# Patient Record
Sex: FEMALE | Race: WHITE | Employment: OTHER | URBAN - METROPOLITAN AREA
[De-identification: names, ages, dates, MRNs, and addresses within clinical notes are randomized per-mention and may not be internally consistent; named-entity substitution may affect disease eponyms.]

---

## 2016-07-11 LAB
AMB DEXA, EXTERNAL: NORMAL
COLONOSCOPY, EXTERNAL: NORMAL
MAMMOGRAPHY, EXTERNAL: NORMAL

## 2017-04-17 DIAGNOSIS — I12.9 HYPERTENSION WITH RENAL DISEASE: ICD-10-CM

## 2017-07-17 ENCOUNTER — OFFICE VISIT (OUTPATIENT)
Dept: INTERNAL MEDICINE CLINIC | Age: 72
End: 2017-07-17

## 2017-07-17 VITALS
HEIGHT: 62 IN | HEART RATE: 80 BPM | SYSTOLIC BLOOD PRESSURE: 140 MMHG | DIASTOLIC BLOOD PRESSURE: 90 MMHG | RESPIRATION RATE: 14 BRPM | WEIGHT: 203.6 LBS | BODY MASS INDEX: 37.47 KG/M2

## 2017-07-17 DIAGNOSIS — M19.90 OSTEOARTHRITIS, UNSPECIFIED OSTEOARTHRITIS TYPE, UNSPECIFIED SITE: ICD-10-CM

## 2017-07-17 DIAGNOSIS — E55.9 VITAMIN D DEFICIENCY: ICD-10-CM

## 2017-07-17 DIAGNOSIS — N18.3 CKD (CHRONIC KIDNEY DISEASE), STAGE 3 (MODERATE): ICD-10-CM

## 2017-07-17 DIAGNOSIS — E66.9 MILD OBESITY: ICD-10-CM

## 2017-07-17 DIAGNOSIS — E78.2 MIXED HYPERLIPIDEMIA: ICD-10-CM

## 2017-07-17 DIAGNOSIS — I12.9 HYPERTENSION WITH RENAL DISEASE: Primary | ICD-10-CM

## 2017-07-17 DIAGNOSIS — M85.80 OSTEOPENIA, UNSPECIFIED LOCATION: ICD-10-CM

## 2017-07-17 DIAGNOSIS — R73.02 GLUCOSE INTOLERANCE (IMPAIRED GLUCOSE TOLERANCE): ICD-10-CM

## 2017-07-17 PROBLEM — R79.89 ELEVATED LFTS: Status: ACTIVE | Noted: 2017-07-17

## 2017-07-17 PROBLEM — N18.9 CKD (CHRONIC KIDNEY DISEASE): Status: ACTIVE | Noted: 2017-07-17

## 2017-07-17 PROBLEM — H40.20X0 PRIMARY ANGLE-CLOSURE GLAUCOMA: Status: ACTIVE | Noted: 2017-07-17

## 2017-07-17 PROBLEM — E78.5 HYPERLIPIDEMIA: Status: ACTIVE | Noted: 2017-07-17

## 2017-07-17 PROBLEM — Z79.899 ON STATIN THERAPY: Status: ACTIVE | Noted: 2017-07-17

## 2017-07-17 PROBLEM — D48.5 NEOPLASM OF UNCERTAIN BEHAVIOR OF SKIN: Status: ACTIVE | Noted: 2017-07-17

## 2017-07-17 PROBLEM — K63.5 COLON POLYPS: Status: ACTIVE | Noted: 2017-07-17

## 2017-07-17 LAB
ALBUMIN SERPL-MCNC: 4.2 G/DL (ref 3.9–5.4)
ALKALINE PHOS POC: 95 U/L (ref 38–126)
ALT SERPL-CCNC: 31 U/L (ref 9–52)
AST SERPL-CCNC: 26 U/L (ref 14–36)
BACTERIA UA POCT, BACTPOCT: NORMAL
BILIRUB UR QL STRIP: NEGATIVE
BUN BLD-MCNC: 19 MG/DL (ref 7–17)
CALCIUM BLD-MCNC: 9.9 MG/DL (ref 8.4–10.2)
CASTS UA POCT: 0
CHLORIDE BLD-SCNC: 99 MMOL/L (ref 98–107)
CHOLEST SERPL-MCNC: 256 MG/DL (ref 0–200)
CK (CPK) POC: 73 U/L (ref 30–135)
CLUE CELLS, CLUEPOCT: NEGATIVE
CO2 POC: 28 MMOL/L (ref 22–32)
CREAT BLD-MCNC: 0.8 MG/DL (ref 0.7–1.2)
CRYSTALS UA POCT, CRYSPOCT: NEGATIVE
EGFR (POC): 75.2
EPITHELIAL CELLS POCT, EPITHPOCT: NORMAL
GLUCOSE POC: 94 MG/DL (ref 65–105)
GLUCOSE UR-MCNC: NEGATIVE MG/DL
GRAN# POC: 3.8 K/UL (ref 2–7.8)
GRAN% POC: 46.5 % (ref 37–92)
HBA1C MFR BLD HPLC: 5.9 % (ref 4–5.7)
HCT VFR BLD CALC: 43.8 %
HDLC SERPL-MCNC: 67 MG/DL (ref 35–130)
HGB BLD-MCNC: 14.1 G/DL (ref 12–18)
KETONES P FAST UR STRIP-MCNC: NEGATIVE MG/DL
LDL CHOLESTEROL POC: 159.8 MG/DL (ref 0–130)
LY# POC: 4 K/UL (ref 0.6–4.1)
LY% POC: 49.6 % (ref 10–58.5)
MCH RBC QN: 26.9 PG (ref 26–32)
MCHC RBC-ENTMCNC: 32.1 G/DL (ref 30–36)
MCV RBC: 84 FL (ref 80–97)
MID #, POC: 0.3 K/UL (ref 0–1.8)
MID% POC: 3.9 % (ref 0.1–24)
MUCUS UA POCT, MUCPOCT: NORMAL
PH UR STRIP: 7 [PH] (ref 4.6–8)
PLATELET # BLD: 264 K/UL (ref 140–440)
POTASSIUM SERPL-SCNC: 4.6 MMOL/L (ref 3.6–5)
PROT SERPL-MCNC: 7.5 G/DL (ref 6.3–8.2)
PROTEIN,URINE POC: NEGATIVE MG/DL
RBC # BLD: 5.22 M/UL (ref 4.2–6.3)
RBC UA POCT, RBCPOCT: NORMAL
SODIUM SERPL-SCNC: 142 MMOL/L (ref 137–145)
SP GR UR STRIP: 1.01 (ref 1–1.03)
T4 FREE SERPL-MCNC: 0.91 NG/DL (ref 0.58–2.3)
TCHOL/HDL RATIO (POC): 3.8 (ref 0–4)
TOTAL BILIRUBIN POC: 1.3 MG/DL (ref 0.2–1.3)
TRICH UA POCT, TRICHPOC: NEGATIVE
TRIGL SERPL-MCNC: 146 MG/DL (ref 0–200)
TSH BLD-ACNC: 2.79 UIU/ML (ref 0.34–5.6)
UA UROBILINOGEN AMB POC: NORMAL (ref 0.2–1)
URINALYSIS CLARITY POC: CLEAR
URINALYSIS COLOR POC: NORMAL
URINE BLOOD POC: NORMAL
URINE LEUKOCYTES POC: NORMAL
URINE NITRITES POC: NEGATIVE
VITAMIN D POC: 44.4 NG/ML (ref 30–96)
VLDLC SERPL CALC-MCNC: 29.2 MG/DL
WBC # BLD: 8.1 K/UL (ref 4.1–10.9)
WBC UA POCT, WBCPOCT: 0
YEAST UA POCT, YEASTPOC: NEGATIVE

## 2017-07-17 RX ORDER — ALPRAZOLAM 0.25 MG/1
0.5 TABLET ORAL
COMMUNITY
End: 2017-08-09 | Stop reason: ALTCHOICE

## 2017-07-17 RX ORDER — POLYETHYLENE GLYCOL 3350 17 G/17G
17 POWDER, FOR SOLUTION ORAL DAILY
COMMUNITY
End: 2017-07-17 | Stop reason: SDUPTHER

## 2017-07-17 RX ORDER — CYCLOBENZAPRINE HCL 10 MG
10 TABLET ORAL 2 TIMES DAILY
COMMUNITY
End: 2017-10-23 | Stop reason: ALTCHOICE

## 2017-07-17 NOTE — PROGRESS NOTES
Chief Complaint   Patient presents with    Annual Exam       SUBJECTIVE:    Gi Shane is a 73 y.o. female who presents for follow-up of her multiple medical problems to include hypertension glucose intolerance hyperlipidemia obesity DJD GERD and osteopenia porosis along with her other medical problems. She claims to be following her diet and trying to get her weight down to like to something that is over-the-counter that may be beneficial to help her with jumpstart her weight loss. She denies any chest pain shortness of breath or cardiovascular complaints at all. There are no GI/ complaints. She does have a lot of stress going on since her 's been diagnosed with advanced prostate cancer and is receiving treatment at the [facility].  There are no other complaints on complete review of systems. Current Outpatient Prescriptions   Medication Sig Dispense Refill    cyclobenzaprine (FLEXERIL) 10 mg tablet Take 10 mg by mouth two (2) times a day.  ALPRAZolam (XANAX) 0.25 mg tablet Take 0.5 mg by mouth two (2) times daily as needed for Anxiety.  Omeprazole delayed release (PRILOSEC D/R) 20 mg tablet Take 20 mg by mouth every morning.  biotin 10,000 mcg cap Take  by mouth daily (with dinner).  Cholecalciferol, Vitamin D3, 3,000 unit tab Take 1,000 Units by mouth every morning.  cyanocobalamin (VITAMIN B12) 500 mcg tablet Take 500 mcg by mouth every morning.  ranitidine (ZANTAC) 300 mg tablet Take 300 mg by mouth daily (with dinner).  irbesartan (AVAPRO) 300 mg tablet Take 300 mg by mouth every morning. Indications: HYPERTENSION      polyethylene glycol (MIRALAX) 17 gram/dose powder Take 17 g by mouth every morning. Indications: CONSTIPATION      hydrochlorothiazide (HYDRODIURIL) 25 mg tablet Take 25 mg by mouth every morning.        Past Medical History:   Diagnosis Date    Anxiety     Arthritis     Chronic constipation     CKD (chronic kidney disease) 7/17/2017    Colon polyps 7/17/2017    DJD (degenerative joint disease) 7/17/2017    Elevated LFTs 7/17/2017    GERD (gastroesophageal reflux disease)     Glucose intolerance (impaired glucose tolerance) 7/17/2017    Hemorrhoids     internal & external- bleeds frequently    High cholesterol     Hyperlipidemia 7/17/2017    Hypertension     Hypertension with renal disease 7/17/2017    PT Education - High Blood Pressure (Essential Hypertension) *: blood pressure PT Education - How to access health information online: discussed with patient and provided information    Hypertension, renal 7/17/2017    Ill-defined condition     ringing in ears    Mild obesity 7/17/2017    Nausea & vomiting     Neoplasm of uncertain behavior of skin 7/17/2017    On statin therapy 7/17/2017    Osteopenia 7/17/2017    Primary angle-closure glaucoma 7/17/2017    Comments: OU    Skin cancer of face     as of 6/15/16 pt states \"removed years ago\"    Spinal stenosis      Past Surgical History:   Procedure Laterality Date    HX CHOLECYSTECTOMY  11/6/2014    Dr Jose E Mina for glaucoma    HX HYSTERECTOMY      partial    HX PELVIC LAPAROSCOPY      Jono. oophorectomy    HX TUBAL LIGATION      MS COLSC FLX W/REMOVAL LESION BY HOT BX FORCEPS  11/12/2012         MS ERCP REMOVE CALCULI/DEBRIS BILIARY/PANCREAS DUCT  11/7/2014         MS ERCP W/SPHINCTEROTOMY/PAPILLOTOMY  11/7/2014          Allergies   Allergen Reactions    Iodinated Contrast- Oral And Iv Dye Hives    Oxycodone-Aspirin Unknown (comments)    Simvastatin Myalgia    Benicar [Olmesartan] Cough       REVIEW OF SYSTEMS:  General: negative for - chills or fever positive anxiety  ENT: negative for - headaches, nasal congestion or tinnitus  Eyes: no blurred or visual changes  Neck: No stiffness or swollen nodes  Respiratory: negative for - cough, hemoptysis, shortness of breath or wheezing  Cardiovascular : negative for - chest pain, edema, palpitations or shortness of breath  Gastrointestinal: negative for - abdominal pain, blood in stools, heartburn or nausea/vomiting  Genito-Urinary: no dysuria, trouble voiding, or hematuria  Musculoskeletal: negative for - gait disturbance, joint pain, joint stiffness or joint swelling  Neurological: no TIA or stroke symptoms  Hematologic: no bruises, no bleeding  Lymphatic: no swollen glands  Integument: no lumps, nail changes or rash positive changed lesion back of R leg  Endocrine:no malaise/lethargy poly uria or polydipsia or unexpected weight changes        Social History     Social History    Marital status:      Spouse name: N/A    Number of children: N/A    Years of education: N/A     Social History Main Topics    Smoking status: Former Smoker     Packs/day: 1.50     Years: 35.00     Quit date: 11/4/2004    Smokeless tobacco: Never Used      Comment: quit smoking 6 yrs ago    Alcohol use No    Drug use: No    Sexual activity: Not on file     Other Topics Concern    Not on file     Social History Narrative     Family History   Problem Relation Age of Onset    Heart Disease Mother     Hypertension Mother     Cancer Mother      skin    Stroke Father     Cancer Sister      breast     Breast Cancer Sister        OBJECTIVE:     Visit Vitals    /90 (BP 1 Location: Left arm, BP Patient Position: Sitting)    Pulse 80    Resp 14    Ht 5' 1.5\" (1.562 m)    Wt 203 lb 9.6 oz (92.4 kg)    BMI 37.85 kg/m2     CONSTITUTIONAL: well , well nourished, appears age appropriate  EYES: perrla, eom intact  ENMT:moist mucous membranes, pharynx clear  NECK: supple.  Thyroid normal, No JVD or bruits  BREAST: Normal female w/o masses or skin changes, no axillary adenopathy  RESPIRATORY: Chest: clear to ascultation and percussion   CARDIOVASCULAR: Heart: regular rate and rhythm no murmurs, rubs or gallops, PMI not displaced  GASTROINTESTINAL: Abdomen: soft, bowel sounds active  HEMATOLOGIC: no pathological lymph nodes palpated  MUSCULOSKELETAL: Extremities: no edema or active synovitis, pulse 1+   INTEGUMENT: No unusual rashes or suspicious skin lesions note except irregular raised lesion back of R calf. Nails appear normal.  NEUROLOGIC: non-focal exam   MENTAL STATUS: alert and oriented, appropriate affect Increased stress    ASSESSMENT:     ICD-10-CM ICD-9-CM    1. Hypertension with renal disease I12.9 403.90 AMB POC COMPLETE CBC,AUTOMATED ENTER      AMB POC COMPREHENSIVE METABOLIC PANEL      AMB POC T4, FREE      AMB POC TSH      AMB POC URINALYSIS DIP STICK AUTO W/ MICRO    2. Glucose intolerance (impaired glucose tolerance) R73.02 790.22 AMB POC HEMOGLOBIN A1C   3. CKD (chronic kidney disease), stage 3 (moderate) N18.3 585.3    4. Mixed hyperlipidemia E78.2 272.2 AMB POC CK (CPK)      AMB POC LIPID PROFILE   5. Osteoarthritis, unspecified osteoarthritis type, unspecified site M19.90 715.90    6. Mild obesity E66.9 278.00    7. Osteopenia, unspecified location M85.80 733.90    8. Vitamin D deficiency E55.9 268.9 AMB POC VITAMIN D     Impression  1. Hypertension that is borderline today 140/90 she thinks stress exacerbated by the fact that her  is ill. We will not make any changes because of that. 2.  Glucose intolerance we will see what that status is today and see if we need to make any changes I did review her last numbers with her A1c at that time was 6.2  3. CKD we will see what that status his last GFR was 75  4. Hyperlipidemia I reviewed her last numbers with her remarkable for a HDL which was 65 LDL was a bit high at 144  5. Mild obesity weight is 063.1 which certainly needs to come down stressed again diet exercise weight reduction I did caution regarding any over-the-counter supplements  6.   Vitamin D deficiency we will see what that status is today  We will call and make further adjustments and recommendations we will continue current medicines and will make any changes we need to based upon lab otherwise follow-up scheduled again for 3 months          PLAN:  .  Orders Placed This Encounter    AMB POC CK (CPK)    AMB POC COMPLETE CBC,AUTOMATED ENTER    AMB POC COMPREHENSIVE METABOLIC PANEL    AMB POC HEMOGLOBIN A1C    AMB POC T4, FREE    AMB POC LIPID PROFILE    AMB POC TSH    AMB POC URINALYSIS DIP STICK AUTO W/ MICRO     AMB POC VITAMIN D    DISCONTD: polyethylene glycol (MIRALAX) 17 gram/dose powder         ATTENTION:   This medical record was transcribed using an electronic medical records system. Although proofread, it may and can contain electronic and spelling errors. Other human spelling and other errors may be present. Corrections may be executed at a later time. Please feel free to contact us for any clarifications as needed.       Follow-up Disposition: Not on File      Clifton Krabbe, MD

## 2017-07-17 NOTE — PROGRESS NOTES
1. Have you been to the ER, urgent care clinic since your last visit? Hospitalized since your last visit? No    2. Have you seen or consulted any other health care providers outside of the [address] [place] [address] since your last visit? Include any pap smears or colon screening. No    Pt under stress due to  dx. Of advanced prostate cancer.

## 2017-08-09 ENCOUNTER — OFFICE VISIT (OUTPATIENT)
Dept: INTERNAL MEDICINE CLINIC | Age: 72
End: 2017-08-09

## 2017-08-09 VITALS
HEART RATE: 92 BPM | BODY MASS INDEX: 37.36 KG/M2 | DIASTOLIC BLOOD PRESSURE: 70 MMHG | TEMPERATURE: 98 F | HEIGHT: 62 IN | RESPIRATION RATE: 18 BRPM | OXYGEN SATURATION: 92 % | SYSTOLIC BLOOD PRESSURE: 122 MMHG | WEIGHT: 203 LBS

## 2017-08-09 DIAGNOSIS — D48.5 NEOPLASM OF UNCERTAIN BEHAVIOR OF SKIN: Primary | ICD-10-CM

## 2017-08-09 NOTE — PROGRESS NOTES
Chief Complaint   Patient presents with    Skin Problem     skin lesion on left lower leg x 6 months has changed color and now raised off the skin     1. Have you been to the ER, urgent care clinic since your last visit? Hospitalized since your last visit? No    2. Have you seen or consulted any other health care providers outside of the [address] [place] [address] since your last visit? Include any pap smears or colon screening.  No

## 2017-08-09 NOTE — PROGRESS NOTES
Subjective:   Kiara Grmaajo is a 81 y.o. female      Chief Complaint   Patient presents with    Skin Problem     skin lesion on left lower leg x 6 months has changed color and now raised off the skin        History of present illness: She presents for removal of skin lesion on the back of the left leg is become larger and very dark in appearance. She denies chest pain shortness breath cardio instruments. There are no neurological points. There are no other complaints on complete review of systems.     Patient Active Problem List   Diagnosis Code    Choledocholithiasis K80.50    Grade IV hemorrhoids K64.3    Colon polyps K63.5    Primary angle-closure glaucoma H40.20X0    Mild obesity E66.9    DJD (degenerative joint disease) M19.90    On statin therapy Z79.899    Hyperlipidemia E78.5    Glucose intolerance (impaired glucose tolerance) R73.02    Hypertension with renal disease I12.9    Osteopenia M85.80    Neoplasm of uncertain behavior of skin D48.5    Elevated LFTs R94.5    CKD (chronic kidney disease) N18.9    Vitamin D deficiency E55.9      Past Medical History:   Diagnosis Date    Anxiety     Arthritis     Chronic constipation     CKD (chronic kidney disease) 7/17/2017    Colon polyps 7/17/2017    DJD (degenerative joint disease) 7/17/2017    Elevated LFTs 7/17/2017    GERD (gastroesophageal reflux disease)     Glucose intolerance (impaired glucose tolerance) 7/17/2017    Hemorrhoids     internal & external- bleeds frequently    High cholesterol     Hyperlipidemia 7/17/2017    Hypertension     Hypertension with renal disease 7/17/2017    PT Education - High Blood Pressure (Essential Hypertension) *: blood pressure PT Education - How to access health information online: discussed with patient and provided information    Hypertension, renal 7/17/2017    Ill-defined condition     ringing in ears    Mild obesity 7/17/2017    Nausea & vomiting     Neoplasm of uncertain behavior of skin 7/17/2017    On statin therapy 7/17/2017    Osteopenia 7/17/2017    Primary angle-closure glaucoma 7/17/2017    Comments: OU    Skin cancer of face     as of 6/15/16 pt states \"removed years ago\"    Spinal stenosis       Allergies   Allergen Reactions    Iodinated Contrast- Oral And Iv Dye Hives    Oxycodone-Aspirin Unknown (comments)    Simvastatin Myalgia    Benicar [Olmesartan] Cough      Family History   Problem Relation Age of Onset    Heart Disease Mother     Hypertension Mother     Cancer Mother      skin    Stroke Father     Breast Cancer Sister       Social History     Social History    Marital status:      Spouse name: N/A    Number of children: N/A    Years of education: N/A     Occupational History    Not on file. Social History Main Topics    Smoking status: Former Smoker     Packs/day: 1.50     Years: 35.00     Quit date: 11/4/2004    Smokeless tobacco: Never Used      Comment: quit smoking 6 yrs ago    Alcohol use No    Drug use: No    Sexual activity: Not on file     Other Topics Concern    Not on file     Social History Narrative     Prior to Admission medications    Medication Sig Start Date End Date Taking? Authorizing Provider   cyclobenzaprine (FLEXERIL) 10 mg tablet Take 10 mg by mouth two (2) times a day. Yes Historical Provider   Omeprazole delayed release (PRILOSEC D/R) 20 mg tablet Take 20 mg by mouth every morning. Yes Historical Provider   biotin 10,000 mcg cap Take  by mouth daily (with dinner). Yes Historical Provider   Cholecalciferol, Vitamin D3, 3,000 unit tab Take 1,000 Units by mouth every morning. Yes Celestino Wetzel MD   cyanocobalamin (VITAMIN B12) 500 mcg tablet Take 500 mcg by mouth every morning. Yes Celestino Wetzel MD   ranitidine (ZANTAC) 300 mg tablet Take 300 mg by mouth daily (with dinner). Yes Historical Provider   irbesartan (AVAPRO) 300 mg tablet Take 300 mg by mouth every morning.  Indications: HYPERTENSION   Yes Historical Provider   polyethylene glycol (MIRALAX) 17 gram/dose powder Take 17 g by mouth every morning. Indications: CONSTIPATION   Yes Historical Provider   hydrochlorothiazide (HYDRODIURIL) 25 mg tablet Take 25 mg by mouth every morning. Yes Celestino Wetzel MD        Review of Systems              Constitutional:  She denies fever, weight loss, sweats or fatigue. Respiratory:  No cough, wheezing or shortness of breath. No sputum production. Cardiac:  Denies chest pain, palpitations, unexplained indigestion, syncope, edema, PND or orthopnea. Skin:  No recent rashes, skin lesion on the back of the left leg has gotten larger and become darker  Neurological:   No syncope, dizziness, or other recent neurologic changes     Objective:     Vitals:    08/09/17 1456   BP: 122/70   Pulse: 92   Resp: 18   Temp: 98 °F (36.7 °C)   TempSrc: Oral   SpO2: 92%   Weight: 203 lb (92.1 kg)   Height: 5' 1.5\" (1.562 m)   PainSc:   0 - No pain       Body mass index is 37.74 kg/(m^2). Physical Examination:              General Appearance:  Well-developed, well-nourished, no acute  distress. Eyes:  Anicteric    Neck:   No JVD noted. Lungs:  Clear to auscultation and percussion. Cardiac:  Regular rate and rhythm without murmur. PMI not displaced. No gallop, rub or click. Extremities:  No edema. Skin:  No rash, but irregular dark raised skin lesion problematic in appearance maximum diameter about 1.4 cm posterior left mid calf  Lymph Nodes:  None felt in the cervical, supraclavicular, axillary or inguinal region. Neurological: . No focal deficits. Assessment/Plan:         1. Neoplasm of uncertain behavior of skin        Impressions/Plan:  We discussed treatment options for the skin lesion and we have elected to excise it after Betadine scrub and local lidocaine anesthesia the lesion was excised in entirety edges were then opposed with 3 sutures of 5-0 Ethilon.   It is sterilely dressed and she is instructed in local care. Specimen sent for path evaluation. She will return in 10 days for suture removal we will notify regarding path results    Follow-up Disposition: Not on File    No results found for any visits on 08/09/17. Carlie Guerrero MD    The patient was given an after visit summary and the patient verbalized an understanding of the plans and problems as explained.

## 2017-08-14 LAB
DX ICD CODE: NORMAL
DX ICD CODE: NORMAL
PATH REPORT.FINAL DX SPEC: NORMAL
PATH REPORT.GROSS SPEC: NORMAL
PATH REPORT.RELEVANT HX SPEC: NORMAL
PATH REPORT.SITE OF ORIGIN SPEC: NORMAL
PATHOLOGIST NAME: NORMAL
PAYMENT PROCEDURE: NORMAL

## 2017-08-18 ENCOUNTER — OFFICE VISIT (OUTPATIENT)
Dept: INTERNAL MEDICINE CLINIC | Age: 72
End: 2017-08-18

## 2017-08-18 DIAGNOSIS — Z48.02 ENCOUNTER FOR REMOVAL OF SUTURES: Primary | ICD-10-CM

## 2017-08-18 NOTE — PROGRESS NOTES
Subjective:   Jovan Jade is a 81 y.o. is here for suture removal.    Objective:   Patient appears well. Wound is healing well, without evidence of infection. Assessment/Plan:   Wound healing well, ready for suture removal.  suture(s) remove x 3, recheck PRN. Discussed taking care of wound and signs of infection.

## 2017-09-21 ENCOUNTER — HOSPITAL ENCOUNTER (OUTPATIENT)
Dept: MAMMOGRAPHY | Age: 72
Discharge: HOME | End: 2017-09-21
Attending: INTERNAL MEDICINE
Payer: MEDICARE

## 2017-09-21 DIAGNOSIS — Z12.31 VISIT FOR SCREENING MAMMOGRAM: ICD-10-CM

## 2017-09-21 PROCEDURE — 77067 SCR MAMMO BI INCL CAD: CPT

## 2017-10-23 ENCOUNTER — OFFICE VISIT (OUTPATIENT)
Dept: INTERNAL MEDICINE CLINIC | Age: 72
End: 2017-10-23

## 2017-10-23 VITALS
TEMPERATURE: 97.7 F | HEART RATE: 78 BPM | HEIGHT: 62 IN | RESPIRATION RATE: 18 BRPM | BODY MASS INDEX: 37.73 KG/M2 | SYSTOLIC BLOOD PRESSURE: 130 MMHG | WEIGHT: 205 LBS | OXYGEN SATURATION: 96 % | DIASTOLIC BLOOD PRESSURE: 89 MMHG

## 2017-10-23 DIAGNOSIS — Z23 ENCOUNTER FOR IMMUNIZATION: ICD-10-CM

## 2017-10-23 DIAGNOSIS — R73.02 GLUCOSE INTOLERANCE (IMPAIRED GLUCOSE TOLERANCE): ICD-10-CM

## 2017-10-23 DIAGNOSIS — M54.50 ACUTE MIDLINE LOW BACK PAIN WITHOUT SCIATICA: ICD-10-CM

## 2017-10-23 DIAGNOSIS — I12.9 HYPERTENSION WITH RENAL DISEASE: Primary | ICD-10-CM

## 2017-10-23 DIAGNOSIS — T75.89XA EFFECTS OF EXPOSURE TO EXTERNAL CAUSE, INITIAL ENCOUNTER: ICD-10-CM

## 2017-10-23 DIAGNOSIS — M85.80 OSTEOPENIA, UNSPECIFIED LOCATION: ICD-10-CM

## 2017-10-23 DIAGNOSIS — E55.9 VITAMIN D DEFICIENCY: ICD-10-CM

## 2017-10-23 DIAGNOSIS — E66.9 MILD OBESITY: ICD-10-CM

## 2017-10-23 DIAGNOSIS — Z00.00 MEDICARE ANNUAL WELLNESS VISIT, INITIAL: ICD-10-CM

## 2017-10-23 DIAGNOSIS — M19.90 OSTEOARTHRITIS, UNSPECIFIED OSTEOARTHRITIS TYPE, UNSPECIFIED SITE: ICD-10-CM

## 2017-10-23 DIAGNOSIS — E78.2 MIXED HYPERLIPIDEMIA: ICD-10-CM

## 2017-10-23 DIAGNOSIS — N18.2 STAGE 2 CHRONIC KIDNEY DISEASE: ICD-10-CM

## 2017-10-23 LAB
ALBUMIN SERPL-MCNC: 4.3 G/DL (ref 3.9–5.4)
ALKALINE PHOS POC: 90 U/L (ref 38–126)
ALT SERPL-CCNC: 36 U/L (ref 9–52)
AST SERPL-CCNC: 27 U/L (ref 14–36)
BACTERIA UA POCT, BACTPOCT: NORMAL
BILIRUB UR QL STRIP: NEGATIVE
BUN BLD-MCNC: 20 MG/DL (ref 7–17)
CALCIUM BLD-MCNC: 9.8 MG/DL (ref 8.4–10.2)
CASTS UA POCT: NORMAL
CHLORIDE BLD-SCNC: 104 MMOL/L (ref 98–107)
CHOLEST SERPL-MCNC: 239 MG/DL (ref 0–200)
CK (CPK) POC: 59 U/L (ref 30–135)
CLUE CELLS, CLUEPOCT: NEGATIVE
CO2 POC: 28 MMOL/L (ref 22–32)
CREAT BLD-MCNC: 0.8 MG/DL (ref 0.7–1.2)
CRYSTALS UA POCT, CRYSPOCT: NEGATIVE
EGFR (POC): 74.7
EPITHELIAL CELLS POCT: NORMAL
GLUCOSE POC: 97 MG/DL (ref 65–105)
GLUCOSE UR-MCNC: NEGATIVE MG/DL
GRAN# POC: 3.6 K/UL (ref 2–7.8)
GRAN% POC: 48.6 % (ref 37–92)
HBA1C MFR BLD HPLC: 6.3 % (ref 4.5–5.7)
HCT VFR BLD CALC: 45.8 % (ref 37–51)
HDLC SERPL-MCNC: 66 MG/DL (ref 35–130)
HGB BLD-MCNC: 14.9 G/DL (ref 12–18)
KETONES P FAST UR STRIP-MCNC: NEGATIVE MG/DL
LDL CHOLESTEROL POC: 148.6 MG/DL (ref 0–130)
LY# POC: 3.5 K/UL (ref 0.6–4.1)
LY% POC: 48 % (ref 10–58.5)
MCH RBC QN: 27.6 PG (ref 26–32)
MCHC RBC-ENTMCNC: 32.5 G/DL (ref 30–36)
MCV RBC: 85 FL (ref 80–97)
MID #, POC: 0.2 K/UL (ref 0–1.8)
MID% POC: 3.4 % (ref 0.1–24)
MUCUS UA POCT, MUCPOCT: NORMAL
PH UR STRIP: 6.5 [PH] (ref 5–7)
PLATELET # BLD: 258 K/UL (ref 140–440)
POTASSIUM SERPL-SCNC: 4.3 MMOL/L (ref 3.6–5)
PROT SERPL-MCNC: 7.6 G/DL (ref 6.3–8.2)
PROT UR QL STRIP: NEGATIVE MG/DL
RBC # BLD: 5.39 M/UL (ref 4.2–6.3)
RBC UA POCT, RBCPOCT: NORMAL
SODIUM SERPL-SCNC: 143 MMOL/L (ref 137–145)
SP GR UR STRIP: 1.01 (ref 1.01–1.02)
T4 FREE SERPL-MCNC: 1.04 NG/DL (ref 0.71–1.85)
TCHOL/HDL RATIO (POC): 3.6 (ref 0–4)
TOTAL BILIRUBIN POC: 1 MG/DL (ref 0.2–1.3)
TRICH UA POCT, TRICHPOC: NEGATIVE
TRIGL SERPL-MCNC: 122 MG/DL (ref 0–200)
TSH BLD-ACNC: 2.38 UIU/ML (ref 0.4–4.2)
UA UROBILINOGEN AMB POC: NORMAL (ref 0.2–1)
URINALYSIS CLARITY POC: CLEAR
URINALYSIS COLOR POC: NORMAL
URINE BLOOD POC: NEGATIVE
URINE CULT COMMENT, POCT: NORMAL
URINE LEUKOCYTES POC: NORMAL
URINE NITRITES POC: NEGATIVE
VITAMIN D POC: 39.7 NG/ML (ref 30–96)
VLDLC SERPL CALC-MCNC: 24.4 MG/DL
WBC # BLD: 7.3 K/UL (ref 4.1–10.9)
WBC UA POCT, WBCPOCT: NORMAL
YEAST UA POCT, YEASTPOC: NEGATIVE

## 2017-10-23 RX ORDER — DIAZEPAM 5 MG/1
TABLET ORAL
COMMUNITY
Start: 2017-10-20 | End: 2017-10-23 | Stop reason: ALTCHOICE

## 2017-10-23 NOTE — PATIENT INSTRUCTIONS

## 2017-10-23 NOTE — PROGRESS NOTES
Chief Complaint   Patient presents with    Blood sugar problem     f/u visit    Cholesterol Problem    Hypertension     1. Have you been to the ER, urgent care clinic since your last visit? Hospitalized since your last visit? No    2. Have you seen or consulted any other health care providers outside of the [address] [place] [address] since your last visit? Include any pap smears or colon screening. No    Per provider order, Preservative high dose Fluzone Influenza Vaccine (0.5) mL was administered to the patient in the left deltoid via IM route. Patient tolerated vaccine/injection well. Patient waited for 20 minutes post injection for observation. No adverse reaction noted. All documentation completed.

## 2017-10-23 NOTE — PROGRESS NOTES
This is an Initial Medicare Annual Wellness Exam (AWV) (Performed 12 months after IPPE or effective date of Medicare Part B enrollment, Once in a lifetime)    I have reviewed the patient's medical history in detail and updated the computerized patient record. She presents today for initial annual Medicare wellness examination screening questionnaire. She is also in follow-up of medical problems include hypertension, glucose intolerance, hyperlipidemia, GERD, DJD, chronic kidney disease stage II, anxiety, and osteopenia, she is taking her medications and trying to follow her diet. She is trying to get some exercise but probably not doing as much as she should. She is noting some low back pain but no other real arthritic complaints. The low back pain seems to be worse some days than others. She notes no radiation to the legs and no numbness or paresthesias. She denies any headaches or other neurologic complaint. She denies chest pain shortness breath or cardiorespiratory complaint. There are no GI/ complaint. There are no other complaints on complete review of systems.     History     Past Medical History:   Diagnosis Date    Anxiety     Arthritis     Chronic constipation     CKD (chronic kidney disease) 7/17/2017    Colon polyps 7/17/2017    DJD (degenerative joint disease) 7/17/2017    Elevated LFTs 7/17/2017    GERD (gastroesophageal reflux disease)     Glucose intolerance (impaired glucose tolerance) 7/17/2017    Hemorrhoids     internal & external- bleeds frequently    High cholesterol     Hyperlipidemia 7/17/2017    Hypertension     Hypertension with renal disease 7/17/2017    PT Education - High Blood Pressure (Essential Hypertension) *: blood pressure PT Education - How to access health information online: discussed with patient and provided information    Hypertension, renal 7/17/2017    Ill-defined condition     ringing in ears    Mild obesity 7/17/2017    Nausea & vomiting  Neoplasm of uncertain behavior of skin 7/17/2017    On statin therapy 7/17/2017    Osteopenia 7/17/2017    Primary angle-closure glaucoma 7/17/2017    Comments: OU    Skin cancer of face     as of 6/15/16 pt states \"removed years ago\"    Spinal stenosis       Past Surgical History:   Procedure Laterality Date    HX CHOLECYSTECTOMY  11/6/2014    Dr Arreola Combe    HX HEENT      laser for glaucoma    HX HYSTERECTOMY      partial    HX PELVIC LAPAROSCOPY      Jono. oophorectomy    HX TUBAL LIGATION      ID COLSC FLX W/REMOVAL LESION BY HOT BX FORCEPS  11/12/2012         ID ERCP REMOVE CALCULI/DEBRIS BILIARY/PANCREAS DUCT  11/7/2014         ID ERCP W/SPHINCTEROTOMY/PAPILLOTOMY  11/7/2014          Current Outpatient Prescriptions   Medication Sig Dispense Refill    Omeprazole delayed release (PRILOSEC D/R) 20 mg tablet Take 20 mg by mouth every morning.  biotin 10,000 mcg cap Take  by mouth daily (with dinner).  Cholecalciferol, Vitamin D3, 3,000 unit tab Take 1,000 Units by mouth every morning.  cyanocobalamin (VITAMIN B12) 500 mcg tablet Take 500 mcg by mouth every morning.  irbesartan (AVAPRO) 300 mg tablet Take 300 mg by mouth every morning. Indications: HYPERTENSION      polyethylene glycol (MIRALAX) 17 gram/dose powder Take 17 g by mouth every morning. Indications: CONSTIPATION      hydrochlorothiazide (HYDRODIURIL) 25 mg tablet Take 25 mg by mouth every morning.        Allergies   Allergen Reactions    Iodinated Contrast- Oral And Iv Dye Hives    Oxycodone-Aspirin Unknown (comments)    Simvastatin Myalgia    Benicar [Olmesartan] Cough     Family History   Problem Relation Age of Onset    Heart Disease Mother     Hypertension Mother     Cancer Mother      skin    Stroke Father     Breast Cancer Sister      onset: 76s     Social History   Substance Use Topics    Smoking status: Former Smoker     Packs/day: 1.50     Years: 35.00     Quit date: 11/4/2004    Smokeless tobacco: Never Used      Comment: quit smoking 6 yrs ago    Alcohol use No     Patient Active Problem List   Diagnosis Code    Choledocholithiasis K80.50    Grade IV hemorrhoids K64.3    Colon polyps K63.5    Primary angle-closure glaucoma H40.20X0    Mild obesity E66.9    DJD (degenerative joint disease) M19.90    On statin therapy Z79.899    Hyperlipidemia E78.5    Glucose intolerance (impaired glucose tolerance) R73.02    Hypertension with renal disease I12.9    Osteopenia M85.80    Neoplasm of uncertain behavior of skin D48.5    Elevated LFTs R79.89    CKD (chronic kidney disease) N18.9    Vitamin D deficiency E55.9    Acute midline low back pain without sciatica M54.5    Exposure T75.89XA    Medicare annual wellness visit, initial Z00.00       Depression Risk Factor Screening:     PHQ over the last two weeks 10/23/2017   Little interest or pleasure in doing things Not at all   Feeling down, depressed or hopeless Not at all   Total Score PHQ 2 0     Alcohol Risk Factor Screening: You do not drink alcohol or very rarely. Functional Ability and Level of Safety:     Hearing Loss  Hearing is good. Activities of Daily Living  The home contains: no safety equipment. Patient does total self care    Fall Risk  Fall Risk Assessment, last 12 mths 10/23/2017   Able to walk? Yes   Fall in past 12 months? No       Abuse Screen  Patient is not abused    Cognitive Screening   Evaluation of Cognitive Function:  Has your family/caregiver stated any concerns about your memory: no  Normal     ROS:    Constitutional: She denies fevers, weight loss, sweats. Eyes: No blurred or double vision. ENT: No difficulty with swallowing, taste, speech or smell. NECK: no stiffness swelling or lymph node enlargement  Respiratory: No cough wheezing or shortness of breath. Cardiovascular: Denies chest pain, palpitations, unexplained indigestion or syncope.   Breast: She has noted no masses or lumps and no discharge or axillary swelling  Gastrointestinal:  No changes in bowel movements, no abdominal pain, no bloating. Genitourinary: No discharge or abnormal bleeding or pain  Extremities: No joint pain, stiffness or swelling. Low back pain as noted above. Neurological:  No numbness, tingling, burring paresthesias or loss of motor strength. No syncope, dizziness or frequent headache  Skin:  No recent rashes or mole changes. Psychiatric/Behavioral:  Negative for depression. The patient is not nervous/anxious. HEMATOLOGIC: no easy bruising or bleeding gums  Endocrine: no sweats of urinary frequency or excessive thirst    Vitals:    10/23/17 1019   BP: 130/89   Pulse: 78   Resp: 18   Temp: 97.7 °F (36.5 °C)   TempSrc: Oral   SpO2: 96%   Weight: 205 lb (93 kg)   Height: 5' 1.5\" (1.562 m)   PainSc:   2   PainLoc: Back        PHYSICAL EXAM:    General appearance - alert, well appearing, and in no distress  Mental status - alert, oriented to person, place, and time  HEENT:  Ears - bilateral TM's and external ear canals clear  Eyes - pupillary responses were normal.  Extraocular muscle function intact. Lids and conjunctiva not injected. Fundoscopic exam revealed sharp disc margins. eye movements intact  Pharynx- clear with teeth in good repair. No masses were noted  Neck - supple without thyromegaly or burit. No JVD noted  Lungs - clear to auscultation and percussion  Cardiac- normal rate, regular rhythm without murmurs. PMI not displaced. No gallop, rub or click  Breast: deferred to GYN  Abdomen - flat, soft, non-tender without palpable organomegaly or mass. No pulsatile mass was felt, and not bruit was heard.   Bowel sounds were active   Female - deferred to GYN  Rectal - deferred to GYN  Extremities -  no clubbing cyanosis or edema  Lymphatics - no palpable lymphadenopathy, no hepatosplenomegaly  Peripheral vascular - Dorsalis pedis and posterior tibial pulses felt without difficulty  Skin - no rash or unusual mole change noted  Neurological - Cranial nerves II-XII grossly intact. Motor strength 5/5. DTR's 2+ and symmetric. Station and gait normal  Back exam - full range of motion, no tenderness, palpable spasm or pain on motion. Straight leg raising is negative bilateral  Musculoskeletal - no joint tenderness, deformity or swelling  Hematologic: no purpura, petechiae or bruising    Patient Care Team   Patient Care Team:  Tyson Joe MD as PCP - General (Internal Medicine)    Assessment/Plan   Education and counseling provided:  Are appropriate based on today's review and evaluation    ASSESSMENT:   1. Hypertension with renal disease    2. Glucose intolerance (impaired glucose tolerance)    3. Mixed hyperlipidemia    4. Osteoarthritis, unspecified osteoarthritis type, unspecified site    5. Stage 2 chronic kidney disease    6. Mild obesity    7. Osteopenia, unspecified location    8. Vitamin D deficiency    9. Effects of exposure to external cause, initial encounter    10. Encounter for immunization    11. Acute midline low back pain without sciatica    12. Medicare annual wellness visit, initial      Impression  1. Hypertension that is controlled we will continue current therapy pending results of today's labs but blood pressure looks good  2. Glucose intolerance repeat status pending we reviewed prior lab with her  3. Hyperlipidemia prior labs reviewed repeat status pending will adjust if necessary  4. DJD all stable except for low back pain x-ray of lumbar spine today reveal some arthritic changes but no acute process we will continue the diclofenac supplementing with as needed Tylenol if he gets worse than we will proceed with a MRI  5. CKD reviewed prior numbers repeat status pending  6. Mild obesity we will see what we can do as far as getting his weight down with diet and exercise which we discussed with her. Getting her weight down I think would help her back pain and I discussed that with her  7. Osteopenia reviewed her prior bone density with her  8. Vitamin D deficiency repeat status pending  Medicare annual wellness examination screening questionnaire completed today. The results were reviewed with her and questions were answered. Lifestyle recommendations and modifications discussed and made. Flu shot given today. Labs are pending as noted will make further recommendations based on those. I recheck her again in 3 months or sooner should be a problem. PLAN:  .  Orders Placed This Encounter    XR SPINE LUMB 2 OR 3 V    Influenza virus vaccine (FLUZONE HIGH-DOSE) 65 years and older (26282)    HEPATITIS C AB    AMB POC VITAMIN D    AMB POC URINALYSIS DIP STICK AUTO W/ MICRO     AMB POC TSH    AMB POC COMPREHENSIVE METABOLIC PANEL    AMB POC LIPID PROFILE    AMB POC T4, FREE    AMB POC HEMOGLOBIN A1C    AMB POC CK (CPK)    AMB POC COMPLETE CBC,AUTOMATED ENTER    DISCONTD: diazePAM (VALIUM) 5 mg tablet         ATTENTION:   This medical record was transcribed using an electronic medical records system. Although proofread, it may and can contain electronic and spelling errors. Other human spelling and other errors may be present. Corrections may be executed at a later time. Please feel free to contact us for any clarifications as needed. Follow-up Disposition:  Return in about 3 months (around 1/23/2018). Vincent Hatfield MD     There are no preventive care reminders to display for this patient.

## 2017-10-23 NOTE — MR AVS SNAPSHOT
Visit Information Date & Time Provider Department Dept. Phone Encounter #  
 10/23/2017  9:20 AM Xiang Dominguez MD [address] [place] [address] [facility] [phone] [id number] Follow-up Instructions Return in about 3 months (around 1/23/2018). Upcoming Health Maintenance Date Due  
 GLAUCOMA SCREENING Q2Y 12/29/2017* ZOSTER VACCINE AGE 60> 12/29/2017* MEDICARE YEARLY EXAM 10/24/2018 COLONOSCOPY 7/11/2019 BREAST CANCER SCRN MAMMOGRAM 9/21/2019 DTaP/Tdap/Td series (2 - Td) 10/23/2027 *Topic was postponed. The date shown is not the original due date. Allergies as of 10/23/2017  Review Complete On: 10/23/2017 By: Xiang Dominguez MD  
  
 Severity Noted Reaction Type Reactions Iodinated Contrast- Oral And Iv Dye  07/17/2017    Hives Oxycodone-aspirin  07/17/2017    Unknown (comments) Simvastatin  07/17/2017    Myalgia Benicar [Olmesartan] Low 11/09/2012   Intolerance Cough Current Immunizations  Never Reviewed Name Date Influenza High Dose Vaccine PF  Incomplete Influenza Vaccine 10/10/2016, 1/11/2016 Pneumococcal Conjugate (PCV-13) 7/6/2015 Pneumococcal Vaccine (Unspecified Type) 10/1/2010 Not reviewed this visit You Were Diagnosed With   
  
 Codes Comments Hypertension with renal disease    -  Primary ICD-10-CM: I12.9 ICD-9-CM: 403.90 Glucose intolerance (impaired glucose tolerance)     ICD-10-CM: R73.02 
ICD-9-CM: 790.22 Mixed hyperlipidemia     ICD-10-CM: E78.2 ICD-9-CM: 272.2 Osteoarthritis, unspecified osteoarthritis type, unspecified site     ICD-10-CM: M19.90 ICD-9-CM: 715.90 Stage 2 chronic kidney disease     ICD-10-CM: N18.2 ICD-9-CM: 663. 2 Mild obesity     ICD-10-CM: E66.9 ICD-9-CM: 278.00 Osteopenia, unspecified location     ICD-10-CM: M85.80 ICD-9-CM: 733.90 Vitamin D deficiency     ICD-10-CM: E55.9 ICD-9-CM: 268.9 Effects of exposure to external cause, initial encounter     ICD-10-CM: T75.89XA ICD-9-CM: 994.9 Encounter for immunization     ICD-10-CM: S29 ICD-9-CM: V03.89 Acute midline low back pain without sciatica     ICD-10-CM: M54.5 ICD-9-CM: 724.2 Medicare annual wellness visit, initial     ICD-10-CM: Z00.00 ICD-9-CM: V70.0 Vitals BP Pulse Temp Resp Height(growth percentile) Weight(growth percentile) 130/89 (BP 1 Location: Right arm, BP Patient Position: Sitting) 78 97.7 °F (36.5 °C) (Oral) 18 5' 1.5\" (1.562 m) 205 lb (93 kg) SpO2 BMI OB Status Smoking Status 96% 38.11 kg/m2 Hysterectomy Former Smoker Vitals History BMI and BSA Data Body Mass Index Body Surface Area  
 38.11 kg/m 2 2.01 m 2 Preferred Pharmacy Pharmacy Name [facility] [address] [phone] Your Updated Medication List  
  
   
This list is accurate as of: 10/23/17 11:50 AM.  Always use your most recent med list.  
  
  
  
  
 biotin 10,000 mcg Cap Take  by mouth daily (with dinner). Cholecalciferol (Vitamin D3) 3,000 unit Tab Take 1,000 Units by mouth every morning. cyanocobalamin 500 mcg tablet Commonly known as:  VITAMIN B12 Take 500 mcg by mouth every morning. hydroCHLOROthiazide 25 mg tablet Commonly known as:  HYDRODIURIL Take 25 mg by mouth every morning. irbesartan 300 mg tablet Commonly known as:  AVAPRO Take 300 mg by mouth every morning. Indications: HYPERTENSION MIRALAX 17 gram/dose powder Generic drug:  polyethylene glycol Take 17 g by mouth every morning. Indications: CONSTIPATION Omeprazole delayed release 20 mg tablet Commonly known as:  PRILOSEC D/R Take 20 mg by mouth every morning. We Performed the Following ADMIN INFLUENZA VIRUS VAC [ HCPCS] CBC WITH AUTOMATED DIFF [22250 CPT(R)] CK [id number] CPT(R)] HEMOGLOBIN A1C WITH EAG [82419 CPT(R)] HEPATITIS C AB [65720 CPT(R)] INFLUENZA VIRUS VACCINE, HIGH DOSE SEASONAL, PRESERVATIVE FREE [98877 CPT(R)] LIPID PANEL [28499 CPT(R)] METABOLIC PANEL, COMPREHENSIVE [52835 CPT(R)] T4, FREE [id number] CPT(R)] TSH 3RD GENERATION [52422 CPT(R)] URINALYSIS W/ RFLX MICROSCOPIC [44742 CPT(R)] VITAMIN D, 25 HYDROXY [id number] CPT(R)] Follow-up Instructions Return in about 3 months (around 1/23/2018). To-Do List   
 10/23/2017 Imaging:  XR SPINE LUMB 2 OR 3 V Patient Instructions Influenza (Flu) Vaccine (Inactivated or Recombinant): What You Need to Know Why get vaccinated? Influenza (\"flu\") is a contagious disease that spreads around the [facility] every winter, usually between October and May. Flu is caused by influenza viruses and is spread mainly by coughing, sneezing, and close contact. Anyone can get flu. Flu strikes suddenly and can last several days. Symptoms vary by age, but can include: · Fever/chills. · Sore throat. · Muscle aches. · Fatigue. · Cough. · Headache. · Runny or stuffy nose. Flu can also lead to pneumonia and blood infections, and cause diarrhea and seizures in children. If you have a medical condition, such as heart or lung disease, flu can make it worse. Flu is more dangerous for some people. Infants and young children, people 72years of age and older, pregnant women, and people with certain health conditions or a weakened immune system are at greatest risk. Each year thousands of people in the [facility] die from flu, and many more are hospitalized. Flu vaccine can: · Keep you from getting flu. · Make flu less severe if you do get it. · Keep you from spreading flu to your family and other people. Inactivated and recombinant flu vaccines A dose of flu vaccine is recommended every flu season.  Children 6 months through 6years of age may need two doses during the same flu season. Everyone else needs only one dose each flu season. Some inactivated flu vaccines contain a very small amount of a mercury-based preservative called thimerosal. Studies have not shown thimerosal in vaccines to be harmful, but flu vaccines that do not contain thimerosal are available. There is no live flu virus in flu shots. They cannot cause the flu. There are many flu viruses, and they are always changing. Each year a new flu vaccine is made to protect against three or four viruses that are likely to cause disease in the upcoming flu season. But even when the vaccine doesn't exactly match these viruses, it may still provide some protection. Flu vaccine cannot prevent: · Flu that is caused by a virus not covered by the vaccine. · Illnesses that look like flu but are not. Some people should not get this vaccine Tell the person who is giving you the vaccine: · If you have any severe (life-threatening) allergies. If you ever had a life-threatening allergic reaction after a dose of flu vaccine, or have a severe allergy to any part of this vaccine, you may be advised not to get vaccinated. Most, but not all, types of flu vaccine contain a small amount of egg protein. · If you ever had Guillain-Barré syndrome (also called GBS) Some people with a history of GBS should not get this vaccine. This should be discussed with your doctor. · If you are not feeling well. It is usually okay to get flu vaccine when you have a mild illness, but you might be asked to come back when you feel better. Risks of a vaccine reaction With any medicine, including vaccines, there is a chance of reactions. These are usually mild and go away on their own, but serious reactions are also possible. Most people who get a flu shot do not have any problems with it. Minor problems following a flu shot include: · Soreness, redness, or swelling where the shot was given · Hoarseness · Sore, red or itchy eyes · Cough · Fever · Aches · Headache · Itching · Fatigue If these problems occur, they usually begin soon after the shot and last 1 or 2 days. More serious problems following a flu shot can include the following: · There may be a small increased risk of Guillain-Barré Syndrome (GBS) after inactivated flu vaccine. This risk has been estimated at 1 or 2 additional cases per million people vaccinated. This is much lower than the risk of severe complications from flu, which can be prevented by flu vaccine. · Ibeth Abbott children who get the flu shot along with pneumococcal vaccine (PCV13) and/or DTaP vaccine at the same time might be slightly more likely to have a seizure caused by fever. Ask your doctor for more information. Tell your doctor if a child who is getting flu vaccine has ever had a seizure Problems that could happen after any injected vaccine: · People sometimes faint after a medical procedure, including vaccination. Sitting or lying down for about 15 minutes can help prevent fainting, and injuries caused by a fall. Tell your doctor if you feel dizzy, or have vision changes or ringing in the ears. · Some people get severe pain in the shoulder and have difficulty moving the arm where a shot was given. This happens very rarely. · Any medication can cause a severe allergic reaction. Such reactions from a vaccine are very rare, estimated at about 1 in a million doses, and would happen within a few minutes to a few hours after the vaccination. As with any medicine, there is a very remote chance of a vaccine causing a serious injury or death. The safety of vaccines is always being monitored. For more information, visit: www.cdc.gov/vaccinesafety/. What if there is a serious reaction? What should I look for? · Look for anything that concerns you, such as signs of a severe allergic reaction, very high fever, or unusual behavior. Signs of a severe allergic reaction can include hives, swelling of the face and throat, difficulty breathing, a fast heartbeat, dizziness, and weakness  usually within a few minutes to a few hours after the vaccination. What should I do? · If you think it is a severe allergic reaction or other emergency that can't wait, call 9-1-1 and get the person to the nearest hospital. Otherwise, call your doctor. · Reactions should be reported to the \"Vaccine Adverse Event Reporting System\" (VAERS). Your doctor should file this report, or you can do it yourself through the VAERS website at www.vaers. [facility].gov, or by calling 5-241.866.5799. [employer] does not give medical advice. The National Vaccine Injury Compensation Program 
The National Vaccine Injury Compensation Program (VICP) is a federal program that was created to compensate people who may have been injured by certain vaccines. Persons who believe they may have been injured by a vaccine can learn about the program and about filing a claim by calling 9-743.105.8427 or visiting the [employer] website at www.[facility].gov/vaccinecompensation. There is a time limit to file a claim for compensation. How can I learn more? · Ask your healthcare provider. He or she can give you the vaccine package insert or suggest other sources of information. · Call your local or state health department. · Contact the Centers for Disease Control and Prevention (CDC): 
¨ Call 5-191.600.6882 (1-800-CDC-INFO) or ¨ Visit CDC's website at www.cdc.gov/flu Vaccine Information Statement Inactivated Influenza Vaccine 8/7/2015) 42 JACQUES Contrerasoli White 477GV-06 Department of Health and [employer] Centers for Disease Control and Prevention Many Vaccine Information Statements are available in Portuguese and other languages. See www.immunize.org/vis. Muchas hojas de información sobre vacunas están disponibles en español y en otros idiomas. Visite www.immunize.org/vis. Care instructions adapted under license by [employer] (which disclaims liability or warranty for this information). If you have questions about a medical condition or this instruction, always ask your healthcare professional. [address] any warranty or liability for your use of this information. Introducing [facility] & HEALTH SERVICES! [facility] introduces Engrade patient portal. Now you can access parts of your medical record, email your doctor's office, and request medication refills online. 1. In your internet browser, go to [email or web address] 2. Click on the First Time User? Click Here link in the Sign In box. You will see the New Member Sign Up page. 3. Enter your Engrade Access Code exactly as it appears below. You will not need to use this code after youve completed the sign-up process. If you do not sign up before the expiration date, you must request a new code. · Engrade Access Code: [email or web address] Expires: 11/7/2017  2:06 PM 
 
4. Enter the last four digits of your Social Security Number (xxxx) and Date of Birth (mm/dd/yyyy) as indicated and click Submit. You will be taken to the next sign-up page. 5. Create a Engrade ID. This will be your Engrade login ID and cannot be changed, so think of one that is secure and easy to remember. 6. Create a Engrade password. You can change your password at any time. 7. Enter your Password Reset Question and Answer. This can be used at a later time if you forget your password. 8. Enter your e-mail address. You will receive e-mail notification when new information is available in [address] 9. Click Sign Up. You can now view and download portions of your medical record. 10. Click the Download Summary menu link to download a portable copy of your medical information.  
 
If you have questions, please visit the Frequently Asked Questions section of the [employer]. Remember, [employer] is NOT to be used for urgent needs. For medical emergencies, dial 911. Now available from your iPhone and Android! Please provide this summary of care documentation to your next provider. Your primary care clinician is listed as Yakov. If you have any questions after today's visit, please call [phone].

## 2017-10-24 LAB — HCV AB S/CO SERPL IA: <0.1 S/CO RATIO (ref 0–0.9)

## 2018-01-05 ENCOUNTER — OFFICE VISIT (OUTPATIENT)
Dept: INTERNAL MEDICINE CLINIC | Age: 73
End: 2018-01-05

## 2018-01-05 VITALS
BODY MASS INDEX: 38.68 KG/M2 | SYSTOLIC BLOOD PRESSURE: 118 MMHG | DIASTOLIC BLOOD PRESSURE: 80 MMHG | OXYGEN SATURATION: 98 % | HEIGHT: 62 IN | WEIGHT: 210.2 LBS | HEART RATE: 91 BPM

## 2018-01-05 DIAGNOSIS — M54.50 ACUTE MIDLINE LOW BACK PAIN WITHOUT SCIATICA: Primary | ICD-10-CM

## 2018-01-05 RX ORDER — PREDNISONE 10 MG/1
10 TABLET ORAL SEE ADMIN INSTRUCTIONS
Qty: 48 TAB | Refills: 0 | Status: SHIPPED | OUTPATIENT
Start: 2018-01-05 | End: 2018-01-22 | Stop reason: ALTCHOICE

## 2018-01-05 NOTE — MR AVS SNAPSHOT
Visit Information Date & Time Provider Department Dept. Phone Encounter #  
 1/5/2018  1:30 PM Marilee Morgan, [address] [facility] [phone] [id number] Follow-up Instructions Return if symptoms worsen or fail to improve. Follow-up and Disposition History Your Appointments 2/5/2018  9:20 AM  
FOLLOW UP 10 with MD JUDIE Lawrence [facility] ([address]) Appt Note: [address] [facility] [address] [phone] [address] [address] Upcoming Health Maintenance Date Due ZOSTER VACCINE AGE 60> 6/2/2007 GLAUCOMA SCREENING Q2Y 8/2/2012 MEDICARE YEARLY EXAM 10/24/2018 COLONOSCOPY 7/11/2019 BREAST CANCER SCRN MAMMOGRAM 9/21/2019 DTaP/Tdap/Td series (2 - Td) 10/23/2027 Allergies as of 1/5/2018  Review Complete On: 1/5/2018 By: Marilee Morgan MD  
  
 Severity Noted Reaction Type Reactions Iodinated Contrast- Oral And Iv Dye  07/17/2017    Hives Oxycodone-aspirin  07/17/2017    Unknown (comments) Simvastatin  07/17/2017    Myalgia Benicar [Olmesartan] Low 11/09/2012   Intolerance Cough Current Immunizations  Never Reviewed Name Date Influenza High Dose Vaccine PF 10/23/2017 Influenza Vaccine 10/10/2016, 1/11/2016 Pneumococcal Conjugate (PCV-13) 7/6/2015 Pneumococcal Vaccine (Unspecified Type) 10/1/2010 Not reviewed this visit You Were Diagnosed With   
  
 Codes Comments Acute midline low back pain without sciatica    -  Primary ICD-10-CM: M54.5 ICD-9-CM: 724.2 Vitals BP Pulse Height(growth percentile) Weight(growth percentile) SpO2 BMI  
 118/80 (BP 1 Location: Left arm, BP Patient Position: Sitting) 91 5' 1.5\" (1.562 m) 210 lb 3.2 oz (95.3 kg) 98% 39.07 kg/m2 OB Status Smoking Status Hysterectomy Former Smoker BMI and BSA Data Body Mass Index Body Surface Area 39.07 kg/m 2 2.03 m 2 Preferred Pharmacy Pharmacy Name Phone [facility] Vicente Anguiano [phone] Your Updated Medication List  
  
   
This list is accurate as of: 1/5/18  2:12 PM.  Always use your most recent med list.  
  
  
  
  
 biotin 10,000 mcg Cap Take  by mouth daily (with dinner). Cholecalciferol (Vitamin D3) 3,000 unit Tab Take 1,000 Units by mouth every morning. cyanocobalamin 500 mcg tablet Commonly known as:  VITAMIN B12 Take 500 mcg by mouth every morning. hydroCHLOROthiazide 25 mg tablet Commonly known as:  HYDRODIURIL Take 25 mg by mouth every morning. irbesartan 300 mg tablet Commonly known as:  AVAPRO Take 300 mg by mouth every morning. Indications: HYPERTENSION MIRALAX 17 gram/dose powder Generic drug:  polyethylene glycol Take 17 g by mouth every morning. Indications: CONSTIPATION Omeprazole delayed release 20 mg tablet Commonly known as:  PRILOSEC D/R Take 20 mg by mouth every morning. predniSONE 10 mg dose pack Commonly known as:  STERAPRED DS Take 1 Tab by mouth See Admin Instructions. See administration instruction per 10mg dose pack Prescriptions Sent to Pharmacy Refills  
 predniSONE (STERAPRED DS) 10 mg dose pack 0 Sig: Take 1 Tab by mouth See Admin Instructions. See administration instruction per 10mg dose pack Class: Normal  
 [facility] [address] [facility] Raya Ferguson Ph #: [phone] Route: Oral  
  
Follow-up Instructions Return if symptoms worsen or fail to improve. To-Do List   
 01/05/2018 Imaging:  MRI LUMB SPINE WO CONT   
  
 01/06/2018 9:45 AM  
  Appointment with [facility] MRI 2 at [facility] ([phone]) 1.  Please bring a list or a bag of your current medications to your appointment 2. Please be sure to remove ALL hair clips, pins, extensions, etc., prior to arriving for your MRI procedure. 3. If you have any medical implants or devices, please bring associated medical card with you. 4. Bring any non [facility] films or CDs pertaining to the area being imaged with you on the day of appointment. 5. A written order with a valid diagnosis and Physicians  signature is required for all scheduled tests. 6. Check in at registration 30min before your appointment time unless you were instructed to do otherwise. Introducing [facility] & HEALTH SERVICES! [facility] introduces [email or web address] patient portal. Now you can access parts of your medical record, email your doctor's office, and request medication refills online. 1. In your internet browser, go to [email or web address] 2. Click on the First Time User? Click Here link in the Sign In box. You will see the New Member Sign Up page. 3. Enter your [email or web address] Access Code exactly as it appears below. You will not need to use this code after youve completed the sign-up process. If you do not sign up before the expiration date, you must request a new code. · [email or web address] Access Code: [email or web address] Expires: 4/5/2018 12:49 PM 
 
4. Enter the last four digits of your Social Security Number (xxxx) and Date of Birth (mm/dd/yyyy) as indicated and click Submit. You will be taken to the next sign-up page. 5. Create a [email or web address] ID. This will be your [email or web address] login ID and cannot be changed, so think of one that is secure and easy to remember. 6. Create a [email or web address] password. You can change your password at any time. 7. Enter your Password Reset Question and Answer. This can be used at a later time if you forget your password. 8. Enter your e-mail address. You will receive e-mail notification when new information is available in [address] 9. Click Sign Up. You can now view and download portions of your medical record. 10. Click the Download Summary menu link to download a portable copy of your medical information. If you have questions, please visit the Frequently Asked Questions section of the [employer] website. Remember, [employer] is NOT to be used for urgent needs. For medical emergencies, dial 911. Now available from your iPhone and Android! Please provide this summary of care documentation to your next provider. Your primary care clinician is listed as Yakov. If you have any questions after today's visit, please call [phone].

## 2018-01-05 NOTE — PROGRESS NOTES
Subjective:   Roslyn Cordova is a 81 y.o. female      Chief Complaint   Patient presents with    Back Pain        History of present illness: She presents today complains of low back pain is been bothering on and off for a while but became severe yesterday morning when she awakened to the point where actually she has a hard time sitting standing or getting out of bed or even sleeping because the pain is so bad. She is noted the pain seemed to go to both legs. We have previously x-rayed that she wants male presents of arthritis but no acute process and it seemed to be okay after that for a while until it started to flareup again yesterday. She denies bowel or bladder dysfunction.     Patient Active Problem List   Diagnosis Code    Choledocholithiasis K80.50    Grade IV hemorrhoids K64.3    Colon polyps K63.5    Primary angle-closure glaucoma H40.20X0    Mild obesity E66.9    DJD (degenerative joint disease) M19.90    On statin therapy Z79.899    Hyperlipidemia E78.5    Glucose intolerance (impaired glucose tolerance) R73.02    Hypertension with renal disease I12.9    Osteopenia M85.80    Neoplasm of uncertain behavior of skin D48.5    Elevated LFTs R79.89    CKD (chronic kidney disease) N18.9    Vitamin D deficiency E55.9    Acute midline low back pain without sciatica M54.5    Exposure T75.89XA    Medicare annual wellness visit, initial Z00.00      Past Medical History:   Diagnosis Date    Anxiety     Arthritis     Chronic constipation     CKD (chronic kidney disease) 7/17/2017    Colon polyps 7/17/2017    DJD (degenerative joint disease) 7/17/2017    Elevated LFTs 7/17/2017    GERD (gastroesophageal reflux disease)     Glucose intolerance (impaired glucose tolerance) 7/17/2017    Hemorrhoids     internal & external- bleeds frequently    High cholesterol     Hyperlipidemia 7/17/2017    Hypertension     Hypertension with renal disease 7/17/2017    PT Education - High Blood Pressure (Essential Hypertension) *: blood pressure PT Education - How to access health information online: discussed with patient and provided information    Hypertension, renal 7/17/2017    Ill-defined condition     ringing in ears    Mild obesity 7/17/2017    Nausea & vomiting     Neoplasm of uncertain behavior of skin 7/17/2017    On statin therapy 7/17/2017    Osteopenia 7/17/2017    Primary angle-closure glaucoma 7/17/2017    Comments: OU    Skin cancer of face     as of 6/15/16 pt states \"removed years ago\"    Spinal stenosis       Allergies   Allergen Reactions    Iodinated Contrast- Oral And Iv Dye Hives    Oxycodone-Aspirin Unknown (comments)    Simvastatin Myalgia    Benicar [Olmesartan] Cough      Family History   Problem Relation Age of Onset    Heart Disease Mother     Hypertension Mother     Cancer Mother      skin    Stroke Father     Breast Cancer Sister      onset: 76s      Social History     Social History    Marital status:      Spouse name: N/A    Number of children: N/A    Years of education: N/A     Occupational History    Not on file. Social History Main Topics    Smoking status: Former Smoker     Packs/day: 1.50     Years: 35.00     Quit date: 11/4/2004    Smokeless tobacco: Never Used      Comment: quit smoking 6 yrs ago    Alcohol use No    Drug use: No    Sexual activity: No     Other Topics Concern    Not on file     Social History Narrative     Prior to Admission medications    Medication Sig Start Date End Date Taking? Authorizing Provider   predniSONE (STERAPRED DS) 10 mg dose pack Take 1 Tab by mouth See Admin Instructions. See administration instruction per 10mg dose pack 1/5/18  Yes Dejuan Nash MD   Omeprazole delayed release (PRILOSEC D/R) 20 mg tablet Take 20 mg by mouth every morning. Yes Historical Provider   biotin 10,000 mcg cap Take  by mouth daily (with dinner).    Yes Historical Provider   Cholecalciferol, Vitamin D3, 3,000 unit tab Take 1,000 Units by mouth every morning. Yes Celestino Wetzel MD   cyanocobalamin (VITAMIN B12) 500 mcg tablet Take 500 mcg by mouth every morning. Yes Celestino Wetzel MD   irbesartan (AVAPRO) 300 mg tablet Take 300 mg by mouth every morning. Indications: HYPERTENSION   Yes Historical Provider   polyethylene glycol (MIRALAX) 17 gram/dose powder Take 17 g by mouth every morning. Indications: CONSTIPATION   Yes Historical Provider   hydrochlorothiazide (HYDRODIURIL) 25 mg tablet Take 25 mg by mouth every morning. Yes Celestino Wetzel MD        Review of Systems              Constitutional:  She denies fever, weight loss, sweats or fatigue. EYES: No blurred or double vision,               ENT: no nasal congestion, no headache or dizziness. No difficulty with               swallowing, taste, speech or smell. Respiratory:  No cough, wheezing or shortness of breath. No sputum production. Cardiac:  Denies chest pain, palpitations, unexplained indigestion, syncope, edema, PND or orthopnea. GI:  No changes in bowel movements, no abdominal pain, no bloating, anorexia, nausea, vomiting or heartburn. :  No frequency or dysuria. Denies incontinence or sexual dysfunction. Extremities:  No joint pain, stiffness or swelling  Back:. Low back pain as described above radiating to both legs  Skin:  No recent rashes or mole changes. Neurological:  No numbness, tingling, burning paresthesias or loss of motor strength. No syncope, dizziness, frequent headaches or memory loss. Hematologic:  No easy bruising  Lymphatic: No lymph node enlargement    Objective:     Vitals:    01/05/18 1316   BP: 118/80   Pulse: 91   SpO2: 98%   Weight: 210 lb 3.2 oz (95.3 kg)   Height: 5' 1.5\" (1.562 m)   PainSc:   8   PainLoc: Back       Body mass index is 39.07 kg/(m^2). Physical Examination:              General Appearance:  Well-developed, well-nourished, no acute distress.              HEENT:      Ears:  The TMs and ear canals were clear. Eyes:  The pupillary responses were normal.  Extraocular muscle function intact. Lids and conjunctiva not injected. Funduscopic exam revealed sharp disc margins. Nares: Clear w/o edema or erythema  Pharynx:  Clear with teeth in good repair. No masses were noted. Neck:  Supple without thyromegaly or adenopathy. No JVD noted. No carotid                bruits. Lungs:  Clear to auscultation and percussion. Cardiac:  Regular rate and rhythm without murmur. PMI not displaced. No gallop, rub or click. Abdominal: Soft, non-tender, no hepata-spleenomegally or masses  Extremities:  No clubbing, cyanosis or edema. Back: Paraspinal lower lumbar discomfort with pain bilateral straight leg raising about 30°. Skin:  No rash or unusual mole changes noted. Lymph Nodes:  None felt in the cervical, supraclavicular, axillary or inguinal region. Neurological: . DTRs 2+ and symmetric. Station and gait normal.   Hematologic:   No purpura or petechiae        Assessment/Plan:         1. Acute midline low back pain without sciatica        Impressions/Plan:  Impression low back pain which I read reviewed her prior lumbar spine films showed some L5-S1 disc disease and based upon the fact of her severe pain I think we need to evaluate this further with an MRI. I have scheduled that I have placed her on a steroid 10 mg 12 day Dosepak. We will determine further evaluation and treatment based upon the above findings and response to therapy. Orders Placed This Encounter    MRI LUMB SPINE WO CONT    predniSONE (STERAPRED DS) 10 mg dose pack       Follow-up Disposition:  Return if symptoms worsen or fail to improve. No results found for any visits on 01/05/18. Aimee Matthew MD    The patient was given after the visit summary the patient verbalized an understanding of the plans and problems as explained.

## 2018-01-05 NOTE — PROGRESS NOTES
Lawanda Sorto is a 81 y.o. female presenting for Back Pain  . 1. Have you been to the ER, urgent care clinic since your last visit? Hospitalized since your last visit? No    2. Have you seen or consulted any other health care providers outside of the [address] [place] [address] since your last visit? Include any pap smears or colon screening. No    Fall Risk Assessment, last 12 mths 10/23/2017   Able to walk? Yes   Fall in past 12 months? No         Abuse Screening Questionnaire 7/17/2017   Do you ever feel afraid of your partner? N   Are you in a relationship with someone who physically or mentally threatens you? N   Is it safe for you to go home? Y       PHQ over the last two weeks 10/23/2017   Little interest or pleasure in doing things Not at all   Feeling down, depressed or hopeless Not at all   Total Score PHQ 2 0       There are no discontinued medications.

## 2018-01-06 ENCOUNTER — HOSPITAL ENCOUNTER (OUTPATIENT)
Dept: MRI IMAGING | Age: 73
Discharge: HOME | End: 2018-01-06
Attending: INTERNAL MEDICINE
Payer: MEDICARE

## 2018-01-06 DIAGNOSIS — M54.50 ACUTE MIDLINE LOW BACK PAIN WITHOUT SCIATICA: ICD-10-CM

## 2018-01-06 PROCEDURE — 72148 MRI LUMBAR SPINE W/O DYE: CPT

## 2018-01-08 NOTE — PROGRESS NOTES
Diffuse arthritis in the lumbar spine but no evidence of spinal stenosis or disc herniation. We could treat acutely with a steroid Dosepak if we have not already done that.

## 2018-01-08 NOTE — PROGRESS NOTES
Diffuse arthritis in the lumbar spine but no evidence of spinal stenosis or disc herniation. We could treat acutely with a steroid Dosepak if we have not already done that. Patient informed. Was given a steroid dose alanna 10 mg 12 day. Advised to supplement with tylenol arthritis and also use a heating pad while sitting in chair. Advised not to sleep with it.

## 2018-01-16 ENCOUNTER — OFFICE VISIT (OUTPATIENT)
Dept: INTERNAL MEDICINE CLINIC | Age: 73
End: 2018-01-16

## 2018-01-16 VITALS
RESPIRATION RATE: 20 BRPM | HEART RATE: 91 BPM | BODY MASS INDEX: 38.46 KG/M2 | TEMPERATURE: 97.9 F | WEIGHT: 209 LBS | OXYGEN SATURATION: 96 % | HEIGHT: 62 IN | DIASTOLIC BLOOD PRESSURE: 86 MMHG | SYSTOLIC BLOOD PRESSURE: 133 MMHG

## 2018-01-16 DIAGNOSIS — R10.11 RIGHT UPPER QUADRANT ABDOMINAL PAIN: Primary | ICD-10-CM

## 2018-01-16 DIAGNOSIS — R07.89 OTHER CHEST PAIN: ICD-10-CM

## 2018-01-16 DIAGNOSIS — I12.9 HYPERTENSION WITH RENAL DISEASE: ICD-10-CM

## 2018-01-16 LAB
ALBUMIN SERPL-MCNC: 4.2 G/DL (ref 3.9–5.4)
ALKALINE PHOS POC: 71 U/L (ref 38–126)
ALT SERPL-CCNC: 29 U/L (ref 9–52)
AMYLASE, POCT, AMYLPOCT: 155 U/L (ref 30–100)
AST SERPL-CCNC: 19 U/L (ref 14–36)
BUN BLD-MCNC: 24 MG/DL (ref 7–17)
CALCIUM BLD-MCNC: 9.8 MG/DL (ref 8.4–10.2)
CHLORIDE BLD-SCNC: 96 MMOL/L (ref 98–107)
CO2 POC: 30 MMOL/L (ref 22–32)
CREAT BLD-MCNC: 0.8 MG/DL (ref 0.7–1.2)
EGFR (POC): 74.7
GLUCOSE POC: 112 MG/DL (ref 65–105)
GRAN# POC: 9.3 K/UL (ref 2–7.8)
GRAN% POC: 58.2 % (ref 37–92)
HCT VFR BLD CALC: 46.5 % (ref 37–51)
HGB BLD-MCNC: 15.2 G/DL (ref 12–18)
LY# POC: 5.8 K/UL (ref 0.6–4.1)
LY% POC: 37.4 % (ref 10–58.5)
MCH RBC QN: 28.4 PG (ref 26–32)
MCHC RBC-ENTMCNC: 32.7 G/DL (ref 30–36)
MCV RBC: 87 FL (ref 80–97)
MID #, POC: 0.6 K/UL (ref 0–1.8)
MID% POC: 4.4 % (ref 0.1–24)
PLATELET # BLD: 298 K/UL (ref 140–440)
POTASSIUM SERPL-SCNC: 4.5 MMOL/L (ref 3.6–5)
PROT SERPL-MCNC: 7.2 G/DL (ref 6.3–8.2)
RBC # BLD: 5.35 M/UL (ref 4.2–6.3)
SODIUM SERPL-SCNC: 137 MMOL/L (ref 137–145)
TOTAL BILIRUBIN POC: 1.1 MG/DL (ref 0.2–1.3)
WBC # BLD: 15.7 K/UL (ref 4.1–10.9)

## 2018-01-16 RX ORDER — CIPROFLOXACIN 500 MG/1
500 TABLET ORAL 2 TIMES DAILY
Qty: 20 TAB | Refills: 0 | Status: SHIPPED | OUTPATIENT
Start: 2018-01-16 | End: 2018-02-05 | Stop reason: ALTCHOICE

## 2018-01-16 NOTE — MR AVS SNAPSHOT
[address] [facility] [address] [id number] [phone] Patient: Sotero Maria MRN: [id number] BNP:3/0/5162 Visit Information Date & Time Provider Department Dept. Phone Encounter #  
 1/16/2018  3:50 PM Preston Nagel [facility] [id number] Follow-up Instructions Return if symptoms worsen or fail to improve. Your Appointments 2/5/2018  9:20 AM  
FOLLOW UP 10 with MD JUDIE Nagel [facility] ([address]) Appt Note: [address] [id number] [address] [address] Upcoming Health Maintenance Date Due ZOSTER VACCINE AGE 60> 6/2/2007 GLAUCOMA SCREENING Q2Y 8/2/2012 MEDICARE YEARLY EXAM 10/24/2018 COLONOSCOPY 7/11/2019 BREAST CANCER SCRN MAMMOGRAM 9/21/2019 DTaP/Tdap/Td series (2 - Td) 10/23/2027 Allergies as of 1/16/2018  Review Complete On: 1/16/2018 By: Lorena Fields MD  
  
 Severity Noted Reaction Type Reactions Iodinated Contrast- Oral And Iv Dye  07/17/2017    Hives Oxycodone-aspirin  07/17/2017    Unknown (comments) Simvastatin  07/17/2017    Myalgia Benicar [Olmesartan] Low 11/09/2012   Intolerance Cough Current Immunizations  Never Reviewed Name Date Influenza High Dose Vaccine PF 10/23/2017 Influenza Vaccine 10/10/2016, 1/11/2016 Pneumococcal Conjugate (PCV-13) 7/6/2015 Pneumococcal Vaccine (Unspecified Type) 10/1/2010 Not reviewed this visit You Were Diagnosed With   
  
 Codes Comments Right upper quadrant abdominal pain    -  Primary ICD-10-CM: R10.11 ICD-9-CM: 789.01 Other chest pain     ICD-10-CM: R07.89 ICD-9-CM: 786.59 Hypertension with renal disease     ICD-10-CM: I12.9 ICD-9-CM: 403.90 Vitals BP Pulse Temp Resp Height(growth percentile) Weight(growth percentile) 133/86 (BP 1 Location: Right arm, BP Patient Position: Sitting) 91 97.9 °F (36.6 °C) (Oral) 20 5' 1.5\" (1.562 m) 209 lb (94.8 kg) SpO2 BMI OB Status Smoking Status 96% 38.85 kg/m2 Hysterectomy Former Smoker Vitals History BMI and BSA Data Body Mass Index Body Surface Area  
 38.85 kg/m 2 2.03 m 2 Preferred Pharmacy Pharmacy Name Phone [facility] [place] [phone] Your Updated Medication List  
  
   
This list is accurate as of: 1/16/18  5:02 PM.  Always use your most recent med list.  
  
  
  
  
 biotin 10,000 mcg Cap Take  by mouth daily (with dinner). Cholecalciferol (Vitamin D3) 3,000 unit Tab Take 1,000 Units by mouth every morning. ciprofloxacin HCl 500 mg tablet Commonly known as:  CIPRO Take 1 Tab by mouth two (2) times a day. cyanocobalamin 500 mcg tablet Commonly known as:  VITAMIN B12 Take 500 mcg by mouth every morning. hydroCHLOROthiazide 25 mg tablet Commonly known as:  HYDRODIURIL Take 25 mg by mouth every morning. irbesartan 300 mg tablet Commonly known as:  AVAPRO Take 300 mg by mouth every morning. Indications: HYPERTENSION MIRALAX 17 gram/dose powder Generic drug:  polyethylene glycol Take 17 g by mouth every morning. Indications: CONSTIPATION Omeprazole delayed release 20 mg tablet Commonly known as:  PRILOSEC D/R Take 20 mg by mouth every morning. predniSONE 10 mg dose pack Commonly known as:  STERAPRED DS Take 1 Tab by mouth See Admin Instructions. See administration instruction per 10mg dose pack Prescriptions Sent to Pharmacy Refills  
 ciprofloxacin HCl (CIPRO) 500 mg tablet 0 Sig: Take 1 Tab by mouth two (2) times a day.   
 Class: Normal  
 Pharmacy: [address] [facility], [address] Ph #: [phone] Route: Oral  
  
We Performed the Following AMB POC AMYLASE [59081 CPT(R)] AMB POC COMPLETE CBC,AUTOMATED ENTER A6836413 CPT(R)] AMB POC COMPREHENSIVE METABOLIC PANEL [38343 CPT(R)] AMB POC EKG ROUTINE W/ 12 LEADS, INTER & REP [47094 CPT(R)] Follow-up Instructions Return if symptoms worsen or fail to improve. To-Do List   
 01/16/2018 Imaging:  CT ABD W WO CONT   
  
 01/16/2018 Imaging:  XR ABD ACUTE W 1 V CHEST Patient Instructions Chest Pain: Care Instructions Your Care Instructions There are many things that can cause chest pain. Some are not serious and will get better on their own in a few days. But some kinds of chest pain need more testing and treatment. Your doctor may have recommended a follow-up visit in the next 8 to 12 hours. If you are not getting better, you may need more tests or treatment. Even though your doctor has released you, you still need to watch for any problems. The doctor carefully checked you, but sometimes problems can develop later. If you have new symptoms or if your symptoms do not get better, get medical care right away. If you have worse or different chest pain or pressure that lasts more than 5 minutes or you passed out (lost consciousness), call 911 or seek other emergency help right away. A medical visit is only one step in your treatment. Even if you feel better, you still need to do what your doctor recommends, such as going to all suggested follow-up appointments and taking medicines exactly as directed. This will help you recover and help prevent future problems. How can you care for yourself at home? · Rest until you feel better. · Take your medicine exactly as prescribed. Call your doctor if you think you are having a problem with your medicine. · Do not drive after taking a prescription pain medicine. When should you call for help? Call 911 if: ? · You passed out (lost consciousness). ? · You have severe difficulty breathing. ? · You have symptoms of a heart attack. These may include: ¨ Chest pain or pressure, or a strange feeling in your chest. 
¨ Sweating. ¨ Shortness of breath. ¨ Nausea or vomiting. ¨ Pain, pressure, or a strange feeling in your back, neck, jaw, or upper belly or in one or both shoulders or arms. ¨ Lightheadedness or sudden weakness. ¨ A fast or irregular heartbeat. After you call 911, the  may tell you to chew 1 adult-strength or 2 to 4 low-dose aspirin. Wait for an ambulance. Do not try to drive yourself. ?Call your doctor today if: 
? · You have any trouble breathing. ? · Your chest pain gets worse. ? · You are dizzy or lightheaded, or you feel like you may faint. ? · You are not getting better as expected. ? · You are having new or different chest pain. Where can you learn more? Go to http://rufina-ion.info/. Enter A120 in the search box to learn more about \"Chest Pain: Care Instructions. \" Current as of: March 20, 2017 Content Version: 11.4 © 5388-4491 [employer]. Care instructions adapted under license by [employer] (which disclaims liability or warranty for this information). If you have questions about a medical condition or this instruction, always ask your healthcare professional. [place] [address] any warranty or liability for your use of this information. Introducing [address]! Clary Anderson introduces SharePlow patient portal. Now you can access parts of your medical record, email your doctor's office, and request medication refills online. 1. In your internet browser, go to [email or web address] 2. Click on the First Time User? Click Here link in the Sign In box. You will see the New Member Sign Up page. 3. Enter your SharePlow Access Code exactly as it appears below.  You will not need to use this code after youve completed the sign-up process. If you do not sign up before the expiration date, you must request a new code. · [employer] Access Code: [email or web address] Expires: 4/5/2018 12:49 PM 
 
4. Enter the last four digits of your Social Security Number (xxxx) and Date of Birth (mm/dd/yyyy) as indicated and click Submit. You will be taken to the next sign-up page. 5. Create a CausePlay ID. This will be your [employer] login ID and cannot be changed, so think of one that is secure and easy to remember. 6. Create a [employer] password. You can change your password at any time. 7. Enter your Password Reset Question and Answer. This can be used at a later time if you forget your password. 8. Enter your e-mail address. You will receive e-mail notification when new information is available in [address]. 9. Click Sign Up. You can now view and download portions of your medical record. 10. Click the Download Summary menu link to download a portable copy of your medical information. If you have questions, please visit the Frequently Asked Questions section of the [employer] website. Remember, CausePlay is NOT to be used for urgent needs. For medical emergencies, dial 911. Now available from your iPhone and Android! Please provide this summary of care documentation to your next provider. Your primary care clinician is listed as Yakov. If you have any questions after today's visit, please call [phone].

## 2018-01-16 NOTE — PROGRESS NOTES
Chief Complaint   Patient presents with    Cold Symptoms     \"head feels heavy\" chest congestion; denies cough x 1 day       SUBJECTIVE:    Madisyn Rogers is a 81 y.o. female who presents today for evaluation of abdominal and chest pain. She started earlier this morning around 2 AM with some upper abdominal pain on the right side that seem to go to the upper chest area and she had she had some discomfort in her right jaw. She noted no associated shortness of breath. She did note that she had a sensation in her abdomen was seen in 2 expansile he was going to contractions. She did have some nausea without vomiting. She had had some Frappuccino before going to bed and adjust going to bed when this occurred around 2 AM.  It did last for about an hour and then seemed to go away. She noted no associated chills but has been having a fever sensation of the past couple days. She has noted no associated palpitations, PND, orthopnea, dyspnea on exertion or other cardiorespiratory complaints. She is status post cholecystectomy and she is noted no change in her bowel habits including no change in color of her bowels. She is concerned this could be cardiac and wants to make sure that is not the case. Of note is none of the pain was on the left side it was all on the right side. Current Outpatient Prescriptions   Medication Sig Dispense Refill    ciprofloxacin HCl (CIPRO) 500 mg tablet Take 1 Tab by mouth two (2) times a day. 20 Tab 0    predniSONE (STERAPRED DS) 10 mg dose pack Take 1 Tab by mouth See Admin Instructions. See administration instruction per 10mg dose pack 48 Tab 0    Omeprazole delayed release (PRILOSEC D/R) 20 mg tablet Take 20 mg by mouth every morning.  biotin 10,000 mcg cap Take  by mouth daily (with dinner).  Cholecalciferol, Vitamin D3, 3,000 unit tab Take 1,000 Units by mouth every morning.       cyanocobalamin (VITAMIN B12) 500 mcg tablet Take 500 mcg by mouth every morning.  irbesartan (AVAPRO) 300 mg tablet Take 300 mg by mouth every morning. Indications: HYPERTENSION      polyethylene glycol (MIRALAX) 17 gram/dose powder Take 17 g by mouth every morning. Indications: CONSTIPATION      hydrochlorothiazide (HYDRODIURIL) 25 mg tablet Take 25 mg by mouth every morning.        Past Medical History:   Diagnosis Date    Anxiety     Arthritis     Chronic constipation     CKD (chronic kidney disease) 7/17/2017    Colon polyps 7/17/2017    DJD (degenerative joint disease) 7/17/2017    Elevated LFTs 7/17/2017    GERD (gastroesophageal reflux disease)     Glucose intolerance (impaired glucose tolerance) 7/17/2017    Hemorrhoids     internal & external- bleeds frequently    High cholesterol     Hyperlipidemia 7/17/2017    Hypertension     Hypertension with renal disease 7/17/2017    PT Education - High Blood Pressure (Essential Hypertension) *: blood pressure PT Education - How to access health information online: discussed with patient and provided information    Hypertension, renal 7/17/2017    Ill-defined condition     ringing in ears    Mild obesity 7/17/2017    Nausea & vomiting     Neoplasm of uncertain behavior of skin 7/17/2017    On statin therapy 7/17/2017    Osteopenia 7/17/2017    Primary angle-closure glaucoma 7/17/2017    Comments: OU    Skin cancer of face     as of 6/15/16 pt states \"removed years ago\"    Spinal stenosis      Past Surgical History:   Procedure Laterality Date    HX CHOLECYSTECTOMY  11/6/2014    Dr Jonathan Yun    HX HEENT      laser for glaucoma    HX HYSTERECTOMY      partial    HX PELVIC LAPAROSCOPY      Jono. oophorectomy    HX TUBAL LIGATION      [place] COLSC FLX W/REMOVAL LESION BY HOT BX FORCEPS  11/12/2012         [place] ERCP REMOVE CALCULI/DEBRIS BILIARY/PANCREAS DUCT  11/7/2014         [place] ERCP W/SPHINCTEROTOMY/PAPILLOTOMY  11/7/2014          Allergies   Allergen Reactions    Iodinated Contrast- Oral And Iv Dye Hives  Oxycodone-Aspirin Unknown (comments)    Simvastatin Myalgia    Benicar [Olmesartan] Cough       REVIEW OF SYSTEMS:  General: negative for - chills or fever, or weight loss or gain  ENT: negative for - headaches, nasal congestion or tinnitus  Eyes: no blurred or visual changes  Neck: No stiffness or swollen nodes  Respiratory: negative for - cough, hemoptysis, shortness of breath or wheezing  Cardiovascular : negative for -  edema, palpitations or shortness of breath. Right-sided chest pain as described above  Gastrointestinal: negative for -  blood in stools, heartburn or nausea/vomiting. Abdominal pain as described above with some abdominal bloating and contractions.   Genito-Urinary: no dysuria, trouble voiding, or hematuria  Musculoskeletal: negative for - gait disturbance, joint pain, joint stiffness or joint swelling  Neurological: no TIA or stroke symptoms  Hematologic: no bruises, no bleeding  Lymphatic: no swollen glands  Integument: no lumps, mole changes, nail changes or rash  Endocrine:no malaise/lethargy poly uria or polydipsia or unexpected weight changes        Social History     Social History    Marital status:      Spouse name: N/A    Number of children: N/A    Years of education: N/A     Social History Main Topics    Smoking status: Former Smoker     Packs/day: 1.50     Years: 35.00     Quit date: 11/4/2004    Smokeless tobacco: Never Used      Comment: quit smoking 6 yrs ago    Alcohol use No    Drug use: No    Sexual activity: No     Other Topics Concern    None     Social History Narrative     Family History   Problem Relation Age of Onset    Heart Disease Mother     Hypertension Mother     Cancer Mother      skin    Stroke Father     Breast Cancer Sister      onset: 76s       OBJECTIVE:     Visit Vitals    /86 (BP 1 Location: Right arm, BP Patient Position: Sitting)    Pulse 91    Temp 97.9 °F (36.6 °C) (Oral)    Resp 20    Ht 5' 1.5\" (1.562 m)    Wt 209 lb (94.8 kg)    SpO2 96%    BMI 38.85 kg/m2     CONSTITUTIONAL:   well nourished, appears age appropriate  EYES: sclera anicteric, PERRL, EOMI  ENMT:nars clear, moist mucous membranes, pharynx clear  NECK: supple. Thyroid normal, No JVD or bruits  RESPIRATORY: Chest: clear to ascultation and percussion, normal inspiratory effort  CARDIOVASCULAR: Heart: regular rate and rhythm no murmurs, rubs or gallops, PMI not displaced, No thrills  GASTROINTESTINAL: Abdomen: Soft with moderate right upper quadrant and midepigastric tenderness without rebound. , bowel sounds normal.  No palpable hepatosplenomegaly or masses. There is no shake tenderness and very minimal tympany with minimal distention observed. HEMATOLOGIC: no purpura, petechiae or bruising  LYMPHATIC: No lymph node enlargemant  MUSCULOSKELETAL: Extremities: no edema or active synovitis, pulse 1+   INTEGUMENT: No unusual rashes or suspicious skin lesions noted. Nails appear normal.  PERIPHERAL VASCULAR: normal pulses femoral, PT and DP  NEUROLOGIC: non-focal exam, A & O X 3  PSYCHIATRIC:, appropriate affect     ASSESSMENT:   1. Right upper quadrant abdominal pain    2. Other chest pain    3. Hypertension with renal disease      Impression  1. Right upper abdominal pain she does have rather marked tenderness on exam her white count was 15,700. She does not feel nauseated has not had any vomiting a problem keeping down fluids I will schedule a stat CT. Amylase and liver enzymes are pending at this time. Abdominal acute series shows a nonspecific bowel gas pattern. 2.  Atypical chest discomfort and EKG does not reveal an acute process  3. Hypertension that is control  She will continue on the Prilosec which she is on. White count elevated I will go ahead and place on Cipro 500 twice daily for 10 days and schedule a CT of her abdomen and pelvis to further evaluate that especially with the concern of possible atypical presentation for diverticulitis.   Still cannot rule out the possibility of pancreatitis or some other issue possibly even a recurrent gallstone even though she has had a cholecystectomy. At this point she is essentially pain-free so I feel comfortable sending her out and treating this as an outpatient. PLAN:  .  Orders Placed This Encounter    XR ABD ACUTE W 1 V CHEST    CT ABD W WO CONT    AMB POC COMPLETE CBC,AUTOMATED ENTER    AMB POC AMYLASE    AMB POC COMPREHENSIVE METABOLIC PANEL    AMB POC EKG ROUTINE W/ 12 LEADS, INTER & REP    ciprofloxacin HCl (CIPRO) 500 mg tablet         ATTENTION:   This medical record was transcribed using an electronic medical records system. Although proofread, it may and can contain electronic and spelling errors. Other human spelling and other errors may be present. Corrections may be executed at a later time. Please feel free to contact us for any clarifications as needed. Follow-up Disposition:  Return if symptoms worsen or fail to improve. Results for orders placed or performed in visit on 01/16/18   XR ABD ACUTE W 1 V CHEST    Narrative    INDICATION: Right upper quadrant abd pain. EXAM: ABDOMEN 3 VIEW RADIOGRAPH. COMPARISON:  None. FINDINGS: Three-view examination of the abdomen, including a frontal view of the  chest, and one dependent view,  reveals a nonspecific bowel gas pattern with  small amounts of gas scattered throughout large and small bowel. There is no  mechanical obstruction, soft tissue mass or free air. A soft tissue density  vaguely suggested over the right upper quadrant in the upright view is thought  to most likely represent superimposed bowel, and is not reproduced in the supine  view. The visualized lung fields are clear. Surgical clips project over the  right upper quadrant. Impression    IMPRESSION: Nonobstructive bowel gas pattern. .     Results for orders placed or performed in visit on 01/16/18   AMB POC COMPLETE CBC,AUTOMATED ENTER   Result Value Ref Range    WBC (POC) 15.7 (A) 4.1 - 10.9 K/uL    RBC (POC) 5.35 4.20 - 6.30 M/uL    HGB (POC) 15.2 12.0 - 18.0 g/dL    HCT (POC) 46.5 37.0 - 51.0 %    MCV (POC) 87 80.0 - 97.0 fL    MCH (POC) 28.4 26.0 - 32.0 pg    MCHC (POC) 32.7 30.0 - 36.0 g/dL    PLATELET (POC) 407 585.2 - 440.0 K/uL    ABS. LYMPHS (POC) 5.8 (A) 0.6 - 4.1 K/uL    LYMPHOCYTES (POC) 37.4 10.0 - 58.5 %    Mid # (POC) 0.6 0.0 - 1.8 K/uL    MID% POC 4.4 0.1 - 24.0 %    ABS. GRANS (POC) 9.3 (A) 2.0 - 7.8 K/uL    GRANULOCYTES (POC) 58.2 37.0 - 92.0 %   AMB POC AMYLASE   Result Value Ref Range    Amylase (POC) 155.0 (A) 30 - 100 U/L   AMB POC COMPREHENSIVE METABOLIC PANEL   Result Value Ref Range    GLUCOSE 112.0 (A) 65 - 105 mg/dL    BUN 24.0 (A) 7 - 17 mg/dL    Creatinine (POC) 0.8 0.7 - 1.2 mg/dL    Sodium (POC) 137.0 137 - 145 MMOL/L    Potassium (POC) 4.5 3.6 - 5.0 MMOL/L    CHLORIDE 96.0 (A) 98 - 107 MMOL/L    CO2 30.0 22 - 32 MMOL/L    CALCIUM 9.8 8.4 - 10.2 mg/dL    TOTAL PROTEIN 7.2 6.3 - 8.2 g/dL    ALBUMIN 4.2 3.9 - 5.4 g/dL    AST (POC) 19 14 - 36 U/L    ALT (POC) 29 9 - 52 U/L    ALKALINE PHOS 71.0 38 - 126 U/L    TOTAL BILIRUBIN 1.1 0.2 - 1.3 mg/dL    eGFR (POC) 74.7        Vera Gilliam MD    The patient verbalized understanding of the problems and plans as explained.

## 2018-01-16 NOTE — PATIENT INSTRUCTIONS
Chest Pain: Care Instructions  Your Care Instructions    There are many things that can cause chest pain. Some are not serious and will get better on their own in a few days. But some kinds of chest pain need more testing and treatment. Your doctor may have recommended a follow-up visit in the next 8 to 12 hours. If you are not getting better, you may need more tests or treatment. Even though your doctor has released you, you still need to watch for any problems. The doctor carefully checked you, but sometimes problems can develop later. If you have new symptoms or if your symptoms do not get better, get medical care right away. If you have worse or different chest pain or pressure that lasts more than 5 minutes or you passed out (lost consciousness), call 911 or seek other emergency help right away. A medical visit is only one step in your treatment. Even if you feel better, you still need to do what your doctor recommends, such as going to all suggested follow-up appointments and taking medicines exactly as directed. This will help you recover and help prevent future problems. How can you care for yourself at home? · Rest until you feel better. · Take your medicine exactly as prescribed. Call your doctor if you think you are having a problem with your medicine. · Do not drive after taking a prescription pain medicine. When should you call for help? Call 911 if:  ? · You passed out (lost consciousness). ? · You have severe difficulty breathing. ? · You have symptoms of a heart attack. These may include:  ¨ Chest pain or pressure, or a strange feeling in your chest.  ¨ Sweating. ¨ Shortness of breath. ¨ Nausea or vomiting. ¨ Pain, pressure, or a strange feeling in your back, neck, jaw, or upper belly or in one or both shoulders or arms. ¨ Lightheadedness or sudden weakness. ¨ A fast or irregular heartbeat.   After you call 911, the  may tell you to chew 1 adult-strength or 2 to 4 low-dose aspirin. Wait for an ambulance. Do not try to drive yourself. ?Call your doctor today if:  ? · You have any trouble breathing. ? · Your chest pain gets worse. ? · You are dizzy or lightheaded, or you feel like you may faint. ? · You are not getting better as expected. ? · You are having new or different chest pain. Where can you learn more? Go to http://rufina-ion.info/. Enter A120 in the search box to learn more about \"Chest Pain: Care Instructions. \"  Current as of: March 20, 2017  Content Version: 11.4  © 0579-7593 [employer]. Care instructions adapted under license by [employer] (which disclaims liability or warranty for this information). If you have questions about a medical condition or this instruction, always ask your healthcare professional. [address] any warranty or liability for your use of this information.

## 2018-01-16 NOTE — PROGRESS NOTES
Chief Complaint   Patient presents with    Cold Symptoms     \"head feels heavy\" chest congestion; denies cough x 1 day     1. Have you been to the ER, urgent care clinic since your last visit? Hospitalized since your last visit? No    2. Have you seen or consulted any other health care providers outside of the [address] [place] [address] since your last visit? Include any pap smears or colon screening.  No

## 2018-01-17 ENCOUNTER — TELEPHONE (OUTPATIENT)
Dept: INTERNAL MEDICINE CLINIC | Age: 73
End: 2018-01-17

## 2018-01-17 ENCOUNTER — HOSPITAL ENCOUNTER (OUTPATIENT)
Dept: CT IMAGING | Age: 73
Discharge: HOME | End: 2018-01-17
Attending: INTERNAL MEDICINE

## 2018-01-17 DIAGNOSIS — T50.8X5D ALLERGIC REACTION TO CONTRAST MATERIAL, SUBSEQUENT ENCOUNTER: Primary | ICD-10-CM

## 2018-01-17 DIAGNOSIS — R10.11 RIGHT UPPER QUADRANT ABDOMINAL PAIN: ICD-10-CM

## 2018-01-17 RX ORDER — BARIUM SULFATE 20 MG/ML
900 SUSPENSION ORAL
Status: ACTIVE | OUTPATIENT
Start: 2018-01-17 | End: 2018-01-18

## 2018-01-17 RX ORDER — PREDNISONE 10 MG/1
TABLET ORAL
Qty: 15 TAB | Refills: 0 | Status: SHIPPED | OUTPATIENT
Start: 2018-01-17 | End: 2018-01-22 | Stop reason: ALTCHOICE

## 2018-01-17 NOTE — TELEPHONE ENCOUNTER
The CT depart from [facility] called regarding patient with history of allergic reaction to contrast dye. Wants her to be medicated prior to procedure. Sent rx to patient's pharmacy as well as patient needs to take Benadry 50 mg 1 hour prior as well.

## 2018-01-17 NOTE — PROGRESS NOTES
Amylase has returned mildly elevated consistent with a diagnosis of pancreatitis and thus we need to get her back here to see me after the CT is done

## 2018-01-17 NOTE — TELEPHONE ENCOUNTER
Requested Prescriptions     Pending Prescriptions Disp Refills    predniSONE (DELTASONE) 10 mg tablet 15 Tab 0     Sig: Take 5 tablets 13 hours prior to scheduled procedure; then  5 tablets 7 hours prior to scheduled procedure and  then 5 tablets 1 hour prior.

## 2018-01-18 NOTE — PROGRESS NOTES
Amylase has returned mildly elevated consistent with a diagnosis of pancreatitis and thus we need to get her back here to see me after the CT is done. Patient informed and scheduled for f/up 1-.

## 2018-01-19 ENCOUNTER — HOSPITAL ENCOUNTER (OUTPATIENT)
Dept: CT IMAGING | Age: 73
Discharge: HOME | End: 2018-01-19
Attending: INTERNAL MEDICINE
Payer: MEDICARE

## 2018-01-19 PROCEDURE — 74011636320 HC RX REV CODE- 636/320: Performed by: INTERNAL MEDICINE

## 2018-01-19 PROCEDURE — 74170 CT ABD WO CNTRST FLWD CNTRST: CPT

## 2018-01-19 PROCEDURE — 74011000255 HC RX REV CODE- 255: Performed by: INTERNAL MEDICINE

## 2018-01-19 PROCEDURE — 74011250636 HC RX REV CODE- 250/636: Performed by: INTERNAL MEDICINE

## 2018-01-19 RX ORDER — BARIUM SULFATE 20 MG/ML
900 SUSPENSION ORAL
Status: COMPLETED | OUTPATIENT
Start: 2018-01-19 | End: 2018-01-19

## 2018-01-19 RX ORDER — SODIUM CHLORIDE 9 MG/ML
50 INJECTION, SOLUTION INTRAVENOUS
Status: COMPLETED | OUTPATIENT
Start: 2018-01-19 | End: 2018-01-19

## 2018-01-19 RX ORDER — SODIUM CHLORIDE 0.9 % (FLUSH) 0.9 %
10 SYRINGE (ML) INJECTION
Status: COMPLETED | OUTPATIENT
Start: 2018-01-19 | End: 2018-01-19

## 2018-01-19 RX ADMIN — IOPAMIDOL 100 ML: 755 INJECTION, SOLUTION INTRAVENOUS at 15:00

## 2018-01-19 RX ADMIN — Medication 10 ML: at 15:00

## 2018-01-19 RX ADMIN — SODIUM CHLORIDE 50 ML/HR: 900 INJECTION, SOLUTION INTRAVENOUS at 15:00

## 2018-01-19 RX ADMIN — BARIUM SULFATE 900 ML: 21 SUSPENSION ORAL at 15:00

## 2018-01-22 ENCOUNTER — OFFICE VISIT (OUTPATIENT)
Dept: INTERNAL MEDICINE CLINIC | Age: 73
End: 2018-01-22

## 2018-01-22 VITALS
HEART RATE: 101 BPM | SYSTOLIC BLOOD PRESSURE: 110 MMHG | HEIGHT: 62 IN | TEMPERATURE: 97.7 F | OXYGEN SATURATION: 96 % | WEIGHT: 203.6 LBS | DIASTOLIC BLOOD PRESSURE: 80 MMHG | BODY MASS INDEX: 37.47 KG/M2 | RESPIRATION RATE: 16 BRPM

## 2018-01-22 DIAGNOSIS — K85.90 ACUTE PANCREATITIS, UNSPECIFIED COMPLICATION STATUS, UNSPECIFIED PANCREATITIS TYPE: Primary | ICD-10-CM

## 2018-01-22 LAB
AMYLASE, POCT, AMYLPOCT: 111 U/L (ref 30–100)
GRAN# POC: 5.8 K/UL (ref 2–7.8)
GRAN% POC: 59.9 % (ref 37–92)
HCT VFR BLD CALC: 45.9 % (ref 37–51)
HGB BLD-MCNC: 15.1 G/DL (ref 12–18)
LY# POC: 3.3 K/UL (ref 0.6–4.1)
LY% POC: 35.5 % (ref 10–58.5)
MCH RBC QN: 28.6 PG (ref 26–32)
MCHC RBC-ENTMCNC: 33 G/DL (ref 30–36)
MCV RBC: 87 FL (ref 80–97)
MID #, POC: 0.4 K/UL (ref 0–1.8)
MID% POC: 4.6 % (ref 0.1–24)
PLATELET # BLD: 247 K/UL (ref 140–440)
RBC # BLD: 5.29 M/UL (ref 4.2–6.3)
WBC # BLD: 9.5 K/UL (ref 4.1–10.9)

## 2018-01-22 NOTE — PROGRESS NOTES
1. Have you been to the ER, urgent care clinic since your last visit? Hospitalized since your last visit? No    2. Have you seen or consulted any other health care providers outside of the [facility] since your last visit? Include any pap smears or colon screening.  No    Chief Complaint   Patient presents with    Abdominal Pain     F/u of CT        Not fasting

## 2018-01-22 NOTE — PROGRESS NOTES
Chief Complaint   Patient presents with    Abdominal Pain     F/u of CT        SUBJECTIVE:    Shelia Wallace is a 81 y.o. female who returns in follow-up for her abdominal pain presumed related to mild pancreatitis in light of the fact that she is slightly elevated amylase and elevated white count. At the time she was seen we were not sure that they could not have been a component of diverticular disease and empirically placed on Cipro which she is still taking. A CT of her abdomen and subsequent return without evidence of diverticulitis and no significant edema of the pancreas was noted either although there was a small calcification in the area of the pancreas. Based upon the fact that she is actually better with the treatment and I will complete the Cipro and have asked her to go with a liquid diet which is the treatment of choice for both pancreatitis and diverticulitis. I am going to recheck her amylase and CBC which were both elevated and check a lipase today. She has a regular scheduled follow-up appointment in a February 5 we will keep that to reassess her at that time. Current Outpatient Prescriptions   Medication Sig Dispense Refill    ciprofloxacin HCl (CIPRO) 500 mg tablet Take 1 Tab by mouth two (2) times a day. 20 Tab 0    Omeprazole delayed release (PRILOSEC D/R) 20 mg tablet Take 20 mg by mouth every morning.  biotin 10,000 mcg cap Take  by mouth daily (with dinner).  Cholecalciferol, Vitamin D3, 3,000 unit tab Take 1,000 Units by mouth every morning.  cyanocobalamin (VITAMIN B12) 500 mcg tablet Take 500 mcg by mouth every morning.  irbesartan (AVAPRO) 300 mg tablet Take 300 mg by mouth every morning. Indications: HYPERTENSION      polyethylene glycol (MIRALAX) 17 gram/dose powder Take 17 g by mouth every morning. Indications: CONSTIPATION      hydrochlorothiazide (HYDRODIURIL) 25 mg tablet Take 25 mg by mouth every morning.        Past Medical History: Diagnosis Date    Anxiety     Arthritis     Chronic constipation     CKD (chronic kidney disease) 7/17/2017    Colon polyps 7/17/2017    DJD (degenerative joint disease) 7/17/2017    Elevated LFTs 7/17/2017    GERD (gastroesophageal reflux disease)     Glucose intolerance (impaired glucose tolerance) 7/17/2017    Hemorrhoids     internal & external- bleeds frequently    High cholesterol     Hyperlipidemia 7/17/2017    Hypertension     Hypertension with renal disease 7/17/2017    PT Education - High Blood Pressure (Essential Hypertension) *: blood pressure PT Education - How to access health information online: discussed with patient and provided information    Hypertension, renal 7/17/2017    Ill-defined condition     ringing in ears    Mild obesity 7/17/2017    Nausea & vomiting     Neoplasm of uncertain behavior of skin 7/17/2017    On statin therapy 7/17/2017    Osteopenia 7/17/2017    Primary angle-closure glaucoma 7/17/2017    Comments: OU    Skin cancer of face     as of 6/15/16 pt states \"removed years ago\"    Spinal stenosis      Past Surgical History:   Procedure Laterality Date    HX CHOLECYSTECTOMY  11/6/2014    Dr Clarisa Pollard for glaucoma    HX HYSTERECTOMY      partial    HX PELVIC LAPAROSCOPY      Jono. oophorectomy    HX TUBAL LIGATION      [place] COLSC FLX W/REMOVAL LESION BY HOT BX FORCEPS  11/12/2012         [place] ERCP REMOVE CALCULI/DEBRIS BILIARY/PANCREAS DUCT  11/7/2014         [place] ERCP W/SPHINCTEROTOMY/PAPILLOTOMY  11/7/2014          Allergies   Allergen Reactions    Iodinated Contrast- Oral And Iv Dye Hives    Oxycodone-Aspirin Unknown (comments)    Simvastatin Myalgia    Benicar [Olmesartan] Cough       REVIEW OF SYSTEMS:  General: negative for - chills or fever, or weight loss or gain  ENT: negative for - headaches, nasal congestion or tinnitus  Eyes: no blurred or visual changes  Neck: No stiffness or swollen nodes  Respiratory: negative for - cough, hemoptysis, shortness of breath or wheezing  Cardiovascular : negative for - chest pain, edema, palpitations or shortness of breath  Gastrointestinal: negative for -  blood in stools, heartburn or nausea/vomiting. Remains positive for mild abdominal pain  Genito-Urinary: no dysuria, trouble voiding, or hematuria  Musculoskeletal: negative for - gait disturbance, joint pain, joint stiffness or joint swelling  Neurological: no TIA or stroke symptoms  Hematologic: no bruises, no bleeding  Lymphatic: no swollen glands  Integument: no lumps, mole changes, nail changes or rash  Endocrine:no malaise/lethargy poly uria or polydipsia or unexpected weight changes        Social History     Social History    Marital status:      Spouse name: N/A    Number of children: N/A    Years of education: N/A     Social History Main Topics    Smoking status: Former Smoker     Packs/day: 1.50     Years: 35.00     Quit date: 11/4/2004    Smokeless tobacco: Never Used      Comment: quit smoking 6 yrs ago    Alcohol use No    Drug use: No    Sexual activity: No     Other Topics Concern    None     Social History Narrative     Family History   Problem Relation Age of Onset    Heart Disease Mother     Hypertension Mother     Cancer Mother      skin    Stroke Father     Breast Cancer Sister      onset: 76s       OBJECTIVE:     Visit Vitals    /80 (BP 1 Location: Left arm, BP Patient Position: Sitting)    Pulse (!) 101    Temp 97.7 °F (36.5 °C) (Oral)    Resp 16    Ht 5' 1.5\" (1.562 m)    Wt 203 lb 9.6 oz (92.4 kg)    SpO2 96%    BMI 37.85 kg/m2     CONSTITUTIONAL:   well nourished, appears age appropriate  EYES: sclera anicteric, PERRL, EOMI  ENMT:nars clear, moist mucous membranes, pharynx clear  NECK: supple.  Thyroid normal, No JVD or bruits  RESPIRATORY: Chest: clear to ascultation and percussion, normal inspiratory effort  CARDIOVASCULAR: Heart: regular rate and rhythm no murmurs, rubs or gallops, PMI not displaced, No thrills  GASTROINTESTINAL: Abdomen: non distended, soft, bowel sounds normal.  Positive for mild tenderness in the upper abdomen more on the left than the right. HEMATOLOGIC: no purpura, petechiae or bruising  LYMPHATIC: No lymph node enlargemant  MUSCULOSKELETAL: Extremities: no edema or active synovitis, pulse 1+   INTEGUMENT: No unusual rashes or suspicious skin lesions noted. Nails appear normal.  PERIPHERAL VASCULAR: normal pulses femoral, PT and DP  NEUROLOGIC: non-focal exam, A & O X 3  PSYCHIATRIC:, appropriate affect     ASSESSMENT:   1. Acute pancreatitis, unspecified complication status, unspecified pancreatitis type      Abdominal pain consistent with mild pancreatitis we will recheck the labs today to see what her CBC and amylase look like and I will check a lipase. I have asked her to go with a liquid diet and I will recheck her again in a prior schedule appointment February 5 or sooner should be a problem. PLAN:  .  Orders Placed This Encounter    LIPASE    AMB POC COMPLETE CBC,AUTOMATED ENTER    AMB POC AMYLASE         ATTENTION:   This medical record was transcribed using an electronic medical records system. Although proofread, it may and can contain electronic and spelling errors. Other human spelling and other errors may be present. Corrections may be executed at a later time. Please feel free to contact us for any clarifications as needed. Follow-up Disposition:  Return in about 2 weeks (around 2/5/2018). No results found for any visits on 01/22/18. Marilee Morgan MD    The patient verbalized understanding of the problems and plans as explained.

## 2018-01-22 NOTE — MR AVS SNAPSHOT
[address] [facility] [address] [address] [phone] Patient: Fredy Durham MRN: [id number] MAGALY:[birth year] Visit Information Date & Time Provider Department Dept. Phone Encounter #  
 1/22/2018  2:00 PM Victor Hugo Lawrence [facility] [address] [phone] [id number] Follow-up Instructions Return in about 2 weeks (around 2/5/2018). Follow-up and Disposition History Your Appointments 2/5/2018  9:20 AM  
FOLLOW UP 10 with MD RONN Lawrence [facility] ([facility]) Appt Note: [address] [id number] [address] [facility] [address] Upcoming Health Maintenance Date Due ZOSTER VACCINE AGE 60> 6/2/2007 GLAUCOMA SCREENING Q2Y 8/2/2012 MEDICARE YEARLY EXAM 10/24/2018 COLONOSCOPY 7/11/2019 BREAST CANCER SCRN MAMMOGRAM 9/21/2019 DTaP/Tdap/Td series (2 - Td) 10/23/2027 Allergies as of 1/22/2018  Review Complete On: 1/22/2018 By: Marilee Morgan MD  
  
 Severity Noted Reaction Type Reactions Iodinated Contrast- Oral And Iv Dye  07/17/2017    Hives Oxycodone-aspirin  07/17/2017    Unknown (comments) Simvastatin  07/17/2017    Myalgia Benicar [Olmesartan] Low 11/09/2012   Intolerance Cough Current Immunizations  Never Reviewed Name Date Influenza High Dose Vaccine PF 10/23/2017 Influenza Vaccine 10/10/2016, 1/11/2016 Pneumococcal Conjugate (PCV-13) 7/6/2015 Pneumococcal Vaccine (Unspecified Type) 10/1/2010 Not reviewed this visit You Were Diagnosed With   
  
 Codes Comments Acute pancreatitis, unspecified complication status, unspecified pancreatitis type    -  Primary ICD-10-CM: K85.90 ICD-9-CM: 730.4 Vitals BP Pulse Temp Resp Height(growth percentile) Weight(growth percentile) 110/80 (BP 1 Location: Left arm, BP Patient Position: Sitting) (!) 101 97.7 °F (36.5 °C) (Oral) 16 5' 1.5\" (1.562 m) 203 lb 9.6 oz (92.4 kg) SpO2 BMI OB Status Smoking Status 96% 37.85 kg/m2 Hysterectomy Former Smoker Vitals History BMI and BSA Data Body Mass Index Body Surface Area  
 37.85 kg/m 2 2 m 2 Preferred Pharmacy Pharmacy Name Phone [facility], [place] [address] [phone] Your Updated Medication List  
  
   
This list is accurate as of: 1/22/18  2:53 PM.  Always use your most recent med list.  
  
  
  
  
 biotin 10,000 mcg Cap Take  by mouth daily (with dinner). Cholecalciferol (Vitamin D3) 3,000 unit Tab Take 1,000 Units by mouth every morning. ciprofloxacin HCl 500 mg tablet Commonly known as:  CIPRO Take 1 Tab by mouth two (2) times a day. cyanocobalamin 500 mcg tablet Commonly known as:  VITAMIN B12 Take 500 mcg by mouth every morning. hydroCHLOROthiazide 25 mg tablet Commonly known as:  HYDRODIURIL Take 25 mg by mouth every morning. irbesartan 300 mg tablet Commonly known as:  AVAPRO Take 300 mg by mouth every morning. Indications: HYPERTENSION MIRALAX 17 gram/dose powder Generic drug:  polyethylene glycol Take 17 g by mouth every morning. Indications: CONSTIPATION Omeprazole delayed release 20 mg tablet Commonly known as:  PRILOSEC D/R Take 20 mg by mouth every morning. We Performed the Following AMB POC AMYLASE [31856 CPT(R)] AMB POC COMPLETE CBC,AUTOMATED ENTER [id number] CPT(R)] LIPASE [id number] CPT(R)] Follow-up Instructions Return in about 2 weeks (around 2/5/2018). Patient Instructions Pancreatitis: Care Instructions Your Care Instructions The pancreas is an organ behind the stomach. It makes hormones and enzymes to help your body digest food. But if these enzymes attack the pancreas, it can get inflamed. This is called pancreatitis. Most cases are caused by gallstones or by heavy alcohol use. If you take care of yourself at home, it will help you get better. It will also help you avoid more problems with your pancreas. Follow-up care is a key part of your treatment and safety. Be sure to make and go to all appointments, and call your doctor if you are having problems. It's also a good idea to know your test results and keep a list of the medicines you take. How can you care for yourself at home? · Drink clear liquids and eat bland foods until you feel better. [place] foods include rice, dry toast, and crackers. They also include bananas and applesauce. · Eat a low-fat diet until your doctor says your pancreas is healed. · Do not drink alcohol. Tell your doctor if you need help to quit. Counseling, support groups, and sometimes medicines can help you stay sober. · Be safe with medicines. Read and follow all instructions on the label. ¨ If the doctor gave you a prescription medicine for pain, take it as prescribed. ¨ If you are not taking a prescription pain medicine, ask your doctor if you can take an over-the-counter medicine. · If your doctor prescribed antibiotics, take them as directed. Do not stop taking them just because you feel better. You need to take the full course of antibiotics. · Get extra rest until you feel better. To prevent future problems with your pancreas · Do not drink alcohol. · Tell your doctors and pharmacist that you've had pancreatitis. They can help you avoid medicines that may cause this problem again. When should you call for help? Call 911 anytime you think you may need emergency care. For example, call if: 
? · You vomit blood or what looks like coffee grounds. ? · Your stools are maroon or very bloody. ?Call your doctor now or seek immediate medical care if: 
? · You have new or worse belly pain. ? · Your stools are black and look like tar, or they have streaks of blood. ? · You are vomiting. ? Watch closely for changes in your health, and be sure to contact your doctor if: 
? · You do not get better as expected. Where can you learn more? Go to http://rufina-ion.info/. Enter N592 in the search box to learn more about \"Pancreatitis: Care Instructions. \" Current as of: May 12, 2017 Content Version: 11.4 © 0434-1907 [employer]. Care instructions adapted under license by [employer] (which disclaims liability or warranty for this information). If you have questions about a medical condition or this instruction, always ask your healthcare professional. [address] any warranty or liability for your use of this information. Patient Instructions History Introducing [facility]! Sharon Thakur introduces LookStat patient portal. Now you can access parts of your medical record, email your doctor's office, and request medication refills online. 1. In your internet browser, go to [email or web address] 2. Click on the First Time User? Click Here link in the Sign In box. You will see the New Member Sign Up page. 3. Enter your LookStat Access Code exactly as it appears below. You will not need to use this code after youve completed the sign-up process. If you do not sign up before the expiration date, you must request a new code. · LookStat Access Code: [email or web address] Expires: 4/5/2018 12:49 PM 
 
4. Enter the last four digits of your Social Security Number (xxxx) and Date of Birth (mm/dd/yyyy) as indicated and click Submit. You will be taken to the next sign-up page. 5. Create a LookStat ID. This will be your LookStat login ID and cannot be changed, so think of one that is secure and easy to remember. 6. Create a [employer] password. You can change your password at any time. 7. Enter your Password Reset Question and Answer. This can be used at a later time if you forget your password. 8. Enter your e-mail address. You will receive e-mail notification when new information is available in [address] 9. Click Sign Up. You can now view and download portions of your medical record. 10. Click the Download Summary menu link to download a portable copy of your medical information. If you have questions, please visit the Frequently Asked Questions section of the [employer] website. Remember, [employer] is NOT to be used for urgent needs. For medical emergencies, dial 911. Now available from your iPhone and Android! Please provide this summary of care documentation to your next provider. Your primary care clinician is listed as Yakov. If you have any questions after today's visit, please call [phone].

## 2018-01-22 NOTE — PATIENT INSTRUCTIONS
Pancreatitis: Care Instructions  Your Care Instructions    The pancreas is an organ behind the stomach. It makes hormones and enzymes to help your body digest food. But if these enzymes attack the pancreas, it can get inflamed. This is called pancreatitis. Most cases are caused by gallstones or by heavy alcohol use. If you take care of yourself at home, it will help you get better. It will also help you avoid more problems with your pancreas. Follow-up care is a key part of your treatment and safety. Be sure to make and go to all appointments, and call your doctor if you are having problems. It's also a good idea to know your test results and keep a list of the medicines you take. How can you care for yourself at home? · Drink clear liquids and eat bland foods until you feel better. [place] foods include rice, dry toast, and crackers. They also include bananas and applesauce. · Eat a low-fat diet until your doctor says your pancreas is healed. · Do not drink alcohol. Tell your doctor if you need help to quit. Counseling, support groups, and sometimes medicines can help you stay sober. · Be safe with medicines. Read and follow all instructions on the label. ¨ If the doctor gave you a prescription medicine for pain, take it as prescribed. ¨ If you are not taking a prescription pain medicine, ask your doctor if you can take an over-the-counter medicine. · If your doctor prescribed antibiotics, take them as directed. Do not stop taking them just because you feel better. You need to take the full course of antibiotics. · Get extra rest until you feel better. To prevent future problems with your pancreas  · Do not drink alcohol. · Tell your doctors and pharmacist that you've had pancreatitis. They can help you avoid medicines that may cause this problem again. When should you call for help? Call 911 anytime you think you may need emergency care.  For example, call if:  ? · You vomit blood or what looks like coffee grounds. ? · Your stools are maroon or very bloody. ?Call your doctor now or seek immediate medical care if:  ? · You have new or worse belly pain. ? · Your stools are black and look like tar, or they have streaks of blood. ? · You are vomiting. ? Watch closely for changes in your health, and be sure to contact your doctor if:  ? · You do not get better as expected. Where can you learn more? Go to http://rufina-ion.info/. Enter Q023 in the search box to learn more about \"Pancreatitis: Care Instructions. \"  Current as of: May 12, 2017  Content Version: 11.4  © 3492-8512 [employer]. Care instructions adapted under license by [employer] (which disclaims liability or warranty for this information). If you have questions about a medical condition or this instruction, always ask your healthcare professional. [address] any warranty or liability for your use of this information.

## 2018-01-23 LAB — LIPASE SERPL-CCNC: 66 U/L (ref 14–72)

## 2018-01-23 NOTE — PROGRESS NOTES
Labs have returned normal including pancreatic enzymes and white blood count so at this point since symptoms have resolved I do not think we need to evaluate further unless mild symptoms persist or recur.

## 2018-01-24 NOTE — PROGRESS NOTES
Labs have returned normal including pancreatic enzymes and white blood count so at this point since symptoms have resolved I do not think we need to evaluate further unless mild symptoms persist or recur. Patient informed.

## 2018-02-04 PROBLEM — N18.2 STAGE 2 CHRONIC KIDNEY DISEASE: Status: ACTIVE | Noted: 2017-07-17

## 2018-02-04 PROBLEM — E66.09 CLASS 2 OBESITY DUE TO EXCESS CALORIES WITHOUT SERIOUS COMORBIDITY WITH BODY MASS INDEX (BMI) OF 37.0 TO 37.9 IN ADULT: Status: ACTIVE | Noted: 2017-07-17

## 2018-02-04 PROBLEM — E78.2 MIXED HYPERLIPIDEMIA: Status: ACTIVE | Noted: 2017-07-17

## 2018-02-04 PROBLEM — M15.9 PRIMARY OSTEOARTHRITIS INVOLVING MULTIPLE JOINTS: Status: ACTIVE | Noted: 2017-07-17

## 2018-02-05 ENCOUNTER — OFFICE VISIT (OUTPATIENT)
Dept: INTERNAL MEDICINE CLINIC | Age: 73
End: 2018-02-05

## 2018-02-05 VITALS
OXYGEN SATURATION: 95 % | HEART RATE: 84 BPM | WEIGHT: 205.6 LBS | RESPIRATION RATE: 16 BRPM | BODY MASS INDEX: 37.84 KG/M2 | HEIGHT: 62 IN | TEMPERATURE: 97.7 F | SYSTOLIC BLOOD PRESSURE: 118 MMHG | DIASTOLIC BLOOD PRESSURE: 80 MMHG

## 2018-02-05 DIAGNOSIS — E66.09 CLASS 2 OBESITY DUE TO EXCESS CALORIES WITHOUT SERIOUS COMORBIDITY WITH BODY MASS INDEX (BMI) OF 37.0 TO 37.9 IN ADULT: ICD-10-CM

## 2018-02-05 DIAGNOSIS — E78.2 MIXED HYPERLIPIDEMIA: ICD-10-CM

## 2018-02-05 DIAGNOSIS — R73.02 GLUCOSE INTOLERANCE (IMPAIRED GLUCOSE TOLERANCE): ICD-10-CM

## 2018-02-05 DIAGNOSIS — N18.2 STAGE 2 CHRONIC KIDNEY DISEASE: ICD-10-CM

## 2018-02-05 DIAGNOSIS — M15.9 PRIMARY OSTEOARTHRITIS INVOLVING MULTIPLE JOINTS: ICD-10-CM

## 2018-02-05 DIAGNOSIS — I12.9 HYPERTENSION WITH RENAL DISEASE: Primary | ICD-10-CM

## 2018-02-05 DIAGNOSIS — B07.0 PLANTAR WART: ICD-10-CM

## 2018-02-05 LAB
ALBUMIN SERPL-MCNC: 4.1 G/DL (ref 3.9–5.4)
ALKALINE PHOS POC: 79 U/L (ref 38–126)
ALT SERPL-CCNC: 42 U/L (ref 9–52)
AST SERPL-CCNC: 27 U/L (ref 14–36)
BUN BLD-MCNC: 18 MG/DL (ref 7–17)
CALCIUM BLD-MCNC: 9.8 MG/DL (ref 8.4–10.2)
CHLORIDE BLD-SCNC: 102 MMOL/L (ref 98–107)
CHOLEST SERPL-MCNC: 232 MG/DL (ref 0–200)
CO2 POC: 31 MMOL/L (ref 22–32)
CREAT BLD-MCNC: 0.8 MG/DL (ref 0.7–1.2)
EGFR (POC): 74.7
GLUCOSE POC: 97 MG/DL (ref 65–105)
HBA1C MFR BLD HPLC: 5.9 % (ref 4.5–5.7)
HDLC SERPL-MCNC: 60 MG/DL (ref 35–130)
LDL CHOLESTEROL POC: 143 MG/DL (ref 0–130)
POTASSIUM SERPL-SCNC: 4.1 MMOL/L (ref 3.6–5)
PROT SERPL-MCNC: 7.2 G/DL (ref 6.3–8.2)
SODIUM SERPL-SCNC: 140 MMOL/L (ref 137–145)
TCHOL/HDL RATIO (POC): 3.9 (ref 0–4)
TOTAL BILIRUBIN POC: 1.1 MG/DL (ref 0.2–1.3)
TRIGL SERPL-MCNC: 145 MG/DL (ref 0–200)
VLDLC SERPL CALC-MCNC: 29 MG/DL

## 2018-02-05 NOTE — PATIENT INSTRUCTIONS
Arthritis: Care Instructions  Your Care Instructions  Arthritis, also called osteoarthritis, is a breakdown of the cartilage that cushions your joints. When the cartilage wears down, your bones rub against each other. This causes pain and stiffness. Many people have some arthritis as they age. Arthritis most often affects the joints of the spine, hands, hips, knees, or feet. You can take simple measures to protect your joints, ease your pain, and help you stay active. Follow-up care is a key part of your treatment and safety. Be sure to make and go to all appointments, and call your doctor if you are having problems. It's also a good idea to know your test results and keep a list of the medicines you take. How can you care for yourself at home? · Stay at a healthy weight. Being overweight puts extra strain on your joints. · Talk to your doctor or physical therapist about exercises that will help ease joint pain. ¨ Stretch. You may enjoy gentle forms of yoga to help keep your joints and muscles flexible. ¨ Walk instead of jog. Other types of exercise that are less stressful on the joints include riding a bicycle, swimming, janiya chi, or water exercise. ¨ Lift weights. Strong muscles help reduce stress on your joints. Stronger thigh muscles, for example, take some of the stress off of the knees and hips. Learn the right way to lift weights so you do not make joint pain worse. · Take your medicines exactly as prescribed. Call your doctor if you think you are having a problem with your medicine. · Take pain medicines exactly as directed. ¨ If the doctor gave you a prescription medicine for pain, take it as prescribed. ¨ If you are not taking a prescription pain medicine, ask your doctor if you can take an over-the-counter medicine. · Use a cane, crutch, walker, or another device if you need help to get around. These can help rest your joints.  You also can use other things to make life easier, such as a higher toilet seat and padded handles on kitchen utensils. · Do not sit in low chairs, which can make it hard to get up. · Put heat or cold on your sore joints as needed. Use whichever helps you most. You also can take turns with hot and cold packs. ¨ Apply heat 2 or 3 times a day for 20 to 30 minutes-using a heating pad, hot shower, or hot pack-to relieve pain and stiffness. ¨ Put ice or a cold pack on your sore joint for 10 to 20 minutes at a time. Put a thin cloth between the ice and your skin. When should you call for help? Call your doctor now or seek immediate medical care if:  ? · You have sudden swelling, warmth, or pain in any joint. ? · You have joint pain and a fever or rash. ? · You have such bad pain that you cannot use a joint. ? Watch closely for changes in your health, and be sure to contact your doctor if:  ? · You have mild joint symptoms that continue even with more than 6 weeks of care at home. ? · You have stomach pain or other problems with your medicine. Where can you learn more? Go to http://rufina-ion.info/. Enter D246 in the search box to learn more about \"Arthritis: Care Instructions. \"  Current as of: October 31, 2016  Content Version: 11.4  © 9897-1604 "[employer]. Care instructions adapted under license by [employer] (which disclaims liability or warranty for this information). If you have questions about a medical condition or this instruction, always ask your healthcare professional. [place] [address] any warranty or liability for your use of this information.

## 2018-02-05 NOTE — MR AVS SNAPSHOT
[address] [facility] [address] [id number] [phone] Patient: Rhonda Vizcaino MRN: [id number] Visit Information Date & Time Provider Department Dept. Phone Encounter #  
 2018  9:20 AM Dayan Heredia MD [facility] [id number] Follow-up Instructions Return in about 3 months (around 2018). Follow-up and Disposition History Upcoming Health Maintenance Date Due ZOSTER VACCINE AGE 60> 2007 GLAUCOMA SCREENING Q2Y 2012 MEDICARE YEARLY EXAM 10/24/2018 COLONOSCOPY 2019 BREAST CANCER SCRN MAMMOGRAM 2019 DTaP/Tdap/Td series (2 - Td) 10/23/2027 Allergies as of 2018  Review Complete On: 2018 By: Dayan Heredia MD  
  
 Severity Noted Reaction Type Reactions Iodinated Contrast- Oral And Iv Dye  2017    Hives Oxycodone-aspirin  2017    Unknown (comments) Simvastatin  2017    Myalgia Benicar [Olmesartan] Low 2012   Intolerance Cough Current Immunizations  Never Reviewed Name Date Influenza High Dose Vaccine PF 10/23/2017 Influenza Vaccine 10/10/2016, 2016 Pneumococcal Conjugate (PCV-13) 2015 Pneumococcal Vaccine (Unspecified Type) 10/1/2010 Not reviewed this visit You Were Diagnosed With   
  
 Codes Comments Hypertension with renal disease    -  Primary ICD-10-CM: I12.9 ICD-9-CM: 403.90 Glucose intolerance (impaired glucose tolerance)     ICD-10-CM: R73.02 
ICD-9-CM: 790.22 Mixed hyperlipidemia     ICD-10-CM: E78.2 ICD-9-CM: 272.2 Primary osteoarthritis involving multiple joints     ICD-10-CM: M15.0 ICD-9-CM: 715.09 Stage 2 chronic kidney disease     ICD-10-CM: N18.2 ICD-9-CM: 688. 2  Class 2 obesity due to excess calories without serious comorbidity with body mass index (BMI) of 37.0 to 37.9 in adult     ICD-10-CM: E66.09, X60.18 ICD-9-CM: 278.00, V85.37 Plantar wart     ICD-10-CM: B07.0 ICD-9-CM: 078.12 Vitals BP Pulse Temp Resp Height(growth percentile) Weight(growth percentile) 118/80 (BP 1 Location: Left arm, BP Patient Position: Sitting) 84 97.7 °F (36.5 °C) (Oral) 16 5' 1.5\" (1.562 m) 205 lb 9.6 oz (93.3 kg) SpO2 BMI OB Status Smoking Status 95% 38.22 kg/m2 Hysterectomy Former Smoker Vitals History BMI and BSA Data Body Mass Index Body Surface Area  
 38.22 kg/m 2 2.01 m 2 Preferred Pharmacy Pharmacy Name Phone [facility] [address] [place] [address] [phone] Your Updated Medication List  
  
   
This list is accurate as of: 2/5/18 10:31 AM.  Always use your most recent med list.  
  
  
  
  
 biotin 10,000 mcg Cap Take  by mouth daily (with dinner). Cholecalciferol (Vitamin D3) 3,000 unit Tab Take 1,000 Units by mouth every morning. cyanocobalamin 500 mcg tablet Commonly known as:  VITAMIN B12 Take 500 mcg by mouth every morning. hydroCHLOROthiazide 25 mg tablet Commonly known as:  HYDRODIURIL Take 25 mg by mouth every morning. irbesartan 300 mg tablet Commonly known as:  AVAPRO Take 300 mg by mouth every morning. Indications: HYPERTENSION MIRALAX 17 gram/dose powder Generic drug:  polyethylene glycol Take 17 g by mouth every morning. Indications: CONSTIPATION Omeprazole delayed release 20 mg tablet Commonly known as:  PRILOSEC D/R Take 20 mg by mouth every morning. We Performed the Following AMB POC COMPREHENSIVE METABOLIC PANEL [87126 CPT(R)] AMB POC HEMOGLOBIN A1C [39022 CPT(R)] AMB POC LIPID PROFILE [61023 CPT(R)] REFERRAL TO PODIATRY [REF90 Custom] Comments:  
 Plantar wart on left foot Follow-up Instructions Return in about 3 months (around 5/5/2018). Referral Information Referral ID Referred By Referred To  
  
 [id number] [facility] [address] [place], [address] Phone: [phone] Fax: [phone] Visits Status Start Date End Date 1 New Request 2/5/18 2/5/19 If your referral has a status of pending review or denied, additional information will be sent to support the outcome of this decision. Patient Instructions Arthritis: Care Instructions Your Care Instructions Arthritis, also called osteoarthritis, is a breakdown of the cartilage that cushions your joints. When the cartilage wears down, your bones rub against each other. This causes pain and stiffness. Many people have some arthritis as they age. Arthritis most often affects the joints of the spine, hands, hips, knees, or feet. You can take simple measures to protect your joints, ease your pain, and help you stay active. Follow-up care is a key part of your treatment and safety. Be sure to make and go to all appointments, and call your doctor if you are having problems. It's also a good idea to know your test results and keep a list of the medicines you take. How can you care for yourself at home? · Stay at a healthy weight. Being overweight puts extra strain on your joints. · Talk to your doctor or physical therapist about exercises that will help ease joint pain. ¨ Stretch. You may enjoy gentle forms of yoga to help keep your joints and muscles flexible. ¨ Walk instead of jog. Other types of exercise that are less stressful on the joints include riding a bicycle, swimming, janiya chi, or water exercise. ¨ Lift weights. Strong muscles help reduce stress on your joints. Stronger thigh muscles, for example, take some of the stress off of the knees and hips. Learn the right way to lift weights so you do not make joint pain worse. · Take your medicines exactly as prescribed. Call your doctor if you think you are having a problem with your medicine. · Take pain medicines exactly as directed. ¨ If the doctor gave you a prescription medicine for pain, take it as prescribed. ¨ If you are not taking a prescription pain medicine, ask your doctor if you can take an over-the-counter medicine. · Use a cane, crutch, walker, or another device if you need help to get around. These can help rest your joints. You also can use other things to make life easier, such as a higher toilet seat and padded handles on kitchen utensils. · Do not sit in low chairs, which can make it hard to get up. · Put heat or cold on your sore joints as needed. Use whichever helps you most. You also can take turns with hot and cold packs. ¨ Apply heat 2 or 3 times a day for 20 to 30 minutes-using a heating pad, hot shower, or hot pack-to relieve pain and stiffness. ¨ Put ice or a cold pack on your sore joint for 10 to 20 minutes at a time. Put a thin cloth between the ice and your skin. When should you call for help? Call your doctor now or seek immediate medical care if: 
? · You have sudden swelling, warmth, or pain in any joint. ? · You have joint pain and a fever or rash. ? · You have such bad pain that you cannot use a joint. ? Watch closely for changes in your health, and be sure to contact your doctor if: 
? · You have mild joint symptoms that continue even with more than 6 weeks of care at home. ? · You have stomach pain or other problems with your medicine. Where can you learn more? Go to http://rufina-ion.info/. Enter T225 in the search box to learn more about \"Arthritis: Care Instructions. \" Current as of: October 31, 2016 Content Version: 11.4 © 2313-1844 [employer].  Care instructions adapted under license by [employer] (which disclaims liability or warranty for this information). If you have questions about a medical condition or this instruction, always ask your healthcare professional. [address] any warranty or liability for your use of this information. Patient Instructions History Introducing [facility]! Wendy Echeverria introduces Aptidata patient portal. Now you can access parts of your medical record, email your doctor's office, and request medication refills online. 1. In your internet browser, go to [email or web address] 2. Click on the First Time User? Click Here link in the Sign In box. You will see the New Member Sign Up page. 3. Enter your Aptidata Access Code exactly as it appears below. You will not need to use this code after youve completed the sign-up process. If you do not sign up before the expiration date, you must request a new code. · Aptid[employer] Access Code: [email or web address] Expires: 4/5/2018 12:49 PM 
 
4. Enter the last four digits of your Social Security Number (xxxx) and Date of Birth (mm/dd/yyyy) as indicated and click Submit. You will be taken to the next sign-up page. 5. Create a Aptidata ID. This will be your Aptidata login ID and cannot be changed, so think of one that is secure and easy to remember. 6. Create a Aptidata password. You can change your password at any time. 7. Enter your Password Reset Question and Answer. This can be used at a later time if you forget your password. 8. Enter your e-mail address. You will receive e-mail notification when new information is available in [address] 9. Click Sign Up. You can now view and download portions of your medical record. 10. Click the Download Summary menu link to download a portable copy of your medical information. If you have questions, please visit the Frequently Asked Questions section of the Apt[employer] website. Remember, Apt[employer]ata is NOT to be used for urgent needs. For medical emergencies, dial 911. Now available from your iPhone and Android! Please provide this summary of care documentation to your next provider. Your primary care clinician is listed as Yakov. If you have any questions after today's visit, please call [phone].

## 2018-02-05 NOTE — PROGRESS NOTES
1. Have you been to the ER, urgent care clinic since your last visit? Hospitalized since your last visit? No    2. Have you seen or consulted any other health care providers outside of the [facility] since your last visit? Include any pap smears or colon screening. No     Chief Complaint   Patient presents with    Hypertension     3 mo. f/u    Chronic Kidney Disease     3 mo. f/u    Cholesterol Problem     3 mo.  f/u       Fasting

## 2018-02-05 NOTE — PROGRESS NOTES
Chief Complaint   Patient presents with    Hypertension     3 mo. f/u    Chronic Kidney Disease     3 mo. f/u    Cholesterol Problem     3 mo. f/u       SUBJECTIVE:    Sotero Maria is a 81 y.o. female who returns in follow-up for medical problems include hypertension, glucose intolerance, hyperlipidemia, mild CKD, DJD, obesity, as well as other medical problems. She has a plantar wart on the bottom of her left foot that she can get to treat herself. She denies any chest pain, shortness of breath, palpitations, or cardiovascular complaints of any other type. There are no GI or  complaints. She has no arthritic she denies any headaches neurologic planes. There are no other complaints on complete review of systems. She is taking medication trying to follow her diet and try to get some exercise but knows she needs to do better with that. Complaints. Other than her chronic joint aches and pains that have not changed. Current Outpatient Prescriptions   Medication Sig Dispense Refill    Omeprazole delayed release (PRILOSEC D/R) 20 mg tablet Take 20 mg by mouth every morning.  biotin 10,000 mcg cap Take  by mouth daily (with dinner).  Cholecalciferol, Vitamin D3, 3,000 unit tab Take 1,000 Units by mouth every morning.  cyanocobalamin (VITAMIN B12) 500 mcg tablet Take 500 mcg by mouth every morning.  irbesartan (AVAPRO) 300 mg tablet Take 300 mg by mouth every morning. Indications: HYPERTENSION      polyethylene glycol (MIRALAX) 17 gram/dose powder Take 17 g by mouth every morning. Indications: CONSTIPATION      hydrochlorothiazide (HYDRODIURIL) 25 mg tablet Take 25 mg by mouth every morning.        Past Medical History:   Diagnosis Date    Anxiety     Arthritis     Chronic constipation     CKD (chronic kidney disease) 7/17/2017    Colon polyps 7/17/2017    DJD (degenerative joint disease) 7/17/2017    Elevated LFTs 7/17/2017    GERD (gastroesophageal reflux disease)  Glucose intolerance (impaired glucose tolerance) 7/17/2017    Hemorrhoids     internal & external- bleeds frequently    High cholesterol     Hyperlipidemia 7/17/2017    Hypertension     Hypertension with renal disease 7/17/2017    PT Education - High Blood Pressure (Essential Hypertension) *: blood pressure PT Education - How to access health information online: discussed with patient and provided information    Hypertension, renal 7/17/2017    Ill-defined condition     ringing in ears    Mild obesity 7/17/2017    Nausea & vomiting     Neoplasm of uncertain behavior of skin 7/17/2017    On statin therapy 7/17/2017    Osteopenia 7/17/2017    Primary angle-closure glaucoma 7/17/2017    Comments: OU    Skin cancer of face     as of 6/15/16 pt states \"removed years ago\"    Spinal stenosis      Past Surgical History:   Procedure Laterality Date    HX CHOLECYSTECTOMY  11/6/2014    Dr Siddhartha Cee for glaucoma    HX HYSTERECTOMY      partial    HX PELVIC LAPAROSCOPY      Jono. oophorectomy    HX TUBAL LIGATION      SD COLSC FLX W/REMOVAL LESION BY HOT BX FORCEPS  11/12/2012         SD ERCP REMOVE CALCULI/DEBRIS BILIARY/PANCREAS DUCT  11/7/2014         SD ERCP W/SPHINCTEROTOMY/PAPILLOTOMY  11/7/2014          Allergies   Allergen Reactions    Iodinated Contrast- Oral And Iv Dye Hives    Oxycodone-Aspirin Unknown (comments)    Simvastatin Myalgia    Benicar [Olmesartan] Cough       REVIEW OF SYSTEMS:  General: negative for - chills or fever, or weight loss or gain  ENT: negative for - headaches, nasal congestion or tinnitus  Eyes: no blurred or visual changes  Neck: No stiffness or swollen nodes  Respiratory: negative for - cough, hemoptysis, shortness of breath or wheezing  Cardiovascular : negative for - chest pain, edema, palpitations or shortness of breath  Gastrointestinal: negative for - abdominal pain, blood in stools, heartburn or nausea/vomiting  Genito-Urinary: no dysuria, trouble voiding, or hematuria  Musculoskeletal: negative for - gait disturbance, joint pain, joint stiffness or joint swelling  Neurological: no TIA or stroke symptoms  Hematologic: no bruises, no bleeding  Lymphatic: no swollen glands  Integument: no lumps, mole changes, nail changes or rash. Plantar wart left foot  Endocrine:no malaise/lethargy poly uria or polydipsia or unexpected weight changes        Social History     Social History    Marital status:      Spouse name: N/A    Number of children: N/A    Years of education: N/A     Social History Main Topics    Smoking status: Former Smoker     Packs/day: 1.50     Years: 35.00     Quit date: 11/4/2004    Smokeless tobacco: Never Used      Comment: quit smoking 6 yrs ago    Alcohol use No    Drug use: No    Sexual activity: No     Other Topics Concern    None     Social History Narrative     Family History   Problem Relation Age of Onset    Heart Disease Mother     Hypertension Mother     Cancer Mother      skin    Stroke Father     Breast Cancer Sister      onset: 76s       OBJECTIVE:     Visit Vitals    /80 (BP 1 Location: Left arm, BP Patient Position: Sitting)    Pulse 84    Temp 97.7 °F (36.5 °C) (Oral)    Resp 16    Ht 5' 1.5\" (1.562 m)    Wt 205 lb 9.6 oz (93.3 kg)    SpO2 95%    BMI 38.22 kg/m2     CONSTITUTIONAL:   well nourished, appears age appropriate  EYES: sclera anicteric, PERRL, EOMI  ENMT:nars clear, moist mucous membranes, pharynx clear  NECK: supple.  Thyroid normal, No JVD or bruits  RESPIRATORY: Chest: clear to ascultation and percussion, normal inspiratory effort  CARDIOVASCULAR: Heart: regular rate and rhythm no murmurs, rubs or gallops, PMI not displaced, No thrills  GASTROINTESTINAL: Abdomen: non distended, soft, non tender, bowel sounds normal  HEMATOLOGIC: no purpura, petechiae or bruising  LYMPHATIC: No lymph node enlargemant  MUSCULOSKELETAL: Extremities: no edema or active synovitis, pulse 1+   INTEGUMENT: No unusual rashes or suspicious skin lesions noted. Nails appear normal.  PERIPHERAL VASCULAR: normal pulses femoral, PT and DP  NEUROLOGIC: non-focal exam, A & O X 3  PSYCHIATRIC:, appropriate affect     ASSESSMENT:   1. Hypertension with renal disease    2. Glucose intolerance (impaired glucose tolerance)    3. Mixed hyperlipidemia    4. Primary osteoarthritis involving multiple joints    5. Stage 2 chronic kidney disease    6. Class 2 obesity due to excess calories without serious comorbidity with body mass index (BMI) of 37.0 to 37.9 in adult    7. Plantar wart      Impression  1. Hypertension that is controlled continue current therapy reviewed with her  2. Glucose intolerance repeat status pending reviewed prior labs will make adjustments if necessary  3. Hyperlipidemia prior labs reviewed repeat status pending will adjust medicines if necessary  4. DJD that seems to be stable  5. CKD stage II repeat status pending reviewed prior labs  6. Obesity unfortunate weight is up stressed diet exercise weight reduction how to get her weight down for long-term health benefit. 7.  Plantar wart left foot I will set up for podiatry evaluation since she cannot treated herself  We will call the lab make further recommendations adjustments if necessary. Follow stable continue same and follow-up in 3 months or sooner should be a problem. PLAN:  .  Orders Placed This Encounter    REFERRAL TO PODIATRY    AMB POC LIPID PROFILE    AMB POC HEMOGLOBIN A1C    AMB POC COMPREHENSIVE METABOLIC PANEL         ATTENTION:   This medical record was transcribed using an electronic medical records system. Although proofread, it may and can contain electronic and spelling errors. Other human spelling and other errors may be present. Corrections may be executed at a later time. Please feel free to contact us for any clarifications as needed.       Follow-up Disposition:  Return in about 3 months (around 5/5/2018). No results found for any visits on 02/05/18. Jennifer Hinds MD    The patient verbalized understanding of the problems and plans as explained.

## 2018-02-13 DIAGNOSIS — K21.9 GASTROESOPHAGEAL REFLUX DISEASE WITHOUT ESOPHAGITIS: Primary | ICD-10-CM

## 2018-02-13 DIAGNOSIS — I10 ESSENTIAL HYPERTENSION: ICD-10-CM

## 2018-02-13 RX ORDER — RANITIDINE 300 MG/1
TABLET ORAL
Qty: 90 TAB | Refills: 3 | Status: SHIPPED | OUTPATIENT
Start: 2018-02-13 | End: 2018-11-15 | Stop reason: SDUPTHER

## 2018-02-13 RX ORDER — OMEPRAZOLE 20 MG/1
CAPSULE, DELAYED RELEASE ORAL
Qty: 90 CAP | Refills: 3 | Status: SHIPPED | OUTPATIENT
Start: 2018-02-13 | End: 2018-11-15 | Stop reason: SDUPTHER

## 2018-02-13 RX ORDER — IRBESARTAN 300 MG/1
TABLET ORAL
Qty: 90 TAB | Refills: 3 | Status: SHIPPED | OUTPATIENT
Start: 2018-02-13 | End: 2018-11-15 | Stop reason: SDUPTHER

## 2018-02-13 RX ORDER — HYDROCHLOROTHIAZIDE 25 MG/1
TABLET ORAL
Qty: 90 TAB | Refills: 3 | Status: SHIPPED | OUTPATIENT
Start: 2018-02-13 | End: 2018-11-15 | Stop reason: SDUPTHER

## 2018-02-13 NOTE — TELEPHONE ENCOUNTER
Requested Prescriptions     Pending Prescriptions Disp Refills    hydroCHLOROthiazide (HYDRODIURIL) 25 mg tablet [Pharmacy Med Name: HYDROCHLOROT 25MG   TAB] 90 Tab 3     Sig: TAKE ONE TABLET BY MOUTH ONCE DAILY    raNITIdine (ZANTAC) 300 mg tablet [Pharmacy Med Name: RANITIDINE 300MG    TAB] 90 Tab 3     Sig: TAKE ONE TABLET BY MOUTH ONCE DAILY AT BEDTIME    irbesartan (AVAPRO) 300 mg tablet [Pharmacy Med Name: IRBESARTAN 300MG     TAB] 90 Tab 3     Sig: TAKE ONE TABLET BY MOUTH ONCE DAILY    omeprazole (PRILOSEC) 20 mg capsule [Pharmacy Med Name: OMEPRAZOLE 20MG CAP] 90 Cap 3     Sig: TAKE ONE CAPSULE BY MOUTH ONCE DAILY

## 2018-05-09 PROBLEM — M85.89 OSTEOPENIA OF MULTIPLE SITES: Status: ACTIVE | Noted: 2017-07-17

## 2018-05-10 ENCOUNTER — OFFICE VISIT (OUTPATIENT)
Dept: INTERNAL MEDICINE CLINIC | Age: 73
End: 2018-05-10

## 2018-05-10 VITALS
BODY MASS INDEX: 38.24 KG/M2 | WEIGHT: 207.8 LBS | DIASTOLIC BLOOD PRESSURE: 82 MMHG | OXYGEN SATURATION: 97 % | SYSTOLIC BLOOD PRESSURE: 130 MMHG | RESPIRATION RATE: 16 BRPM | TEMPERATURE: 98.6 F | HEART RATE: 84 BPM | HEIGHT: 62 IN

## 2018-05-10 DIAGNOSIS — I12.9 HYPERTENSION WITH RENAL DISEASE: Primary | ICD-10-CM

## 2018-05-10 DIAGNOSIS — E78.2 MIXED HYPERLIPIDEMIA: ICD-10-CM

## 2018-05-10 DIAGNOSIS — N18.2 STAGE 2 CHRONIC KIDNEY DISEASE: ICD-10-CM

## 2018-05-10 DIAGNOSIS — M15.9 PRIMARY OSTEOARTHRITIS INVOLVING MULTIPLE JOINTS: ICD-10-CM

## 2018-05-10 DIAGNOSIS — R73.02 GLUCOSE INTOLERANCE (IMPAIRED GLUCOSE TOLERANCE): ICD-10-CM

## 2018-05-10 DIAGNOSIS — E66.09 CLASS 2 OBESITY DUE TO EXCESS CALORIES WITHOUT SERIOUS COMORBIDITY WITH BODY MASS INDEX (BMI) OF 37.0 TO 37.9 IN ADULT: ICD-10-CM

## 2018-05-10 DIAGNOSIS — M85.89 OSTEOPENIA OF MULTIPLE SITES: ICD-10-CM

## 2018-05-10 DIAGNOSIS — E66.01 SEVERE OBESITY (BMI 35.0-39.9) WITH COMORBIDITY (HCC): ICD-10-CM

## 2018-05-10 NOTE — MR AVS SNAPSHOT
[address] [facility] [address] [address] [phone] Patient: Bala Etienne MRN: [id number] WFU:[birth year] Visit Information Date & Time Provider Department Dept. Phone Encounter #  
 5/10/2018  9:40 AM Richie Calderon MD [facility] [id number] Follow-up Instructions Return in about 3 months (around 8/10/2018). Your Appointments 8/16/2018  9:40 AM  
FOLLOW UP 10 with MD JUDIE Saldana [facility] ([address]) Appt Note: 3 [place] FLP; 3 [place] [address] [address] [address] [facility] [id number] Upcoming Health Maintenance Date Due ZOSTER VACCINE AGE 60> 6/2/2007 GLAUCOMA SCREENING Q2Y 8/2/2012 Influenza Age 5 to Adult 8/1/2018 MEDICARE YEARLY EXAM 10/24/2018 COLONOSCOPY 7/11/2019 BREAST CANCER SCRN MAMMOGRAM 9/21/2019 DTaP/Tdap/Td series (2 - Td) 10/23/2027 Allergies as of 5/10/2018  Review Complete On: 5/10/2018 By: Richie Calderon MD  
  
 Severity Noted Reaction Type Reactions Iodinated Contrast- Oral And Iv Dye  07/17/2017    Hives Oxycodone-aspirin  07/17/2017    Unknown (comments) Simvastatin  07/17/2017    Myalgia Benicar [Olmesartan] Low 11/09/2012   Intolerance Cough Current Immunizations  Never Reviewed Name Date Influenza High Dose Vaccine PF 10/23/2017 Influenza Vaccine 10/10/2016, 1/11/2016 Pneumococcal Conjugate (PCV-13) 7/6/2015 Pneumococcal Vaccine (Unspecified Type) 10/1/2010 Not reviewed this visit You Were Diagnosed With   
  
 Codes Comments Hypertension with renal disease    -  Primary ICD-10-CM: I12.9 ICD-9-CM: 403.90 Glucose intolerance (impaired glucose tolerance)     ICD-10-CM: R73.02 
ICD-9-CM: 790.22 Mixed hyperlipidemia     ICD-10-CM: E78.2 ICD-9-CM: 272.2 Primary osteoarthritis involving multiple joints     ICD-10-CM: M15.0 ICD-9-CM: 715.09 Stage 2 chronic kidney disease     ICD-10-CM: N18.2 ICD-9-CM: 894. 2 Class 2 obesity due to excess calories without serious comorbidity with body mass index (BMI) of 37.0 to 37.9 in adult     ICD-10-CM: E66.09, I99.99 ICD-9-CM: 278.00, V85.37 Osteopenia of multiple sites     ICD-10-CM: M85.89 ICD-9-CM: 733.90 Severe obesity (BMI 35.0-39. 9) with comorbidity ([address]     ICD-10-CM: E66.01 
ICD-9-CM: 278.01 Vitals BP Pulse Temp Resp Height(growth percentile) Weight(growth percentile) 130/82 (BP 1 Location: Left arm, BP Patient Position: Sitting) 84 98.6 °F (37 °C) (Oral) 16 5' 1.5\" (1.562 m) 207 lb 12.8 oz (94.3 kg) SpO2 BMI OB Status Smoking Status 97% 38.63 kg/m2 Hysterectomy Former Smoker Vitals History BMI and BSA Data Body Mass Index Body Surface Area  
 38.63 kg/m 2 2.02 m 2 Preferred Pharmacy Pharmacy Name Phone [facility] [phone] Your Updated Medication List  
  
   
This list is accurate as of 5/10/18 10:57 AM.  Always use your most recent med list.  
  
  
  
  
 biotin 10,000 mcg Cap Take  by mouth daily (with dinner). Cholecalciferol (Vitamin D3) 3,000 unit Tab Take 1,000 Units by mouth every morning. cyanocobalamin 500 mcg tablet Commonly known as:  VITAMIN B12 Take 500 mcg by mouth every morning. hydroCHLOROthiazide 25 mg tablet Commonly known as:  HYDRODIURIL  
TAKE ONE TABLET BY MOUTH ONCE DAILY  
  
 irbesartan 300 mg tablet Commonly known as:  AVAPRO TAKE ONE TABLET BY MOUTH ONCE DAILY MIRALAX 17 gram/dose powder Generic drug:  polyethylene glycol Take 17 g by mouth every morning. Indications: CONSTIPATION  
  
 * Omeprazole delayed release 20 mg tablet Commonly known as:  PRILOSEC D/R  
 Take 20 mg by mouth every morning. * omeprazole 20 mg capsule Commonly known as:  PRILOSEC  
TAKE ONE CAPSULE BY MOUTH ONCE DAILY  
  
 raNITIdine 300 mg tablet Commonly known as:  ZANTAC TAKE ONE TABLET BY MOUTH ONCE DAILY AT BEDTIME  
  
 * Notice: This list has 2 medication(s) that are the same as other medications prescribed for you. Read the directions carefully, and ask your doctor or other care provider to review them with you. We Performed the Following AMB POC CK (CPK) [21581 CPT(R)] AMB POC COMPREHENSIVE METABOLIC PANEL [17842 CPT(R)] AMB POC HEMOGLOBIN A1C [53377 CPT(R)] AMB POC LIPID PROFILE [23123 CPT(R)] Follow-up Instructions Return in about 3 months (around 8/10/2018). Patient Instructions Body Mass Index: Care Instructions Your Care Instructions Body mass index (BMI) can help you see if your weight is raising your risk for health problems. It uses a formula to compare how much you weigh with how tall you are. · A BMI lower than 18.5 is considered underweight. · A BMI between 18.5 and 24.9 is considered healthy. · A BMI between 25 and 29.9 is considered overweight. A BMI of 30 or higher is considered obese. If your BMI is in the normal range, it means that you have a lower risk for weight-related health problems. If your BMI is in the overweight or obese range, you may be at increased risk for weight-related health problems, such as high blood pressure, heart disease, stroke, arthritis or joint pain, and diabetes. If your BMI is in the underweight range, you may be at increased risk for health problems such as fatigue, lower protection (immunity) against illness, muscle loss, bone loss, hair loss, and hormone problems. BMI is just one measure of your risk for weight-related health problems.  You may be at higher risk for health problems if you are not active, you eat an unhealthy diet, or you drink too much alcohol or use tobacco products. Follow-up care is a key part of your treatment and safety. Be sure to make and go to all appointments, and call your doctor if you are having problems. It's also a good idea to know your test results and keep a list of the medicines you take. How can you care for yourself at home? · Practice healthy eating habits. This includes eating plenty of fruits, vegetables, whole grains, lean protein, and low-fat dairy. · If your doctor recommends it, get more exercise. Walking is a good choice. Bit by bit, increase the amount you walk every day. Try for at least 30 minutes on most days of the week. · Do not smoke. Smoking can increase your risk for health problems. If you need help quitting, talk to your doctor about stop-smoking programs and medicines. These can increase your chances of quitting for good. · Limit alcohol to 2 drinks a day for men and 1 drink a day for women. Too much alcohol can cause health problems. If you have a BMI higher than 25 · Your doctor may do other tests to check your risk for weight-related health problems. This may include measuring the distance around your waist. A waist measurement of more than 40 inches in men or 35 inches in women can increase the risk of weight-related health problems. · Talk with your doctor about steps you can take to stay healthy or improve your health. You may need to make lifestyle changes to lose weight and stay healthy, such as changing your diet and getting regular exercise. If you have a BMI lower than 18.5 · Your doctor may do other tests to check your risk for health problems. · Talk with your doctor about steps you can take to stay healthy or improve your health. You may need to make lifestyle changes to gain or maintain weight and stay healthy, such as getting more healthy foods in your diet and doing exercises to build muscle. Where can you learn more? Go to http://rufina-ion.info/. Enter S176 in the search box to learn more about \"Body Mass Index: Care Instructions. \" Current as of: October 13, 2016 Content Version: 11.4 © 3163-7719 [employer]. Care instructions adapted under license by [employer] (which disclaims liability or warranty for this information). If you have questions about a medical condition or this instruction, always ask your healthcare professional. [address] any warranty or liability for your use of this information. Introducing [facility]! Toyr Robles introduces Affinaquest patient portal. Now you can access parts of your medical record, email your doctor's office, and request medication refills online. 1. In your internet browser, go to [email or web address] 2. Click on the First Time User? Click Here link in the Sign In box. You will see the New Member Sign Up page. 3. Enter your Affinaquest Access Code exactly as it appears below. You will not need to use this code after youve completed the sign-up process. If you do not sign up before the expiration date, you must request a new code. · Affinaquest Access Code: [email or web address] Expires: 8/8/2018 10:57 AM 
 
4. Enter the last four digits of your Social Security Number (xxxx) and Date of Birth (mm/dd/yyyy) as indicated and click Submit. You will be taken to the next sign-up page. 5. Create a Affinaquest ID. This will be your Affinaquest login ID and cannot be changed, so think of one that is secure and easy to remember. 6. Create a Affinaquest password. You can change your password at any time. 7. Enter your Password Reset Question and Answer. This can be used at a later time if you forget your password. 8. Enter your e-mail address. You will receive e-mail notification when new information is available in [address] 9. Click Sign Up.  You can now view and download portions of your medical record. 10. Click the Download Summary menu link to download a portable copy of your medical information. If you have questions, please visit the Frequently Asked Questions section of the Kuratur website. Remember, Kuratur is NOT to be used for urgent needs. For medical emergencies, dial 911. Now available from your iPhone and Android! Please provide this summary of care documentation to your next provider. Your primary care clinician is listed as Yakov. If you have any questions after today's visit, please call [phone].

## 2018-05-10 NOTE — PROGRESS NOTES
Chief Complaint   Patient presents with    Hypertension     3 month follow up     Obesity    Chronic Kidney Disease       SUBJECTIVE:    Jovan Jade is a 81 y.o. female returns in follow-up for medical problems include hypertension, glucose intolerance, hyperlipidemia, CKD stage II, DJD, and obesity. She is taking her medications and trying to follow her diet and get some exercise. She knows she could do better with diet and exercise. She denies any chest pain, shortness breath, palpitations or cardiorespiratory complaints. She denies any GI or  complaints. She denies any headaches, dizziness or neurological planes. She has no other complaints on complete review of systems. Current Outpatient Prescriptions   Medication Sig Dispense Refill    hydroCHLOROthiazide (HYDRODIURIL) 25 mg tablet TAKE ONE TABLET BY MOUTH ONCE DAILY 90 Tab 3    raNITIdine (ZANTAC) 300 mg tablet TAKE ONE TABLET BY MOUTH ONCE DAILY AT BEDTIME 90 Tab 3    irbesartan (AVAPRO) 300 mg tablet TAKE ONE TABLET BY MOUTH ONCE DAILY 90 Tab 3    omeprazole (PRILOSEC) 20 mg capsule TAKE ONE CAPSULE BY MOUTH ONCE DAILY 90 Cap 3    Omeprazole delayed release (PRILOSEC D/R) 20 mg tablet Take 20 mg by mouth every morning.  biotin 10,000 mcg cap Take  by mouth daily (with dinner).  Cholecalciferol, Vitamin D3, 3,000 unit tab Take 1,000 Units by mouth every morning.  cyanocobalamin (VITAMIN B12) 500 mcg tablet Take 500 mcg by mouth every morning.  polyethylene glycol (MIRALAX) 17 gram/dose powder Take 17 g by mouth every morning.  Indications: CONSTIPATION       Past Medical History:   Diagnosis Date    Anxiety     Arthritis     Chronic constipation     CKD (chronic kidney disease) 7/17/2017    Colon polyps 7/17/2017    DJD (degenerative joint disease) 7/17/2017    Elevated LFTs 7/17/2017    GERD (gastroesophageal reflux disease)     Glucose intolerance (impaired glucose tolerance) 7/17/2017    Hemorrhoids     internal & external- bleeds frequently    High cholesterol     Hyperlipidemia 7/17/2017    Hypertension     Hypertension with renal disease 7/17/2017    PT Education - High Blood Pressure (Essential Hypertension) *: blood pressure PT Education - How to access health information online: discussed with patient and provided information    Hypertension, renal 7/17/2017    Ill-defined condition     ringing in ears    Mild obesity 7/17/2017    Nausea & vomiting     Neoplasm of uncertain behavior of skin 7/17/2017    On statin therapy 7/17/2017    Osteopenia 7/17/2017    Primary angle-closure glaucoma 7/17/2017    Comments: OU    Skin cancer of face     as of 6/15/16 pt states \"removed years ago\"    Spinal stenosis      Past Surgical History:   Procedure Laterality Date    HX CHOLECYSTECTOMY  11/6/2014    Dr Smith Craig for glaucoma    HX HYSTERECTOMY      partial    HX PELVIC LAPAROSCOPY      Jono. oophorectomy    HX TUBAL LIGATION      CT COLSC FLX W/REMOVAL LESION BY HOT BX FORCEPS  11/12/2012         CT ERCP REMOVE CALCULI/DEBRIS BILIARY/PANCREAS DUCT  11/7/2014         CT ERCP W/SPHINCTEROTOMY/PAPILLOTOMY  11/7/2014          Allergies   Allergen Reactions    Iodinated Contrast- Oral And Iv Dye Hives    Oxycodone-Aspirin Unknown (comments)    Simvastatin Myalgia    Benicar [Olmesartan] Cough       REVIEW OF SYSTEMS:  General: negative for - chills or fever, or weight loss or gain  ENT: negative for - headaches, nasal congestion or tinnitus  Eyes: no blurred or visual changes  Neck: No stiffness or swollen nodes  Respiratory: negative for - cough, hemoptysis, shortness of breath or wheezing  Cardiovascular : negative for - chest pain, edema, palpitations or shortness of breath  Gastrointestinal: negative for - abdominal pain, blood in stools, heartburn or nausea/vomiting  Genito-Urinary: no dysuria, trouble voiding, or hematuria  Musculoskeletal: negative for - gait disturbance, joint pain, joint stiffness or joint swelling  Neurological: no TIA or stroke symptoms  Hematologic: no bruises, no bleeding  Lymphatic: no swollen glands  Integument: no lumps, mole changes, nail changes or rash  Endocrine:no malaise/lethargy poly uria or polydipsia or unexpected weight changes        Social History     Social History    Marital status:      Spouse name: N/A    Number of children: N/A    Years of education: N/A     Social History Main Topics    Smoking status: Former Smoker     Packs/day: 1.50     Years: 35.00     Quit date: 11/4/2004    Smokeless tobacco: Never Used      Comment: quit smoking 6 yrs ago    Alcohol use No    Drug use: No    Sexual activity: No     Other Topics Concern    None     Social History Narrative     Family History   Problem Relation Age of Onset    Heart Disease Mother     Hypertension Mother     Cancer Mother      skin    Stroke Father     Breast Cancer Sister      onset: 76s       OBJECTIVE:     Visit Vitals    /82 (BP 1 Location: Left arm, BP Patient Position: Sitting)    Pulse 84    Temp 98.6 °F (37 °C) (Oral)    Resp 16    Ht 5' 1.5\" (1.562 m)    Wt 207 lb 12.8 oz (94.3 kg)    SpO2 97%    BMI 38.63 kg/m2     CONSTITUTIONAL:   well nourished, appears age appropriate  EYES: sclera anicteric, PERRL, EOMI  ENMT:nares clear, moist mucous membranes, pharynx clear  NECK: supple. Thyroid normal, No JVD or bruits  RESPIRATORY: Chest: clear to ascultation and percussion, normal inspiratory effort  CARDIOVASCULAR: Heart: regular rate and rhythm no murmurs, rubs or gallops, PMI not displaced, No thrills  GASTROINTESTINAL: Abdomen: non distended, soft, non tender, bowel sounds normal  HEMATOLOGIC: no purpura, petechiae or bruising  LYMPHATIC: No lymph node enlargemant  MUSCULOSKELETAL: Extremities: no edema or active synovitis, pulse 1+   INTEGUMENT: No unusual rashes or suspicious skin lesions noted.  Nails appear normal.  PERIPHERAL VASCULAR: normal pulses femoral, PT and DP  NEUROLOGIC: non-focal exam, A & O X 3  PSYCHIATRIC:, appropriate affect     ASSESSMENT:   1. Hypertension with renal disease    2. Glucose intolerance (impaired glucose tolerance)    3. Mixed hyperlipidemia    4. Primary osteoarthritis involving multiple joints    5. Stage 2 chronic kidney disease    6. Class 2 obesity due to excess calories without serious comorbidity with body mass index (BMI) of 37.0 to 37.9 in adult    7. Osteopenia of multiple sites    8. Severe obesity (BMI 35.0-39. 9) with comorbidity ([address]      Impression  1. Hypertension that is controlled continue current therapy reviewed with her  2. Glucose intolerance repeat status pending reviewed prior labs and make adjustments if necessary  3. Hyperlipidemia prior labs reviewed repeat status pending will adjust if needed  4. DJD that is stable  5. CKD stage II repeat status pending  6. Obesity discussed diet exercise weight reduction for [facility]  7. Osteopenia reviewed prior bone density with her  Advance care planning discussed with her and she thinks she has something written down at home and will check and see and get us a copy if she does if not she will get something drawn up. We will call the lab and make further recommendations adjustments are necessary. Follow-up in 3 months or sooner should be a problem. PLAN:  .  Orders Placed This Encounter    AMB POC LIPID PROFILE    AMB POC HEMOGLOBIN A1C    AMB POC COMPREHENSIVE METABOLIC PANEL    AMB POC CK (CPK)         ATTENTION:   This medical record was transcribed using an electronic medical records system. Although proofread, it may and can contain electronic and spelling errors. Other human spelling and other errors may be present. Corrections may be executed at a later time. Please feel free to contact us for any clarifications as needed.       Follow-up Disposition:  Return in about 3 months (around 8/10/2018). Results for orders placed or performed in visit on 05/10/18   AMB POC LIPID PROFILE   Result Value Ref Range    Cholesterol (POC)  0 - 200 mg/dL    Triglycerides (POC)  0 - 200 mg/dL    HDL Cholesterol (POC)  35 - 130 mg/dL    VLDL (POC)  MG/DL    LDL Cholesterol (POC)  0.0 - 130.0 MG/DL    TChol/HDL Ratio (POC)  0.0 - 4.0   AMB POC HEMOGLOBIN A1C   Result Value Ref Range    Hemoglobin A1c (POC)  4.5 - 5.7 %   AMB POC COMPREHENSIVE METABOLIC PANEL   Result Value Ref Range    GLUCOSE  65 - 105 mg/dL    BUN  7 - 17 mg/dL    Creatinine (POC)  0.7 - 1.2 mg/dL    Sodium (POC)  137 - 145 MMOL/L    Potassium (POC)  3.6 - 5.0 MMOL/L    CHLORIDE  98 - 107 MMOL/L    CO2  22 - 32 MMOL/L    CALCIUM  8.4 - 10.2 mg/dL    TOTAL PROTEIN  6.3 - 8.2 g/dL    ALBUMIN  3.9 - 5.4 g/dL    AST (POC)  14 - 36 U/L    ALT (POC)  9 - 52 U/L    ALKALINE PHOS  38 - 126 U/L    TOTAL BILIRUBIN  0.2 - 1.3 mg/dL    eGFR (POC)     AMB POC CK (CPK)   Result Value Ref Range    CK (CPK) (POC)  30 - 135 U/L       Destinee Petit MD    The patient verbalized understanding of the problems and plans as explained.

## 2018-05-10 NOTE — ACP (ADVANCE CARE PLANNING)
Discussed with patient advanced care medical directives. Thinks she has a copy and will bring for her medical records.

## 2018-05-10 NOTE — PROGRESS NOTES
Chief Complaint   Patient presents with    Hypertension     3 month follow up     Obesity    Chronic Kidney Disease     1. Have you been to the ER, urgent care clinic since your last visit? Hospitalized since your last visit? No    2. Have you seen or consulted any other health care providers outside of the [address] [place] [address] since your last visit? Include any pap smears or colon screening.  No     Fasting

## 2018-05-10 NOTE — PATIENT INSTRUCTIONS
Body Mass Index: Care Instructions  Your Care Instructions    Body mass index (BMI) can help you see if your weight is raising your risk for health problems. It uses a formula to compare how much you weigh with how tall you are. · A BMI lower than 18.5 is considered underweight. · A BMI between 18.5 and 24.9 is considered healthy. · A BMI between 25 and 29.9 is considered overweight. A BMI of 30 or higher is considered obese. If your BMI is in the normal range, it means that you have a lower risk for weight-related health problems. If your BMI is in the overweight or obese range, you may be at increased risk for weight-related health problems, such as high blood pressure, heart disease, stroke, arthritis or joint pain, and diabetes. If your BMI is in the underweight range, you may be at increased risk for health problems such as fatigue, lower protection (immunity) against illness, muscle loss, bone loss, hair loss, and hormone problems. BMI is just one measure of your risk for weight-related health problems. You may be at higher risk for health problems if you are not active, you eat an unhealthy diet, or you drink too much alcohol or use tobacco products. Follow-up care is a key part of your treatment and safety. Be sure to make and go to all appointments, and call your doctor if you are having problems. It's also a good idea to know your test results and keep a list of the medicines you take. How can you care for yourself at home? · Practice healthy eating habits. This includes eating plenty of fruits, vegetables, whole grains, lean protein, and low-fat dairy. · If your doctor recommends it, get more exercise. Walking is a good choice. Bit by bit, increase the amount you walk every day. Try for at least 30 minutes on most days of the week. · Do not smoke. Smoking can increase your risk for health problems. If you need help quitting, talk to your doctor about stop-smoking programs and medicines. These can increase your chances of quitting for good. · Limit alcohol to 2 drinks a day for men and 1 drink a day for women. Too much alcohol can cause health problems. If you have a BMI higher than 25  · Your doctor may do other tests to check your risk for weight-related health problems. This may include measuring the distance around your waist. A waist measurement of more than 40 inches in men or 35 inches in women can increase the risk of weight-related health problems. · Talk with your doctor about steps you can take to stay healthy or improve your health. You may need to make lifestyle changes to lose weight and stay healthy, such as changing your diet and getting regular exercise. If you have a BMI lower than 18.5  · Your doctor may do other tests to check your risk for health problems. · Talk with your doctor about steps you can take to stay healthy or improve your health. You may need to make lifestyle changes to gain or maintain weight and stay healthy, such as getting more healthy foods in your diet and doing exercises to build muscle. Where can you learn more? Go to http://rufina-ion.info/. Enter S176 in the search box to learn more about \"Body Mass Index: Care Instructions. \"  Current as of: October 13, 2016  Content Version: 11.4  © 4381-9525 [employer]. Care instructions adapted under license by [employer] (which disclaims liability or warranty for this information). If you have questions about a medical condition or this instruction, always ask your healthcare professional. [address] any warranty or liability for your use of this information.

## 2018-05-16 LAB
ALBUMIN SERPL-MCNC: 4.3 G/DL (ref 3.9–5.4)
ALKALINE PHOS POC: 78 U/L (ref 38–126)
ALT SERPL-CCNC: 36 U/L (ref 9–52)
AST SERPL-CCNC: 26 U/L (ref 14–36)
BUN BLD-MCNC: 17 MG/DL (ref 7–17)
CALCIUM BLD-MCNC: 9.6 MG/DL (ref 8.4–10.2)
CHLORIDE BLD-SCNC: 101 MMOL/L (ref 98–107)
CHOLEST SERPL-MCNC: 226 MG/DL (ref 0–200)
CK (CPK) POC: 71 U/L (ref 30–135)
CO2 POC: 29 MMOL/L (ref 22–32)
CREAT BLD-MCNC: 0.7 MG/DL (ref 0.7–1.2)
EGFR (POC): 87.8
GLUCOSE POC: 93 MG/DL (ref 65–105)
HBA1C MFR BLD HPLC: 5.8 % (ref 4.5–5.7)
HDLC SERPL-MCNC: 57 MG/DL (ref 35–130)
LDL CHOLESTEROL POC: 143 MG/DL (ref 0–130)
POTASSIUM SERPL-SCNC: 4.3 MMOL/L (ref 3.6–5)
PROT SERPL-MCNC: 7.2 G/DL (ref 6.3–8.2)
SODIUM SERPL-SCNC: 140 MMOL/L (ref 137–145)
TCHOL/HDL RATIO (POC): 4 (ref 0–4)
TOTAL BILIRUBIN POC: 1.1 MG/DL (ref 0.2–1.3)
TRIGL SERPL-MCNC: 130 MG/DL (ref 0–200)
VLDLC SERPL CALC-MCNC: 26 MG/DL

## 2018-07-18 ENCOUNTER — OFFICE VISIT (OUTPATIENT)
Dept: INTERNAL MEDICINE CLINIC | Age: 73
End: 2018-07-18

## 2018-07-18 VITALS
HEART RATE: 100 BPM | DIASTOLIC BLOOD PRESSURE: 62 MMHG | RESPIRATION RATE: 17 BRPM | OXYGEN SATURATION: 99 % | WEIGHT: 210 LBS | BODY MASS INDEX: 38.64 KG/M2 | SYSTOLIC BLOOD PRESSURE: 112 MMHG | HEIGHT: 62 IN

## 2018-07-18 DIAGNOSIS — L23.9 ALLERGIC DERMATITIS: Primary | ICD-10-CM

## 2018-07-18 RX ORDER — METHYLPREDNISOLONE ACETATE 40 MG/ML
40 INJECTION, SUSPENSION INTRA-ARTICULAR; INTRALESIONAL; INTRAMUSCULAR; SOFT TISSUE ONCE
Qty: 1 VIAL | Refills: 0
Start: 2018-07-18 | End: 2018-07-18

## 2018-07-18 NOTE — PROGRESS NOTES
Chief Complaint   Patient presents with    Other     poison oak in eyed      1. Have you been to the ER, urgent care clinic since your last visit? Hospitalized since your last visit? No    2. Have you seen or consulted any other health care providers outside of the [address] [place] [address] since your last visit? Include any pap smears or colon screening.  No

## 2018-07-18 NOTE — PATIENT INSTRUCTIONS
Dermatitis: Care Instructions Your Care Instructions Dermatitis is the general name used for any rash or inflammation of the skin. Different kinds of dermatitis cause different kinds of rashes. Common causes of a rash include new medicines, plants (such as poison oak or poison ivy), heat, and stress. Certain illnesses can also cause a rash. An allergic reaction to something that touches your skin, such as latex, nickel, or poison ivy, is called contact dermatitis. Contact dermatitis may also be caused by something that irritates the skin, such as bleach, a chemical, or soap. These types of rashes cannot be spread from person to person. How long your rash will last depends on what caused it. Rashes may last a few days or months. Follow-up care is a key part of your treatment and safety. Be sure to make and go to all appointments, and call your doctor if you are having problems. It's also a good idea to know your test results and keep a list of the medicines you take. How can you care for yourself at home? · Do not scratch the rash. Cut your nails short, and file them smooth. Or wear gloves if this helps keep you from scratching. · Wash the area with water only. Pat dry. · Put cold, wet cloths on the rash to reduce itching. · Keep cool, and stay out of the sun. · Leave the rash open to the air as much as possible. · If the rash itches, use hydrocortisone cream. Follow the directions on the label. Calamine lotion may help for plant rashes. · Take an over-the-counter antihistamine, such as diphenhydramine (Benadryl) or loratadine (Claritin), to help calm the itching. Read and follow all instructions on the label. · If your doctor prescribed a cream, use it as directed. If your doctor prescribed medicine, take it exactly as directed. When should you call for help?  
Call your doctor now or seek immediate medical care if: 
  · You have symptoms of infection, such as: 
¨ Increased pain, swelling, warmth, or redness. ¨ Red streaks leading from the area. ¨ Pus draining from the area. ¨ A fever.  
  · You have joint pain along with the rash.  
 Watch closely for changes in your health, and be sure to contact your doctor if: 
  · Your rash is changing or getting worse.  
  · You are not getting better as expected. Where can you learn more? Go to http://rufina-ion.info/. Enter ([phone] in the search box to learn more about \"Dermatitis: Care Instructions. \" Current as of: October 5, 2017 Content Version: 11.7 © 6705-2725 [employer]. Care instructions adapted under license by [employer] (which disclaims liability or warranty for this information). If you have questions about a medical condition or this instruction, always ask your healthcare professional. [address] any warranty or liability for your use of this information.

## 2018-07-18 NOTE — MR AVS SNAPSHOT
[address] [id number] [phone] Patient: Paz Rivas MRN: [id number] BNB:[birth year] Visit Information Date & Time Provider Department Dept. Phone Encounter #  
 7/18/2018  3:20 PM Beulah WilsonRadha [id number] Follow-up Instructions Return if symptoms worsen or fail to improve. Your Appointments 8/16/2018  9:40 AM  
FOLLOW UP 10 with MD JUDIE Alejo [facility] ([address]) Appt Note: 3 MO FLP; 3 MO [address] [phone] [address] [address] Upcoming Health Maintenance Date Due ZOSTER VACCINE AGE 60> 6/2/2007 GLAUCOMA SCREENING Q2Y 8/2/2012 Influenza Age 5 to Adult 8/1/2018 MEDICARE YEARLY EXAM 10/24/2018 COLONOSCOPY 7/11/2019 BREAST CANCER SCRN MAMMOGRAM 9/21/2019 DTaP/Tdap/Td series (2 - Td) 10/23/2027 Allergies as of 7/18/2018  Review Complete On: 7/18/2018 By: Beulah Wilson MD  
  
 Severity Noted Reaction Type Reactions Iodinated Contrast- Oral And Iv Dye  07/17/2017    Hives Oxycodone-aspirin  07/17/2017    Unknown (comments) Simvastatin  07/17/2017    Myalgia Benicar [Olmesartan] Low 11/09/2012   Intolerance Cough Current Immunizations  Never Reviewed Name Date Influenza High Dose Vaccine PF 10/23/2017 Influenza Vaccine 10/10/2016, 1/11/2016 Pneumococcal Conjugate (PCV-13) 7/6/2015 Pneumococcal Vaccine (Unspecified Type) 10/1/2010 Not reviewed this visit You Were Diagnosed With   
  
 Codes Comments Allergic dermatitis    -  Primary ICD-10-CM: L23.9 ICD-9-CM: 692.9 Vitals BP Pulse Resp Height(growth percentile) Weight(growth percentile) SpO2 112/62 (BP 1 Location: Left arm, BP Patient Position: Sitting) 100 17 5' 1.5\" (1.562 m) 210 lb (95.3 kg) 99% BMI OB Status Smoking Status 39.04 kg/m2 Hysterectomy Former Smoker Vitals History BMI and BSA Data Body Mass Index Body Surface Area 39.04 kg/m 2 2.03 m 2 Preferred Pharmacy Pharmacy Name Phone [facility] Vicente Anguiano [phone] Your Updated Medication List  
  
   
This list is accurate as of 7/18/18  4:27 PM.  Always use your most recent med list.  
  
  
  
  
 biotin 10,000 mcg Cap Take  by mouth daily (with dinner). Cholecalciferol (Vitamin D3) 3,000 unit Tab Take 1,000 Units by mouth every morning. cyanocobalamin 500 mcg tablet Commonly known as:  VITAMIN B12 Take 500 mcg by mouth every morning. hydroCHLOROthiazide 25 mg tablet Commonly known as:  HYDRODIURIL  
TAKE ONE TABLET BY MOUTH ONCE DAILY  
  
 irbesartan 300 mg tablet Commonly known as:  AVAPRO TAKE ONE TABLET BY MOUTH ONCE DAILY  
  
 methylPREDNISolone acetate 40 mg/mL injection Commonly known as:  DEPO-MEDROL 1 mL by IntraMUSCular route once for 1 dose. MIRALAX 17 gram/dose powder Generic drug:  polyethylene glycol Take 17 g by mouth every morning. Indications: CONSTIPATION  
  
 * Omeprazole delayed release 20 mg tablet Commonly known as:  PRILOSEC D/R Take 20 mg by mouth every morning. * omeprazole 20 mg capsule Commonly known as:  PRILOSEC  
TAKE ONE CAPSULE BY MOUTH ONCE DAILY  
  
 raNITIdine 300 mg tablet Commonly known as:  ZANTAC TAKE ONE TABLET BY MOUTH ONCE DAILY AT BEDTIME  
  
 * Notice: This list has 2 medication(s) that are the same as other medications prescribed for you. Read the directions carefully, and ask your doctor or other care provider to review them with you. We Performed the Following METHYLPREDNISOLONE ACETATE INJECTION 40 MG [ [facility]] WI THER/PROPH/DIAG INJECTION, SUBCUT/IM [id number] CPT(R)] Follow-up Instructions Return if symptoms worsen or fail to improve. Patient Instructions Dermatitis: Care Instructions Your Care Instructions Dermatitis is the general name used for any rash or inflammation of the skin. Different kinds of dermatitis cause different kinds of rashes. Common causes of a rash include new medicines, plants (such as poison oak or poison ivy), heat, and stress. Certain illnesses can also cause a rash. An allergic reaction to something that touches your skin, such as latex, nickel, or poison ivy, is called contact dermatitis. Contact dermatitis may also be caused by something that irritates the skin, such as bleach, a chemical, or soap. These types of rashes cannot be spread from person to person. How long your rash will last depends on what caused it. Rashes may last a few days or months. Follow-up care is a key part of your treatment and safety. Be sure to make and go to all appointments, and call your doctor if you are having problems. It's also a good idea to know your test results and keep a list of the medicines you take. How can you care for yourself at home? · Do not scratch the rash. Cut your nails short, and file them smooth. Or wear gloves if this helps keep you from scratching. · Wash the area with water only. Pat dry. · Put cold, wet cloths on the rash to reduce itching. · Keep cool, and stay out of the sun. · Leave the rash open to the air as much as possible. · If the rash itches, use hydrocortisone cream. Follow the directions on the label. Calamine lotion may help for plant rashes. · Take an over-the-counter antihistamine, such as diphenhydramine (Benadryl) or loratadine (Claritin), to help calm the itching. Read and follow all instructions on the label. · If your doctor prescribed a cream, use it as directed. If your doctor prescribed medicine, take it exactly as directed. When should you call for help? Call your doctor now or seek immediate medical care if: 
  · You have symptoms of infection, such as: 
¨ Increased pain, swelling, warmth, or redness. ¨ Red streaks leading from the area. ¨ Pus draining from the area. ¨ A fever.  
  · You have joint pain along with the rash.  
 Watch closely for changes in your health, and be sure to contact your doctor if: 
  · Your rash is changing or getting worse.  
  · You are not getting better as expected. Where can you learn more? Go to http://rufina-ion.info/. Enter (52) 3925 9566 in the search box to learn more about \"Dermatitis: Care Instructions. \" Current as of: October 5, 2017 Content Version: 11.7 © 1634-9265 [employer]. Care instructions adapted under license by [employer] (which disclaims liability or warranty for this information). If you have questions about a medical condition or this instruction, always ask your healthcare professional. [place] [address] any warranty or liability for your use of this information. Introducing [facility]! [facility] introduces [employer] patient portal. Now you can access parts of your medical record, email your doctor's office, and request medication refills online. 1. In your internet browser, go to https://Shot & Shop. [employer]/Shot & Shop 2. Click on the First Time User? Click Here link in the Sign In box. You will see the New Member Sign Up page. 3. Enter your [employer] Access Code exactly as it appears below. You will not need to use this code after youve completed the sign-up process. If you do not sign up before the expiration date, you must request a new code. · [employer] Access Code: [email or web address] Expires: 8/8/2018 10:57 AM 
 
4. Enter the last four digits of your Social Security Number (xxxx) and Date of Birth (mm/dd/yyyy) as indicated and click Submit. You will be taken to the next sign-up page. 5. Create a interspireSubmit ID. This will be your interspireSubmit login ID and cannot be changed, so think of one that is secure and easy to remember. 6. Create a interspireSubmit password. You can change your password at any time. 7. Enter your Password Reset Question and Answer. This can be used at a later time if you forget your password. 8. Enter your e-mail address. You will receive e-mail notification when new information is available in [address] 9. Click Sign Up. You can now view and download portions of your medical record. 10. Click the Download Summary menu link to download a portable copy of your medical information. If you have questions, please visit the Frequently Asked Questions section of the interspireSubmit website. Remember, interspireSubmit is NOT to be used for urgent needs. For medical emergencies, dial 911. Now available from your iPhone and Android! Please provide this summary of care documentation to your next provider. Your primary care clinician is listed as Yakov. If you have any questions after today's visit, please call [phone].

## 2018-07-18 NOTE — PROGRESS NOTES
After obtaining consent, and per orders of Dr. Renan Newberry, injection of Methylprednisolone 40mg IM given in Left Deltoid by Teofilo Acharya LPN. Patient instructed to remain in clinic for 20 minutes afterwards, and to report any adverse reaction to me immediately.

## 2018-07-18 NOTE — PROGRESS NOTES
Subjective:   Dilma Weinstein is a 81 y.o. female      Chief Complaint   Patient presents with    Other     poison oak in eyed         History of present illness: She presents today for evaluation of a rash around her left eye as well as a left hand and neck that she thinks are related to poison oak poison ivy since she has been doing a lot of yard work with mulching. She does notice very pruritic in nature. Patient Active Problem List   Diagnosis Code    Choledocholithiasis K80.50    Grade IV hemorrhoids K64.3    Colon polyps K63.5    Primary angle-closure glaucoma H40.20X0    Class 2 obesity due to excess calories without serious comorbidity with body mass index (BMI) of 37.0 to 37.9 in adult E66.09, Z68.37    Primary osteoarthritis involving multiple joints M15.0    On statin therapy Z79.899    Mixed hyperlipidemia E78.2    Glucose intolerance (impaired glucose tolerance) R73.02    Hypertension with renal disease I12.9    Osteopenia of multiple sites M85.89    Neoplasm of uncertain behavior of skin D48.5    Elevated LFTs R79.89    Stage 2 chronic kidney disease N18.2    Vitamin D deficiency E55.9    Acute midline low back pain without sciatica M54.5    Medicare annual wellness visit, initial Z00.00    Right upper quadrant abdominal pain R10.11    Acute pancreatitis K85.90    Plantar wart B07.0    Severe obesity (BMI 35.0-39. 9) with comorbidity ([address] E66.01    Allergic dermatitis L23.9      Past Medical History:   Diagnosis Date    Anxiety     Arthritis     Chronic constipation     CKD (chronic kidney disease) 7/17/2017    Colon polyps 7/17/2017    DJD (degenerative joint disease) 7/17/2017    Elevated LFTs 7/17/2017    GERD (gastroesophageal reflux disease)     Glucose intolerance (impaired glucose tolerance) 7/17/2017    Hemorrhoids     internal & external- bleeds frequently    High cholesterol     Hyperlipidemia 7/17/2017    Hypertension     Hypertension with renal disease 7/17/2017    PT Education - High Blood Pressure (Essential Hypertension) *: blood pressure PT Education - How to access health information online: discussed with patient and provided information    Hypertension, renal 7/17/2017    Ill-defined condition     ringing in ears    Mild obesity 7/17/2017    Nausea & vomiting     Neoplasm of uncertain behavior of skin 7/17/2017    On statin therapy 7/17/2017    Osteopenia 7/17/2017    Primary angle-closure glaucoma 7/17/2017    Comments: OU    Skin cancer of face     as of 6/15/16 pt states \"removed years ago\"    Spinal stenosis       Allergies   Allergen Reactions    Iodinated Contrast- Oral And Iv Dye Hives    Oxycodone-Aspirin Unknown (comments)    Simvastatin Myalgia    Benicar [Olmesartan] Cough      Family History   Problem Relation Age of Onset    Heart Disease Mother     Hypertension Mother     Cancer Mother      skin    Stroke Father     Breast Cancer Sister      onset: 76s      Social History     Social History    Marital status:      Spouse name: N/A    Number of children: N/A    Years of education: N/A     Occupational History    Not on file. Social History Main Topics    Smoking status: Former Smoker     Packs/day: 1.50     Years: 35.00     Quit date: 11/4/2004    Smokeless tobacco: Never Used      Comment: quit smoking 6 yrs ago    Alcohol use No    Drug use: No    Sexual activity: No     Other Topics Concern    Not on file     Social History Narrative     Prior to Admission medications    Medication Sig Start Date End Date Taking? Authorizing Provider   methylPREDNISolone acetate (DEPO-MEDROL) 40 mg/mL injection 1 mL by IntraMUSCular route once for 1 dose.  7/18/18 7/18/18 Yes Akash Mcdaniels MD   hydroCHLOROthiazide (HYDRODIURIL) 25 mg tablet TAKE ONE TABLET BY MOUTH ONCE DAILY 2/13/18  Yes Akash Mcdaniels MD   raNITIdine (ZANTAC) 300 mg tablet TAKE ONE TABLET BY MOUTH ONCE DAILY AT BEDTIME 2/13/18  Yes Carla Bautista MD   irbesartan (AVAPRO) 300 mg tablet TAKE ONE TABLET BY MOUTH ONCE DAILY 2/13/18  Yes Carla Bautista MD   omeprazole (PRILOSEC) 20 mg capsule TAKE ONE CAPSULE BY MOUTH ONCE DAILY 2/13/18  Yes Carla Bautista MD   Omeprazole delayed release (PRILOSEC D/R) 20 mg tablet Take 20 mg by mouth every morning. Yes Historical Provider   biotin 10,000 mcg cap Take  by mouth daily (with dinner). Yes Historical Provider   Cholecalciferol, Vitamin D3, 3,000 unit tab Take 1,000 Units by mouth every morning. Yes Celestino Wetzel MD   cyanocobalamin (VITAMIN B12) 500 mcg tablet Take 500 mcg by mouth every morning. Yes Celestino Wetzel MD   polyethylene glycol (MIRALAX) 17 gram/dose powder Take 17 g by mouth every morning. Indications: CONSTIPATION   Yes Historical Provider        Review of Systems              Constitutional:  She denies fever, weight loss, sweats or fatigue. EYES: No blurred or double vision,               ENT: no nasal congestion, no headache or dizziness. No difficulty with               swallowing, taste, speech or smell. Respiratory:  No cough, wheezing or shortness of breath. No sputum production. Cardiac:  Denies chest pain, palpitations, unexplained indigestion, syncope, edema, PND or orthopnea. GI:  No changes in bowel movements, no abdominal pain, no bloating, anorexia, nausea, vomiting or heartburn. :  No frequency or dysuria. Denies incontinence or sexual dysfunction. Extremities:  No joint pain, stiffness or swelling  Back:.no pain or soreness  Skin:  No recent rashes or mole changes except pruritic rash as noted above  Neurological:  No numbness, tingling, burning paresthesias or loss of motor strength. No syncope, dizziness, frequent headaches or memory loss.   Hematologic:  No easy bruising  Lymphatic: No lymph node enlargement    Objective:     Vitals:    07/18/18 1543   BP: 112/62   Pulse: 100   Resp: 17   SpO2: 99%   Weight: 210 lb (95.3 kg) Height: 5' 1.5\" (1.562 m)   PainSc:   0 - No pain       Body mass index is 39.04 kg/(m^2). Physical Examination:              General Appearance:  Well-developed, well-nourished, no acute distress. HEENT:      Ears:  The TMs and ear canals were clear. Eyes:  The pupillary responses were normal.  Extraocular muscle function intact. Lids and conjunctiva not injected. Funduscopic exam revealed sharp disc margins. Nares: Clear w/o edema or erythema  Pharynx:  Clear with teeth in good repair. No masses were noted. Neck:  Supple without thyromegaly or adenopathy. No JVD noted. No carotid                bruits. Lungs:  Clear to auscultation and percussion. Cardiac:  Regular rate and rhythm without murmur. PMI not displaced. No gallop, rub or click. Abdominal: Soft, non-tender, no hepata-spleenomegally or masses  Extremities:  No clubbing, cyanosis or edema. Skin:  No rash or unusual mole changes noted. Rash neck midline to the right as well as periorbital left and left arm all consistent with contact dermatitis. Lymph Nodes:  None felt in the cervical, supraclavicular, axillary or inguinal region. Neurological: . DTRs 2+ and symmetric. Station and gait normal.   Hematologic:   No purpura or petechiae        Assessment/Plan:         1. Allergic dermatitis        Impressions/Plan:  Contact dermatitis we will treat this with Depo-Medrol 40 IM. Recheck if not resolved. Orders Placed This Encounter    methylPREDNISolone acetate (DEPO-MEDROL) 40 mg/mL injection       Follow-up Disposition:  Return if symptoms worsen or fail to improve. No results found for any visits on 07/18/18. Genny Lane MD    The patient was given after the visit summary the patient verbalized an understanding of the plans and problems as explained.

## 2018-08-16 ENCOUNTER — OFFICE VISIT (OUTPATIENT)
Dept: INTERNAL MEDICINE CLINIC | Age: 73
End: 2018-08-16

## 2018-08-16 VITALS
SYSTOLIC BLOOD PRESSURE: 124 MMHG | DIASTOLIC BLOOD PRESSURE: 78 MMHG | BODY MASS INDEX: 37.73 KG/M2 | RESPIRATION RATE: 15 BRPM | OXYGEN SATURATION: 96 % | WEIGHT: 205 LBS | HEIGHT: 62 IN | HEART RATE: 79 BPM

## 2018-08-16 DIAGNOSIS — M15.9 PRIMARY OSTEOARTHRITIS INVOLVING MULTIPLE JOINTS: ICD-10-CM

## 2018-08-16 DIAGNOSIS — E78.2 MIXED HYPERLIPIDEMIA: ICD-10-CM

## 2018-08-16 DIAGNOSIS — N18.2 STAGE 2 CHRONIC KIDNEY DISEASE: ICD-10-CM

## 2018-08-16 DIAGNOSIS — E66.01 SEVERE OBESITY (BMI 35.0-39.9) WITH COMORBIDITY (HCC): ICD-10-CM

## 2018-08-16 DIAGNOSIS — I12.9 HYPERTENSION WITH RENAL DISEASE: Primary | ICD-10-CM

## 2018-08-16 DIAGNOSIS — R73.02 GLUCOSE INTOLERANCE (IMPAIRED GLUCOSE TOLERANCE): ICD-10-CM

## 2018-08-16 NOTE — PROGRESS NOTES
Chief Complaint   Patient presents with    Hypertension     3 month follow up     Obesity    Chronic Kidney Disease     1. Have you been to the ER, urgent care clinic since your last visit? Hospitalized since your last visit? No    2. Have you seen or consulted any other health care providers outside of the [address] [place] [address] since your last visit? Include any pap smears or colon screening. No     Eye exam-  around May or June of 2018.

## 2018-08-16 NOTE — MR AVS SNAPSHOT
[facility] 
 
 
 [address] [address] [phone] Patient: Dilma Weinstein MRN: [id number] GYU:[id number] Visit Information Date & Time Provider Department Dept. Phone Encounter #  
 8/16/2018  9:40 AM Preston Stewart [id number] Follow-up Instructions Return in about 3 months (around 11/16/2018). Follow-up and Disposition History Your Appointments 11/15/2018 10:40 AM  
FOLLOW UP 10 with MD JUDIE Stewart [facility] ([address]) Appt Note: 3 MO FLP   yearly [address] [address] [address] Upcoming Health Maintenance Date Due ZOSTER VACCINE AGE 60> 6/2/2007 GLAUCOMA SCREENING Q2Y 8/2/2012 Influenza Age 5 to Adult 8/1/2018 MEDICARE YEARLY EXAM 10/24/2018 COLONOSCOPY 7/11/2019 BREAST CANCER SCRN MAMMOGRAM 9/21/2019 DTaP/Tdap/Td series (2 - Td) 10/23/2027 Allergies as of 8/16/2018  Review Complete On: 8/16/2018 By: Vincent Hatfield MD  
  
 Severity Noted Reaction Type Reactions Iodinated Contrast- Oral And Iv Dye  07/17/2017    Hives Oxycodone-aspirin  07/17/2017    Unknown (comments) Simvastatin  07/17/2017    Myalgia Benicar [Olmesartan] Low 11/09/2012   Intolerance Cough Current Immunizations  Never Reviewed Name Date Influenza High Dose Vaccine PF 10/23/2017 Influenza Vaccine 10/10/2016, 1/11/2016 Pneumococcal Conjugate (PCV-13) 7/6/2015 Pneumococcal Vaccine (Unspecified Type) 10/1/2010 Not reviewed this visit You Were Diagnosed With   
  
 Codes Comments Hypertension with renal disease    -  Primary ICD-10-CM: I12.9 ICD-9-CM: 403.90  Glucose intolerance (impaired glucose tolerance)     ICD-10-CM: R73.02 
ICD-9-CM: 790.22   
 Mixed hyperlipidemia     ICD-10-CM: E78.2 ICD-9-CM: 272.2 Primary osteoarthritis involving multiple joints     ICD-10-CM: M15.0 ICD-9-CM: 715.09 Stage 2 chronic kidney disease     ICD-10-CM: N18.2 ICD-9-CM: 869. 2 Severe obesity (BMI 35.0-39. 9) with comorbidity ([address]     ICD-10-CM: E66.01 
ICD-9-CM: 278.01 Vitals BP Pulse Resp Height(growth percentile) Weight(growth percentile) SpO2  
 124/78 (BP 1 Location: Left arm, BP Patient Position: Sitting) 79 15 5' 1.5\" (1.562 m) 205 lb (93 kg) 96% BMI OB Status Smoking Status 38.11 kg/m2 Hysterectomy Former Smoker Vitals History BMI and BSA Data Body Mass Index Body Surface Area  
 38.11 kg/m 2 2.01 m 2 Preferred Pharmacy Pharmacy Name Phone [facility] Vicente Orourke [phone] Your Updated Medication List  
  
   
This list is accurate as of 8/16/18 11:28 AM.  Always use your most recent med list.  
  
  
  
  
 biotin 10,000 mcg Cap Take  by mouth daily (with dinner). Cholecalciferol (Vitamin D3) 3,000 unit Tab Take 1,000 Units by mouth every morning. cyanocobalamin 500 mcg tablet Commonly known as:  VITAMIN B12 Take 500 mcg by mouth every morning. hydroCHLOROthiazide 25 mg tablet Commonly known as:  HYDRODIURIL  
TAKE ONE TABLET BY MOUTH ONCE DAILY  
  
 irbesartan 300 mg tablet Commonly known as:  AVAPRO TAKE ONE TABLET BY MOUTH ONCE DAILY MIRALAX 17 gram/dose powder Generic drug:  polyethylene glycol Take 17 g by mouth every morning. Indications: CONSTIPATION  
  
 * Omeprazole delayed release 20 mg tablet Commonly known as:  PRILOSEC D/R Take 20 mg by mouth every morning. * omeprazole 20 mg capsule Commonly known as:  PRILOSEC  
TAKE ONE CAPSULE BY MOUTH ONCE DAILY PRESERVISION AREDS 2 PO Take  by mouth. Indications: 2 pills dailly  
  
 raNITIdine 300 mg tablet Commonly known as:  ZANTAC TAKE ONE TABLET BY MOUTH ONCE DAILY AT BEDTIME  
  
 * Notice: This list has 2 medication(s) that are the same as other medications prescribed for you. Read the directions carefully, and ask your doctor or other care provider to review them with you. We Performed the Following CK [id number] CPT(R)] HEMOGLOBIN A1C WITH EAG [82759 CPT(R)] LIPID PANEL [12488 CPT(R)] METABOLIC PANEL, COMPREHENSIVE [45300 CPT(R)] Follow-up Instructions Return in about 3 months (around 11/16/2018). Patient Instructions Body Mass Index: Care Instructions Your Care Instructions Body mass index (BMI) can help you see if your weight is raising your risk for health problems. It uses a formula to compare how much you weigh with how tall you are. · A BMI lower than 18.5 is considered underweight. · A BMI between 18.5 and 24.9 is considered healthy. · A BMI between 25 and 29.9 is considered overweight. A BMI of 30 or higher is considered obese. If your BMI is in the normal range, it means that you have a lower risk for weight-related health problems. If your BMI is in the overweight or obese range, you may be at increased risk for weight-related health problems, such as high blood pressure, heart disease, stroke, arthritis or joint pain, and diabetes. If your BMI is in the underweight range, you may be at increased risk for health problems such as fatigue, lower protection (immunity) against illness, muscle loss, bone loss, hair loss, and hormone problems. BMI is just one measure of your risk for weight-related health problems. You may be at higher risk for health problems if you are not active, you eat an unhealthy diet, or you drink too much alcohol or use tobacco products. Follow-up care is a key part of your treatment and safety. Be sure to make and go to all appointments, and call your doctor if you are having problems.  It's also a good idea to know your test results and keep a list of the medicines you take. How can you care for yourself at home? · Practice healthy eating habits. This includes eating plenty of fruits, vegetables, whole grains, lean protein, and low-fat dairy. · If your doctor recommends it, get more exercise. Walking is a good choice. Bit by bit, increase the amount you walk every day. Try for at least 30 minutes on most days of the week. · Do not smoke. Smoking can increase your risk for health problems. If you need help quitting, talk to your doctor about stop-smoking programs and medicines. These can increase your chances of quitting for good. · Limit alcohol to 2 drinks a day for men and 1 drink a day for women. Too much alcohol can cause health problems. If you have a BMI higher than 25 · Your doctor may do other tests to check your risk for weight-related health problems. This may include measuring the distance around your waist. A waist measurement of more than 40 inches in men or 35 inches in women can increase the risk of weight-related health problems. · Talk with your doctor about steps you can take to stay healthy or improve your health. You may need to make lifestyle changes to lose weight and stay healthy, such as changing your diet and getting regular exercise. If you have a BMI lower than 18.5 · Your doctor may do other tests to check your risk for health problems. · Talk with your doctor about steps you can take to stay healthy or improve your health. You may need to make lifestyle changes to gain or maintain weight and stay healthy, such as getting more healthy foods in your diet and doing exercises to build muscle. Where can you learn more? Go to http://rufina-ion.info/. Enter S176 in the search box to learn more about \"Body Mass Index: Care Instructions. \" Current as of: October 13, 2016 Content Version: 11.4 © 2043-1206 Healthwise, Incorporated.  Care instructions adapted under license by [address] (which disclaims liability or warranty for this information). If you have questions about a medical condition or this instruction, always ask your healthcare professional. [address] any warranty or liability for your use of this information. Patient Instructions History Introducing [facility]! Ronit Yeh introduces MD.Voice patient portal. Now you can access parts of your medical record, email your doctor's office, and request medication refills online. 1. In your internet browser, go to https://[employer] 2. Click on the First Time User? Click Here link in the Sign In box. You will see the New Member Sign Up page. 3. Enter your MD.Voice Access Code exactly as it appears below. You will not need to use this code after youve completed the sign-up process. If you do not sign up before the expiration date, you must request a new code. · MD.Voice Access Code: [email or web address] Expires: 11/14/2018 11:28 AM 
 
4. Enter the last four digits of your Social Security Number (xxxx) and Date of Birth (mm/dd/yyyy) as indicated and click Submit. You will be taken to the next sign-up page. 5. Create a MD.Voice ID. This will be your MD.Voice login ID and cannot be changed, so think of one that is secure and easy to remember. 6. Create a MD.Voice password. You can change your password at any time. 7. Enter your Password Reset Question and Answer. This can be used at a later time if you forget your password. 8. Enter your e-mail address. You will receive e-mail notification when new information is available in [address] 9. Click Sign Up. You can now view and download portions of your medical record. 10. Click the Download Summary menu link to download a portable copy of your medical information.  
 
If you have questions, please visit the Frequently Asked Questions section of the Whereoscope. Remember, [employer] is NOT to be used for urgent needs. For medical emergencies, dial 911. Now available from your iPhone and Android! Please provide this summary of care documentation to your next provider. Your primary care clinician is listed as Yakov. If you have any questions after today's visit, please call [phone].

## 2018-08-16 NOTE — PATIENT INSTRUCTIONS
Body Mass Index: Care Instructions  Your Care Instructions    Body mass index (BMI) can help you see if your weight is raising your risk for health problems. It uses a formula to compare how much you weigh with how tall you are. · A BMI lower than 18.5 is considered underweight. · A BMI between 18.5 and 24.9 is considered healthy. · A BMI between 25 and 29.9 is considered overweight. A BMI of 30 or higher is considered obese. If your BMI is in the normal range, it means that you have a lower risk for weight-related health problems. If your BMI is in the overweight or obese range, you may be at increased risk for weight-related health problems, such as high blood pressure, heart disease, stroke, arthritis or joint pain, and diabetes. If your BMI is in the underweight range, you may be at increased risk for health problems such as fatigue, lower protection (immunity) against illness, muscle loss, bone loss, hair loss, and hormone problems. BMI is just one measure of your risk for weight-related health problems. You may be at higher risk for health problems if you are not active, you eat an unhealthy diet, or you drink too much alcohol or use tobacco products. Follow-up care is a key part of your treatment and safety. Be sure to make and go to all appointments, and call your doctor if you are having problems. It's also a good idea to know your test results and keep a list of the medicines you take. How can you care for yourself at home? · Practice healthy eating habits. This includes eating plenty of fruits, vegetables, whole grains, lean protein, and low-fat dairy. · If your doctor recommends it, get more exercise. Walking is a good choice. Bit by bit, increase the amount you walk every day. Try for at least 30 minutes on most days of the week. · Do not smoke. Smoking can increase your risk for health problems. If you need help quitting, talk to your doctor about stop-smoking programs and medicines. These can increase your chances of quitting for good. · Limit alcohol to 2 drinks a day for men and 1 drink a day for women. Too much alcohol can cause health problems. If you have a BMI higher than 25  · Your doctor may do other tests to check your risk for weight-related health problems. This may include measuring the distance around your waist. A waist measurement of more than 40 inches in men or 35 inches in women can increase the risk of weight-related health problems. · Talk with your doctor about steps you can take to stay healthy or improve your health. You may need to make lifestyle changes to lose weight and stay healthy, such as changing your diet and getting regular exercise. If you have a BMI lower than 18.5  · Your doctor may do other tests to check your risk for health problems. · Talk with your doctor about steps you can take to stay healthy or improve your health. You may need to make lifestyle changes to gain or maintain weight and stay healthy, such as getting more healthy foods in your diet and doing exercises to build muscle. Where can you learn more? Go to http://rufina-ion.info/. Enter S176 in the search box to learn more about \"Body Mass Index: Care Instructions. \"  Current as of: October 13, 2016  Content Version: 11.4  © 9036-9510 Healthwise, Incorporated. Care instructions adapted under license by [employer] (which disclaims liability or warranty for this information). If you have questions about a medical condition or this instruction, always ask your healthcare professional. [address] any warranty or liability for your use of this information.

## 2018-08-16 NOTE — PROGRESS NOTES
Chief Complaint   Patient presents with    Hypertension     3 month follow up     Obesity    Chronic Kidney Disease       SUBJECTIVE:    Umer Burnham is a 72 y.o. female returns in follow-up for medical problems include hypertension, glucose intolerance, hyperlipidemia, CKD stage II, DJD, and obesity. She is taking her medications and trying to follow her diet and get some exercise. She knows she could do better with diet and exercise but has been doing better. She denies any chest pain, shortness breath, palpitations or cardiorespiratory complaints. She denies any GI or  complaints. She denies any headaches, dizziness or neurological c/o. She has no other complaints on complete review of systems. Current Outpatient Prescriptions   Medication Sig Dispense Refill    vit C/E/Zn/coppr/lutein/zeaxan (PRESERVISION AREDS 2 PO) Take  by mouth. Indications: 2 pills dailly      hydroCHLOROthiazide (HYDRODIURIL) 25 mg tablet TAKE ONE TABLET BY MOUTH ONCE DAILY 90 Tab 3    raNITIdine (ZANTAC) 300 mg tablet TAKE ONE TABLET BY MOUTH ONCE DAILY AT BEDTIME 90 Tab 3    irbesartan (AVAPRO) 300 mg tablet TAKE ONE TABLET BY MOUTH ONCE DAILY 90 Tab 3    omeprazole (PRILOSEC) 20 mg capsule TAKE ONE CAPSULE BY MOUTH ONCE DAILY 90 Cap 3    Omeprazole delayed release (PRILOSEC D/R) 20 mg tablet Take 20 mg by mouth every morning.  biotin 10,000 mcg cap Take  by mouth daily (with dinner).  Cholecalciferol, Vitamin D3, 3,000 unit tab Take 1,000 Units by mouth every morning.  cyanocobalamin (VITAMIN B12) 500 mcg tablet Take 500 mcg by mouth every morning.  polyethylene glycol (MIRALAX) 17 gram/dose powder Take 17 g by mouth every morning.  Indications: CONSTIPATION       Past Medical History:   Diagnosis Date    Anxiety     Arthritis     Chronic constipation     CKD (chronic kidney disease) 7/17/2017    Colon polyps 7/17/2017    DJD (degenerative joint disease) 7/17/2017    Elevated LFTs 7/17/2017    GERD (gastroesophageal reflux disease)     Glucose intolerance (impaired glucose tolerance) 7/17/2017    Hemorrhoids     internal & external- bleeds frequently    High cholesterol     Hyperlipidemia 7/17/2017    Hypertension     Hypertension with renal disease 7/17/2017    PT Education - High Blood Pressure (Essential Hypertension) *: blood pressure PT Education - How to access health information online: discussed with patient and provided information    Hypertension, renal 7/17/2017    Ill-defined condition     ringing in ears    Mild obesity 7/17/2017    Nausea & vomiting     Neoplasm of uncertain behavior of skin 7/17/2017    On statin therapy 7/17/2017    Osteopenia 7/17/2017    Primary angle-closure glaucoma 7/17/2017    Comments: OU    Skin cancer of face     as of 6/15/16 pt states \"removed years ago\"    Spinal stenosis      Past Surgical History:   Procedure Laterality Date    HX CHOLECYSTECTOMY  11/6/2014    Dr Rolle Area for glaucoma    HX HYSTERECTOMY      partial    HX PELVIC LAPAROSCOPY      Jono. oophorectomy    HX TUBAL LIGATION      LA COLSC FLX W/REMOVAL LESION BY HOT BX FORCEPS  11/12/2012         LA ERCP REMOVE CALCULI/DEBRIS BILIARY/PANCREAS DUCT  11/7/2014         LA ERCP W/SPHINCTEROTOMY/PAPILLOTOMY  11/7/2014          Allergies   Allergen Reactions    Iodinated Contrast- Oral And Iv Dye Hives    Oxycodone-Aspirin Unknown (comments)    Simvastatin Myalgia    Benicar [Olmesartan] Cough       REVIEW OF SYSTEMS:  General: negative for - chills or fever, or weight loss or gain  ENT: negative for - headaches, nasal congestion or tinnitus  Eyes: no blurred or visual changes  Neck: No stiffness or swollen nodes  Respiratory: negative for - cough, hemoptysis, shortness of breath or wheezing  Cardiovascular : negative for - chest pain, edema, palpitations or shortness of breath  Gastrointestinal: negative for - abdominal pain, blood in stools, heartburn or nausea/vomiting  Genito-Urinary: no dysuria, trouble voiding, or hematuria  Musculoskeletal: negative for - gait disturbance, joint pain, joint stiffness or joint swelling  Neurological: no TIA or stroke symptoms  Hematologic: no bruises, no bleeding  Lymphatic: no swollen glands  Integument: no lumps, mole changes, nail changes or rash  Endocrine:no malaise/lethargy poly uria or polydipsia or unexpected weight changes        Social History     Social History    Marital status:      Spouse name: N/A    Number of children: N/A    Years of education: N/A     Social History Main Topics    Smoking status: Former Smoker     Packs/day: 1.50     Years: 35.00     Quit date: 11/4/2004    Smokeless tobacco: Never Used      Comment: quit smoking 6 yrs ago    Alcohol use No    Drug use: No    Sexual activity: No     Other Topics Concern    None     Social History Narrative     Family History   Problem Relation Age of Onset    Heart Disease Mother     Hypertension Mother     Cancer Mother      skin    Stroke Father     Breast Cancer Sister      onset: 76s       OBJECTIVE:     Visit Vitals    /78 (BP 1 Location: Left arm, BP Patient Position: Sitting)    Pulse 79    Resp 15    Ht 5' 1.5\" (1.562 m)    Wt 205 lb (93 kg)    SpO2 96%    BMI 38.11 kg/m2     CONSTITUTIONAL:   well nourished, appears age appropriate  EYES: sclera anicteric, PERRL, EOMI  ENMT:nares clear, moist mucous membranes, pharynx clear  NECK: supple.  Thyroid normal, No JVD or bruits  RESPIRATORY: Chest: clear to ascultation and percussion, normal inspiratory effort  CARDIOVASCULAR: Heart: regular rate and rhythm no murmurs, rubs or gallops, PMI not displaced, No thrills  GASTROINTESTINAL: Abdomen: non distended, soft, non tender, bowel sounds normal  HEMATOLOGIC: no purpura, petechiae or bruising  LYMPHATIC: No lymph node enlargemant  MUSCULOSKELETAL: Extremities: no edema or active synovitis, pulse 1+ INTEGUMENT: No unusual rashes or suspicious skin lesions noted. Nails appear normal.  PERIPHERAL VASCULAR: normal pulses femoral, PT and DP  NEUROLOGIC: non-focal exam, A & O X 3  PSYCHIATRIC:, appropriate affect     ASSESSMENT:   1. Hypertension with renal disease    2. Glucose intolerance (impaired glucose tolerance)    3. Mixed hyperlipidemia    4. Primary osteoarthritis involving multiple joints    5. Stage 2 chronic kidney disease    6. Severe obesity (BMI 35.0-39. 9) with comorbidity ([address]      Impression  1. Hypertension that is controlled so continue current therapy reviewed with her  2. Glucose intolerance repeat status pending and I reviewed prior labs and I will make adjustments if necessary  3. Hyperlipidemia prior labs reviewed and repeat status pending and I will adjust if needed  4. DJD that is stable  5. CKD stage II repeat status pending  6. Obesity discussed diet exercise weight reduction for overall health benefit and I congratulated her on losing 5 pounds since her last visit  7. Osteopenia reviewed prior bone density with her  We will call the lab results and make further recommendations or adjustments as necessary. Follow-up in 3 months or sooner should there  be a problem. PLAN:  .  Orders Placed This Encounter    METABOLIC PANEL, COMPREHENSIVE    LIPID PANEL    HEMOGLOBIN A1C WITH EAG    CK    vit C/E/Zn/coppr/lutein/zeaxan (PRESERVISION AREDS 2 PO)         ATTENTION:   This medical record was transcribed using an electronic medical records system. Although proofread, it may and can contain electronic and spelling errors. Other human spelling and other errors may be present. Corrections may be executed at a later time. Please feel free to contact us for any clarifications as needed. Follow-up Disposition:  Return in about 3 months (around 11/16/2018). No results found for any visits on 08/16/18.     Jah Hopkins MD    The patient verbalized understanding of the problems and plans as explained.

## 2018-08-17 LAB
ALBUMIN SERPL-MCNC: 4.3 G/DL (ref 3.5–4.8)
ALBUMIN/GLOB SERPL: 1.6 {RATIO} (ref 1.2–2.2)
ALP SERPL-CCNC: 97 IU/L (ref 39–117)
ALT SERPL-CCNC: 17 IU/L (ref 0–32)
AST SERPL-CCNC: 18 IU/L (ref 0–40)
BILIRUB SERPL-MCNC: 0.7 MG/DL (ref 0–1.2)
BUN SERPL-MCNC: 18 MG/DL (ref 8–27)
BUN/CREAT SERPL: 24 (ref 12–28)
CALCIUM SERPL-MCNC: 9.9 MG/DL (ref 8.7–10.3)
CHLORIDE SERPL-SCNC: 102 MMOL/L (ref 96–106)
CHOLEST SERPL-MCNC: 247 MG/DL (ref 100–199)
CK SERPL-CCNC: 83 U/L (ref 24–173)
CO2 SERPL-SCNC: 27 MMOL/L (ref 20–29)
CREAT SERPL-MCNC: 0.74 MG/DL (ref 0.57–1)
EST. AVERAGE GLUCOSE BLD GHB EST-MCNC: 120 MG/DL
GLOBULIN SER CALC-MCNC: 2.7 G/DL (ref 1.5–4.5)
GLUCOSE SERPL-MCNC: 103 MG/DL (ref 65–99)
HBA1C MFR BLD: 5.8 % (ref 4.8–5.6)
HDLC SERPL-MCNC: 55 MG/DL
LDLC SERPL CALC-MCNC: 160 MG/DL (ref 0–99)
POTASSIUM SERPL-SCNC: 5.2 MMOL/L (ref 3.5–5.2)
PROT SERPL-MCNC: 7 G/DL (ref 6–8.5)
SODIUM SERPL-SCNC: 143 MMOL/L (ref 134–144)
TRIGL SERPL-MCNC: 162 MG/DL (ref 0–149)
VLDLC SERPL CALC-MCNC: 32 MG/DL (ref 5–40)

## 2018-08-23 NOTE — TELEPHONE ENCOUNTER
Patient informed of lab results and recommendations made by Dr. Sherice Ruiz to begin Pravachol 20 mg daily. Also discussed dietary changes to help with lowering cholesterol.

## 2018-08-23 NOTE — TELEPHONE ENCOUNTER
----- Message from Joe Dacosta MD sent at 8/17/2018 12:15 PM EDT -----  Very high cholesterol and LDL cholesterol so this needs treatment. Start Pravachol 20 mg daily.

## 2018-08-24 RX ORDER — PRAVASTATIN SODIUM 40 MG/1
40 TABLET ORAL
Qty: 90 TAB | Refills: 0 | Status: SHIPPED | OUTPATIENT
Start: 2018-08-24 | End: 2018-11-15 | Stop reason: RX

## 2018-08-24 NOTE — TELEPHONE ENCOUNTER
Per verbal order from Dr. Gracy Patel, called in Pravachol 40mg Take 1 tab by mouth nightly, #90 Tablets, Refills 0, to [facility].

## 2018-10-03 ENCOUNTER — TELEPHONE (OUTPATIENT)
Dept: INTERNAL MEDICINE CLINIC | Age: 73
End: 2018-10-03

## 2018-10-03 ENCOUNTER — OFFICE VISIT (OUTPATIENT)
Dept: URGENT CARE | Age: 73
End: 2018-10-03

## 2018-10-03 VITALS
HEART RATE: 86 BPM | HEIGHT: 61 IN | WEIGHT: 205 LBS | OXYGEN SATURATION: 98 % | SYSTOLIC BLOOD PRESSURE: 136 MMHG | BODY MASS INDEX: 38.71 KG/M2 | RESPIRATION RATE: 18 BRPM | TEMPERATURE: 98 F | DIASTOLIC BLOOD PRESSURE: 65 MMHG

## 2018-10-03 DIAGNOSIS — R07.9 CHEST PAIN IN ADULT: Primary | ICD-10-CM

## 2018-10-03 DIAGNOSIS — R10.13 EPIGASTRIC PAIN: ICD-10-CM

## 2018-10-03 NOTE — TELEPHONE ENCOUNTER
Patient called stating she has been having chest pains for 2 weeks which began radiating to arm last night. She thinks it may be a reaction to taking statins. I advised that she be seen at [address] [place] [address] or another medical facility close to her. She asked if she could wait until this evening because she was travelling for work today. I advised her to be seen as soon as she can because we don't know at this point if the pain is related to the heart or the statin medication. Patient states will go to [address] [place] [address].

## 2018-10-03 NOTE — PATIENT INSTRUCTIONS
May hold off on statin- discuss with PCP fr further options If pain resolved may restart in smaller dose 20 mg Chest Pain: Care Instructions Your Care Instructions There are many things that can cause chest pain. Some are not serious and will get better on their own in a few days. But some kinds of chest pain need more testing and treatment. Your doctor may have recommended a follow-up visit in the next 8 to 12 hours. If you are not getting better, you may need more tests or treatment. Even though your doctor has released you, you still need to watch for any problems. The doctor carefully checked you, but sometimes problems can develop later. If you have new symptoms or if your symptoms do not get better, get medical care right away. If you have worse or different chest pain or pressure that lasts more than 5 minutes or you passed out (lost consciousness), call 911 or seek other emergency help right away. A medical visit is only one step in your treatment. Even if you feel better, you still need to do what your doctor recommends, such as going to all suggested follow-up appointments and taking medicines exactly as directed. This will help you recover and help prevent future problems. How can you care for yourself at home? · Rest until you feel better. · Take your medicine exactly as prescribed. Call your doctor if you think you are having a problem with your medicine. · Do not drive after taking a prescription pain medicine. When should you call for help? Call 911 if: 
  · You passed out (lost consciousness).  
  · You have severe difficulty breathing.  
  · You have symptoms of a heart attack. These may include: ¨ Chest pain or pressure, or a strange feeling in your chest. 
¨ Sweating. ¨ Shortness of breath. ¨ Nausea or vomiting. ¨ Pain, pressure, or a strange feeling in your back, neck, jaw, or upper belly or in one or both shoulders or arms. ¨ Lightheadedness or sudden weakness. ¨ A fast or irregular heartbeat. After you call 911, the  may tell you to chew 1 adult-strength or 2 to 4 low-dose aspirin. Wait for an ambulance. Do not try to drive yourself.  
 Call your doctor today if: 
  · You have any trouble breathing.  
  · Your chest pain gets worse.  
  · You are dizzy or lightheaded, or you feel like you may faint.  
  · You are not getting better as expected.  
  · You are having new or different chest pain. Where can you learn more? Go to http://rufina-ion.info/. Enter A120 in the search box to learn more about \"Chest Pain: Care Instructions. \" Current as of: November 20, 2017 Content Version: 11.7 © 5637-1030 [employer]. Care instructions adapted under license by [employer] (which disclaims liability or warranty for this information). If you have questions about a medical condition or this instruction, always ask your healthcare professional. [place] [address] any warranty or liability for your use of this information.

## 2018-10-03 NOTE — MR AVS SNAPSHOT
[address] [address] 
[phone] Patient: Chilango Santillan MRN: [id number] NFC:[id number] Visit Information Date & Time Provider Department Dept. Phone Encounter #  
 10/3/2018  4:00 PM [address] [facility] [phone] [id number] Your Appointments 11/15/2018 10:40 AM  
FOLLOW UP 10 with MD JUDIE Coelho [facility] ([address]) Appt Note: 3 MO FLP   yearly [address] [address] [address] [address] Upcoming Health Maintenance Date Due Shingrix Vaccine Age 50> (1 of 2) 8/2/1997 GLAUCOMA SCREENING Q2Y 8/2/2012 Influenza Age 5 to Adult 8/1/2018 MEDICARE YEARLY EXAM 10/24/2018 COLONOSCOPY 7/11/2019 BREAST CANCER SCRN MAMMOGRAM 9/21/2019 DTaP/Tdap/Td series (2 - Td) 10/23/2027 Allergies as of 10/3/2018  Review Complete On: 10/3/2018 By: Hank Spann RN Severity Noted Reaction Type Reactions Iodinated Contrast- Oral And Iv Dye  07/17/2017    Hives Oxycodone-aspirin  07/17/2017    Unknown (comments) Simvastatin  07/17/2017    Myalgia Benicar [Olmesartan] Low 11/09/2012   Intolerance Cough Current Immunizations  Never Reviewed Name Date Influenza High Dose Vaccine PF 10/23/2017 Influenza Vaccine 10/10/2016, 1/11/2016 Pneumococcal Conjugate (PCV-13) 7/6/2015 Pneumococcal Vaccine (Unspecified Type) 10/1/2010 Not reviewed this visit You Were Diagnosed With   
  
 Codes Comments Chest pain in adult    -  Primary ICD-10-CM: R07.9 ICD-9-CM: 786.50 lower chest- non specific- mostly non cardiac Epigastric pain     ICD-10-CM: R10.13 ICD-9-CM: 789.06 h/o GERD and pancreatitis Vitals BP Pulse Temp Resp Height(growth percentile) Weight(growth percentile) 136/65 86 98 °F (36.7 °C) 18 5' 1\" (1.549 m) 205 lb (93 kg) SpO2 BMI OB Status Smoking Status 98% 38.73 kg/m2 Hysterectomy Former Smoker Vitals History BMI and BSA Data Body Mass Index Body Surface Area 38.73 kg/m 2 2 m 2 Preferred Pharmacy Pharmacy Name Phone NO PHARMACY PREFERENCE Your Updated Medication List  
  
   
This list is accurate as of 10/3/18  4:39 PM.  Always use your most recent med list.  
  
  
  
  
 biotin 10,000 mcg Cap Take  by mouth daily (with dinner). Cholecalciferol (Vitamin D3) 3,000 unit Tab Take 1,000 Units by mouth every morning. cyanocobalamin 500 mcg tablet Commonly known as:  VITAMIN B12 Take 500 mcg by mouth every morning. hydroCHLOROthiazide 25 mg tablet Commonly known as:  HYDRODIURIL  
TAKE ONE TABLET BY MOUTH ONCE DAILY  
  
 irbesartan 300 mg tablet Commonly known as:  AVAPRO TAKE ONE TABLET BY MOUTH ONCE DAILY MIRALAX 17 gram/dose powder Generic drug:  polyethylene glycol Take 17 g by mouth every morning. Indications: CONSTIPATION  
  
 * Omeprazole delayed release 20 mg tablet Commonly known as:  PRILOSEC D/R Take 20 mg by mouth every morning. * omeprazole 20 mg capsule Commonly known as:  PRILOSEC  
TAKE ONE CAPSULE BY MOUTH ONCE DAILY pravastatin 40 mg tablet Commonly known as:  PRAVACHOL Take 1 Tab by mouth nightly. PRESERVISION AREDS 2 PO Take  by mouth. Indications: 2 pills dailly  
  
 raNITIdine 300 mg Tab Commonly known as:  ZANTAC TAKE ONE TABLET BY MOUTH ONCE DAILY AT BEDTIME  
  
 * Notice: This list has 2 medication(s) that are the same as other medications prescribed for you. Read the directions carefully, and ask your doctor or other care provider to review them with you. We Performed the Following EKG, 12 LEAD, INITIAL [26984 CPT(R)] Patient Instructions May hold off on statin- discuss with PCP fr further options If pain resolved may restart in smaller dose 20 mg Chest Pain: Care Instructions Your Care Instructions There are many things that can cause chest pain. Some are not serious and will get better on their own in a few days. But some kinds of chest pain need more testing and treatment. Your doctor may have recommended a follow-up visit in the next 8 to 12 hours. If you are not getting better, you may need more tests or treatment. Even though your doctor has released you, you still need to watch for any problems. The doctor carefully checked you, but sometimes problems can develop later. If you have new symptoms or if your symptoms do not get better, get medical care right away. If you have worse or different chest pain or pressure that lasts more than 5 minutes or you passed out (lost consciousness), call 911 or seek other emergency help right away. A medical visit is only one step in your treatment. Even if you feel better, you still need to do what your doctor recommends, such as going to all suggested follow-up appointments and taking medicines exactly as directed. This will help you recover and help prevent future problems. How can you care for yourself at home? · Rest until you feel better. · Take your medicine exactly as prescribed. Call your doctor if you think you are having a problem with your medicine. · Do not drive after taking a prescription pain medicine. When should you call for help? Call 911 if: 
  · You passed out (lost consciousness).  
  · You have severe difficulty breathing.  
  · You have symptoms of a heart attack. These may include: ¨ Chest pain or pressure, or a strange feeling in your chest. 
¨ Sweating. ¨ Shortness of breath. ¨ Nausea or vomiting. ¨ Pain, pressure, or a strange feeling in your back, neck, jaw, or upper belly or in one or both shoulders or arms. ¨ Lightheadedness or sudden weakness. ¨ A fast or irregular heartbeat. After you call 911, the  may tell you to chew 1 adult-strength or 2 to 4 low-dose aspirin. Wait for an ambulance. Do not try to drive yourself.  
 Call your doctor today if: 
  · You have any trouble breathing.  
  · Your chest pain gets worse.  
  · You are dizzy or lightheaded, or you feel like you may faint.  
  · You are not getting better as expected.  
  · You are having new or different chest pain. Where can you learn more? Go to http://rufina-ion.info/. Enter A120 in the search box to learn more about \"Chest Pain: Care Instructions. \" Current as of: November 20, 2017 Content Version: 11.7 © 5577-9151 Healthwise, Incorporated. Care instructions adapted under license by [employer] (which disclaims liability or warranty for this information). If you have questions about a medical condition or this instruction, always ask your healthcare professional. [place] [address] any warranty or liability for your use of this information. Introducing [facility]! [address] [facility] introduces Quincus patient portal. Now you can access parts of your medical record, email your doctor's office, and request medication refills online. 1. In your internet browser, go to [email or web address] 2. Click on the First Time User? Click Here link in the Sign In box. You will see the New Member Sign Up page. 3. Enter your Quincus Access Code exactly as it appears below. You will not need to use this code after youve completed the sign-up process. If you do not sign up before the expiration date, you must request a new code. · Quincus Access Code: [email or web address] Expires: 11/14/2018 11:28 AM 
 
4. Enter the last four digits of your Social Security Number (xxxx) and Date of Birth (mm/dd/yyyy) as indicated and click Submit. You will be taken to the next sign-up page. 5. Create a Mendeley ID. This will be your Mendeley login ID and cannot be changed, so think of one that is secure and easy to remember. 6. Create a Mendeley password. You can change your password at any time. 7. Enter your Password Reset Question and Answer. This can be used at a later time if you forget your password. 8. Enter your e-mail address. You will receive e-mail notification when new information is available in [address] 9. Click Sign Up. You can now view and download portions of your medical record. 10. Click the Download Summary menu link to download a portable copy of your medical information. If you have questions, please visit the Frequently Asked Questions section of the Mendeley website. Remember, Mendeley is NOT to be used for urgent needs. For medical emergencies, dial 911. Now available from your iPhone and Android! Please provide this summary of care documentation to your next provider. Your primary care clinician is listed as Yakov. If you have any questions after today's visit, please call [phone].

## 2018-10-03 NOTE — PROGRESS NOTES
Patient is a 72 y.o. female presenting with chest pain. Chest Pain (Angina) The history is provided by the patient. This is a new problem. The current episode started more than 1 week ago. The problem has not changed since onset. The problem occurs daily (off and on ). Associated with: nothing  The pain is present in the epigastric region and substernal region. The pain is at a severity of 4/10. The pain is mild. The quality of the pain is described as pressure-like and intermittent. The pain does not radiate. Exacerbated by: nothing  Associated symptoms include abdominal pain. Associated symptoms comments: none. She has tried nothing for the symptoms. Her past medical history does not include DVT, PE or CHF. Past Medical History:  
Diagnosis Date  Anxiety  Arthritis  Chronic constipation  CKD (chronic kidney disease) 7/17/2017  Colon polyps 7/17/2017  DJD (degenerative joint disease) 7/17/2017  Elevated LFTs 7/17/2017  GERD (gastroesophageal reflux disease)  Glucose intolerance (impaired glucose tolerance) 7/17/2017  Hemorrhoids   
 internal & external- bleeds frequently  High cholesterol  Hyperlipidemia 7/17/2017  Hypertension  Hypertension with renal disease 7/17/2017 PT Education - High Blood Pressure (Essential Hypertension) *: blood pressure PT Education - How to access health information online: discussed with patient and provided information  Hypertension, renal 7/17/2017  Ill-defined condition   
 ringing in ears  Mild obesity 7/17/2017  Nausea & vomiting  Neoplasm of uncertain behavior of skin 7/17/2017  On statin therapy 7/17/2017  Osteopenia 7/17/2017  Primary angle-closure glaucoma 7/17/2017 Comments: OU  
 Skin cancer of face   
 as of 6/15/16 pt states \"removed years ago\"  Spinal stenosis Past Surgical History:  
Procedure Laterality Date  HX CHOLECYSTECTOMY  11/6/2014 Dr Calvin Montoya  HX HEENT    
 laser for glaucoma  HX HYSTERECTOMY partial  
 HX PELVIC LAPAROSCOPY Jono. oophorectomy  HX TUBAL LIGATION    
 NC COLSC FLX W/REMOVAL LESION BY HOT BX FORCEPS  11/12/2012  NC ERCP REMOVE CALCULI/DEBRIS BILIARY/PANCREAS DUCT  11/7/2014  NC ERCP W/SPHINCTEROTOMY/PAPILLOTOMY  11/7/2014 Family History Problem Relation Age of Onset  Heart Disease Mother  Hypertension Mother  Cancer Mother   
  skin  Stroke Father  Breast Cancer Sister   
  onset: 76s Social History Social History  Marital status:  Spouse name: N/A  
 Number of children: N/A  
 Years of education: N/A Occupational History  Not on file. Social History Main Topics  Smoking status: Former Smoker Packs/day: 1.50 Years: 35.00 Quit date: 11/4/2004  Smokeless tobacco: Never Used Comment: quit smoking 6 yrs ago  Alcohol use No  
 Drug use: No  
 Sexual activity: No  
 
Other Topics Concern  Not on file Social History Narrative ALLERGIES: Iodinated contrast- oral and iv dye; Oxycodone-aspirin; Simvastatin; and Benicar [olmesartan] Review of Systems Cardiovascular: Positive for chest pain. Gastrointestinal: Positive for abdominal pain. Negative for abdominal distention, blood in stool, constipation and diarrhea. All other systems reviewed and are negative. Vitals:  
 10/03/18 1603 10/03/18 1607 BP:  136/65 Pulse:  86 Resp:  18 Temp:  98 °F (36.7 °C) SpO2:  98% Weight: 205 lb (93 kg) Height: 5' 1\" (1.549 m) Physical Exam  
Constitutional: No distress. HENT:  
Right Ear: Tympanic membrane and ear canal normal.  
Left Ear: Tympanic membrane and ear canal normal.  
Nose: Nose normal.  
Mouth/Throat: No oropharyngeal exudate, posterior oropharyngeal edema or posterior oropharyngeal erythema. Eyes: Conjunctivae are normal. Right eye exhibits no discharge.  Left eye exhibits no discharge. No scleral icterus. Neck: Neck supple. Cardiovascular: Normal rate, normal heart sounds and intact distal pulses. No murmur heard. Pulmonary/Chest: Effort normal and breath sounds normal. No respiratory distress. She has no wheezes. She has no rales. Abdominal: Soft. Bowel sounds are normal. She exhibits no distension and no mass. There is no tenderness. There is no rebound and no guarding. Musculoskeletal: She exhibits no edema. Lymphadenopathy:  
  She has no cervical adenopathy. Skin: No rash noted. Nursing note and vitals reviewed. [facility] 
 
EKG Date/Time: 10/27/2018 9:11 AM 
Performed by: Kenyon Olivarez MD 
Authorized by: Kenyon Olivarez MD  
Clinical impression: normal ECG 
 
 
 
 
  ICD-10-CM ICD-9-CM 1. Chest pain in adult R07.9 786.50 EKG, 12 LEAD, INITIAL CANCELED: EKG, 12 LEAD, INITIAL CANCELED: EKG, 12 LEAD, INITIAL CANCELED: EKG, 12 LEAD, INITIAL  
 lower chest- non specific- mostly non cardiac 2. Epigastric pain R10.13 789.06   
 h/o GERD and pancreatitis May hold off on statin- discuss with PCP fr further options If pain resolved may restart in smaller dose 20 mg  
  
Start OTC pepcid/ gasx- light diet Avoid dairy and fatty food If sxs worsen go to ED No orders of the defined types were placed in this encounter. No results found for any visits on 10/03/18. The patients condition was discussed with the patient and they understand. The patient is to follow up with primary care doctor. If signs and symptoms become worse the pt is to go to the ER. The patient is to take medications as prescribed.

## 2018-11-12 ENCOUNTER — HOSPITAL ENCOUNTER (OUTPATIENT)
Dept: MAMMOGRAPHY | Age: 73
Discharge: HOME | End: 2018-11-12
Attending: INTERNAL MEDICINE
Payer: MEDICARE

## 2018-11-12 DIAGNOSIS — Z12.39 SCREENING BREAST EXAMINATION: ICD-10-CM

## 2018-11-12 PROCEDURE — 77067 SCR MAMMO BI INCL CAD: CPT

## 2018-11-15 ENCOUNTER — OFFICE VISIT (OUTPATIENT)
Dept: INTERNAL MEDICINE CLINIC | Age: 73
End: 2018-11-15

## 2018-11-15 VITALS
HEART RATE: 68 BPM | DIASTOLIC BLOOD PRESSURE: 68 MMHG | TEMPERATURE: 97.8 F | OXYGEN SATURATION: 98 % | SYSTOLIC BLOOD PRESSURE: 103 MMHG | HEIGHT: 63 IN | BODY MASS INDEX: 35.51 KG/M2 | WEIGHT: 200.4 LBS | RESPIRATION RATE: 16 BRPM

## 2018-11-15 DIAGNOSIS — M15.9 PRIMARY OSTEOARTHRITIS INVOLVING MULTIPLE JOINTS: ICD-10-CM

## 2018-11-15 DIAGNOSIS — N18.2 STAGE 2 CHRONIC KIDNEY DISEASE: ICD-10-CM

## 2018-11-15 DIAGNOSIS — Z00.00 MEDICARE ANNUAL WELLNESS VISIT, SUBSEQUENT: ICD-10-CM

## 2018-11-15 DIAGNOSIS — I10 ESSENTIAL HYPERTENSION: ICD-10-CM

## 2018-11-15 DIAGNOSIS — I12.9 HYPERTENSION WITH RENAL DISEASE: Primary | ICD-10-CM

## 2018-11-15 DIAGNOSIS — Z23 ENCOUNTER FOR IMMUNIZATION: ICD-10-CM

## 2018-11-15 DIAGNOSIS — K21.9 GASTROESOPHAGEAL REFLUX DISEASE WITHOUT ESOPHAGITIS: ICD-10-CM

## 2018-11-15 DIAGNOSIS — E55.9 VITAMIN D DEFICIENCY: ICD-10-CM

## 2018-11-15 DIAGNOSIS — E78.2 MIXED HYPERLIPIDEMIA: ICD-10-CM

## 2018-11-15 DIAGNOSIS — M85.89 OSTEOPENIA OF MULTIPLE SITES: ICD-10-CM

## 2018-11-15 DIAGNOSIS — R73.02 GLUCOSE INTOLERANCE (IMPAIRED GLUCOSE TOLERANCE): ICD-10-CM

## 2018-11-15 DIAGNOSIS — E66.01 SEVERE OBESITY (BMI 35.0-39.9) WITH COMORBIDITY (HCC): ICD-10-CM

## 2018-11-15 LAB
BACTERIA UA POCT, BACTPOCT: NORMAL
BILIRUB UR QL STRIP: NEGATIVE
CASTS UA POCT: NEGATIVE
CLUE CELLS, CLUEPOCT: NEGATIVE
CRYSTALS UA POCT, CRYSPOCT: NEGATIVE
EPITHELIAL CELLS POCT: NORMAL
GLUCOSE UR-MCNC: NEGATIVE MG/DL
GRAN# POC: 4.1 K/UL (ref 2–7.8)
GRAN% POC: 51.3 % (ref 37–92)
HCT VFR BLD CALC: 42.6 % (ref 37–51)
HGB BLD-MCNC: 14.4 G/DL (ref 12–18)
KETONES P FAST UR STRIP-MCNC: NEGATIVE MG/DL
LY# POC: 3.5 K/UL (ref 0.6–4.1)
LY% POC: 44.7 % (ref 10–58.5)
MCH RBC QN: 29 PG (ref 26–32)
MCHC RBC-ENTMCNC: 33.8 G/DL (ref 30–36)
MCV RBC: 86 FL (ref 80–97)
MID #, POC: 0.3 K/UL (ref 0–1.8)
MID% POC: 4 % (ref 0.1–24)
MUCUS UA POCT, MUCPOCT: NORMAL
PH UR STRIP: 7 [PH] (ref 5–7)
PLATELET # BLD: 265 K/UL (ref 140–440)
PROT UR QL STRIP: NEGATIVE
RBC # BLD: 4.96 M/UL (ref 4.2–6.3)
RBC UA POCT, RBCPOCT: NORMAL
SP GR UR STRIP: 1.01 (ref 1.01–1.02)
TRICH UA POCT, TRICHPOC: NEGATIVE
UA UROBILINOGEN AMB POC: NORMAL (ref 0.2–1)
URINALYSIS CLARITY POC: CLEAR
URINALYSIS COLOR POC: NORMAL
URINE BLOOD POC: NEGATIVE
URINE CULT COMMENT, POCT: NORMAL
URINE LEUKOCYTES POC: NORMAL
URINE NITRITES POC: NEGATIVE
WBC # BLD: 7.9 K/UL (ref 4.1–10.9)
WBC UA POCT, WBCPOCT: 0
YEAST UA POCT, YEASTPOC: NEGATIVE

## 2018-11-15 RX ORDER — RANITIDINE 300 MG/1
TABLET ORAL
Qty: 90 TAB | Refills: 3 | Status: SHIPPED | OUTPATIENT
Start: 2018-11-15 | End: 2019-01-21 | Stop reason: SDUPTHER

## 2018-11-15 RX ORDER — IRBESARTAN 300 MG/1
TABLET ORAL
Qty: 90 TAB | Refills: 3 | Status: SHIPPED | OUTPATIENT
Start: 2018-11-15 | End: 2019-01-21 | Stop reason: SDUPTHER

## 2018-11-15 RX ORDER — OMEPRAZOLE 20 MG/1
CAPSULE, DELAYED RELEASE ORAL
Qty: 90 CAP | Refills: 3 | Status: SHIPPED | OUTPATIENT
Start: 2018-11-15 | End: 2019-01-21 | Stop reason: SDUPTHER

## 2018-11-15 RX ORDER — PRAVASTATIN SODIUM 40 MG/1
40 TABLET ORAL
Qty: 90 TAB | Refills: 0 | Status: SHIPPED | OUTPATIENT
Start: 2018-11-15 | End: 2018-11-19 | Stop reason: ALTCHOICE

## 2018-11-15 RX ORDER — HYDROCHLOROTHIAZIDE 25 MG/1
TABLET ORAL
Qty: 90 TAB | Refills: 3 | Status: SHIPPED | OUTPATIENT
Start: 2018-11-15 | End: 2019-01-21 | Stop reason: SDUPTHER

## 2018-11-15 NOTE — PATIENT INSTRUCTIONS
Arthritis: Care Instructions Your Care Instructions Arthritis, also called osteoarthritis, is a breakdown of the cartilage that cushions your joints. When the cartilage wears down, your bones rub against each other. This causes pain and stiffness. Many people have some arthritis as they age. Arthritis most often affects the joints of the spine, hands, hips, knees, or feet. You can take simple measures to protect your joints, ease your pain, and help you stay active. Follow-up care is a key part of your treatment and safety. Be sure to make and go to all appointments, and call your doctor if you are having problems. It's also a good idea to know your test results and keep a list of the medicines you take. How can you care for yourself at home? · Stay at a healthy weight. Being overweight puts extra strain on your joints. · Talk to your doctor or physical therapist about exercises that will help ease joint pain. ? Stretch. You may enjoy gentle forms of yoga to help keep your joints and muscles flexible. ? Walk instead of jog. Other types of exercise that are less stressful on the joints include riding a bicycle, swimming, janiya chi, or water exercise. ? Lift weights. Strong muscles help reduce stress on your joints. Stronger thigh muscles, for example, take some of the stress off of the knees and hips. Learn the right way to lift weights so you do not make joint pain worse. · Take your medicines exactly as prescribed. Call your doctor if you think you are having a problem with your medicine. · Take pain medicines exactly as directed. ? If the doctor gave you a prescription medicine for pain, take it as prescribed. ? If you are not taking a prescription pain medicine, ask your doctor if you can take an over-the-counter medicine. · Use a cane, crutch, walker, or another device if you need help to get around. These can help rest your joints.  You also can use other things to make life easier, such as a higher toilet seat and padded handles on kitchen utensils. · Do not sit in low chairs, which can make it hard to get up. · Put heat or cold on your sore joints as needed. Use whichever helps you most. You also can take turns with hot and cold packs. ? Apply heat 2 or 3 times a day for 20 to 30 minutesusing a heating pad, hot shower, or hot packto relieve pain and stiffness. ? Put ice or a cold pack on your sore joint for 10 to 20 minutes at a time. Put a thin cloth between the ice and your skin. When should you call for help? Call your doctor now or seek immediate medical care if: 
  · You have sudden swelling, warmth, or pain in any joint.  
  · You have joint pain and a fever or rash.  
  · You have such bad pain that you cannot use a joint.  
 Watch closely for changes in your health, and be sure to contact your doctor if: 
  · You have mild joint symptoms that continue even with more than 6 weeks of care at home.  
  · You have stomach pain or other problems with your medicine. Where can you learn more? Go to http://rufina-ion.info/. Enter Y730 in the search box to learn more about \"Arthritis: Care Instructions. \" Current as of: June 11, 2018 Content Version: 11.8 © 7241-8191 Healthwise, Incorporated. Care instructions adapted under license by [employer] (which disclaims liability or warranty for this information). If you have questions about a medical condition or this instruction, always ask your healthcare professional. [place] [address] any warranty or liability for your use of this information.

## 2018-11-15 NOTE — PROGRESS NOTES
Chief Complaint Patient presents with [address] [place] [address] Annual Wellness Visit 1. Have you been to the ER, urgent care clinic since your last visit? Hospitalized since your last visit? [facility], [facility]  medication problem 2. Have you seen or consulted any other health care providers outside of the [facility] since your last visit? Include any pap smears or colon screening. No  
 
Pt c/o non-productive cough for a couple of months. Forest Garduno is a 72 y.o. female who presents for routine immunizations. She denies any symptoms , reactions or allergies that would exclude them from being immunized today. Risks and adverse reactions were discussed and the VIS was given to them. All questions were addressed. She was observed for 15 min post injection. There were no reactions observed.  
 
Macy Curran LPN

## 2018-11-15 NOTE — PROGRESS NOTES
This is a Subsequent Medicare Annual Wellness Visit providing Personalized Prevention Plan Services (PPPS) (Performed 12 months after initial AWV and PPPS ) I have reviewed the patient's medical history in detail and updated the computerized patient record. She presents today for Medicare subsequent annual wellness examination screening questionnaire. She is also in follow-up of her medical problems include hypertension, glucose intolerance, hyperlipidemia, DJD, CKD stage II, osteopenia, vitamin D deficiency, obesity, as well as a history of colonic polypectomy. She had a recent colonoscopy that was clean and she was given a good L for 10 years. She currently denies any chest pain, shortness of breath, palpitations, PND, orthopnea or cardiorespiratory complaints. She denies any GI or  complaints. She denies any headaches, dizziness or neurologic complaints. There are no current arthritic complaints and no other complaints on complete review of systems. She is taking all her medications and trying to follow her diet. History Past Medical History:  
Diagnosis Date  Anxiety  Arthritis  Chronic constipation  CKD (chronic kidney disease) 7/17/2017  Colon polyps 7/17/2017  DJD (degenerative joint disease) 7/17/2017  Elevated LFTs 7/17/2017  GERD (gastroesophageal reflux disease)  Glucose intolerance (impaired glucose tolerance) 7/17/2017  Hemorrhoids   
 internal & external- bleeds frequently  High cholesterol  Hyperlipidemia 7/17/2017  Hypertension  Hypertension with renal disease 7/17/2017 PT Education - High Blood Pressure (Essential Hypertension) *: blood pressure PT Education - How to access health information online: discussed with patient and provided information  Hypertension, renal 7/17/2017  Ill-defined condition   
 ringing in ears  Mild obesity 7/17/2017  Nausea & vomiting  Neoplasm of uncertain behavior of skin 7/17/2017  On statin therapy 2017  Osteopenia 2017  Primary angle-closure glaucoma 2017 Comments: OU  
 Skin cancer of face   
 as of 6/15/16 pt states \"removed years ago\"  Spinal stenosis Past Surgical History:  
Procedure Laterality Date  HX CHOLECYSTECTOMY  2014 Dr Whitlock  HX HEENT    
 laser for glaucoma  HX HYSTERECTOMY partial  
 HX PELVIC LAPAROSCOPY Jono. oophorectomy  HX TUBAL LIGATION    
 CT COLSC FLX W/REMOVAL LESION BY HOT BX FORCEPS  2012  CT ERCP REMOVE CALCULI/DEBRIS BILIARY/PANCREAS DUCT  2014  CT ERCP W/SPHINCTEROTOMY/PAPILLOTOMY  2014 Social History Tobacco Use  Smoking status: Former Smoker Packs/day: 1.50 Years: 35.00 Pack years: 52.50 Last attempt to quit: 2004 Years since quittin.0  Smokeless tobacco: Never Used  Tobacco comment: quit smoking 6 yrs ago Substance Use Topics  Alcohol use: No  
 Drug use: No  
 
Current Outpatient Medications Medication Sig Dispense Refill  hydroCHLOROthiazide (HYDRODIURIL) 25 mg tablet TAKE ONE TABLET BY MOUTH ONCE DAILY 90 Tab 3  
 raNITIdine (ZANTAC) 300 mg tab TAKE ONE TABLET BY MOUTH ONCE DAILY AT BEDTIME 90 Tab 3  
 irbesartan (AVAPRO) 300 mg tablet TAKE ONE TABLET BY MOUTH ONCE DAILY 90 Tab 3  
 omeprazole (PRILOSEC) 20 mg capsule TAKE ONE CAPSULE BY MOUTH ONCE DAILY 90 Cap 3  pravastatin (PRAVACHOL) 40 mg tablet Take 1 Tab by mouth nightly. 90 Tab 0  
 vit C/E/Zn/coppr/lutein/zeaxan (PRESERVISION AREDS 2 PO) Take  by mouth. Indications: 2 pills dailly  biotin 10,000 mcg cap Take  by mouth daily (with dinner).  Cholecalciferol, Vitamin D3, 3,000 unit tab Take 1,000 Units by mouth every morning.  cyanocobalamin (VITAMIN B12) 500 mcg tablet Take 500 mcg by mouth every morning.     
 polyethylene glycol (MIRALAX) 17 gram/dose powder Take 17 g by mouth every morning. Indications: CONSTIPATION Allergies Allergen Reactions  Iodinated Contrast- Oral And Iv Dye Hives  Oxycodone-Aspirin Unknown (comments) Family History Problem Relation Age of Onset  Heart Disease Mother  Hypertension Mother  Cancer Mother   
     skin  Stroke Father  Breast Cancer Sister   
     onset: 76s  Breast Cancer Niece Patient Active Problem List  
 Diagnosis  Primary osteoarthritis involving multiple joints  Mixed hyperlipidemia  Glucose intolerance (impaired glucose tolerance)  Hypertension with renal disease PT Education - High Blood Pressure (Essential Hypertension) *: blood pressure PT Education - How to access health information online: discussed with patient and provided information  Stage 2 chronic kidney disease  Severe obesity (BMI 35.0-39. 9) with comorbidity ([facility] [address]  Osteopenia of multiple sites  Vitamin D deficiency  Allergic dermatitis  Plantar wart  Acute pancreatitis  Right upper quadrant abdominal pain  Acute midline low back pain without sciatica  Medicare annual wellness visit, subsequent  Colon polyps  Primary angle-closure glaucoma Comments: OU  Neoplasm of uncertain behavior of skin  Elevated LFTs  Grade IV hemorrhoids  Choledocholithiasis Patient Care Team: 
Kirill Hughes MD as PCP - General (Internal Medicine) Depression Risk Factor Screening: PHQ over the last two weeks 11/15/2018 Little interest or pleasure in doing things Not at all Feeling down, depressed, irritable, or hopeless Not at all Total Score PHQ 2 0 Alcohol Risk Factor Screening: You do not drink alcohol or very rarely. Functional Ability and Level of Safety:  
 
Fall Risk Fall Risk Assessment, last 12 mths 11/15/2018 Able to walk? Yes Fall in past 12 months? No  
 
 
Hearing Loss  
mild Activities of Daily Living Self-care. ADL Assessment 5/10/2018 Feeding yourself No Help Needed Getting from bed to chair No Help Needed Getting dressed No Help Needed Bathing or showering No Help Needed Walk across the room (includes cane/walker) No Help Needed Using the telphone No Help Needed Taking your medications No Help Needed Preparing meals No Help Needed Managing money (expenses/bills) No Help Needed Moderately strenuous housework (laundry) No Help Needed Shopping for personal items (toiletries/medicines) No Help Needed Shopping for groceries No Help Needed Driving No Help Needed Climbing a flight of stairs No Help Needed Getting to places beyond walking distances No Help Needed Abuse Screen Patient is not abused Social History Social History Narrative  Not on file Review of Systems ROS: 
 
Constitutional: She denies fevers, weight loss, sweats. Eyes: No blurred or double vision. ENT: No difficulty with swallowing, taste, speech or smell. NECK: no stiffness swelling or lymph node enlargement Respiratory: No cough wheezing or shortness of breath. Cardiovascular: Denies chest pain, palpitations, unexplained indigestion or syncope. Breast: She has noted no masses or lumps and no discharge or axillary swelling Gastrointestinal:  No changes in bowel movements, no abdominal pain, no bloating. Genitourinary: No discharge or abnormal bleeding or pain Extremities: No joint pain, stiffness or swelling. Neurological:  No numbness, tingling, burring paresthesias or loss of motor strength. No syncope, dizziness or frequent headache Skin:  No recent rashes or mole changes. Psychiatric/Behavioral:  Negative for depression. The patient is not nervous/anxious. HEMATOLOGIC: no easy bruising or bleeding gums Endocrine: no sweats of urinary frequency or excessive thirst 
 
Physical Examination Evaluation of Cognitive Function: 
Mood/affect:  happy Appearance: age appropriate Family member/caregiver input: none Visit Vitals /68 (BP 1 Location: Right arm, BP Patient Position: Sitting) Pulse 68 Temp 97.8 °F (36.6 °C) (Oral) Resp 16 Ht 5' 3\" (1.6 m) Wt 200 lb 6.4 oz (90.9 kg) SpO2 98% BMI 35.50 kg/m² Vitals:  
 11/15/18 1113 BP: 103/68 Pulse: 68 Resp: 16 Temp: 97.8 °F (36.6 °C) TempSrc: Oral  
SpO2: 98% Weight: 200 lb 6.4 oz (90.9 kg) Height: 5' 3\" (1.6 m) PainSc:   0 - No pain PHYSICAL EXAM: 
 
General appearance - alert, well appearing, and in no distress Mental status - alert, oriented to person, place, and time HEENT: 
Ears - bilateral TM's and external ear canals clear Eyes - pupillary responses were normal.  Extraocular muscle function intact. Lids and conjunctiva not injected. Fundoscopic exam revealed sharp disc margins. eye movements intact Pharynx- clear with teeth in good repair. No masses were noted Neck - supple without thyromegaly or burit. No JVD noted Lungs - clear to auscultation and percussion Cardiac- normal rate, regular rhythm without murmurs. PMI not displaced. No gallop, rub or click Breast: deferred to GYN Abdomen - flat, soft, non-tender without palpable organomegaly or mass. No pulsatile mass was felt, and not bruit was heard. Bowel sounds were active  Female - deferred to GYN Rectal - deferred to GYN Extremities -  no clubbing cyanosis or edema Lymphatics - no palpable lymphadenopathy, no hepatosplenomegaly Peripheral vascular - Dorsalis pedis and posterior tibial pulses felt without difficulty Skin - no rash or unusual mole change noted Neurological - Cranial nerves II-XII grossly intact. Motor strength 5/5. DTR's 2+ and symmetric. Station and gait normal 
Back exam - full range of motion, no tenderness, palpable spasm or pain on motion Musculoskeletal - no joint tenderness, deformity or swelling Hematologic: no purpura, petechiae or bruising Results for orders placed or performed in visit on 08/16/18 METABOLIC PANEL, COMPREHENSIVE Result Value Ref Range Glucose 103 (H) 65 - 99 mg/dL BUN 18 8 - 27 mg/dL Creatinine 0.74 0.57 - 1.00 mg/dL GFR est non-AA 82 >59 mL/min/1.73 GFR est AA 94 >59 mL/min/1.73  
 BUN/Creatinine ratio 24 12 - 28 Sodium 143 134 - 144 mmol/L Potassium 5.2 3.5 - 5.2 mmol/L Chloride 102 96 - 106 mmol/L  
 CO2 27 20 - 29 mmol/L Calcium 9.9 8.7 - 10.3 mg/dL Protein, total 7.0 6.0 - 8.5 g/dL Albumin 4.3 3.5 - 4.8 g/dL GLOBULIN, TOTAL 2.7 1.5 - 4.5 g/dL A-G Ratio 1.6 1.2 - 2.2 Bilirubin, total 0.7 0.0 - 1.2 mg/dL Alk. phosphatase 97 39 - 117 IU/L  
 AST (SGOT) 18 0 - 40 IU/L  
 ALT (SGPT) 17 0 - 32 IU/L  
LIPID PANEL Result Value Ref Range Cholesterol, total 247 (H) 100 - 199 mg/dL Triglyceride 162 (H) 0 - 149 mg/dL HDL Cholesterol 55 >39 mg/dL VLDL, calculated 32 5 - 40 mg/dL LDL, calculated 160 (H) 0 - 99 mg/dL HEMOGLOBIN A1C WITH EAG Result Value Ref Range Hemoglobin A1c 5.8 (H) 4.8 - 5.6 % Estimated average glucose 120 mg/dL CK Result Value Ref Range Creatine Kinase,Total 83 24 - 173 U/L Advice/Referrals/Counseling Education and counseling provided: 
Are appropriate based on today's review and evaluation End-of-Life planning (with patient's consent) Pneumococcal Vaccine Influenza Vaccine Colorectal cancer screening tests Assessment/Plan ASSESSMENT:  
1. Hypertension with renal disease 2. Glucose intolerance (impaired glucose tolerance) 3. Mixed hyperlipidemia 4. Primary osteoarthritis involving multiple joints 5. Stage 2 chronic kidney disease 6. Osteopenia of multiple sites 7. Severe obesity (BMI 35.0-39. 9) with comorbidity ([address] 8. Vitamin D deficiency 9. Medicare annual wellness visit, subsequent 10. Essential hypertension 11. Gastroesophageal reflux disease without esophagitis 12. Encounter for immunization Impression 1. Hypertension that is controlled to continue current therapy reviewed with her 2. Glucose intolerance repeat status pending prior labs reviewed on make further adjustments if necessary. 3   Hyperlipidemia prior labs reviewed and repeat status pending I will adjust if needed. 4. DJD that is currently stable 5   CKD stage II repeat status is pending 6. Osteopenia reviewed prior bone density and vitamin D level is pending 7. Obesity we discussed diet, exercise and weight reduction for overall health benefit. 8.  Vitamin D deficiency as noted vitamin D level pending 9. GERD that is currently stable Flu shot given today. Medicare subsequent annual wellness examination screening questionnaire is completed today. The results were reviewed with her and her questions were answered. Lifestyle recommendations and modifications discussed and made. I will call the lab results and make further recommendations or adjustments if necessary. Follow-up as scheduled for 3 months or sooner should to be a problem. PLAN: 
. Orders Placed This Encounter  Influenza Vaccine Inactivated (IIV)(FLUAD), Subunit, Adjuvanted, IM, ([id number])  T4, FREE  
 METABOLIC PANEL, COMPREHENSIVE  
 TSH 3RD GENERATION  
 LIPID PANEL  
 CK  
 HEMOGLOBIN A1C WITH EAG  
 VITAMIN D, 25 HYDROXY  AMB POC COMPLETE CBC,AUTOMATED ENTER  AMB POC URINALYSIS DIP STICK AUTO W/ MICRO  hydroCHLOROthiazide (HYDRODIURIL) 25 mg tablet  raNITIdine (ZANTAC) 300 mg tab  irbesartan (AVAPRO) 300 mg tablet  omeprazole (PRILOSEC) 20 mg capsule  pravastatin (PRAVACHOL) 40 mg tablet ATTENTION:  
This medical record was transcribed using an electronic medical records system. Although proofread, it may and can contain electronic and spelling errors. Other human spelling and other errors may be present. Corrections may be executed at a later time.   Please feel free to contact us for any clarifications as needed. Follow-up Disposition: 
Return in about 3 months (around 2/15/2019). Marion Potter MD 
 
Recommended healthy diet low in carbohydrates, fats, sodium and cholesterol. Recommended regular cardiovascular exercise 3-6 times per week for 30-60 minutes daily. Current Outpatient Medications Medication Sig Dispense Refill  hydroCHLOROthiazide (HYDRODIURIL) 25 mg tablet TAKE ONE TABLET BY MOUTH ONCE DAILY 90 Tab 3  
 raNITIdine (ZANTAC) 300 mg tab TAKE ONE TABLET BY MOUTH ONCE DAILY AT BEDTIME 90 Tab 3  
 irbesartan (AVAPRO) 300 mg tablet TAKE ONE TABLET BY MOUTH ONCE DAILY 90 Tab 3  
 omeprazole (PRILOSEC) 20 mg capsule TAKE ONE CAPSULE BY MOUTH ONCE DAILY 90 Cap 3  pravastatin (PRAVACHOL) 40 mg tablet Take 1 Tab by mouth nightly. 90 Tab 0  
 vit C/E/Zn/coppr/lutein/zeaxan (PRESERVISION AREDS 2 PO) Take  by mouth. Indications: 2 pills dailly  biotin 10,000 mcg cap Take  by mouth daily (with dinner).  Cholecalciferol, Vitamin D3, 3,000 unit tab Take 1,000 Units by mouth every morning.  cyanocobalamin (VITAMIN B12) 500 mcg tablet Take 500 mcg by mouth every morning.  polyethylene glycol (MIRALAX) 17 gram/dose powder Take 17 g by mouth every morning. Indications: CONSTIPATION No results found for any visits on 11/15/18. Verbal and written instructions (see AVS) provided. Patient expresses understanding of diagnosis and treatment plan.  
 
Marion Potter MD

## 2018-11-16 LAB
25(OH)D3+25(OH)D2 SERPL-MCNC: 40.3 NG/ML (ref 30–100)
ALBUMIN SERPL-MCNC: 4.4 G/DL (ref 3.5–4.8)
ALBUMIN/GLOB SERPL: 1.6 {RATIO} (ref 1.2–2.2)
ALP SERPL-CCNC: 99 IU/L (ref 39–117)
ALT SERPL-CCNC: 19 IU/L (ref 0–32)
AST SERPL-CCNC: 17 IU/L (ref 0–40)
BILIRUB SERPL-MCNC: 0.8 MG/DL (ref 0–1.2)
BUN SERPL-MCNC: 15 MG/DL (ref 8–27)
BUN/CREAT SERPL: 22 (ref 12–28)
CALCIUM SERPL-MCNC: 10 MG/DL (ref 8.7–10.3)
CHLORIDE SERPL-SCNC: 101 MMOL/L (ref 96–106)
CHOLEST SERPL-MCNC: 252 MG/DL (ref 100–199)
CK SERPL-CCNC: 70 U/L (ref 24–173)
CO2 SERPL-SCNC: 28 MMOL/L (ref 20–29)
CREAT SERPL-MCNC: 0.69 MG/DL (ref 0.57–1)
EST. AVERAGE GLUCOSE BLD GHB EST-MCNC: 120 MG/DL
GLOBULIN SER CALC-MCNC: 2.8 G/DL (ref 1.5–4.5)
GLUCOSE SERPL-MCNC: 102 MG/DL (ref 65–99)
HBA1C MFR BLD: 5.8 % (ref 4.8–5.6)
HDLC SERPL-MCNC: 50 MG/DL
LDLC SERPL CALC-MCNC: 167 MG/DL (ref 0–99)
POTASSIUM SERPL-SCNC: 4.5 MMOL/L (ref 3.5–5.2)
PROT SERPL-MCNC: 7.2 G/DL (ref 6–8.5)
SODIUM SERPL-SCNC: 144 MMOL/L (ref 134–144)
T4 FREE SERPL-MCNC: 1.33 NG/DL (ref 0.82–1.77)
TRIGL SERPL-MCNC: 174 MG/DL (ref 0–149)
TSH SERPL DL<=0.005 MIU/L-ACNC: 2.35 UIU/ML (ref 0.45–4.5)
VLDLC SERPL CALC-MCNC: 35 MG/DL (ref 5–40)

## 2018-11-17 NOTE — PROGRESS NOTES
Lab okay except for markedly increased cholesterol and LDL and if I remember correctly she said at office visit she was not taking her Pravachol prescription that is the case this try Livalo 2 mg daily

## 2018-11-19 ENCOUNTER — TELEPHONE (OUTPATIENT)
Dept: INTERNAL MEDICINE CLINIC | Age: 73
End: 2018-11-19

## 2018-11-19 NOTE — TELEPHONE ENCOUNTER
Discussed lab with patient. States that simvastatin had caused muscle pain; pravastatin had caused a episode of chest pain and went to [facility] and advised to d/c. Willing to try Livalo 2 mg daily. Rx sent to patient's pharmacy.

## 2018-11-19 NOTE — TELEPHONE ENCOUNTER
----- Message from Kimberly Tucker MD sent at 11/16/2018  7:08 PM EST -----  Lab okay except for markedly increased cholesterol and LDL and if I remember correctly she said at office visit she was not taking her Pravachol prescription that is the case this try Livalo 2 mg daily

## 2019-01-21 DIAGNOSIS — I12.9 HYPERTENSION WITH RENAL DISEASE: ICD-10-CM

## 2019-01-21 DIAGNOSIS — K21.9 GASTROESOPHAGEAL REFLUX DISEASE WITHOUT ESOPHAGITIS: ICD-10-CM

## 2019-01-21 DIAGNOSIS — I10 ESSENTIAL HYPERTENSION: ICD-10-CM

## 2019-01-21 RX ORDER — IRBESARTAN 300 MG/1
TABLET ORAL
Qty: 90 TAB | Refills: 3 | Status: SHIPPED | OUTPATIENT
Start: 2019-01-21 | End: 2019-12-16 | Stop reason: SDUPTHER

## 2019-01-21 RX ORDER — RANITIDINE 300 MG/1
TABLET ORAL
Qty: 90 TAB | Refills: 3 | Status: SHIPPED | OUTPATIENT
Start: 2019-01-21 | End: 2019-12-16 | Stop reason: SDUPTHER

## 2019-01-21 RX ORDER — OMEPRAZOLE 20 MG/1
CAPSULE, DELAYED RELEASE ORAL
Qty: 90 CAP | Refills: 3 | Status: SHIPPED | OUTPATIENT
Start: 2019-01-21 | End: 2019-12-16 | Stop reason: SDUPTHER

## 2019-01-21 RX ORDER — HYDROCHLOROTHIAZIDE 25 MG/1
TABLET ORAL
Qty: 90 TAB | Refills: 3 | Status: SHIPPED | OUTPATIENT
Start: 2019-01-21 | End: 2019-12-16 | Stop reason: SDUPTHER

## 2019-01-21 NOTE — TELEPHONE ENCOUNTER
RX refill request from the patient/pharmacy. Patient last seen 10- with labs, and next appt. scheduled for 01-  Requested Prescriptions     Pending Prescriptions Disp Refills    raNITIdine (ZANTAC) 300 mg tab 90 Tab 3     Sig: TAKE ONE TABLET BY MOUTH ONCE DAILY AT BEDTIME    pitavastatin calcium (LIVALO) 2 mg tablet 90 Tab 3     Sig: Take 1 Tab by mouth nightly.  omeprazole (PRILOSEC) 20 mg capsule 90 Cap 3     Sig: TAKE ONE CAPSULE BY MOUTH ONCE DAILY    hydroCHLOROthiazide (HYDRODIURIL) 25 mg tablet 90 Tab 3     Sig: TAKE ONE TABLET BY MOUTH ONCE DAILY    irbesartan (AVAPRO) 300 mg tablet 90 Tab 3     Sig: TAKE ONE TABLET BY MOUTH ONCE DAILY   .

## 2019-02-16 ENCOUNTER — OFFICE VISIT (OUTPATIENT)
Dept: URGENT CARE | Age: 74
End: 2019-02-16

## 2019-02-16 VITALS
HEART RATE: 100 BPM | OXYGEN SATURATION: 98 % | TEMPERATURE: 98.5 F | WEIGHT: 198 LBS | RESPIRATION RATE: 18 BRPM | SYSTOLIC BLOOD PRESSURE: 122 MMHG | HEIGHT: 63 IN | DIASTOLIC BLOOD PRESSURE: 63 MMHG | BODY MASS INDEX: 35.08 KG/M2

## 2019-02-16 DIAGNOSIS — R52 BODY ACHES: ICD-10-CM

## 2019-02-16 DIAGNOSIS — J02.9 SORE THROAT: ICD-10-CM

## 2019-02-16 DIAGNOSIS — J06.9 VIRAL URI: Primary | ICD-10-CM

## 2019-02-16 LAB
FLUAV+FLUBV AG NOSE QL IA.RAPID: NEGATIVE POS/NEG
FLUAV+FLUBV AG NOSE QL IA.RAPID: NEGATIVE POS/NEG
S PYO AG THROAT QL: NEGATIVE
VALID INTERNAL CONTROL?: YES
VALID INTERNAL CONTROL?: YES

## 2019-02-16 RX ORDER — GUAIFENESIN 600 MG/1
600 TABLET, EXTENDED RELEASE ORAL 2 TIMES DAILY
Qty: 14 TAB | Refills: 0 | Status: SHIPPED | OUTPATIENT
Start: 2019-02-16 | End: 2019-02-23

## 2019-02-16 RX ORDER — BENZONATATE 200 MG/1
200 CAPSULE ORAL
Qty: 21 CAP | Refills: 0 | Status: SHIPPED | OUTPATIENT
Start: 2019-02-16 | End: 2019-02-21 | Stop reason: ALTCHOICE

## 2019-02-16 NOTE — PATIENT INSTRUCTIONS
Follow up with PCP without improvement over next 5-7 days sooner/immediately for new or worsening       Viral Respiratory Illness: Care Instructions  Your Care Instructions    Viruses are very small organisms. They grow in number after they enter your body. There are many types that cause different illnesses, such as colds and the mumps. The symptoms of a viral respiratory infection often start quickly. They include a fever, sore throat, and runny nose. You may also just not feel well. Or you may not want to eat much. Most viral respiratory infections are not serious. They usually get better with time and self-care. Antibiotics are not used to treat a viral infection. That's because antibiotics will not help cure a viral illness. In some cases, antiviral medicine can help your body fight a serious viral infection. Follow-up care is a key part of your treatment and safety. Be sure to make and go to all appointments, and call your doctor if you are having problems. It's also a good idea to know your test results and keep a list of the medicines you take. How can you care for yourself at home? · Rest as much as possible until you feel better. · Be safe with medicines. Take your medicine exactly as prescribed. Call your doctor if you think you are having a problem with your medicine. You will get more details on the specific medicine your doctor prescribes. · Take an over-the-counter pain medicine, such as acetaminophen (Tylenol), ibuprofen (Advil, Motrin), or naproxen (Aleve), as needed for pain and fever. Read and follow all instructions on the label. Do not give aspirin to anyone younger than 20. It has been linked to Reye syndrome, a serious illness. · Drink plenty of fluids, enough so that your urine is light yellow or clear like water. Hot fluids, such as tea or soup, may help relieve congestion in your nose and throat.  If you have kidney, heart, or liver disease and have to limit fluids, talk with your doctor before you increase the amount of fluids you drink. · Try to clear mucus from your lungs by breathing deeply and coughing. · Gargle with warm salt water once an hour. This can help reduce swelling and throat pain. Use 1 teaspoon of salt mixed in 1 cup of warm water. · Do not smoke or allow others to smoke around you. If you need help quitting, talk to your doctor about stop-smoking programs and medicines. These can increase your chances of quitting for good. To avoid spreading the virus  · Cough or sneeze into a tissue. Then throw the tissue away. · If you don't have a tissue, use your hand to cover your cough or sneeze. Then clean your hand. You can also cough into your sleeve. · Wash your hands often. Use soap and warm water. Wash for 15 to 20 seconds each time. · If you don't have soap and water near you, you can clean your hands with alcohol wipes or gel. When should you call for help? Call your doctor now or seek immediate medical care if:    · You have a new or higher fever.     · Your fever lasts more than 48 hours.     · You have trouble breathing.     · You have a fever with a stiff neck or a severe headache.     · You are sensitive to light.     · You feel very sleepy or confused.    Watch closely for changes in your health, and be sure to contact your doctor if:    · You do not get better as expected. Where can you learn more? Go to http://rufina-ion.info/. Enter C236 in the search box to learn more about \"Viral Respiratory Infection: Care Instructions. \"  Current as of: September 5, 2018  Content Version: 11.9  © 4339-4242 [email or web address]. Care instructions adapted under license by [email or web address] (which disclaims liability or warranty for this information).  If you have questions about a medical condition or this instruction, always ask your healthcare professional. [address] any warranty or liability for your use of this information.

## 2019-02-16 NOTE — PROGRESS NOTES
Here for sore throat, cough, runny nose, nasal congestion for past 3 days without preceding illness. Sore throat is 6/10 worried about throat infection. Feels a little achy. No known fever. Symptoms present throughout the day. hasnt tried any OTC meds. Normal appetite. Specifically denies fever, chills, SOB, pleuritic pain, nausea   Denies lung conditions or copd.               Past Medical History:   Diagnosis Date    Anxiety     Arthritis     Chronic constipation     CKD (chronic kidney disease) 7/17/2017    Colon polyps 7/17/2017    DJD (degenerative joint disease) 7/17/2017    Elevated LFTs 7/17/2017    GERD (gastroesophageal reflux disease)     Glucose intolerance (impaired glucose tolerance) 7/17/2017    Hemorrhoids     internal & external- bleeds frequently    High cholesterol     Hyperlipidemia 7/17/2017    Hypertension     Hypertension with renal disease 7/17/2017    PT Education - High Blood Pressure (Essential Hypertension) *: blood pressure PT Education - How to access health information online: discussed with patient and provided information    Hypertension, renal 7/17/2017    Ill-defined condition     ringing in ears    Mild obesity 7/17/2017    Nausea & vomiting     Neoplasm of uncertain behavior of skin 7/17/2017    On statin therapy 7/17/2017    Osteopenia 7/17/2017    Primary angle-closure glaucoma 7/17/2017    Comments: OU    Skin cancer of face     as of 6/15/16 pt states \"removed years ago\"    Spinal stenosis         Past Surgical History:   Procedure Laterality Date    HX CHOLECYSTECTOMY  11/6/2014    Dr Ayers Mention for glaucoma    HX HYSTERECTOMY      partial    HX PELVIC LAPAROSCOPY      Jono. oophorectomy    HX TUBAL LIGATION      DE COLSC FLX W/REMOVAL LESION BY HOT BX FORCEPS  11/12/2012         DE ERCP REMOVE CALCULI/DEBRIS BILIARY/PANCREAS DUCT  11/7/2014         DE ERCP W/SPHINCTEROTOMY/PAPILLOTOMY  11/7/2014              Family History   Problem Relation Age of Onset    Heart Disease Mother     Hypertension Mother     Cancer Mother         skin    Stroke Father     Breast Cancer Sister         onset: 76s    Breast Cancer Niece         Social History     Socioeconomic History    Marital status:      Spouse name: Not on file    Number of children: Not on file    Years of education: Not on file    Highest education level: Not on file   Social Needs    Financial resource strain: Not on file    Food insecurity - worry: Not on file    Food insecurity - inability: Not on file   [employer] needs - medical: Not on file   [employer] needs - non-medical: Not on file   Occupational History    Not on file   Tobacco Use    Smoking status: Former Smoker     Packs/day: 1.50     Years: 35.00     Pack years: 52.50     Last attempt to quit: 2004     Years since quittin.2    Smokeless tobacco: Never Used    Tobacco comment: quit smoking 6 yrs ago   Substance and Sexual Activity    Alcohol use: No    Drug use: No    Sexual activity: No   Other Topics Concern    Not on file   Social History Narrative    Not on file                ALLERGIES: Iodinated contrast- oral and iv dye and Oxycodone-aspirin    Review of Systems   All other systems reviewed and are negative. Vitals:    19 1237   BP: 122/63   Pulse: 100   Resp: 18   Temp: 98.5 °F (36.9 °C)   SpO2: 98%   Weight: 198 lb (89.8 kg)   Height: 5' 3\" (1.6 m)       Physical Exam   Constitutional: She is oriented to person, place, and time. No distress. Non-toxic appearing, well hydrated   HENT:     TMs normal appearing bilat  Erythematous nasal turbinates with clear rhinorrhea  OP mild erythema without swelling or exudate. Uvula midline. No oral lesions. Eyes: Conjunctivae and EOM are normal. Pupils are equal, round, and reactive to light. No scleral icterus. Neck: Normal range of motion. Neck supple.    Cardiovascular: Normal rate, regular rhythm and normal heart sounds. Exam reveals no gallop and no friction rub. No murmur heard. Pulmonary/Chest: Effort normal and breath sounds normal. No respiratory distress. She has no wheezes. She has no rales. Lymphadenopathy:     She has no cervical adenopathy. Neurological: She is alert and oriented to person, place, and time. Skin: Skin is warm and dry. No rash noted. She is not diaphoretic. No erythema. No pallor. Psychiatric: She has a normal mood and affect. Her behavior is normal. Thought content normal.   Nursing note and vitals reviewed. MDM     Differential Diagnosis; Clinical Impression; Plan:       CLINICAL IMPRESSION:  (J06.9) Viral URI  (primary encounter diagnosis)  (J02.9) Sore throat  (R52) Body aches    Orders Placed This Encounter      benzonatate (TESSALON) 200 mg capsule          Sig: Take 1 Cap by mouth three (3) times daily as needed for Cough for up to 7 days. Dispense:  21 Cap          Refill:  0      guaiFENesin ER (MUCINEX) 600 mg ER tablet          Sig: Take 1 Tab by mouth two (2) times a day for 7 days. Dispense:  14 Tab          Refill:  0      Plan:  Flu and strep negative  Viral uri without complication today  Fluids, rest, tessalon and guafenesin for symptomatic relief  Review handouts  Wash hands       We have reviewed concerning signs/symptoms, normal vs abnormal progression of medical condition and when to seek immediate medical attention. Schedule with PCP or Urgent Care immediately for worsening or new symptoms. See your PCP if there is not at least some improvement in symptoms within the next 1 week  You should see your PCP for updates on your routine health maintenance.              Procedures

## 2019-02-19 ENCOUNTER — APPOINTMENT (OUTPATIENT)
Dept: CT IMAGING | Age: 74
End: 2019-02-19
Attending: EMERGENCY MEDICINE
Payer: MEDICARE

## 2019-02-19 ENCOUNTER — APPOINTMENT (OUTPATIENT)
Dept: GENERAL RADIOLOGY | Age: 74
End: 2019-02-19
Attending: EMERGENCY MEDICINE
Payer: MEDICARE

## 2019-02-19 ENCOUNTER — HOSPITAL ENCOUNTER (EMERGENCY)
Age: 74
Discharge: HOME | End: 2019-02-19
Attending: EMERGENCY MEDICINE
Payer: MEDICARE

## 2019-02-19 VITALS
OXYGEN SATURATION: 97 % | SYSTOLIC BLOOD PRESSURE: 129 MMHG | WEIGHT: 192.46 LBS | HEIGHT: 63 IN | TEMPERATURE: 98.8 F | DIASTOLIC BLOOD PRESSURE: 77 MMHG | HEART RATE: 85 BPM | BODY MASS INDEX: 34.1 KG/M2 | RESPIRATION RATE: 14 BRPM

## 2019-02-19 DIAGNOSIS — R55 SYNCOPE AND COLLAPSE: ICD-10-CM

## 2019-02-19 DIAGNOSIS — J10.1 INFLUENZA A: Primary | ICD-10-CM

## 2019-02-19 DIAGNOSIS — E86.0 DEHYDRATION: ICD-10-CM

## 2019-02-19 DIAGNOSIS — I95.1 ORTHOSTATIC HYPOTENSION: ICD-10-CM

## 2019-02-19 LAB
ALBUMIN SERPL-MCNC: 3.5 G/DL (ref 3.5–5)
ALBUMIN/GLOB SERPL: 0.9 {RATIO} (ref 1.1–2.2)
ALP SERPL-CCNC: 101 U/L (ref 45–117)
ALT SERPL-CCNC: 34 U/L (ref 12–78)
ANION GAP SERPL CALC-SCNC: 9 MMOL/L (ref 5–15)
APPEARANCE UR: ABNORMAL
AST SERPL-CCNC: 30 U/L (ref 15–37)
BACTERIA URNS QL MICRO: NEGATIVE /HPF
BASOPHILS # BLD: 0 K/UL (ref 0–0.1)
BASOPHILS NFR BLD: 0 % (ref 0–1)
BILIRUB SERPL-MCNC: 0.5 MG/DL (ref 0.2–1)
BILIRUB UR QL: NEGATIVE
BUN SERPL-MCNC: 21 MG/DL (ref 6–20)
BUN/CREAT SERPL: 24 (ref 12–20)
CALCIUM SERPL-MCNC: 8.6 MG/DL (ref 8.5–10.1)
CHLORIDE SERPL-SCNC: 99 MMOL/L (ref 97–108)
CK MB CFR SERPL CALC: 1.1 % (ref 0–2.5)
CK MB SERPL-MCNC: 1.6 NG/ML (ref 5–25)
CK SERPL-CCNC: 151 U/L (ref 26–192)
CO2 SERPL-SCNC: 26 MMOL/L (ref 21–32)
COLOR UR: ABNORMAL
CREAT SERPL-MCNC: 0.87 MG/DL (ref 0.55–1.02)
DIFFERENTIAL METHOD BLD: NORMAL
EOSINOPHIL # BLD: 0 K/UL (ref 0–0.4)
EOSINOPHIL NFR BLD: 1 % (ref 0–7)
EPITH CASTS URNS QL MICRO: ABNORMAL /LPF
ERYTHROCYTE [DISTWIDTH] IN BLOOD BY AUTOMATED COUNT: 14 % (ref 11.5–14.5)
FLUAV AG NPH QL IA: POSITIVE
FLUBV AG NOSE QL IA: NEGATIVE
GLOBULIN SER CALC-MCNC: 4 G/DL (ref 2–4)
GLUCOSE SERPL-MCNC: 110 MG/DL (ref 65–100)
GLUCOSE UR STRIP.AUTO-MCNC: NEGATIVE MG/DL
HCT VFR BLD AUTO: 42.8 % (ref 35–47)
HGB BLD-MCNC: 14.5 G/DL (ref 11.5–16)
HGB UR QL STRIP: NEGATIVE
IMM GRANULOCYTES # BLD AUTO: 0 K/UL (ref 0–0.04)
IMM GRANULOCYTES NFR BLD AUTO: 0 % (ref 0–0.5)
KETONES UR QL STRIP.AUTO: ABNORMAL MG/DL
LEUKOCYTE ESTERASE UR QL STRIP.AUTO: NEGATIVE
LYMPHOCYTES # BLD: 2.5 K/UL (ref 0.8–3.5)
LYMPHOCYTES NFR BLD: 29 % (ref 12–49)
MAGNESIUM SERPL-MCNC: 2 MG/DL (ref 1.6–2.4)
MCH RBC QN AUTO: 28.1 PG (ref 26–34)
MCHC RBC AUTO-ENTMCNC: 33.9 G/DL (ref 30–36.5)
MCV RBC AUTO: 82.9 FL (ref 80–99)
MONOCYTES # BLD: 0.6 K/UL (ref 0–1)
MONOCYTES NFR BLD: 7 % (ref 5–13)
NEUTS SEG # BLD: 5.4 K/UL (ref 1.8–8)
NEUTS SEG NFR BLD: 63 % (ref 32–75)
NITRITE UR QL STRIP.AUTO: NEGATIVE
NRBC # BLD: 0 K/UL (ref 0–0.01)
NRBC BLD-RTO: 0 PER 100 WBC
PH UR STRIP: 5.5 [PH] (ref 5–8)
PLATELET # BLD AUTO: 201 K/UL (ref 150–400)
PMV BLD AUTO: 11.1 FL (ref 8.9–12.9)
POTASSIUM SERPL-SCNC: 3.7 MMOL/L (ref 3.5–5.1)
PROT SERPL-MCNC: 7.5 G/DL (ref 6.4–8.2)
PROT UR STRIP-MCNC: NEGATIVE MG/DL
RBC # BLD AUTO: 5.16 M/UL (ref 3.8–5.2)
RBC #/AREA URNS HPF: ABNORMAL /HPF (ref 0–5)
SODIUM SERPL-SCNC: 134 MMOL/L (ref 136–145)
SP GR UR REFRACTOMETRY: 1.03 (ref 1–1.03)
TROPONIN I SERPL-MCNC: <0.05 NG/ML
UA: UC IF INDICATED,UAUC: ABNORMAL
UROBILINOGEN UR QL STRIP.AUTO: 0.2 EU/DL (ref 0.2–1)
WBC # BLD AUTO: 8.6 K/UL (ref 3.6–11)
WBC URNS QL MICRO: ABNORMAL /HPF (ref 0–4)

## 2019-02-19 PROCEDURE — 85025 COMPLETE CBC W/AUTO DIFF WBC: CPT

## 2019-02-19 PROCEDURE — 96360 HYDRATION IV INFUSION INIT: CPT

## 2019-02-19 PROCEDURE — 70450 CT HEAD/BRAIN W/O DYE: CPT

## 2019-02-19 PROCEDURE — 74011250636 HC RX REV CODE- 250/636: Performed by: EMERGENCY MEDICINE

## 2019-02-19 PROCEDURE — 74011250637 HC RX REV CODE- 250/637: Performed by: EMERGENCY MEDICINE

## 2019-02-19 PROCEDURE — 93005 ELECTROCARDIOGRAM TRACING: CPT

## 2019-02-19 PROCEDURE — 81001 URINALYSIS AUTO W/SCOPE: CPT

## 2019-02-19 PROCEDURE — 99285 EMERGENCY DEPT VISIT HI MDM: CPT

## 2019-02-19 PROCEDURE — 84484 ASSAY OF TROPONIN QUANT: CPT

## 2019-02-19 PROCEDURE — 71046 X-RAY EXAM CHEST 2 VIEWS: CPT

## 2019-02-19 PROCEDURE — 36415 COLL VENOUS BLD VENIPUNCTURE: CPT

## 2019-02-19 PROCEDURE — 87804 INFLUENZA ASSAY W/OPTIC: CPT

## 2019-02-19 PROCEDURE — 80053 COMPREHEN METABOLIC PANEL: CPT

## 2019-02-19 PROCEDURE — 83735 ASSAY OF MAGNESIUM: CPT

## 2019-02-19 PROCEDURE — 96361 HYDRATE IV INFUSION ADD-ON: CPT

## 2019-02-19 PROCEDURE — 82550 ASSAY OF CK (CPK): CPT

## 2019-02-19 RX ORDER — OSELTAMIVIR PHOSPHATE 75 MG/1
75 CAPSULE ORAL
Status: COMPLETED | OUTPATIENT
Start: 2019-02-19 | End: 2019-02-19

## 2019-02-19 RX ORDER — SODIUM CHLORIDE 0.9 % (FLUSH) 0.9 %
5-40 SYRINGE (ML) INJECTION EVERY 8 HOURS
Status: DISCONTINUED | OUTPATIENT
Start: 2019-02-19 | End: 2019-02-19 | Stop reason: HOSPADM

## 2019-02-19 RX ORDER — OSELTAMIVIR PHOSPHATE 75 MG/1
75 CAPSULE ORAL 2 TIMES DAILY
Qty: 10 CAP | Refills: 0 | Status: SHIPPED | OUTPATIENT
Start: 2019-02-19 | End: 2019-02-24

## 2019-02-19 RX ORDER — SODIUM CHLORIDE 0.9 % (FLUSH) 0.9 %
5-40 SYRINGE (ML) INJECTION AS NEEDED
Status: DISCONTINUED | OUTPATIENT
Start: 2019-02-19 | End: 2019-02-19 | Stop reason: HOSPADM

## 2019-02-19 RX ADMIN — OSELTAMIVIR PHOSPHATE 75 MG: 75 CAPSULE ORAL at 10:37

## 2019-02-19 RX ADMIN — SODIUM CHLORIDE 1000 ML: 900 INJECTION, SOLUTION INTRAVENOUS at 06:29

## 2019-02-19 RX ADMIN — SODIUM CHLORIDE 1000 ML: 900 INJECTION, SOLUTION INTRAVENOUS at 08:06

## 2019-02-19 NOTE — ED NOTES
Patient discharge by Maki Estrada MD - pt sent to the front lobby, with strong and steady gait -  Discharge information / home RX / and reasons to return to the ED were reviewed by the doctor.

## 2019-02-19 NOTE — ED PROVIDER NOTES
EMERGENCY DEPARTMENT HISTORY AND PHYSICAL EXAM 
     
 
Date: 2/19/2019 Patient Name: Juan Sanders History of Presenting Illness Chief Complaint Patient presents with  Syncope  
  reports syncope x2 today  Fever  
  subjective fever History Provided By: Patient HPI: Juan Sanders is a 72 y.o. female, pmhx HTN, high cholesterol, GERD, arthritis, CKD, who presents via EMS to the ED for evaluation after two syncopal episodes tonight. Pt states she was sitting on the couch and got up to use the bathroom and had syncopal episode, noting she woke up on another couch and was incontinent. She states she then got up and had another syncopal episode, falling onto her bottom. She denies head trauma. She notes the third time she tried to stand up she became dizzy so she called EMS. Pt reports mild to moderate cough, congestion x a few days with associated intermittent fever, tmax 100.7. She notes she was evaluated by PCP yesterday with negative flu swab and was discharged with tessalon. Pt also notes chest tightness / soreness secondary to cough. Pt specifically denies any CP, abd pain, vomiting. She specifically denies any recent vomiting, diarrhea, abd pain, CP, SOB, numbness, weakness, tingling, BLE swelling, HA, heart palpitations, urinary sxs, changes in BM, changes in PO intake, melena, hematochezia, cough, or congestion. PCP: Ivy Howell MD 
 
Allergies: iodinated contrast, oxy PMHx: Significant for CKD, HTN, osteopenia, hyperlipidemia, DJD, anxiety, arthritis, GERD, high cholesterol, HTN 
PSHx: Significant for hysterectomy, ERCP, cholecysectomy Social Hx: tobacco (former), EtOH (-), Illicit Drugs (-) There are no other complaints, changes, or physical findings at this time. Current Facility-Administered Medications Medication Dose Route Frequency Provider Last Rate Last Dose  sodium chloride (NS) flush 5-40 mL  5-40 mL IntraVENous Q8H O'Gonzales, Annelise RODRIGUEZ MD      
 sodium chloride (NS) flush 5-40 mL  5-40 mL IntraVENous PRN Annelise Reyes MD      
 
Current Outpatient Medications Medication Sig Dispense Refill  oseltamivir (TAMIFLU) 75 mg capsule Take 1 Cap by mouth two (2) times a day for 5 days. 10 Cap 0  
 raNITIdine (ZANTAC) 300 mg tab TAKE ONE TABLET BY MOUTH ONCE DAILY AT BEDTIME 90 Tab 3  pitavastatin calcium (LIVALO) 2 mg tablet Take 1 Tab by mouth nightly. 90 Tab 3  
 omeprazole (PRILOSEC) 20 mg capsule TAKE ONE CAPSULE BY MOUTH ONCE DAILY 90 Cap 3  
 hydroCHLOROthiazide (HYDRODIURIL) 25 mg tablet TAKE ONE TABLET BY MOUTH ONCE DAILY 90 Tab 3  
 irbesartan (AVAPRO) 300 mg tablet TAKE ONE TABLET BY MOUTH ONCE DAILY 90 Tab 3  
 biotin 10,000 mcg cap Take  by mouth daily (with dinner).  Cholecalciferol, Vitamin D3, 3,000 unit tab Take 1,000 Units by mouth every morning.  cyanocobalamin (VITAMIN B12) 500 mcg tablet Take 500 mcg by mouth every morning.  polyethylene glycol (MIRALAX) 17 gram/dose powder Take 17 g by mouth every morning. Indications: CONSTIPATION    
 benzonatate (TESSALON) 200 mg capsule Take 1 Cap by mouth three (3) times daily as needed for Cough for up to 7 days. 21 Cap 0  
 guaiFENesin ER (MUCINEX) 600 mg ER tablet Take 1 Tab by mouth two (2) times a day for 7 days. 14 Tab 0  
 vit C/E/Zn/coppr/lutein/zeaxan (PRESERVISION AREDS 2 PO) Take  by mouth. Indications: 2 pills dailly Past History Past Medical History: 
Past Medical History:  
Diagnosis Date  Anxiety  Arthritis  Chronic constipation  CKD (chronic kidney disease) 7/17/2017  Colon polyps 7/17/2017  DJD (degenerative joint disease) 7/17/2017  Elevated LFTs 7/17/2017  GERD (gastroesophageal reflux disease)  Glucose intolerance (impaired glucose tolerance) 7/17/2017  Hemorrhoids   
 internal & external- bleeds frequently  High cholesterol  Hyperlipidemia 7/17/2017  Hypertension  Hypertension with renal disease 2017 PT Education - High Blood Pressure (Essential Hypertension) *: blood pressure PT Education - How to access health information online: discussed with patient and provided information  Hypertension, renal 2017  Ill-defined condition   
 ringing in ears  Mild obesity 2017  Nausea & vomiting  Neoplasm of uncertain behavior of skin 2017  On statin therapy 2017  Osteopenia 2017  Primary angle-closure glaucoma 2017 Comments: OU  
 Skin cancer of face   
 as of 6/15/16 pt states \"removed years ago\"  Spinal stenosis Past Surgical History: 
Past Surgical History:  
Procedure Laterality Date  HX CHOLECYSTECTOMY  2014 Dr Juvenal Waldron  HX HEENT    
 laser for glaucoma  HX HYSTERECTOMY partial  
 HX PELVIC LAPAROSCOPY Jono. oophorectomy  HX TUBAL LIGATION    
 IA COLSC FLX W/REMOVAL LESION BY HOT BX FORCEPS  2012  IA ERCP REMOVE CALCULI/DEBRIS BILIARY/PANCREAS DUCT  2014  IA ERCP W/SPHINCTEROTOMY/PAPILLOTOMY  2014 Family History: 
Family History Problem Relation Age of Onset  Heart Disease Mother  Hypertension Mother  Cancer Mother   
     skin  Stroke Father  Breast Cancer Sister   
     onset: 76s  Breast Cancer Niece Social History: 
Social History Tobacco Use  Smoking status: Former Smoker Packs/day: 1.50 Years: 35.00 Pack years: 52.50 Last attempt to quit: 2004 Years since quittin.3  Smokeless tobacco: Never Used  Tobacco comment: quit smoking 6 yrs ago Substance Use Topics  Alcohol use: No  
 Drug use: No  
 
 
Allergies: Allergies Allergen Reactions  Iodinated Contrast- Oral And Iv Dye Hives  Oxycodone-Aspirin Unknown (comments) Review of Systems Review of Systems Constitutional: Positive for fever.  Negative for appetite change and chills. HENT: Positive for congestion. Eyes: Negative. Respiratory: Positive for cough and chest tightness. Negative for shortness of breath. Cardiovascular: Negative. Negative for chest pain, palpitations and leg swelling. Gastrointestinal: Negative. Negative for abdominal pain, blood in stool, constipation, diarrhea, nausea and vomiting. Genitourinary: Negative. Negative for dysuria and frequency. Musculoskeletal: Negative. Skin: Negative. Neurological: Positive for syncope. Negative for dizziness, weakness, light-headedness, numbness and headaches. Psychiatric/Behavioral: Negative. All other systems reviewed and are negative. Physical Exam  
Physical Exam 
General appearance - overweight, well appearing, and in no distress Eyes - pupils equal and reactive, extraocular eye movements intact ENT - mucous membranes moist, pharynx normal without lesions Neck - supple, no significant adenopathy; non-tender to palpation Chest - clear to auscultation, no wheezes, rales or rhonchi; mild anterior chest wall tenderness and upper abd wall tenderness Heart - tachycardic in the low 100s and regular rhythm, S1 and S2 normal, no murmurs noted Abdomen - soft, nontender, nondistended, no masses or organomegaly Musculoskeletal - no joint tenderness, deformity or swelling; normal ROM Extremities - peripheral pulses normal, no pedal edema Skin - normal coloration and turgor, no rashes Neurological - alert, oriented x3, normal speech, no focal findings or movement disorder noted Written by Merrily Schilder, ED Scribe, as dictated by Janelle Christensen MD 
 
Diagnostic Study Results Labs - Recent Results (from the past 12 hour(s)) EKG, 12 LEAD, INITIAL Collection Time: 02/19/19  3:16 AM  
Result Value Ref Range Ventricular Rate 110 BPM  
 Atrial Rate 110 BPM  
 P-R Interval 136 ms QRS Duration 78 ms Q-T Interval 334 ms QTC Calculation (Bezet) 452 ms Calculated P Axis 62 degrees Calculated R Axis 28 degrees Calculated T Axis 66 degrees Diagnosis Sinus tachycardia When compared with ECG of 16-JUN-2016 09:12, 
Criteria for Inferior infarct are no longer present CBC WITH AUTOMATED DIFF Collection Time: 02/19/19  6:15 AM  
Result Value Ref Range WBC 8.6 3.6 - 11.0 K/uL  
 RBC 5.16 3.80 - 5.20 M/uL  
 HGB 14.5 11.5 - 16.0 g/dL HCT 42.8 35.0 - 47.0 % MCV 82.9 80.0 - 99.0 FL  
 MCH 28.1 26.0 - 34.0 PG  
 MCHC 33.9 30.0 - 36.5 g/dL  
 RDW 14.0 11.5 - 14.5 % PLATELET 387 190 - 876 K/uL MPV 11.1 8.9 - 12.9 FL  
 NRBC 0.0 0  WBC ABSOLUTE NRBC 0.00 0.00 - 0.01 K/uL NEUTROPHILS 63 32 - 75 % LYMPHOCYTES 29 12 - 49 % MONOCYTES 7 5 - 13 % EOSINOPHILS 1 0 - 7 % BASOPHILS 0 0 - 1 % IMMATURE GRANULOCYTES 0 0.0 - 0.5 % ABS. NEUTROPHILS 5.4 1.8 - 8.0 K/UL  
 ABS. LYMPHOCYTES 2.5 0.8 - 3.5 K/UL  
 ABS. MONOCYTES 0.6 0.0 - 1.0 K/UL  
 ABS. EOSINOPHILS 0.0 0.0 - 0.4 K/UL  
 ABS. BASOPHILS 0.0 0.0 - 0.1 K/UL  
 ABS. IMM. GRANS. 0.0 0.00 - 0.04 K/UL  
 DF AUTOMATED METABOLIC PANEL, COMPREHENSIVE Collection Time: 02/19/19  6:15 AM  
Result Value Ref Range Sodium 134 (L) 136 - 145 mmol/L Potassium 3.7 3.5 - 5.1 mmol/L Chloride 99 97 - 108 mmol/L  
 CO2 26 21 - 32 mmol/L Anion gap 9 5 - 15 mmol/L Glucose 110 (H) 65 - 100 mg/dL BUN 21 (H) 6 - 20 MG/DL Creatinine 0.87 0.55 - 1.02 MG/DL  
 BUN/Creatinine ratio 24 (H) 12 - 20 GFR est AA >60 >60 ml/min/1.73m2 GFR est non-AA >60 >60 ml/min/1.73m2 Calcium 8.6 8.5 - 10.1 MG/DL Bilirubin, total 0.5 0.2 - 1.0 MG/DL  
 ALT (SGPT) 34 12 - 78 U/L  
 AST (SGOT) 30 15 - 37 U/L Alk. phosphatase 101 45 - 117 U/L Protein, total 7.5 6.4 - 8.2 g/dL Albumin 3.5 3.5 - 5.0 g/dL Globulin 4.0 2.0 - 4.0 g/dL A-G Ratio 0.9 (L) 1.1 - 2.2 CK W/ CKMB & INDEX Collection Time: 02/19/19  6:15 AM  
Result Value Ref Range  26 - 192 U/L  
 CK - MB 1.6 <3.6 NG/ML  
 CK-MB Index 1.1 0.0 - 2.5    
TROPONIN I Collection Time: 02/19/19  6:15 AM  
Result Value Ref Range Troponin-I, Qt. <0.05 <0.05 ng/mL MAGNESIUM Collection Time: 02/19/19  6:15 AM  
Result Value Ref Range Magnesium 2.0 1.6 - 2.4 mg/dL INFLUENZA A & B AG (RAPID TEST) Collection Time: 02/19/19  6:31 AM  
Result Value Ref Range Influenza A Antigen POSITIVE (A) NEG Influenza B Antigen NEGATIVE  NEG    
URINALYSIS W/ REFLEX CULTURE Collection Time: 02/19/19  8:19 AM  
Result Value Ref Range Color YELLOW/STRAW Appearance CLOUDY (A) CLEAR Specific gravity 1.026 1.003 - 1.030    
 pH (UA) 5.5 5.0 - 8.0 Protein NEGATIVE  NEG mg/dL Glucose NEGATIVE  NEG mg/dL Ketone TRACE (A) NEG mg/dL Bilirubin NEGATIVE  NEG Blood NEGATIVE  NEG Urobilinogen 0.2 0.2 - 1.0 EU/dL Nitrites NEGATIVE  NEG Leukocyte Esterase NEGATIVE  NEG    
 WBC 0-4 0 - 4 /hpf  
 RBC 0-5 0 - 5 /hpf Epithelial cells FEW FEW /lpf Bacteria NEGATIVE  NEG /hpf  
 UA:UC IF INDICATED CULTURE NOT INDICATED BY UA RESULT CNI Radiologic Studies -  
XR CHEST PA LAT Final Result  
 impression: No acute changes. CT HEAD WO CONT Final Result IMPRESSION:  
  
No acute process. CT Results  (Last 48 hours) 02/19/19 0641  CT HEAD WO CONT Final result Impression:  IMPRESSION:  
   
No acute process. Narrative:  INDICATION: Syncope/fainting EXAM:  HEAD CT WITHOUT CONTRAST  
   
COMPARISON: August 15, 2015 TECHNIQUE:  Routine noncontrast axial head CT was performed. Sagittal and  
coronal reconstructions were generated. CT dose reduction was achieved through use of a standardized protocol tailored  
for this examination and automatic exposure control for dose modulation. FINDINGS:  
   
Ventricles: Midline, no hydrocephalus. Intracranial Hemorrhage: None. Brain Parenchyma/Brainstem: Normal for age. Basal Cisterns: Normal.  
Paranasal Sinuses: Visualized sinuses are clear. Additional Comments: N/A. CXR Results  (Last 48 hours) 02/19/19 0729  XR CHEST PA LAT Final result Impression:   impression: No acute changes. Narrative:  Clinical indication: Syncope, hypertension, fever URI. Frontal and lateral views of the chest obtained, comparison to 2018. The  heart  
size is normal. Slight chronic elevation of the right hemidiaphragm, no  
infiltrate. Medical Decision Making I am the first provider for this patient. I reviewed the vital signs, available nursing notes, past medical history, past surgical history, family history and social history. Vital Signs-Reviewed the patient's vital signs. Patient Vitals for the past 12 hrs: 
 Temp Pulse Resp BP SpO2  
02/19/19 1025 98.8 °F (37.1 °C) 85 14 129/77 97 % 02/19/19 0930  86 12 (!) 84/70 97 % 02/19/19 0820 98.3 °F (36.8 °C) 90 16 137/71 96 % 02/19/19 0800  91 16 114/69 92 % 02/19/19 0730  89 16 117/70 95 % 02/19/19 0635  (!) 107 13 (!) 85/60 95 % 02/19/19 0634  (!) 108 18 100/62 96 % 02/19/19 0633  90 15 114/69 94 % 02/19/19 0630  (!) 104 18 (!) 85/60 95 % 02/19/19 0530  92 14 113/79 93 % 02/19/19 0500  84 (!) 4 106/66 92 % 02/19/19 0430  94 19 109/70 95 % 02/19/19 0400  90 20 117/71 94 % 02/19/19 0330     98 % 02/19/19 0313 98 °F (36.7 °C) (!) 102 18 111/67 98 % Pulse Oximetry Analysis - 100% on RA Cardiac Monitor:  
Rate: 90 bpm 
 
 
Records Reviewed: Nursing Notes, Old Medical Records, Previous electrocardiograms, Previous Radiology Studies and Previous Laboratory Studies Provider Notes (Medical Decision Making): DDx: orthostatic hypotension, dehydration, flu, UTI, viral syndrome ED Course:  
Initial assessment performed.  The patients presenting problems have been discussed, and they are in agreement with the care plan formulated and outlined with them. I have encouraged them to ask questions as they arise throughout their visit. EKG interpretation: (Preliminary) 110 Rhythm: sinus tachycardia. Rate (approx.): 110bpm; Axis: normal; Normal SC, QRS, QTc intervals; ST/T wave: Non specific ST and T changes Written by BOGDAN Burr, as dictated by Lady Antonino MD 
 
PROGRESS NOTE: 
7:00 AM 
Notified by RN that pt was orthostatic. Written by BOGDAN Burr, as dictated by Lady Antonino MD 
 
8:15 AM 
Case discussed and care transferred to Dr. Chu Contreras awaiting IVF infusion. 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 She was markedly orthostatic prior to ivf. Will recheck orthostatics after ivf. If she is still orthostatic, she will need admission. If not, she may be discharged on Tamiflu and follow up PCP, return if worse. Critical Care Time:  
 
none Diagnosis Clinical Impression:  
1. Influenza A 2. Dehydration 3. Orthostatic hypotension 4. Syncope and collapse PLAN: 
1. Current Discharge Medication List  
  
START taking these medications Details  
oseltamivir (TAMIFLU) 75 mg capsule Take 1 Cap by mouth two (2) times a day for 5 days. Qty: 10 Cap, Refills: 0 CONTINUE these medications which have NOT CHANGED Details  
raNITIdine (ZANTAC) 300 mg tab TAKE ONE TABLET BY MOUTH ONCE DAILY AT BEDTIME Qty: 90 Tab, Refills: 3 Associated Diagnoses: Gastroesophageal reflux disease without esophagitis  
  
pitavastatin calcium (LIVALO) 2 mg tablet Take 1 Tab by mouth nightly. Qty: 90 Tab, Refills: 3  
  
omeprazole (PRILOSEC) 20 mg capsule TAKE ONE CAPSULE BY MOUTH ONCE DAILY Qty: 90 Cap, Refills: 3 Associated Diagnoses: Gastroesophageal reflux disease without esophagitis hydroCHLOROthiazide (HYDRODIURIL) 25 mg tablet TAKE ONE TABLET BY MOUTH ONCE DAILY Qty: 90 Tab, Refills: 3 Associated Diagnoses: Essential hypertension; Hypertension with renal disease  
  
irbesartan (AVAPRO) 300 mg tablet TAKE ONE TABLET BY MOUTH ONCE DAILY Qty: 90 Tab, Refills: 3 Associated Diagnoses: Essential hypertension; Hypertension with renal disease  
  
biotin 10,000 mcg cap Take  by mouth daily (with dinner). Cholecalciferol, Vitamin D3, 3,000 unit tab Take 1,000 Units by mouth every morning. cyanocobalamin (VITAMIN B12) 500 mcg tablet Take 500 mcg by mouth every morning. polyethylene glycol (MIRALAX) 17 gram/dose powder Take 17 g by mouth every morning. Indications: CONSTIPATION  
  
benzonatate (TESSALON) 200 mg capsule Take 1 Cap by mouth three (3) times daily as needed for Cough for up to 7 days. Qty: 21 Cap, Refills: 0 Associated Diagnoses: Viral URI  
  
guaiFENesin ER (MUCINEX) 600 mg ER tablet Take 1 Tab by mouth two (2) times a day for 7 days. Qty: 14 Tab, Refills: 0 Associated Diagnoses: Viral URI  
  
vit C/E/Zn/coppr/lutein/zeaxan (PRESERVISION AREDS 2 PO) Take  by mouth. Indications: 2 pills dailly 2. Follow-up Information Follow up With Specialties Details Why Contact Info Genia Cutler MD Internal Medicine In 2 days  [facility] [address] [facility] [address] 
[phone] [facility] EMERGENCY DEPT Emergency Medicine  If symptoms worsen [address] [facility] 
[phone] Return to ED if worse Disposition: 
 
DISCHARGE 
12:54 PM 
The patient has been re-evaluated and is ready for discharge. Reviewed available results with patient. Counseled pt on diagnosis and care plan. Pt has expressed understanding, and all questions have been answered. Pt agrees with plan and agrees to follow up as recommended, or return to the ED if their symptoms worsen.  Discharge instructions have been provided and explained to the pt, along with reasons to return to the ED. Attestations: This note is prepared by Kathryn Gomes, acting as Scribe for MD Annelise Mace MD : The scribe's documentation has been prepared under my direction and personally reviewed by me in its entirety. I confirm that the note above accurately reflects all work, treatment, procedures, and medical decision making performed by me. This note will not be viewable in [address]

## 2019-02-19 NOTE — DISCHARGE INSTRUCTIONS
Patient Education        Dehydration: Care Instructions  Your Care Instructions  Dehydration happens when your body loses too much fluid. This might happen when you do not drink enough water or you lose large amounts of fluids from your body because of diarrhea, vomiting, or sweating. Severe dehydration can be life-threatening. Water and minerals called electrolytes help put your body fluids back in balance. Learn the early signs of fluid loss, and drink more fluids to prevent dehydration. Follow-up care is a key part of your treatment and safety. Be sure to make and go to all appointments, and call your doctor if you are having problems. It's also a good idea to know your test results and keep a list of the medicines you take. How can you care for yourself at home? · To prevent dehydration, drink plenty of fluids, enough so that your urine is light yellow or clear like water. Choose water and other caffeine-free clear liquids until you feel better. If you have kidney, heart, or liver disease and have to limit fluids, talk with your doctor before you increase the amount of fluids you drink. · If you do not feel like eating or drinking, try taking small sips of water, sports drinks, or other rehydration drinks. · Get plenty of rest.  To prevent dehydration  · Add more fluids to your diet and daily routine, unless your doctor has told you not to. · During hot weather, drink more fluids. Drink even more fluids if you exercise a lot. Stay away from drinks with alcohol or caffeine. · Watch for the symptoms of dehydration. These include:  ? A dry, sticky mouth. ? Dark yellow urine, and not much of it. ? Dry and sunken eyes. ? Feeling very tired. · Learn what problems can lead to dehydration. These include:  ? Diarrhea, fever, and vomiting. ? Any illness with a fever, such as pneumonia or the flu. ?  Activities that cause heavy sweating, such as endurance races and heavy outdoor work in hot or humid weather. ? Alcohol or drug abuse or withdrawal.  ? Certain medicines, such as cold and allergy pills (antihistamines), diet pills (diuretics), and laxatives. ? Certain diseases, such as diabetes, cancer, and heart or kidney disease. When should you call for help? Call 911 anytime you think you may need emergency care. For example, call if:    · You passed out (lost consciousness).    Call your doctor now or seek immediate medical care if:    · You are confused and cannot think clearly.     · You are dizzy or lightheaded, or you feel like you may faint.     · You have signs of needing more fluids. You have sunken eyes and a dry mouth, and you pass only a little dark urine.     · You cannot keep fluids down.    Watch closely for changes in your health, and be sure to contact your doctor if:    · You are not making tears.     · Your skin is very dry and sags slowly back into place after you pinch it.     · Your mouth and eyes are very dry. Where can you learn more? Go to http://rufina-ion.info/. Enter O803 in the search box to learn more about \"Dehydration: Care Instructions. \"  Current as of: September 23, 2018  Content Version: 11.9  © 3880-3425 [employer]. Care instructions adapted under license by [employer] (which disclaims liability or warranty for this information). If you have questions about a medical condition or this instruction, always ask your healthcare professional. [place] [address] any warranty or liability for your use of this information. Patient Education        Fainting: Care Instructions  Your Care Instructions    When you faint, or pass out, you lose consciousness for a short time. A brief drop in blood flow to the brain often causes it. When you fall or lie down, more blood flows to your brain and you regain consciousness.   Emotional stress, pain, or overheating--especially if you have been standing--can make you faint. In these cases, fainting is usually not serious. But fainting can be a sign of a more serious problem. Your doctor may want you to have more tests to rule out other causes. The treatment you need depends on the reason why you fainted. The doctor has checked you carefully, but problems can develop later. If you notice any problems or new symptoms, get medical treatment right away. Follow-up care is a key part of your treatment and safety. Be sure to make and go to all appointments, and call your doctor if you are having problems. It's also a good idea to know your test results and keep a list of the medicines you take. How can you care for yourself at home? · Drink plenty of fluids to prevent dehydration. If you have kidney, heart, or liver disease and have to limit fluids, talk with your doctor before you increase your fluid intake. When should you call for help? Call 911 anytime you think you may need emergency care. For example, call if:    · You have symptoms of a heart problem. These may include:  ? Chest pain or pressure. ? Severe trouble breathing. ? A fast or irregular heartbeat. ? Lightheadedness or sudden weakness. ? Coughing up pink, foamy mucus. ? Passing out. After you call 911, the  may tell you to chew 1 adult-strength or 2 to 4 low-dose aspirin. Wait for an ambulance. Do not try to drive yourself.     · You have symptoms of a stroke. These may include:  ? Sudden numbness, tingling, weakness, or loss of movement in your face, arm, or leg, especially on only one side of your body. ? Sudden vision changes. ? Sudden trouble speaking. ? Sudden confusion or trouble understanding simple statements. ? Sudden problems with walking or balance. ? A sudden, severe headache that is different from past headaches.     · You passed out (lost consciousness) again.    Watch closely for changes in your health, and be sure to contact your doctor if:    · You do not get better as expected. Where can you learn more? Go to http://rufina-ion.info/. Enter I472 in the search box to learn more about \"Fainting: Care Instructions. \"  Current as of: September 23, 2018  Content Version: 11.9  © 1504-8263 [employer]. Care instructions adapted under license by [employer] (which disclaims liability or warranty for this information). If you have questions about a medical condition or this instruction, always ask your healthcare professional. [place] [address] any warranty or liability for your use of this information. Patient Education        Influenza (Flu): Care Instructions  Your Care Instructions    Influenza (flu) is an infection in the lungs and breathing passages. It is caused by the influenza virus. There are different strains, or types, of the flu virus from year to year. Unlike the common cold, the flu comes on suddenly and the symptoms, such as a cough, congestion, fever, chills, fatigue, aches, and pains, are more severe. These symptoms may last up to 10 days. Although the flu can make you feel very sick, it usually doesn't cause serious health problems. Home treatment is usually all you need for flu symptoms. But your doctor may prescribe antiviral medicine to prevent other health problems, such as pneumonia, from developing. Older people and those who have a long-term health condition, such as lung disease, are most at risk for having pneumonia or other health problems. Follow-up care is a key part of your treatment and safety. Be sure to make and go to all appointments, and call your doctor if you are having problems. It's also a good idea to know your test results and keep a list of the medicines you take. How can you care for yourself at home? · Get plenty of rest.  · Drink plenty of fluids, enough so that your urine is light yellow or clear like water.  If you have kidney, heart, or liver disease and have to limit fluids, talk with your doctor before you increase the amount of fluids you drink. · Take an over-the-counter pain medicine if needed, such as acetaminophen (Tylenol), ibuprofen (Advil, Motrin), or naproxen (Aleve), to relieve fever, headache, and muscle aches. Read and follow all instructions on the label. No one younger than 20 should take aspirin. It has been linked to Reye syndrome, a serious illness. · Do not smoke. Smoking can make the flu worse. If you need help quitting, talk to your doctor about stop-smoking programs and medicines. These can increase your chances of quitting for good. · Breathe moist air from a hot shower or from a sink filled with hot water to help clear a stuffy nose. · Before you use cough and cold medicines, check the label. These medicines may not be safe for young children or for people with certain health problems. · If the skin around your nose and lips becomes sore, put some petroleum jelly on the area. · To ease coughing:  ? Drink fluids to soothe a scratchy throat. ? Suck on cough drops or plain hard candy. ? Take an over-the-counter cough medicine that contains dextromethorphan to help you get some sleep. Read and follow all instructions on the label. ? Raise your head at night with an extra pillow. This may help you rest if coughing keeps you awake. · Take any prescribed medicine exactly as directed. Call your doctor if you think you are having a problem with your medicine. To avoid spreading the flu  · Wash your hands regularly, and keep your hands away from your face. · Stay home from school, work, and other public places until you are feeling better and your fever has been gone for at least 24 hours. The fever needs to have gone away on its own without the help of medicine. · Ask people living with you to talk to their doctors about preventing the flu. They may get antiviral medicine to keep from getting the flu from you.   · To prevent the flu in the future, get a flu vaccine every fall. Encourage people living with you to get the vaccine. · Cover your mouth when you cough or sneeze. When should you call for help? Call 911 anytime you think you may need emergency care. For example, call if:    · You have severe trouble breathing.    Call your doctor now or seek immediate medical care if:    · You have new or worse trouble breathing.     · You seem to be getting much sicker.     · You feel very sleepy or confused.     · You have a new or higher fever.     · You get a new rash.    Watch closely for changes in your health, and be sure to contact your doctor if:    · You begin to get better and then get worse.     · You are not getting better after 1 week. Where can you learn more? Go to http://rufina-ion.info/. Enter X945 in the search box to learn more about \"Influenza (Flu): Care Instructions. \"  Current as of: September 5, 2018  Content Version: 11.9  © 6158-2626 [employer]. Care instructions adapted under license by [employer] (which disclaims liability or warranty for this information). If you have questions about a medical condition or this instruction, always ask your healthcare professional. [address] any warranty or liability for your use of this information.

## 2019-02-19 NOTE — ED NOTES
Pt c/o syncopal episodes today X 2 and subjective fever. Pt denies hitting head, has minot abrasion to Right Hand/Wrist and c/o pain to the sacrum/coccyx region after \"landing on my butt\". Pt recently seen and treated by Urgent Care for an upper respiratory infection (no Abx, supportive care only). 12 Lead EKG obtained in triage, pt placed on cardiac monitor X 3, pt resting in position of comfort with friend and call bell at bedside.

## 2019-02-19 NOTE — ED NOTES
Bedside and Verbal shift change report given to Erik Araya RN and Pamella Almonte RN (oncoming nurse) by Svetlana Alejandra RN (offgoing nurse). Report included the following information SBAR, ED Summary, Intake/Output, Recent Results and Cardiac Rhythm NSR/Sinus Tach.

## 2019-02-20 LAB
ATRIAL RATE: 110 BPM
CALCULATED P AXIS, ECG09: 62 DEGREES
CALCULATED R AXIS, ECG10: 28 DEGREES
CALCULATED T AXIS, ECG11: 66 DEGREES
DIAGNOSIS, 93000: NORMAL
P-R INTERVAL, ECG05: 136 MS
Q-T INTERVAL, ECG07: 334 MS
QRS DURATION, ECG06: 78 MS
QTC CALCULATION (BEZET), ECG08: 452 MS
VENTRICULAR RATE, ECG03: 110 BPM

## 2019-02-20 NOTE — PROGRESS NOTES
Chief Complaint Patient presents with  Hypertension 3 month follow up  Chronic Kidney Disease  Fall  Flu  
  had the Flu  [facility] [place]   
 
 
SUBJECTIVE: 
 
Prosper Whitfield is a 72 y.o. female who returns in follow-up for her medical problems include hypertension, glucose intolerance, hyperlipidemia, CKD stage II, DJD, obesity and other medical problems and in addition she also is returning from a visit to urgent care on 2/16 when she was tested negative for flu and strep and was discharged home only return to the emergency room on February 19 at which time she tested positive for the flu she had a couple syncopal episodes at that time her workup was otherwise negative she was discharged home with Tamiflu which she has been taking. She does note that she feels better since she has been under Tamiflu and she has had no more falls and her energy level is improved but she still is weak. She does have some mild residual cough. She is noted no fevers or chills. She denies any shortness of breath. She denies any GI or  complaints. Other than being generally weak she has no focal weakness and has had no further falls. She denies any chest pain, palpitations, PND, orthopnea or other cardiovascular complaints except for the cough. She denies any headaches, dizziness or neurologic complaints other than the weakness. There are no current arthritic complaints and no other complaints are noted. Current Outpatient Medications Medication Sig Dispense Refill  oseltamivir (TAMIFLU) 75 mg capsule Take 1 Cap by mouth two (2) times a day for 5 days. 10 Cap 0  
 guaiFENesin ER (MUCINEX) 600 mg ER tablet Take 1 Tab by mouth two (2) times a day for 7 days. 14 Tab 0  
 raNITIdine (ZANTAC) 300 mg tab TAKE ONE TABLET BY MOUTH ONCE DAILY AT BEDTIME 90 Tab 3  pitavastatin calcium (LIVALO) 2 mg tablet Take 1 Tab by mouth nightly.  90 Tab 3  
  omeprazole (PRILOSEC) 20 mg capsule TAKE ONE CAPSULE BY MOUTH ONCE DAILY 90 Cap 3  
 hydroCHLOROthiazide (HYDRODIURIL) 25 mg tablet TAKE ONE TABLET BY MOUTH ONCE DAILY 90 Tab 3  
 irbesartan (AVAPRO) 300 mg tablet TAKE ONE TABLET BY MOUTH ONCE DAILY 90 Tab 3  
 vit C/E/Zn/coppr/lutein/zeaxan (PRESERVISION AREDS 2 PO) Take  by mouth. Indications: 2 pills dailly  biotin 10,000 mcg cap Take  by mouth daily (with dinner).  Cholecalciferol, Vitamin D3, 3,000 unit tab Take 1,000 Units by mouth every morning.  cyanocobalamin (VITAMIN B12) 500 mcg tablet Take 500 mcg by mouth every morning.  polyethylene glycol (MIRALAX) 17 gram/dose powder Take 17 g by mouth every morning. Indications: CONSTIPATION Past Medical History:  
Diagnosis Date  Anxiety  Arthritis  Chronic constipation  CKD (chronic kidney disease) 7/17/2017  Colon polyps 7/17/2017  DJD (degenerative joint disease) 7/17/2017  Elevated LFTs 7/17/2017  Flu 02/19/2019  GERD (gastroesophageal reflux disease)  Glucose intolerance (impaired glucose tolerance) 7/17/2017  Hemorrhoids   
 internal & external- bleeds frequently  High cholesterol  Hyperlipidemia 7/17/2017  Hypertension  Hypertension with renal disease 7/17/2017 PT Education - High Blood Pressure (Essential Hypertension) *: blood pressure PT Education - How to access health information online: discussed with patient and provided information  Hypertension, renal 7/17/2017  Ill-defined condition   
 ringing in ears  Mild obesity 7/17/2017  Nausea & vomiting  Neoplasm of uncertain behavior of skin 7/17/2017  On statin therapy 7/17/2017  Osteopenia 7/17/2017  Primary angle-closure glaucoma 7/17/2017 Comments: OU  
 Skin cancer of face   
 as of 6/15/16 pt states \"removed years ago\"  Spinal stenosis Past Surgical History:  
Procedure Laterality Date  HX CHOLECYSTECTOMY  11/6/2014 Dr Escobar Coleman  HX HEENT    
 laser for glaucoma  HX HYSTERECTOMY partial  
 HX PELVIC LAPAROSCOPY Jono. oophorectomy  HX TUBAL LIGATION    
 NM COLSC FLX W/REMOVAL LESION BY HOT BX FORCEPS  2012  NM ERCP REMOVE CALCULI/DEBRIS BILIARY/PANCREAS DUCT  2014  NM ERCP W/SPHINCTEROTOMY/PAPILLOTOMY  2014 Allergies Allergen Reactions  Iodinated Contrast- Oral And Iv Dye Hives  Oxycodone-Aspirin Unknown (comments) REVIEW OF SYSTEMS: 
General: negative for - chills or fever, or weight loss or gain ENT: negative for - headaches, nasal congestion or tinnitus Eyes: no blurred or visual changes Neck: No stiffness or swollen nodes Respiratory: negative for - cough, hemoptysis, shortness of breath or wheezing Cardiovascular : negative for - chest pain, edema, palpitations or shortness of breath Gastrointestinal: negative for - abdominal pain, blood in stools, heartburn or nausea/vomiting Genito-Urinary: no dysuria, trouble voiding, or hematuria Musculoskeletal: negative for - gait disturbance, joint pain, joint stiffness or joint swelling Neurological: no TIA or stroke symptoms Hematologic: no bruises, no bleeding Lymphatic: no swollen glands Integument: no lumps, mole changes, nail changes or rash Endocrine:no malaise/lethargy poly uria or polydipsia or unexpected weight changes Social History Socioeconomic History  Marital status:  Spouse name: Not on file  Number of children: Not on file  Years of education: Not on file  Highest education level: Not on file Tobacco Use  Smoking status: Former Smoker Packs/day: 1.50 Years: 35.00 Pack years: 52.50 Last attempt to quit: 2004 Years since quittin.3  Smokeless tobacco: Never Used  Tobacco comment: quit smoking 6 yrs ago Substance and Sexual Activity  Alcohol use: No  
 Drug use: No  
 Sexual activity: No  
 
 Family History Problem Relation Age of Onset  Heart Disease Mother  Hypertension Mother  Cancer Mother   
     skin  Stroke Father  Breast Cancer Sister   
     onset: 76s  Breast Cancer Niece OBJECTIVE:  
 
Visit Vitals /72 Pulse 97 Temp 97.7 °F (36.5 °C) (Oral) Resp 19 Ht 5' 3\" (1.6 m) Wt 192 lb 9.6 oz (87.4 kg) SpO2 97% BMI 34.12 kg/m² CONSTITUTIONAL:   well nourished, appears age appropriate EYES: sclera anicteric, PERRL, EOMI 
ENMT:nares clear, moist mucous membranes, pharynx clear NECK: supple. Thyroid normal, No JVD or bruits RESPIRATORY: Chest: clear to ascultation and percussion, normal inspiratory effort CARDIOVASCULAR: Heart: regular rate and rhythm no murmurs, rubs or gallops, PMI not displaced, No thrills GASTROINTESTINAL: Abdomen: non distended, soft, non tender, bowel sounds normal 
HEMATOLOGIC: no purpura, petechiae or bruising LYMPHATIC: No lymph node enlargemant MUSCULOSKELETAL: Extremities: no edema or active synovitis, pulse 1+ INTEGUMENT: No unusual rashes or suspicious skin lesions noted. Nails appear normal. 
PERIPHERAL VASCULAR: normal pulses femoral, PT and DP NEUROLOGIC: non-focal exam, A & O X 3 PSYCHIATRIC:, appropriate affect ASSESSMENT:  
1. Hypertension with renal disease 2. Glucose intolerance (impaired glucose tolerance) 3. Mixed hyperlipidemia 4. Primary osteoarthritis involving multiple joints 5. Stage 2 chronic kidney disease 6. Severe obesity (BMI 35.0-39. 9) with comorbidity ([address] Pression 1. Hypertension blood pressures a little on the low side and she did have the syncopal spells and has had the flu and weight sounds on the negative to hold hydrochlorothiazide for the next 3 days and then resume it. 2.  Glucose intolerance repeat status is pending and prior labs reviewed no make adjustments if necessary.  
3.  Hyperlipidemia prior lab reviewed repeat status pending I will adjust if needed. 4   DJD that is stable 5   CKD stage II repeat status pending 6. Obesity we discussed diet, exercise and weight reduction for overall health benefit. I will call the lab and make further recommendations or adjustments if necessary. Follow-up in 3 months or sooner if there is a problem. PLAN: 
. Orders Placed This Encounter  HEMOGLOBIN A1C WITH EAG  
 METABOLIC PANEL, COMPREHENSIVE ([facility])  LIPID PANEL ([facility])  CK ([facility]) ATTENTION:  
This medical record was transcribed using an electronic medical records system. Although proofread, it may and can contain electronic and spelling errors. Other human spelling and other errors may be present. Corrections may be executed at a later time. Please feel free to contact us for any clarifications as needed. Follow-up Disposition: 
Return in about 3 months (around 5/21/2019). No results found for any visits on 02/21/19. Mikey Bradley MD 
 
The patient verbalized understanding of the problems and plans as explained.

## 2019-02-21 ENCOUNTER — OFFICE VISIT (OUTPATIENT)
Dept: INTERNAL MEDICINE CLINIC | Age: 74
End: 2019-02-21

## 2019-02-21 VITALS
RESPIRATION RATE: 19 BRPM | HEART RATE: 97 BPM | HEIGHT: 63 IN | DIASTOLIC BLOOD PRESSURE: 72 MMHG | BODY MASS INDEX: 34.12 KG/M2 | SYSTOLIC BLOOD PRESSURE: 110 MMHG | WEIGHT: 192.6 LBS | TEMPERATURE: 97.7 F | OXYGEN SATURATION: 97 %

## 2019-02-21 DIAGNOSIS — M15.9 PRIMARY OSTEOARTHRITIS INVOLVING MULTIPLE JOINTS: ICD-10-CM

## 2019-02-21 DIAGNOSIS — E66.01 SEVERE OBESITY (BMI 35.0-39.9) WITH COMORBIDITY (HCC): ICD-10-CM

## 2019-02-21 DIAGNOSIS — I12.9 HYPERTENSION WITH RENAL DISEASE: Primary | ICD-10-CM

## 2019-02-21 DIAGNOSIS — E78.2 MIXED HYPERLIPIDEMIA: ICD-10-CM

## 2019-02-21 DIAGNOSIS — N18.2 STAGE 2 CHRONIC KIDNEY DISEASE: ICD-10-CM

## 2019-02-21 DIAGNOSIS — R73.02 GLUCOSE INTOLERANCE (IMPAIRED GLUCOSE TOLERANCE): ICD-10-CM

## 2019-02-21 LAB
A-G RATIO,AGRAT: 1.4 RATIO
ALBUMIN SERPL-MCNC: 4 G/DL (ref 3.9–5.4)
ALP SERPL-CCNC: 105 U/L (ref 38–126)
ALT SERPL-CCNC: 46 U/L (ref 9–52)
ANION GAP SERPL CALC-SCNC: 12 MMOL/L
AST SERPL W P-5'-P-CCNC: 31 U/L (ref 14–36)
BILIRUB SERPL-MCNC: 0.8 MG/DL (ref 0.2–1.3)
BUN SERPL-MCNC: 14 MG/DL (ref 7–17)
BUN/CREATININE RATIO,BUCR: 20 RATIO
CALCIUM SERPL-MCNC: 9.5 MG/DL (ref 8.4–10.2)
CHLORIDE SERPL-SCNC: 97 MMOL/L (ref 98–107)
CHOL/HDL RATIO,CHHD: 4 RATIO (ref 0–4)
CHOLEST SERPL-MCNC: 113 MG/DL (ref 0–200)
CK SERPL-CCNC: 85 U/L (ref 30–135)
CO2 SERPL-SCNC: 28 MMOL/L (ref 22–32)
CREAT SERPL-MCNC: 0.7 MG/DL (ref 0.7–1.2)
GLOBULIN,GLOB: 2.9
GLUCOSE SERPL-MCNC: 102 MG/DL (ref 65–105)
HDLC SERPL-MCNC: 32 MG/DL (ref 35–130)
LDL/HDL RATIO,LDHD: 2 RATIO
LDLC SERPL CALC-MCNC: 63 MG/DL (ref 0–130)
POTASSIUM SERPL-SCNC: 3.7 MMOL/L (ref 3.6–5)
PROT SERPL-MCNC: 6.9 G/DL (ref 6.3–8.2)
SODIUM SERPL-SCNC: 137 MMOL/L (ref 137–145)
TRIGL SERPL-MCNC: 92 MG/DL (ref 0–200)
VLDLC SERPL CALC-MCNC: 18 MG/DL

## 2019-02-21 NOTE — PATIENT INSTRUCTIONS
Body Mass Index: Care Instructions Your Care Instructions Body mass index (BMI) can help you see if your weight is raising your risk for health problems. It uses a formula to compare how much you weigh with how tall you are. · A BMI lower than 18.5 is considered underweight. · A BMI between 18.5 and 24.9 is considered healthy. · A BMI between 25 and 29.9 is considered overweight. A BMI of 30 or higher is considered obese. If your BMI is in the normal range, it means that you have a lower risk for weight-related health problems. If your BMI is in the overweight or obese range, you may be at increased risk for weight-related health problems, such as high blood pressure, heart disease, stroke, arthritis or joint pain, and diabetes. If your BMI is in the underweight range, you may be at increased risk for health problems such as fatigue, lower protection (immunity) against illness, muscle loss, bone loss, hair loss, and hormone problems. BMI is just one measure of your risk for weight-related health problems. You may be at higher risk for health problems if you are not active, you eat an unhealthy diet, or you drink too much alcohol or use tobacco products. Follow-up care is a key part of your treatment and safety. Be sure to make and go to all appointments, and call your doctor if you are having problems. It's also a good idea to know your test results and keep a list of the medicines you take. How can you care for yourself at home? · Practice healthy eating habits. This includes eating plenty of fruits, vegetables, whole grains, lean protein, and low-fat dairy. · If your doctor recommends it, get more exercise. Walking is a good choice. Bit by bit, increase the amount you walk every day. Try for at least 30 minutes on most days of the week. · Do not smoke. Smoking can increase your risk for health problems.  If you need help quitting, talk to your doctor about stop-smoking programs and medicines. These can increase your chances of quitting for good. · Limit alcohol to 2 drinks a day for men and 1 drink a day for women. Too much alcohol can cause health problems. If you have a BMI higher than 25 · Your doctor may do other tests to check your risk for weight-related health problems. This may include measuring the distance around your waist. A waist measurement of more than 40 inches in men or 35 inches in women can increase the risk of weight-related health problems. · Talk with your doctor about steps you can take to stay healthy or improve your health. You may need to make lifestyle changes to lose weight and stay healthy, such as changing your diet and getting regular exercise. If you have a BMI lower than 18.5 · Your doctor may do other tests to check your risk for health problems. · Talk with your doctor about steps you can take to stay healthy or improve your health. You may need to make lifestyle changes to gain or maintain weight and stay healthy, such as getting more healthy foods in your diet and doing exercises to build muscle. Where can you learn more? Go to http://rufina-ion.info/. Enter S176 in the search box to learn more about \"Body Mass Index: Care Instructions. \" Current as of: June 25, 2018 Content Version: 11.9 © 1070-9010 [employer]. Care instructions adapted under license by [employer] (which disclaims liability or warranty for this information). If you have questions about a medical condition or this instruction, always ask your healthcare professional. [address] any warranty or liability for your use of this information.

## 2019-02-21 NOTE — PROGRESS NOTES
Dior Wilson  Identified pt with two pt identifiers(name and ). Chief Complaint Patient presents with  Hypertension 3 month follow up  Chronic Kidney Disease  Fall  Flu  
  had the Flu  Transitions Of Care [facility]    
 
 
1. Have you been to the ER, urgent care clinic since your last visit? Hospitalized since your last visit? Yes, [facility] on 19 for the Flu and had a fall/passing out. 2. Have you seen or consulted any other health care providers outside of the [address] [place] [address] since your last visit? Include any pap smears or colon screening. Yes, [facility] on 19 for the Flu, but was negative at that current time. Health Maintenance Topics with due status: Overdue Topic Date Due Shingrix Vaccine Age 50> 1997 GLAUCOMA SCREENING Q2Y 2012 Health Maintenance Topics with due status: Due Soon Topic Date Due COLONOSCOPY 2019 Health Maintenance Topics with due status: Not Due Topic Last Completion Date DTaP/Tdap/Td series 10/23/2017 BREAST CANCER SCRN MAMMOGRAM 2018 MEDICARE YEARLY EXAM 11/15/2018 Health Maintenance Topics with due status: Completed Topic Last Completion Date Bone Densitometry (Dexa) Screening 2016 Hepatitis C Screening 10/23/2017 Influenza Age 5 to Adult 11/15/2018 Health Maintenance Topics with due status: Addressed Topic Date Due Pneumococcal 65+ Low/Medium Risk Addressed Medication reconciliation up to date and corrected with patient at this time. Today's provider has been notified of reason for visit, vitals and flowsheets obtained on patients. Reviewed record in preparation for visit, huddled with provider and have obtained necessary documentation. Wt Readings from Last 3 Encounters:  
19 192 lb 9.6 oz (87.4 kg) 19 192 lb 7.4 oz (87.3 kg) 19 198 lb (89.8 kg) Temp Readings from Last 3 Encounters:  
02/21/19 97.7 °F (36.5 °C) (Oral) 02/19/19 98.8 °F (37.1 °C)  
02/16/19 98.5 °F (36.9 °C) BP Readings from Last 3 Encounters:  
02/21/19 100/70  
02/19/19 129/77  
02/16/19 122/63 Pulse Readings from Last 3 Encounters:  
02/21/19 97  
02/19/19 85  
02/16/19 100 Vitals:  
 02/21/19 1036 BP: 100/70 Pulse: 97 Resp: 19 Temp: 97.7 °F (36.5 °C) TempSrc: Oral  
SpO2: 97% Weight: 192 lb 9.6 oz (87.4 kg) Height: 5' 3\" (1.6 m) PainSc:   0 - No pain Learning Assessment: 
:  
 
Learning Assessment 7/17/2017 10/28/2014 PRIMARY LEARNER Patient Patient HIGHEST LEVEL OF EDUCATION - PRIMARY LEARNER  2 YEARS OF COLLEGE -  
BARRIERS PRIMARY LEARNER NONE -  
PRIMARY LANGUAGE ENGLISH ENGLISH  
LEARNER PREFERENCE PRIMARY DEMONSTRATION LISTENING  
ANSWERED BY patient patient RELATIONSHIP SELF SELF Depression Screening: 
:  
 
3 most recent PHQ Screens 11/15/2018 Little interest or pleasure in doing things Not at all Feeling down, depressed, irritable, or hopeless Not at all Total Score PHQ 2 0 No flowsheet data found. Fall Risk Assessment: 
:  
 
Fall Risk Assessment, last 12 mths 11/15/2018 Able to walk? Yes Fall in past 12 months? No  
 
 
Abuse Screening: 
:  
 
Abuse Screening Questionnaire 11/15/2018 5/10/2018 7/17/2017 Do you ever feel afraid of your partner? N N N Are you in a relationship with someone who physically or mentally threatens you? N N N Is it safe for you to go home? Akira Strickland  
 
 
ADL Screening: 
:  
 

## 2019-02-22 LAB
EST. AVERAGE GLUCOSE BLD GHB EST-MCNC: 123 MG/DL
HBA1C MFR BLD: 5.9 % (ref 4.8–5.6)

## 2019-02-22 NOTE — PROGRESS NOTES
Your lab results are good all except for a low HDL which is too protective cholesterol; however yours is been normal before so I am not sure why slow this time. Things that do help improve that are increasing aerobic exercise as well as increasing fiber in your diet and weight reduction.

## 2019-02-25 NOTE — PROGRESS NOTES
Note sent to patient regarding lab work okay except for low HDL level. Advised to increase her dietary fiber and exercise to help raise this and call if you have any questions.

## 2019-03-05 ENCOUNTER — OFFICE VISIT (OUTPATIENT)
Dept: INTERNAL MEDICINE CLINIC | Age: 74
End: 2019-03-05

## 2019-03-05 VITALS
HEART RATE: 85 BPM | DIASTOLIC BLOOD PRESSURE: 70 MMHG | BODY MASS INDEX: 34.45 KG/M2 | OXYGEN SATURATION: 96 % | RESPIRATION RATE: 18 BRPM | SYSTOLIC BLOOD PRESSURE: 116 MMHG | TEMPERATURE: 98 F | HEIGHT: 63 IN | WEIGHT: 194.4 LBS

## 2019-03-05 DIAGNOSIS — G44.319 ACUTE POST-TRAUMATIC HEADACHE, NOT INTRACTABLE: ICD-10-CM

## 2019-03-05 DIAGNOSIS — R51.9 NONINTRACTABLE HEADACHE, UNSPECIFIED CHRONICITY PATTERN, UNSPECIFIED HEADACHE TYPE: Primary | ICD-10-CM

## 2019-03-05 NOTE — PATIENT INSTRUCTIONS

## 2019-03-05 NOTE — PROGRESS NOTES
Subjective:   Tessa Diaz is a 72 y.o. female      Chief Complaint   Patient presents with    Fall     x2 weeks ago and hit head    Headache        History of present illness: She presents today complaining of a headache and a fuzzy feeling in her head status post a fall 2 weeks ago today when she had her head. Apparently she had influenza a and became lightheaded at home and fell to the floor initially on her buttock at which time she also apparently lost her urine and had some shaking when she came to. She did get up and go to the bathroom and then leaving the bathroom she apparently fell again and did not realize anything until she awakened lying in the floor and she said she was shaking a little again at that time. She had a friend take her to the emergency room that noted being February 19 I reviewed today while the patient was here in office. At that time they tested her and she had influenza A for which they treated with Tamiflu. They also did a chest x-ray as well as a head CT both of which were unremarkable. Other laboratory studies remarkable for a BMP which had a BUN of 21 and a creatinine 1.87 as well as a CBC that was normal and a CPK and troponin both of which were normal arguing against a seizure with the CPK being normal.  She was told she was dehydrated and given some fluids and discharged home with the Tamiflu. Since that time she has continued with a foggy headed feeling and she actually saw me in the office once since that time at which time she did not mention those symptoms because she thought it was normal and was going to get well on its own. She feels that she has a problem remembering things and her thought process she seems to lose. She denies any true visual symptoms other than a fuzzy sensation. She has no other neurologic complaints. She denies any cardiorespiratory complaints. There are no GI or  complaints.   There are no other complaints noted on complete review of systems. Patient Active Problem List   Diagnosis Code    Choledocholithiasis K80.50    Grade IV hemorrhoids K64.3    Colon polyps K63.5    Primary angle-closure glaucoma H40.20X0    Primary osteoarthritis involving multiple joints M15.0    Mixed hyperlipidemia E78.2    Glucose intolerance (impaired glucose tolerance) R73.02    Hypertension with renal disease I12.9    Osteopenia of multiple sites M85.89    Neoplasm of uncertain behavior of skin D48.5    Elevated LFTs R94.5    Stage 2 chronic kidney disease N18.2    Vitamin D deficiency E55.9    Acute midline low back pain without sciatica M54.5    Medicare annual wellness visit, subsequent Z00.00    Right upper quadrant abdominal pain R10.11    Acute pancreatitis K85.90    Plantar wart B07.0    Severe obesity (BMI 35.0-39. 9) with comorbidity (HCC) E66.01    Allergic dermatitis L23.9    Headache R51    Acute post-traumatic headache, not intractable G44.319      Past Medical History:   Diagnosis Date    Anxiety     Arthritis     Chronic constipation     CKD (chronic kidney disease) 7/17/2017    Colon polyps 7/17/2017    DJD (degenerative joint disease) 7/17/2017    Elevated LFTs 7/17/2017    Flu 02/19/2019    GERD (gastroesophageal reflux disease)     Glucose intolerance (impaired glucose tolerance) 7/17/2017    Hemorrhoids     internal & external- bleeds frequently    High cholesterol     Hyperlipidemia 7/17/2017    Hypertension     Hypertension with renal disease 7/17/2017    PT Education - High Blood Pressure (Essential Hypertension) *: blood pressure PT Education - How to access health information online: discussed with patient and provided information    Hypertension, renal 7/17/2017    Ill-defined condition     ringing in ears    Mild obesity 7/17/2017    Nausea & vomiting     Neoplasm of uncertain behavior of skin 7/17/2017    On statin therapy 7/17/2017    Osteopenia 7/17/2017    Primary angle-closure glaucoma 2017    Comments: OU    Skin cancer of face     as of 6/15/16 pt states \"removed years ago\"    Spinal stenosis       Allergies   Allergen Reactions    Iodinated Contrast- Oral And Iv Dye Hives    Oxycodone-Aspirin Unknown (comments)      Family History   Problem Relation Age of Onset    Heart Disease Mother     Hypertension Mother     Cancer Mother         skin    Stroke Father     Breast Cancer Sister         onset: 76s    Breast Cancer Niece       Social History     Socioeconomic History    Marital status:      Spouse name: Not on file    Number of children: Not on file    Years of education: Not on file    Highest education level: Not on file   Social Needs    Financial resource strain: Not on file    Food insecurity - worry: Not on file    Food insecurity - inability: Not on file   [employer] needs - medical: Not on file   [employer] needs - non-medical: Not on file   Occupational History    Not on file   Tobacco Use    Smoking status: Former Smoker     Packs/day: 1.50     Years: 35.00     Pack years: 52.50     Last attempt to quit: 2004     Years since quittin.3    Smokeless tobacco: Never Used    Tobacco comment: quit smoking 6 yrs ago   Substance and Sexual Activity    Alcohol use: No    Drug use: No    Sexual activity: No   Other Topics Concern    Not on file   Social History Narrative    Not on file     Prior to Admission medications    Medication Sig Start Date End Date Taking? Authorizing Provider   raNITIdine (ZANTAC) 300 mg tab TAKE ONE TABLET BY MOUTH ONCE DAILY AT BEDTIME 19  Yes Ras Alexis MD   pitavastatin calcium (LIVALO) 2 mg tablet Take 1 Tab by mouth nightly.  19  Yes Ras Alexis MD   omeprazole (PRILOSEC) 20 mg capsule TAKE ONE CAPSULE BY MOUTH ONCE DAILY 19  Yes Ras Alexis MD   hydroCHLOROthiazide (HYDRODIURIL) 25 mg tablet TAKE ONE TABLET BY MOUTH ONCE DAILY 19  Yes Ras Alexis MD irbesartan (AVAPRO) 300 mg tablet TAKE ONE TABLET BY MOUTH ONCE DAILY 1/21/19  Yes Jose Alejandro Rivas MD   vit C/E/Zn/coppr/lutein/zeaxan (PRESERVISION AREDS 2 PO) Take  by mouth. Indications: 2 pills dailly   Yes Provider, Historical   biotin 10,000 mcg cap Take  by mouth daily (with dinner). Yes Provider, Historical   Cholecalciferol, Vitamin D3, 3,000 unit tab Take 1,000 Units by mouth every morning. Yes Other, MD Celestino   cyanocobalamin (VITAMIN B12) 500 mcg tablet Take 500 mcg by mouth every morning. Yes Other, MD Celestino   polyethylene glycol (MIRALAX) 17 gram/dose powder Take 17 g by mouth every morning. Indications: CONSTIPATION   Yes Provider, Historical        Review of Systems              Constitutional:  She denies fever, weight loss, sweats or fatigue. EYES: No blurred or double vision,               ENT: no nasal congestion, no headache or dizziness. No difficulty with               swallowing, taste, speech or smell. Respiratory:  No cough, wheezing or shortness of breath. No sputum production. Cardiac:  Denies chest pain, palpitations, unexplained indigestion, syncope, edema, PND or orthopnea. GI:  No changes in bowel movements, no abdominal pain, no bloating, anorexia, nausea, vomiting or heartburn. :  No frequency or dysuria. Denies incontinence or sexual dysfunction. Extremities:  No joint pain, stiffness or swelling  Back:.no pain or soreness  Skin:  No recent rashes or mole changes. Neurological:  No numbness, tingling, burning paresthesias or loss of motor strength. No syncope, dizziness, frequent headaches or memory loss. Positive for a foggy/fuzzy sensation in her head post fall with head trauma which occurred on February 19 and problems with remembering things.   Hematologic:  No easy bruising  Lymphatic: No lymph node enlargement    Objective:     Vitals:    03/05/19 1603   BP: 116/70   Pulse: 85   Resp: 18   Temp: 98 °F (36.7 °C)   TempSrc: Oral   SpO2: 96%   Weight: 194 lb 6.4 oz (88.2 kg)   Height: 5' 3\" (1.6 m)   PainSc:   2   PainLoc: Buttocks       Body mass index is 34.44 kg/m². Physical Examination:              General Appearance:  Well-developed, well-nourished, no acute distress. HEENT:      Ears:  The TMs and ear canals were clear. Eyes:  The pupillary responses were normal.  Extraocular muscle function intact. Lids and conjunctiva not injected. Funduscopic exam revealed sharp disc margins. Nares: Clear w/o edema or erythema  Pharynx:  Clear with teeth in good repair. No masses were noted. Neck:  Supple without thyromegaly or adenopathy. No JVD noted. No carotid                bruits. Lungs:  Clear to auscultation and percussion. Cardiac:  Regular rate and rhythm without murmur. PMI not displaced. No gallop, rub or click. Abdominal: Soft, non-tender, no hepata-spleenomegally or masses  Extremities:  No clubbing, cyanosis or edema. Skin:  No rash or unusual mole changes noted. Lymph Nodes:  None felt in the cervical, supraclavicular, axillary or inguinal region. Neurological: . DTRs 2+ and symmetric. Station and gait normal.  She is having some difficulty remembering words in conversation and certainly is not as clear thinking as her usual self  Hematologic:   No purpura or petechiae        Assessment/Plan:         1. Nonintractable headache, unspecified chronicity pattern, unspecified headache type    2. Acute post-traumatic headache, not intractable        Impressions/Plan:  Impression  1. Posttraumatic headache with a foggy fuzzy sensation in her head as well as some forgetfulness and memory issues certainly concerning for intracerebral process. I think we certainly first need to rule out a subdural hematoma with a head trauma. Thus head CT scheduled I will go ahead and get contrast, so I can see if there is any other abnormality that shows up there.   If this is negative then I think we will need an MRI of her head.  2.  Probable hypotensive seizure that she gave by the history of urinary incontinence any jerking sensation however of note is her CPK at the emergency room was normal which argues somewhat against any significant seizure activity  3. Influenza those symptoms have resolved except for the memory sensation which I think is probably related to the fall and head trauma which certainly I guess could be all posttraumatic concussion although it 2 weeks I would think that would be clearer  I will determine when follow-up is after the results of the CT and depending upon patient response. 30 minutes spent on this moderate complexity office visit today with greater than 50% counseling coordination of care including review of her emergency room records while she was present today. Orders Placed This Encounter    CT HEAD W WO CONT       Follow-up Disposition:  Return TBD. No results found for any visits on 03/05/19. Excell MD Katherine    The patient was given after the visit summary the patient verbalized an understanding of the plans and problems as explained.

## 2019-03-05 NOTE — PROGRESS NOTES
Identified pt with two pt identifiers(name and ). Reviewed record in preparation for visit and have obtained necessary documentation. Chief Complaint   Patient presents with    Fall     x2 weeks ago and hit head    Headache        Health Maintenance Due   Topic    Shingrix Vaccine Age 50> (1 of 2)    GLAUCOMA SCREENING Q2Y     COLONOSCOPY        Coordination of Care Questionnaire:  :   1) Have you been to an emergency room, urgent care, or hospitalized since your last visit? If yes, where when, and reason for visit? yes [facility] 19 after fall. 2. Have seen or consulted any other health care provider since your last visit? If yes, where when, and reason for visit? NO      3) Do you have an Advanced Directive/ Living Will in place? NO  If yes, do we have a copy on file NO  If no, would you like information NO    Patient is accompanied by self I have received verbal consent from Park to discuss any/all medical information while they are present in the room.

## 2019-03-06 ENCOUNTER — HOSPITAL ENCOUNTER (OUTPATIENT)
Dept: CT IMAGING | Age: 74
Discharge: HOME | End: 2019-03-06
Attending: INTERNAL MEDICINE
Payer: MEDICARE

## 2019-03-06 DIAGNOSIS — R51.9 NONINTRACTABLE HEADACHE, UNSPECIFIED CHRONICITY PATTERN, UNSPECIFIED HEADACHE TYPE: ICD-10-CM

## 2019-03-06 PROCEDURE — 70450 CT HEAD/BRAIN W/O DYE: CPT

## 2019-03-06 NOTE — PROGRESS NOTES
Head CT reveals no acute process so I think her symptoms are probably related to postconcussion syndrome and should clear. If they have not cleared in a week or 2 then I think we need to evaluate further.

## 2019-03-25 ENCOUNTER — OFFICE VISIT (OUTPATIENT)
Dept: INTERNAL MEDICINE CLINIC | Age: 74
End: 2019-03-25

## 2019-03-25 VITALS
HEART RATE: 86 BPM | TEMPERATURE: 97.8 F | OXYGEN SATURATION: 98 % | WEIGHT: 196 LBS | BODY MASS INDEX: 34.72 KG/M2 | SYSTOLIC BLOOD PRESSURE: 130 MMHG | DIASTOLIC BLOOD PRESSURE: 87 MMHG
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
## 2019-03-25 RX ORDER — PREDNISONE 10 MG/1
TABLET ORAL
Qty: 21 TAB | Refills: 0 | Status: SHIPPED | OUTPATIENT
Start: 2019-03-25 | End: 2019-05-30 | Stop reason: ALTCHOICE

## 2019-03-25 NOTE — PROGRESS NOTES
Subjective:  Ms. Maria Antonia Arias is a pleasant 72year old lady, patient of Dr. Gerri Ward, who comes in today for evaluation of a rash that she developed after working in her garden. She thinks she was exposed to poison oak. The rash is on her left hand and around her waist area. She has been using some calamine cream, but continues to be bothered. In addition, she sustained a tick bite on the anterior aspect of her right leg. She denies any chills or fever or joint pain. She was able to remove the tick fairly quickly.   Past Medical History:   Diagnosis Date    Anxiety     Arthritis     Chronic constipation     CKD (chronic kidney disease) 7/17/2017    Colon polyps 7/17/2017    DJD (degenerative joint disease) 7/17/2017    Elevated LFTs 7/17/2017    Flu 02/19/2019    GERD (gastroesophageal reflux disease)     Glucose intolerance (impaired glucose tolerance) 7/17/2017    Hemorrhoids     internal & external- bleeds frequently    High cholesterol     Hyperlipidemia 7/17/2017    Hypertension     Hypertension with renal disease 7/17/2017    PT Education - High Blood Pressure (Essential Hypertension) *: blood pressure PT Education - How to access health information online: discussed with patient and provided information    Hypertension, renal 7/17/2017    Ill-defined condition     ringing in ears    Mild obesity 7/17/2017    Nausea & vomiting     Neoplasm of uncertain behavior of skin 7/17/2017    On statin therapy 7/17/2017    Osteopenia 7/17/2017    Primary angle-closure glaucoma 7/17/2017    Comments: OU    Skin cancer of face     as of 6/15/16 pt states \"removed years ago\"    Spinal stenosis      Past Surgical History:   Procedure Laterality Date    HX CHOLECYSTECTOMY  11/6/2014    Dr Isai Mei for glaucoma    HX HYSTERECTOMY      partial    HX PELVIC LAPAROSCOPY      Jono. oophorectomy    HX TUBAL LIGATION      FL COLSC FLX W/REMOVAL LESION BY HOT BX FORCEPS 11/12/2012         [place] ERCP REMOVE CALCULI/DEBRIS BILIARY/PANCREAS DUCT  11/7/2014         [place] ERCP W/SPHINCTEROTOMY/PAPILLOTOMY  11/7/2014            Current Outpatient Medications on File Prior to Visit   Medication Sig Dispense Refill    raNITIdine (ZANTAC) 300 mg tab TAKE ONE TABLET BY MOUTH ONCE DAILY AT BEDTIME 90 Tab 3    pitavastatin calcium (LIVALO) 2 mg tablet Take 1 Tab by mouth nightly. 90 Tab 3    omeprazole (PRILOSEC) 20 mg capsule TAKE ONE CAPSULE BY MOUTH ONCE DAILY 90 Cap 3    hydroCHLOROthiazide (HYDRODIURIL) 25 mg tablet TAKE ONE TABLET BY MOUTH ONCE DAILY 90 Tab 3    irbesartan (AVAPRO) 300 mg tablet TAKE ONE TABLET BY MOUTH ONCE DAILY 90 Tab 3    biotin 10,000 mcg cap Take  by mouth daily (with dinner).  Cholecalciferol, Vitamin D3, 3,000 unit tab Take 1,000 Units by mouth every morning.  cyanocobalamin (VITAMIN B12) 500 mcg tablet Take 500 mcg by mouth every morning.  polyethylene glycol (MIRALAX) 17 gram/dose powder Take 17 g by mouth every morning. Indications: CONSTIPATION      vit C/E/Zn/coppr/lutein/zeaxan (PRESERVISION AREDS 2 PO) Take  by mouth. Indications: 2 pills dailly       No current facility-administered medications on file prior to visit. Allergies   Allergen Reactions    Iodinated Contrast- Oral And Iv Dye Hives    Oxycodone-Aspirin Unknown (comments)       Physical Examination:  GENERAL:  Pleasant lady in no acute distress. She is alert and oriented. VITALS:  BP: 130/87. P: 86.  O2 sat: 98.  T: 97.8. HEENT:  Normocephalic, atraumatic. NECK:  Supple without adenopathy. CHEST:  Lungs clear to auscultation, no rales or wheezes. CV:  Heart regular rhythm without murmur. EXTREMITIES:  No edema or calf tenderness. Distal pulses were present. SKIN:  She does have contact dermatitis on the thumb of her right hand, a few on the dorsal aspect of that left hand and around her waist on the left side.   She has a small raised area on the anterior aspect of her leg where she had the tick bite. There is certainly no redness or swelling noted. Impression:  1. Contact dermatitis. 2. Tick bite, anterior aspect of right leg. Plan:  1. It was opted to start her on a Medrol Dosepak. 2. She may continue with calamine lotion as needed. 3. In terms of tick bite, it was opted to get a Lyme titer and I will call her as soon as I have the results.

## 2019-03-25 NOTE — PROGRESS NOTES
Gio Story  Identified pt with two pt identifiers(name and ). Chief Complaint   Patient presents with    Rash    Insect Bite   Patient of Dr Sal Foley here with complaint of a rash on her hand, arm, face & side since working in her yard. Also with a tick bite on her right lower leg 2 weeks ago. 1. Have you been to the ER, urgent care clinic since your last visit? Hospitalized since your last visit? NO    2. Have you seen or consulted any other health care providers outside of the [address] [place] [address] Richard since your last visit? Include any pap smears or colon screening. NO        Medication reconciliation up to date and corrected with patient at this time. Today's provider has been notified of reason for visit, vitals and flowsheets obtained on patients. Reviewed record in preparation for visit, huddled with provider and have obtained necessary documentation.         Depression Risk Factor Screening:     3 most recent PHQ Screens 3/5/2019   Little interest or pleasure in doing things Not at all   Feeling down, depressed, irritable, or hopeless Not at all   Total Score PHQ 2 0       Functional Ability and Level of Safety:     Activities of Daily Living  ADL Assessment 2019   Feeding yourself No Help Needed   Getting from bed to chair No Help Needed   Getting dressed No Help Needed   Bathing or showering No Help Needed   Walk across the room (includes cane/walker) No Help Needed   Using the telphone No Help Needed   Taking your medications No Help Needed   Preparing meals No Help Needed   Managing money (expenses/bills) No Help Needed   Moderately strenuous housework (laundry) No Help Needed   Shopping for personal items (toiletries/medicines) No Help Needed   Shopping for groceries No Help Needed   Driving No Help Needed   Climbing a flight of stairs No Help Needed   Getting to places beyond walking distances No Help Needed       Fall Risk  Fall Risk Assessment, last 12 mths 3/5/2019 Able to walk? Yes   Fall in past 12 months? Yes   Fall with injury? Yes   Number of falls in past 12 months 2   Fall Risk Score 3       Abuse Screen  Abuse Screening Questionnaire 11/15/2018   Do you ever feel afraid of your partner? N   Are you in a relationship with someone who physically or mentally threatens you? N   Is it safe for you to go home? Y           Health Maintenance Due   Topic    Shingrix Vaccine Age 49> (1 of 2)    GLAUCOMA SCREENING Q2Y     Pneumococcal 65+ years (2 of 2 - PPSV23)    COLONOSCOPY        Wt Readings from Last 3 Encounters:   03/05/19 194 lb 6.4 oz (88.2 kg)   02/21/19 192 lb 9.6 oz (87.4 kg)   02/19/19 192 lb 7.4 oz (87.3 kg)     Temp Readings from Last 3 Encounters:   03/05/19 98 °F (36.7 °C) (Oral)   02/21/19 97.7 °F (36.5 °C) (Oral)   02/19/19 98.8 °F (37.1 °C)     BP Readings from Last 3 Encounters:   03/05/19 116/70   02/21/19 110/72   02/19/19 129/77     Pulse Readings from Last 3 Encounters:   03/05/19 85   02/21/19 97   02/19/19 85     There were no vitals filed for this visit.       Patient Care Team   Patient Care Team:  Cheryl Martell MD as PCP - General (Internal Medicine)

## 2019-03-25 NOTE — PATIENT INSTRUCTIONS
Tick Bite: Care Instructions  Your Care Instructions    Ticks are small spiderlike animals. They bite to fasten themselves onto your skin and feed on your blood. Ticks can carry diseases. But most ticks do not carry diseases, and most tick bites do not cause serious health problems. Some people may have an allergic reaction to a tick bite. This reaction may be mild, with symptoms like itching and swelling. In rare cases, a severe allergic reaction may occur. Most of the time, all you need to do for a tick bite is relieve any symptoms you may have. Follow-up care is a key part of your treatment and safety. Be sure to make and go to all appointments, and call your doctor if you are having problems. It's also a good idea to know your test results and keep a list of the medicines you take. How can you care for yourself at home? · Put ice or a cold pack on the bite for 15 to 20 minutes once an hour. Put a thin cloth between the ice and your skin. · Try an over-the-counter medicine to relieve itching, redness, swelling, and pain. Be safe with medicines. Read and follow all instructions on the label. ? Take an antihistamine medicine, such as a nondrowsy one like loratadine (Claritin) or one that might make you sleepy like diphenhydramine (Benadryl). These medicines may help relieve itching, redness, and swelling. ? Use a spray of local anesthetic that contains benzocaine, such as Solarcaine. It may help relieve pain. If your skin reacts to the spray, stop using it. ? Put calamine lotion on the skin. It may help relieve itching. To avoid tick bites  · Avoid ticks:  ? Learn where ticks are found in your community, and stay away from those areas if possible. ? Cover as much of your body as possible when you work or play in grassy or wooded areas. ? Use insect repellents, such as products containing DEET. You can spray them on your skin.   ? Take steps to control ticks on your property if you live in an area where Lyme disease occurs. Clear leaves, brush, tall grasses, woodpiles, and stone fences from around your house and the edges of your yard or garden. This may help get rid of ticks. · When you come in from outdoors, check your body for ticks, including your groin, head, and underarms. The ticks may be about the size of a sesame seed. If no one else can help you check for ticks on your scalp, comb your hair with a fine-tooth comb. · If you find a tick, remove it quickly. Use tweezers to grasp the tick as close to its mouth (the part in your skin) as possible. Slowly pull the tick straight out--do not twist or yank--until its mouth releases from your skin. If part of the tick stays in the skin, leave it alone. It will likely come out on its own in a few days. · Ticks can come into your house on clothing, outdoor gear, and pets. These ticks can fall off and attach to you. ? Check your clothing and outdoor gear. Remove any ticks you find. Then put your clothing in a clothes dryer on high heat for 1 hour to kill any ticks that might remain. ? Check your pets for ticks after they have been outdoors. · When hiking in the woods, carry a small dry jar or ziplock bag. If you find a tick on your body, remove the tick and put it in the jar or bag. Store the container in the freezer so you can give it to your doctor if symptoms develop. The tick can be tested to learn whether it is carrying the bacteria that cause Lyme disease. When should you call for help? Call 911 anytime you think you may need emergency care. For example, call if:    · You have symptoms of a severe allergic reaction. These may include:  ? Sudden raised, red areas (hives) all over your body. ? Swelling of the throat, mouth, lips, or tongue. ? Trouble breathing. ? Passing out (losing consciousness).  Or you may feel very lightheaded or suddenly feel weak, confused, or restless.    Call your doctor now or seek immediate medical care if:    · You have signs of infection, such as:  ? Increased pain, swelling, warmth, or redness around the bite. ? Red streaks leading from the bite. ? Pus draining from the bite. ? A fever.    Watch closely for changes in your health, and be sure to contact your doctor if:    · You develop a new rash.     · You have joint pain.     · You are very tired.     · You have flu-like symptoms.     · You have symptoms for more than 1 week. Where can you learn more? Go to http://rufina-ion.info/. Enter H944 in the search box to learn more about \"Tick Bite: Care Instructions. \"  Current as of: September 23, 2018  Content Version: 11.9  © 2240-9950 [employer]. Care instructions adapted under license by [employer] (which disclaims liability or warranty for this information). If you have questions about a medical condition or this instruction, always ask your healthcare professional. [place] [address] any warranty or liability for your use of this information. Tick Bite: Care Instructions  Your Care Instructions    Ticks are small spiderlike animals. They bite to fasten themselves onto your skin and feed on your blood. Ticks can carry diseases. But most ticks do not carry diseases, and most tick bites do not cause serious health problems. Some people may have an allergic reaction to a tick bite. This reaction may be mild, with symptoms like itching and swelling. In rare cases, a severe allergic reaction may occur. Most of the time, all you need to do for a tick bite is relieve any symptoms you may have. Follow-up care is a key part of your treatment and safety. Be sure to make and go to all appointments, and call your doctor if you are having problems. It's also a good idea to know your test results and keep a list of the medicines you take. How can you care for yourself at home? · Put ice or a cold pack on the bite for 15 to 20 minutes once an hour.  Put a thin cloth between the ice and your skin. · Try an over-the-counter medicine to relieve itching, redness, swelling, and pain. Be safe with medicines. Read and follow all instructions on the label. ? Take an antihistamine medicine, such as a nondrowsy one like loratadine (Claritin) or one that might make you sleepy like diphenhydramine (Benadryl). These medicines may help relieve itching, redness, and swelling. ? Use a spray of local anesthetic that contains benzocaine, such as Solarcaine. It may help relieve pain. If your skin reacts to the spray, stop using it. ? Put calamine lotion on the skin. It may help relieve itching. To avoid tick bites  · Avoid ticks:  ? Learn where ticks are found in your community, and stay away from those areas if possible. ? Cover as much of your body as possible when you work or play in grassy or wooded areas. ? Use insect repellents, such as products containing DEET. You can spray them on your skin. ? Take steps to control ticks on your property if you live in an area where Lyme disease occurs. Clear leaves, brush, tall grasses, woodpiles, and stone fences from around your house and the edges of your yard or garden. This may help get rid of ticks. · When you come in from outdoors, check your body for ticks, including your groin, head, and underarms. The ticks may be about the size of a sesame seed. If no one else can help you check for ticks on your scalp, comb your hair with a fine-tooth comb. · If you find a tick, remove it quickly. Use tweezers to grasp the tick as close to its mouth (the part in your skin) as possible. Slowly pull the tick straight out--do not twist or yank--until its mouth releases from your skin. If part of the tick stays in the skin, leave it alone. It will likely come out on its own in a few days. · Ticks can come into your house on clothing, outdoor gear, and pets. These ticks can fall off and attach to you. ? Check your clothing and outdoor gear. Remove any ticks you find. Then put your clothing in a clothes dryer on high heat for 1 hour to kill any ticks that might remain. ? Check your pets for ticks after they have been outdoors. · When hiking in the woods, carry a small dry jar or ziplock bag. If you find a tick on your body, remove the tick and put it in the jar or bag. Store the container in the freezer so you can give it to your doctor if symptoms develop. The tick can be tested to learn whether it is carrying the bacteria that cause Lyme disease. When should you call for help? Call 911 anytime you think you may need emergency care. For example, call if:    · You have symptoms of a severe allergic reaction. These may include:  ? Sudden raised, red areas (hives) all over your body. ? Swelling of the throat, mouth, lips, or tongue. ? Trouble breathing. ? Passing out (losing consciousness). Or you may feel very lightheaded or suddenly feel weak, confused, or restless.    Call your doctor now or seek immediate medical care if:    · You have signs of infection, such as:  ? Increased pain, swelling, warmth, or redness around the bite. ? Red streaks leading from the bite. ? Pus draining from the bite. ? A fever.    Watch closely for changes in your health, and be sure to contact your doctor if:    · You develop a new rash.     · You have joint pain.     · You are very tired.     · You have flu-like symptoms.     · You have symptoms for more than 1 week. Where can you learn more? Go to http://rufina-ion.info/. Enter R007 in the search box to learn more about \"Tick Bite: Care Instructions. \"  Current as of: September 23, 2018  Content Version: 11.9  © 2259-6841 [employer]. Care instructions adapted under license by [employer] (which disclaims liability or warranty for this information).  If you have questions about a medical condition or this instruction, always ask your healthcare professional. [employer], [facility] disclaims any warranty or liability for your use of this information. According to the Centers for Disease Control and Prevention, as of 2011 Lyme disease is the sixth fastest growing disease in the [place]. Your health care provider has ordered a laboratory test for the presence of Lyme disease for you. Current laboratory testing for Lyme disease can be problematic and standard laboratory tests often result in false negative and false positive results, and if done too early, you may not have produced enough antibodies. If you are tested for Lyme disease, and the results are negative, this does not necessarily mean you do not have Lyme disease. If you continue to experience symptoms, you should contact your health care provider and inquire about the appropriateness of retesting or additional treatment.

## 2019-03-26 LAB
B BURGDOR IGG+IGM SER-ACNC: <0.91 ISR (ref 0–0.9)
B BURGDOR IGM SER IA-ACNC: <0.8 INDEX (ref 0–0.79)

## 2019-05-29 NOTE — PROGRESS NOTES
Chief Complaint   Patient presents with    Hypertension     3 month follow up    Blood sugar problem    Chronic Kidney Disease    Cholesterol Problem       SUBJECTIVE:    Stone Taylor is a 72 y.o. female who returns in follow-up for medical problems include hypertension, glucose intolerance, hyperlipidemia, CKD stage II, DJD, obesity and other medical problems. She is taking her medications and trying to follow her diet but does know she could do better with exercise and getting her weight down. She currently denies any chest pain, shortness of breath, palpitations, PND, orthopnea or other cardiorespiratory complaints. She denies any GI or  complaints. She denies any headaches, dizziness or neurological planes. There are no current arthritic complaints and the only other complaint she has a sensation in the ears are full and a constant ringing in the years which she has had for some time. Current Outpatient Medications   Medication Sig Dispense Refill    raNITIdine (ZANTAC) 300 mg tab TAKE ONE TABLET BY MOUTH ONCE DAILY AT BEDTIME 90 Tab 3    pitavastatin calcium (LIVALO) 2 mg tablet Take 1 Tab by mouth nightly. 90 Tab 3    omeprazole (PRILOSEC) 20 mg capsule TAKE ONE CAPSULE BY MOUTH ONCE DAILY 90 Cap 3    hydroCHLOROthiazide (HYDRODIURIL) 25 mg tablet TAKE ONE TABLET BY MOUTH ONCE DAILY 90 Tab 3    irbesartan (AVAPRO) 300 mg tablet TAKE ONE TABLET BY MOUTH ONCE DAILY 90 Tab 3    vit C/E/Zn/coppr/lutein/zeaxan (PRESERVISION AREDS 2 PO) Take  by mouth. Indications: 2 pills dailly      biotin 10,000 mcg cap Take  by mouth daily (with dinner).  Cholecalciferol, Vitamin D3, 3,000 unit tab Take 1,000 Units by mouth every morning.  cyanocobalamin (VITAMIN B12) 500 mcg tablet Take 500 mcg by mouth every morning.  polyethylene glycol (MIRALAX) 17 gram/dose powder Take 17 g by mouth every morning.  Indications: CONSTIPATION       Past Medical History:   Diagnosis Date    Anxiety     Arthritis     Chronic constipation     CKD (chronic kidney disease) 7/17/2017    Colon polyps 7/17/2017    DJD (degenerative joint disease) 7/17/2017    Elevated LFTs 7/17/2017    Flu 02/19/2019    GERD (gastroesophageal reflux disease)     Glucose intolerance (impaired glucose tolerance) 7/17/2017    Hemorrhoids     internal & external- bleeds frequently    High cholesterol     Hyperlipidemia 7/17/2017    Hypertension     Hypertension with renal disease 7/17/2017    PT Education - High Blood Pressure (Essential Hypertension) *: blood pressure PT Education - How to access health information online: discussed with patient and provided information    Hypertension, renal 7/17/2017    Ill-defined condition     ringing in ears    Mild obesity 7/17/2017    Nausea & vomiting     Neoplasm of uncertain behavior of skin 7/17/2017    On statin therapy 7/17/2017    Osteopenia 7/17/2017    Primary angle-closure glaucoma 7/17/2017    Comments: OU    Skin cancer of face     as of 6/15/16 pt states \"removed years ago\"    Spinal stenosis      Past Surgical History:   Procedure Laterality Date    HX CHOLECYSTECTOMY  11/6/2014    Dr Abbey Craft for glaucoma    HX HYSTERECTOMY      partial    HX PELVIC LAPAROSCOPY      Jono. oophorectomy    HX TUBAL LIGATION      ND COLSC FLX W/REMOVAL LESION BY HOT BX FORCEPS  11/12/2012         ND ERCP REMOVE CALCULI/DEBRIS BILIARY/PANCREAS DUCT  11/7/2014         ND ERCP W/SPHINCTEROTOMY/PAPILLOTOMY  11/7/2014          Allergies   Allergen Reactions    Iodinated Contrast- Oral And Iv Dye Hives    Oxycodone-Aspirin Unknown (comments)       REVIEW OF SYSTEMS:  General: negative for - chills or fever, or weight loss or gain  ENT: negative for - headaches, nasal congestion but positive sensation in the ears before with tinnitus  Eyes: no blurred or visual changes  Neck: No stiffness or swollen nodes  Respiratory: negative for - cough, hemoptysis, shortness of breath or wheezing  Cardiovascular : negative for - chest pain, edema, palpitations or shortness of breath  Gastrointestinal: negative for - abdominal pain, blood in stools, heartburn or nausea/vomiting  Genito-Urinary: no dysuria, trouble voiding, or hematuria  Musculoskeletal: negative for - gait disturbance, joint pain, joint stiffness or joint swelling  Neurological: no TIA or stroke symptoms  Hematologic: no bruises, no bleeding  Lymphatic: no swollen glands  Integument: no lumps, mole changes, nail changes or rash  Endocrine:no malaise/lethargy poly uria or polydipsia or unexpected weight changes        Social History     Socioeconomic History    Marital status:      Spouse name: Not on file    Number of children: Not on file    Years of education: Not on file    Highest education level: Not on file   Tobacco Use    Smoking status: Former Smoker     Packs/day: 1.50     Years: 35.00     Pack years: 52.50     Last attempt to quit: 2004     Years since quittin.5    Smokeless tobacco: Never Used    Tobacco comment: quit smoking 6 yrs ago   Substance and Sexual Activity    Alcohol use: No    Drug use: No    Sexual activity: Never     Family History   Problem Relation Age of Onset    Heart Disease Mother     Hypertension Mother     Cancer Mother         skin    Stroke Father     Breast Cancer Sister         onset: 76s    Breast Cancer Niece        OBJECTIVE:     Visit Vitals  /88 (BP 1 Location: Right arm, BP Patient Position: Sitting)   Pulse 70   Temp 97.8 °F (36.6 °C) (Oral)   Resp 19   Ht 5' 3\" (1.6 m)   Wt 195 lb 3.2 oz (88.5 kg)   SpO2 97%   BMI 34.58 kg/m²     CONSTITUTIONAL:   well nourished, appears age appropriate  EYES: sclera anicteric, PERRL, EOMI  ENMT:nares clear, moist mucous membranes, pharynx clear. Ears clear bilateral with some mild serous fluid behind the right tympanic membrane but no evidence of infection  NECK: supple.  Thyroid normal, No JVD or bruits  RESPIRATORY: Chest: clear to ascultation and percussion, normal inspiratory effort  CARDIOVASCULAR: Heart: regular rate and rhythm no murmurs, rubs or gallops, PMI not displaced, No thrills  GASTROINTESTINAL: Abdomen: non distended, soft, non tender, bowel sounds normal  HEMATOLOGIC: no purpura, petechiae or bruising  LYMPHATIC: No lymph node enlargemant  MUSCULOSKELETAL: Extremities: no edema or active synovitis, pulse 1+   INTEGUMENT: No unusual rashes or suspicious skin lesions noted. Nails appear normal.  PERIPHERAL VASCULAR: normal pulses femoral, PT and DP  NEUROLOGIC: non-focal exam, A & O X 3  PSYCHIATRIC:, appropriate affect     ASSESSMENT:   1. Hypertension with renal disease    2. Glucose intolerance (impaired glucose tolerance)    3. Mixed hyperlipidemia    4. Primary osteoarthritis involving multiple joints    5. Stage 2 chronic kidney disease    6. Severe obesity (BMI 35.0-39. 9) with comorbidity ([address]      Pression  1. Hypertension is controlled so continue current therapy reviewed with her. 2.  Glucose intolerance repeat status pending a prior lab reviewed and I will make adjustments if necessary. 3   Hyperlipidemia prior lab reviewed and repeat status pending I will adjust if needed. 4   DJD that is stable   5   CKD stage II repeat status pending  6. Obesity we did discuss diet, exercise and weight reduction for overall health benefit. Abnormal sensation in the right ear with clear fluid I suggested that she try some Zyrtec on a daily basis. I will call with lab results and make further recommendations or adjustments if necessary. Follow-up scheduled again for 3 months or sooner should to be a problem. PLAN:  .  Orders Placed This Encounter    METABOLIC PANEL, COMPREHENSIVE    LIPID PANEL    CK    HEMOGLOBIN A1C WITH EAG         ATTENTION:   This medical record was transcribed using an electronic medical records system.   Although proofread, it may and can contain electronic and spelling errors. Other human spelling and other errors may be present. Corrections may be executed at a later time. Please feel free to contact us for any clarifications as needed. Follow-up and Dispositions    · Return in about 3 months (around 8/30/2019). No results found for any visits on 05/30/19. Blu Lay MD    The patient verbalized understanding of the problems and plans as explained.

## 2019-05-30 ENCOUNTER — OFFICE VISIT (OUTPATIENT)
Dept: INTERNAL MEDICINE CLINIC | Age: 74
End: 2019-05-30

## 2019-05-30 VITALS
TEMPERATURE: 97.8 F | RESPIRATION RATE: 19 BRPM | BODY MASS INDEX: 34.59 KG/M2 | HEIGHT: 63 IN | SYSTOLIC BLOOD PRESSURE: 132 MMHG | DIASTOLIC BLOOD PRESSURE: 88 MMHG | HEART RATE: 70 BPM | OXYGEN SATURATION: 97 % | WEIGHT: 195.2 LBS

## 2019-05-30 DIAGNOSIS — I12.9 HYPERTENSION WITH RENAL DISEASE: Primary | ICD-10-CM

## 2019-05-30 DIAGNOSIS — M15.9 PRIMARY OSTEOARTHRITIS INVOLVING MULTIPLE JOINTS: ICD-10-CM

## 2019-05-30 DIAGNOSIS — E78.2 MIXED HYPERLIPIDEMIA: ICD-10-CM

## 2019-05-30 DIAGNOSIS — E66.01 SEVERE OBESITY (BMI 35.0-39.9) WITH COMORBIDITY (HCC): ICD-10-CM

## 2019-05-30 DIAGNOSIS — N18.2 STAGE 2 CHRONIC KIDNEY DISEASE: ICD-10-CM

## 2019-05-30 DIAGNOSIS — R73.02 GLUCOSE INTOLERANCE (IMPAIRED GLUCOSE TOLERANCE): ICD-10-CM

## 2019-05-30 NOTE — PATIENT INSTRUCTIONS
Arthritis: Care Instructions Your Care Instructions Arthritis, also called osteoarthritis, is a breakdown of the cartilage that cushions your joints. When the cartilage wears down, your bones rub against each other. This causes pain and stiffness. Many people have some arthritis as they age. Arthritis most often affects the joints of the spine, hands, hips, knees, or feet. You can take simple measures to protect your joints, ease your pain, and help you stay active. Follow-up care is a key part of your treatment and safety. Be sure to make and go to all appointments, and call your doctor if you are having problems. It's also a good idea to know your test results and keep a list of the medicines you take. How can you care for yourself at home? · Stay at a healthy weight. Being overweight puts extra strain on your joints. · Talk to your doctor or physical therapist about exercises that will help ease joint pain. ? Stretch. You may enjoy gentle forms of yoga to help keep your joints and muscles flexible. ? Walk instead of jog. Other types of exercise that are less stressful on the joints include riding a bicycle, swimming, janiya chi, or water exercise. ? Lift weights. Strong muscles help reduce stress on your joints. Stronger thigh muscles, for example, take some of the stress off of the knees and hips. Learn the right way to lift weights so you do not make joint pain worse. · Take your medicines exactly as prescribed. Call your doctor if you think you are having a problem with your medicine. · Take pain medicines exactly as directed. ? If the doctor gave you a prescription medicine for pain, take it as prescribed. ? If you are not taking a prescription pain medicine, ask your doctor if you can take an over-the-counter medicine. · Use a cane, crutch, walker, or another device if you need help to get around. These can help rest your joints.  You also can use other things to make life easier, such as a higher toilet seat and padded handles on kitchen utensils. · Do not sit in low chairs, which can make it hard to get up. · Put heat or cold on your sore joints as needed. Use whichever helps you most. You also can take turns with hot and cold packs. ? Apply heat 2 or 3 times a day for 20 to 30 minutesusing a heating pad, hot shower, or hot packto relieve pain and stiffness. ? Put ice or a cold pack on your sore joint for 10 to 20 minutes at a time. Put a thin cloth between the ice and your skin. When should you call for help? Call your doctor now or seek immediate medical care if: 
  · You have sudden swelling, warmth, or pain in any joint.  
  · You have joint pain and a fever or rash.  
  · You have such bad pain that you cannot use a joint.  
 Watch closely for changes in your health, and be sure to contact your doctor if: 
  · You have mild joint symptoms that continue even with more than 6 weeks of care at home.  
  · You have stomach pain or other problems with your medicine. Where can you learn more? Go to http://rufina-ion.info/. Enter M445 in the search box to learn more about \"Arthritis: Care Instructions. \" Current as of: [place] 10, 2018 Content Version: 11.9 © 8603-6345 [employer]. Care instructions adapted under license by [employer] (which disclaims liability or warranty for this information). If you have questions about a medical condition or this instruction, always ask your healthcare professional. [place] [address] any warranty or liability for your use of this information.

## 2019-05-30 NOTE — PROGRESS NOTES
Chief Complaint   Patient presents with    Hypertension     3 month follow up    Blood sugar problem    Chronic Kidney Disease    Cholesterol Problem     Visit Vitals  /88 (BP 1 Location: Right arm, BP Patient Position: Sitting)   Pulse 70   Temp 97.8 °F (36.6 °C) (Oral)   Resp 19   Ht 5' 3\" (1.6 m)   Wt 195 lb 3.2 oz (88.5 kg)   SpO2 97%   BMI 34.58 kg/m²     1. Have you been to the ER, urgent care clinic since your last visit? Hospitalized since your last visit? No    2. Have you seen or consulted any other health care providers outside of the [address] [place] [address] since your last visit? Include any pap smears or colon screening.  No

## 2019-05-31 LAB
ALBUMIN SERPL-MCNC: 4.3 G/DL (ref 3.5–4.8)
ALBUMIN/GLOB SERPL: 1.7 {RATIO} (ref 1.2–2.2)
ALP SERPL-CCNC: 91 IU/L (ref 39–117)
ALT SERPL-CCNC: 20 IU/L (ref 0–32)
AST SERPL-CCNC: 20 IU/L (ref 0–40)
BILIRUB SERPL-MCNC: 0.8 MG/DL (ref 0–1.2)
BUN SERPL-MCNC: 19 MG/DL (ref 8–27)
BUN/CREAT SERPL: 26 (ref 12–28)
CALCIUM SERPL-MCNC: 10.1 MG/DL (ref 8.7–10.3)
CHLORIDE SERPL-SCNC: 100 MMOL/L (ref 96–106)
CHOLEST SERPL-MCNC: 181 MG/DL (ref 100–199)
CK SERPL-CCNC: 69 U/L (ref 24–173)
CO2 SERPL-SCNC: 27 MMOL/L (ref 20–29)
CREAT SERPL-MCNC: 0.74 MG/DL (ref 0.57–1)
EST. AVERAGE GLUCOSE BLD GHB EST-MCNC: 123 MG/DL
GLOBULIN SER CALC-MCNC: 2.6 G/DL (ref 1.5–4.5)
GLUCOSE SERPL-MCNC: 96 MG/DL (ref 65–99)
HBA1C MFR BLD: 5.9 % (ref 4.8–5.6)
HDLC SERPL-MCNC: 65 MG/DL
LDLC SERPL CALC-MCNC: 93 MG/DL (ref 0–99)
POTASSIUM SERPL-SCNC: 4.2 MMOL/L (ref 3.5–5.2)
PROT SERPL-MCNC: 6.9 G/DL (ref 6–8.5)
SODIUM SERPL-SCNC: 139 MMOL/L (ref 134–144)
TRIGL SERPL-MCNC: 117 MG/DL (ref 0–149)
VLDLC SERPL CALC-MCNC: 23 MG/DL (ref 5–40)

## 2019-08-24 NOTE — PROGRESS NOTES
Chief Complaint   Patient presents with    Hypertension     3 month follow up    Blood sugar problem    Chronic Kidney Disease    Cholesterol Problem       SUBJECTIVE:    Hayde Craig is a 69 y.o. female who returns in follow-up for medical problems include hypertension, glucose intolerance, hyperlipidemia, CKD, DJD, obesity and other medical problems. She still noting some chronic back pain but has had no history of falls and no recent trauma. She currently denies any chest pain, shortness of breath, palpitations, PND, orthopnea or other cardiorespiratory complaints. She notes no GI or  complaints. She denies any headaches, dizziness or neurological planes. There are no other complaints on complete review of systems. Current Outpatient Medications   Medication Sig Dispense Refill    raNITIdine (ZANTAC) 300 mg tab TAKE ONE TABLET BY MOUTH ONCE DAILY AT BEDTIME 90 Tab 3    pitavastatin calcium (LIVALO) 2 mg tablet Take 1 Tab by mouth nightly. 90 Tab 3    omeprazole (PRILOSEC) 20 mg capsule TAKE ONE CAPSULE BY MOUTH ONCE DAILY 90 Cap 3    hydroCHLOROthiazide (HYDRODIURIL) 25 mg tablet TAKE ONE TABLET BY MOUTH ONCE DAILY 90 Tab 3    irbesartan (AVAPRO) 300 mg tablet TAKE ONE TABLET BY MOUTH ONCE DAILY 90 Tab 3    vit C/E/Zn/coppr/lutein/zeaxan (PRESERVISION AREDS 2 PO) Take  by mouth. Indications: 2 pills dailly      biotin 10,000 mcg cap Take  by mouth daily (with dinner).  Cholecalciferol, Vitamin D3, 3,000 unit tab Take 1,000 Units by mouth every morning.  cyanocobalamin (VITAMIN B12) 500 mcg tablet Take 500 mcg by mouth every morning.  polyethylene glycol (MIRALAX) 17 gram/dose powder Take 17 g by mouth every morning.  Indications: CONSTIPATION       Past Medical History:   Diagnosis Date    Anxiety     Arthritis     Chronic constipation     CKD (chronic kidney disease) 7/17/2017    Colon polyps 7/17/2017    DJD (degenerative joint disease) 7/17/2017    Elevated LFTs 7/17/2017    Flu 02/19/2019    GERD (gastroesophageal reflux disease)     Glucose intolerance (impaired glucose tolerance) 7/17/2017    Hemorrhoids     internal & external- bleeds frequently    High cholesterol     Hyperlipidemia 7/17/2017    Hypertension     Hypertension with renal disease 7/17/2017    PT Education - High Blood Pressure (Essential Hypertension) *: blood pressure PT Education - How to access health information online: discussed with patient and provided information    Hypertension, renal 7/17/2017    Ill-defined condition     ringing in ears    Mild obesity 7/17/2017    Nausea & vomiting     Neoplasm of uncertain behavior of skin 7/17/2017    On statin therapy 7/17/2017    Osteopenia 7/17/2017    Primary angle-closure glaucoma 7/17/2017    Comments: OU    Skin cancer of face     as of 6/15/16 pt states \"removed years ago\"    Spinal stenosis      Past Surgical History:   Procedure Laterality Date    HX CHOLECYSTECTOMY  11/6/2014    Dr Aniya Barrett for glaucoma    HX HYSTERECTOMY      partial    HX PELVIC LAPAROSCOPY      Jono. oophorectomy    HX TUBAL LIGATION      CO COLSC FLX W/REMOVAL LESION BY HOT BX FORCEPS  11/12/2012         CO ERCP REMOVE CALCULI/DEBRIS BILIARY/PANCREAS DUCT  11/7/2014         CO ERCP W/SPHINCTEROTOMY/PAPILLOTOMY  11/7/2014          Allergies   Allergen Reactions    Iodinated Contrast Media Hives    Oxycodone-Aspirin Unknown (comments)       REVIEW OF SYSTEMS:  General: negative for - chills or fever, or weight loss or gain  ENT: negative for - headaches, nasal congestion or tinnitus  Eyes: no blurred or visual changes  Neck: No stiffness or swollen nodes  Respiratory: negative for - cough, hemoptysis, shortness of breath or wheezing  Cardiovascular : negative for - chest pain, edema, palpitations or shortness of breath  Gastrointestinal: negative for - abdominal pain, blood in stools, heartburn or nausea/vomiting  Genito-Urinary: no dysuria, trouble voiding, or hematuria  Musculoskeletal: negative for - gait disturbance, joint pain, joint stiffness or joint swelling. Positive no change of her chronic back pain  Neurological: no TIA or stroke symptoms  Hematologic: no bruises, no bleeding  Lymphatic: no swollen glands  Integument: no lumps, mole changes, nail changes or rash  Endocrine:no malaise/lethargy poly uria or polydipsia or unexpected weight changes        Social History     Socioeconomic History    Marital status:      Spouse name: Not on file    Number of children: Not on file    Years of education: Not on file    Highest education level: Not on file   Tobacco Use    Smoking status: Former Smoker     Packs/day: 1.50     Years: 35.00     Pack years: 52.50     Last attempt to quit: 2004     Years since quittin.8    Smokeless tobacco: Never Used    Tobacco comment: quit smoking 6 yrs ago   Substance and Sexual Activity    Alcohol use: No    Drug use: No    Sexual activity: Never     Family History   Problem Relation Age of Onset    Heart Disease Mother     Hypertension Mother     Cancer Mother         skin    Stroke Father     Breast Cancer Sister         onset: 76s    Breast Cancer Niece        OBJECTIVE:     Visit Vitals  /82 (BP 1 Location: Left arm, BP Patient Position: Sitting)   Pulse 80   Temp 98 °F (36.7 °C) (Oral)   Resp 19   Ht 5' 3\" (1.6 m)   Wt 196 lb 9.6 oz (89.2 kg)   SpO2 98%   BMI 34.83 kg/m²     CONSTITUTIONAL:   well nourished, appears age appropriate  EYES: sclera anicteric, PERRL, EOMI  ENMT:nares clear, moist mucous membranes, pharynx clear  NECK: supple.  Thyroid normal, No JVD or bruits  RESPIRATORY: Chest: clear to ascultation and percussion, normal inspiratory effort  CARDIOVASCULAR: Heart: regular rate and rhythm no murmurs, rubs or gallops, PMI not displaced, No thrills  GASTROINTESTINAL: Abdomen: non distended, soft, non tender, bowel sounds normal  HEMATOLOGIC: no purpura, petechiae or bruising  LYMPHATIC: No lymph node enlargemant  MUSCULOSKELETAL: Extremities: no edema or active synovitis, pulse 1+   INTEGUMENT: No unusual rashes or suspicious skin lesions noted. Nails appear normal.  PERIPHERAL VASCULAR: normal pulses femoral, PT and DP  NEUROLOGIC: non-focal exam, A & O X 3  PSYCHIATRIC:, appropriate affect     ASSESSMENT:   1. Hypertension with renal disease    2. Glucose intolerance (impaired glucose tolerance)    3. Mixed hyperlipidemia    4. Primary osteoarthritis involving multiple joints    5. Stage 2 chronic kidney disease    6. Severe obesity (BMI 35.0-39. 9) with comorbidity ([address]      Impression  1. Hypertension that is controlled so continue current therapy reviewed with her. 2.  Glucose intolerance repeat status pending and prior lab reviewed no make adjustments if necessary. 3.  Hyperlipidemia prior lab reviewed and repeat status pending I will adjust if needed. 4.  DJD I did recommend she take Tylenol arthritis 2 twice daily on a regular basis and see if that would help with her back. 5.  CKD stage II repeat status pending  6. Obesity we did discuss diet, exercise and weight reduction for overall health benefit. I will call with lab results and make further recommendations or adjustments if necessary. Follow-up scheduled for 3 months or sooner if there is a problem. PLAN:  .  Orders Placed This Encounter    METABOLIC PANEL, COMPREHENSIVE ([facility])    LIPID PANEL ([facility])    CK ([facility])    HEMOGLOBIN A1C W/O EAG ([facility])         ATTENTION:   This medical record was transcribed using an electronic medical records system. Although proofread, it may and can contain electronic and spelling errors. Other human spelling and other errors may be present. Corrections may be executed at a later time. Please feel free to contact us for any clarifications as needed.       Follow-up and Dispositions    · Return in about 3 months (around 11/26/2019). No results found for any visits on 08/26/19. Ricardo Mendez MD    The patient verbalized understanding of the problems and plans as explained.

## 2019-08-26 ENCOUNTER — OFFICE VISIT (OUTPATIENT)
Dept: INTERNAL MEDICINE CLINIC | Age: 74
End: 2019-08-26

## 2019-08-26 VITALS
TEMPERATURE: 98 F | HEIGHT: 63 IN | BODY MASS INDEX: 34.84 KG/M2 | HEART RATE: 80 BPM | SYSTOLIC BLOOD PRESSURE: 118 MMHG | DIASTOLIC BLOOD PRESSURE: 82 MMHG | RESPIRATION RATE: 19 BRPM | WEIGHT: 196.6 LBS | OXYGEN SATURATION: 98 %

## 2019-08-26 DIAGNOSIS — I12.9 HYPERTENSION WITH RENAL DISEASE: Primary | ICD-10-CM

## 2019-08-26 DIAGNOSIS — M15.9 PRIMARY OSTEOARTHRITIS INVOLVING MULTIPLE JOINTS: ICD-10-CM

## 2019-08-26 DIAGNOSIS — E66.01 SEVERE OBESITY (BMI 35.0-39.9) WITH COMORBIDITY (HCC): ICD-10-CM

## 2019-08-26 DIAGNOSIS — E78.2 MIXED HYPERLIPIDEMIA: ICD-10-CM

## 2019-08-26 DIAGNOSIS — N18.2 STAGE 2 CHRONIC KIDNEY DISEASE: ICD-10-CM

## 2019-08-26 DIAGNOSIS — R73.02 GLUCOSE INTOLERANCE (IMPAIRED GLUCOSE TOLERANCE): ICD-10-CM

## 2019-08-26 LAB
A-G RATIO,AGRAT: 1.4 RATIO
ALBUMIN SERPL-MCNC: 4.1 G/DL (ref 3.9–5.4)
ALP SERPL-CCNC: 98 U/L (ref 38–126)
ALT SERPL-CCNC: 23 U/L (ref 0–35)
ANION GAP SERPL CALC-SCNC: 12 MMOL/L
AST SERPL W P-5'-P-CCNC: 23 U/L (ref 14–36)
BILIRUB SERPL-MCNC: 0.9 MG/DL (ref 0.2–1.3)
BUN SERPL-MCNC: 20 MG/DL (ref 7–17)
BUN/CREATININE RATIO,BUCR: 29 RATIO
CALCIUM SERPL-MCNC: 9.4 MG/DL (ref 8.4–10.2)
CHLORIDE SERPL-SCNC: 99 MMOL/L (ref 98–107)
CHOL/HDL RATIO,CHHD: 3 RATIO (ref 0–4)
CHOLEST SERPL-MCNC: 166 MG/DL (ref 0–200)
CK SERPL-CCNC: 56 U/L (ref 30–135)
CO2 SERPL-SCNC: 29 MMOL/L (ref 22–32)
CREAT SERPL-MCNC: 0.7 MG/DL (ref 0.7–1.2)
GLOBULIN,GLOB: 3
GLUCOSE SERPL-MCNC: 94 MG/DL (ref 65–105)
HBA1C MFR BLD HPLC: 5.7 % (ref 4–5.7)
HDLC SERPL-MCNC: 56 MG/DL (ref 35–130)
LDL/HDL RATIO,LDHD: 2 RATIO
LDLC SERPL CALC-MCNC: 88 MG/DL (ref 0–130)
POTASSIUM SERPL-SCNC: 4.4 MMOL/L (ref 3.6–5)
PROT SERPL-MCNC: 7.1 G/DL (ref 6.3–8.2)
SODIUM SERPL-SCNC: 140 MMOL/L (ref 137–145)
TRIGL SERPL-MCNC: 111 MG/DL (ref 0–200)
VLDLC SERPL CALC-MCNC: 22 MG/DL

## 2019-08-26 NOTE — PATIENT INSTRUCTIONS
Back Pain: Care Instructions  Your Care Instructions    Back pain has many possible causes. It is often related to problems with muscles and ligaments of the back. It may also be related to problems with the nerves, discs, or bones of the back. Moving, lifting, standing, sitting, or sleeping in an awkward way can strain the back. Sometimes you don't notice the injury until later. Arthritis is another common cause of back pain. Although it may hurt a lot, back pain usually improves on its own within several weeks. Most people recover in 12 weeks or less. Using good home treatment and being careful not to stress your back can help you feel better sooner. Follow-up care is a key part of your treatment and safety. Be sure to make and go to all appointments, and call your doctor if you are having problems. It's also a good idea to know your test results and keep a list of the medicines you take. How can you care for yourself at home? · Sit or lie in positions that are most comfortable and reduce your pain. Try one of these positions when you lie down:  ? Lie on your back with your knees bent and supported by large pillows. ? Lie on the floor with your legs on the seat of a sofa or chair. ? Lie on your side with your knees and hips bent and a pillow between your legs. ? Lie on your stomach if it does not make pain worse. · Do not sit up in bed, and avoid soft couches and twisted positions. Bed rest can help relieve pain at first, but it delays healing. Avoid bed rest after the first day of back pain. · Change positions every 30 minutes. If you must sit for long periods of time, take breaks from sitting. Get up and walk around, or lie in a comfortable position. · Try using a heating pad on a low or medium setting for 15 to 20 minutes every 2 or 3 hours. Try a warm shower in place of one session with the heating pad. · You can also try an ice pack for 10 to 15 minutes every 2 to 3 hours.  Put a thin cloth between the ice pack and your skin. · Take pain medicines exactly as directed. ? If the doctor gave you a prescription medicine for pain, take it as prescribed. ? If you are not taking a prescription pain medicine, ask your doctor if you can take an over-the-counter medicine. · Take short walks several times a day. You can start with 5 to 10 minutes, 3 or 4 times a day, and work up to longer walks. Walk on level surfaces and avoid hills and stairs until your back is better. · Return to work and other activities as soon as you can. Continued rest without activity is usually not good for your back. · To prevent future back pain, do exercises to stretch and strengthen your back and stomach. Learn how to use good posture, safe lifting techniques, and proper body mechanics. When should you call for help? Call your doctor now or seek immediate medical care if:    · You have new or worsening numbness in your legs.     · You have new or worsening weakness in your legs. (This could make it hard to stand up.)     · You lose control of your bladder or bowels.    Watch closely for changes in your health, and be sure to contact your doctor if:    · You have a fever, lose weight, or don't feel well.     · You do not get better as expected. Where can you learn more? Go to http://rufina-ion.info/. Enter G926 in the search box to learn more about \"Back Pain: Care Instructions. \"  Current as of: September 20, 2018  Content Version: 12.1  © 2595-1704 [employer]. Care instructions adapted under license by [employer] (which disclaims liability or warranty for this information). If you have questions about a medical condition or this instruction, always ask your healthcare professional. [place] [address] any warranty or liability for your use of this information.

## 2019-08-26 NOTE — PROGRESS NOTES
Chief Complaint   Patient presents with    Hypertension     3 month follow up    Blood sugar problem    Chronic Kidney Disease    Cholesterol Problem     Visit Vitals  /82 (BP 1 Location: Left arm, BP Patient Position: Sitting)   Pulse 80   Temp 98 °F (36.7 °C) (Oral)   Resp 19   Ht 5' 3\" (1.6 m)   Wt 196 lb 9.6 oz (89.2 kg)   SpO2 98%   BMI 34.83 kg/m²     1. Have you been to the ER, urgent care clinic since your last visit? Hospitalized since your last visit? No    2. Have you seen or consulted any other health care providers outside of the [address] [place] [address] since your last visit? Include any pap smears or colon screening.  No

## 2019-09-04 ENCOUNTER — OFFICE VISIT (OUTPATIENT)
Dept: INTERNAL MEDICINE CLINIC | Age: 74
End: 2019-09-04

## 2019-09-04 VITALS
HEIGHT: 63 IN | RESPIRATION RATE: 16 BRPM | BODY MASS INDEX: 35.26 KG/M2 | TEMPERATURE: 98 F | HEART RATE: 88 BPM | OXYGEN SATURATION: 97 % | SYSTOLIC BLOOD PRESSURE: 138 MMHG | WEIGHT: 199 LBS | DIASTOLIC BLOOD PRESSURE: 78 MMHG

## 2019-09-04 DIAGNOSIS — L23.9 ALLERGIC CONTACT DERMATITIS, UNSPECIFIED TRIGGER: Primary | ICD-10-CM

## 2019-09-04 RX ORDER — METHYLPREDNISOLONE ACETATE 40 MG/ML
40 INJECTION, SUSPENSION INTRA-ARTICULAR; INTRALESIONAL; INTRAMUSCULAR; SOFT TISSUE ONCE
Qty: 1 VIAL | Refills: 0
Start: 2019-09-04 | End: 2019-09-04

## 2019-09-04 NOTE — PROGRESS NOTES
Per Raoul Diaz from Dr. Indio Damon and after obtaining consent from patient Methylprednisolone 40mg was given IM in left deltoid. Patient tolerated well and will contact us with any reactions.

## 2019-09-04 NOTE — PATIENT INSTRUCTIONS
Rash: Care Instructions  Your Care Instructions  A rash is any irritation or inflammation of the skin. Rashes have many possible causes, including allergy, infection, illness, heat, and emotional stress. Follow-up care is a key part of your treatment and safety. Be sure to make and go to all appointments, and call your doctor if you are having problems. It's also a good idea to know your test results and keep a list of the medicines you take. How can you care for yourself at home? · Wash the area with water only. Soap can make dryness and itching worse. Pat dry. · Put cold, wet cloths on the rash to reduce itching. · Keep cool, and stay out of the sun. · Leave the rash open to the air as much of the time as possible. · Sometimes petroleum jelly (Vaseline) can help relieve the discomfort caused by a rash. A moisturizing lotion, such as Cetaphil, also may help. Calamine lotion may help for rashes caused by contact with something (such as a plant or soap) that irritated the skin. Use it 3 or 4 times a day. · If your doctor prescribed a cream, use it as directed. If your doctor prescribed medicine, take it exactly as directed. · If your rash itches so badly that it interferes with your normal activities, take an over-the-counter antihistamine, such as diphenhydramine (Benadryl) or loratadine (Claritin). Read and follow all instructions on the label. When should you call for help? Call your doctor now or seek immediate medical care if:    · You have signs of infection, such as:  ? Increased pain, swelling, warmth, or redness. ? Red streaks leading from the area. ? Pus draining from the area. ? A fever.     · You have joint pain along with the rash.    Watch closely for changes in your health, and be sure to contact your doctor if:    · Your rash is changing or getting worse.  For example, call if you have pain along with the rash, the rash is spreading, or you have new blisters.     · You do not get better after 1 week. Where can you learn more? Go to http://rufina-ion.info/. Enter T584 in the search box to learn more about \"Rash: Care Instructions. \"  Current as of: April 1, 2019  Content Version: 12.1  © 5861-7457 [employer]. Care instructions adapted under license by [employer] (which disclaims liability or warranty for this information). If you have questions about a medical condition or this instruction, always ask your healthcare professional. [address] any warranty or liability for your use of this information.

## 2019-09-04 NOTE — PROGRESS NOTES
Chief Complaint   Patient presents with    Rash     on her neck     1. Have you been to the ER, urgent care clinic since your last visit? Hospitalized since your last visit? No    2. Have you seen or consulted any other health care providers outside of the [address] [place] [address] since your last visit? Include any pap smears or colon screening.  No

## 2019-09-04 NOTE — PROGRESS NOTES
Subjective:   Wiley Kowalski is a 69 y.o. female      Chief Complaint   Patient presents with    Rash     on her neck        History of present illness: She presents complaining of pruritic rash on her neck is been present now for a few days. There is no pain but it seems it quite a bit. She is not aware as to anything she is been exposed to the possibly could be poison oak or poison ivy. Patient Active Problem List   Diagnosis Code    Choledocholithiasis K80.50    Grade IV hemorrhoids K64.3    Colon polyps K63.5    Primary angle-closure glaucoma H40.20X0    Primary osteoarthritis involving multiple joints M15.0    Mixed hyperlipidemia E78.2    Glucose intolerance (impaired glucose tolerance) R73.02    Hypertension with renal disease I12.9    Osteopenia of multiple sites M85.89    Neoplasm of uncertain behavior of skin D48.5    Elevated LFTs R94.5    Stage 2 chronic kidney disease N18.2    Vitamin D deficiency E55.9    Acute midline low back pain without sciatica M54.5    Medicare annual wellness visit, subsequent Z00.00    Right upper quadrant abdominal pain R10.11    Acute pancreatitis K85.90    Plantar wart B07.0    Severe obesity (BMI 35.0-39. 9) with comorbidity ([facility] [address] E66.01    Contact dermatitis, allergic L23.9    Headache R51    Acute post-traumatic headache, not intractable G44.319      Past Medical History:   Diagnosis Date    Anxiety     Arthritis     Chronic constipation     CKD (chronic kidney disease) 7/17/2017    Colon polyps 7/17/2017    DJD (degenerative joint disease) 7/17/2017    Elevated LFTs 7/17/2017    Flu 02/19/2019    GERD (gastroesophageal reflux disease)     Glucose intolerance (impaired glucose tolerance) 7/17/2017    Hemorrhoids     internal & external- bleeds frequently    High cholesterol     Hyperlipidemia 7/17/2017    Hypertension     Hypertension with renal disease 7/17/2017    PT Education - High Blood Pressure (Essential Hypertension) *: blood pressure PT Education - How to access health information online: discussed with patient and provided information    Hypertension, renal 2017    Ill-defined condition     ringing in ears    Mild obesity 2017    Nausea & vomiting     Neoplasm of uncertain behavior of skin 2017    On statin therapy 2017    Osteopenia 2017    Primary angle-closure glaucoma 2017    Comments: OU    Skin cancer of face     as of 6/15/16 pt states \"removed years ago\"    Spinal stenosis       Allergies   Allergen Reactions    Iodinated Contrast Media Hives    Oxycodone-Aspirin Unknown (comments)      Family History   Problem Relation Age of Onset    Heart Disease Mother     Hypertension Mother     Cancer Mother         skin    Stroke Father     Breast Cancer Sister         onset: 76s    Breast Cancer Niece       Social History     Socioeconomic History    Marital status:      Spouse name: Not on file    Number of children: Not on file    Years of education: Not on file    Highest education level: Not on file   Occupational History    Not on file   Social Needs    Financial resource strain: Not on file    Food insecurity:     Worry: Not on file     Inability: Not on file    Transportation needs:     Medical: Not on file     Non-medical: Not on file   Tobacco Use    Smoking status: Former Smoker     Packs/day: 1.50     Years: 35.00     Pack years: 52.50     Last attempt to quit: 2004     Years since quittin.8    Smokeless tobacco: Never Used    Tobacco comment: quit smoking 6 yrs ago   Substance and Sexual Activity    Alcohol use: No    Drug use: No    Sexual activity: Never   Lifestyle    Physical activity:     Days per week: Not on file     Minutes per session: Not on file    Stress: Not on file   Relationships    Social connections:     Talks on phone: Not on file     Gets together: Not on file     Attends Caoda[employer] service: Not on file     Active member of club or organization: Not on file     Attends meetings of clubs or organizations: Not on file     Relationship status: Not on file    Intimate partner violence:     Fear of current or ex partner: Not on file     Emotionally abused: Not on file     Physically abused: Not on file     Forced sexual activity: Not on file   Other Topics Concern    Not on file   Social History Narrative    Not on file     Prior to Admission medications    Medication Sig Start Date End Date Taking? Authorizing Provider   methylPREDNISolone acetate (DEPO-MEDROL) 40 mg/mL injection 1 mL by IntraMUSCular route once for 1 dose. 9/4/19 9/4/19 Yes Bjorn Johansen MD   raNITIdine (ZANTAC) 300 mg tab TAKE ONE TABLET BY MOUTH ONCE DAILY AT BEDTIME 1/21/19  Yes Bjorn Johansen MD   pitavastatin calcium (LIVALO) 2 mg tablet Take 1 Tab by mouth nightly. 1/21/19  Yes Bjorn Johansen MD   omeprazole (PRILOSEC) 20 mg capsule TAKE ONE CAPSULE BY MOUTH ONCE DAILY 1/21/19  Yes Bjorn Johansen MD   hydroCHLOROthiazide (HYDRODIURIL) 25 mg tablet TAKE ONE TABLET BY MOUTH ONCE DAILY 1/21/19  Yes Bjorn Johansen MD   irbesartan (AVAPRO) 300 mg tablet TAKE ONE TABLET BY MOUTH ONCE DAILY 1/21/19  Yes Bjorn Johansen MD   vit C/E/Zn/coppr/lutein/zeaxan (PRESERVISION AREDS 2 PO) Take  by mouth. Indications: 2 pills dailly   Yes Provider, Historical   biotin 10,000 mcg cap Take  by mouth daily (with dinner). Yes Provider, Historical   Cholecalciferol, Vitamin D3, 3,000 unit tab Take 1,000 Units by mouth every morning. Yes Other, MD Celestino   cyanocobalamin (VITAMIN B12) 500 mcg tablet Take 500 mcg by mouth every morning. Yes Other, MD Celestino   polyethylene glycol (MIRALAX) 17 gram/dose powder Take 17 g by mouth every morning. Indications: CONSTIPATION   Yes Provider, Historical        Review of Systems              Constitutional:  She denies fever, weight loss, sweats or fatigue.               EYES: No blurred or double vision, ENT: no nasal congestion, no headache or dizziness. No difficulty with               swallowing, taste, speech or smell. Respiratory:  No cough, wheezing or shortness of breath. No sputum production. Cardiac:  Denies chest pain, palpitations, unexplained indigestion, syncope, edema, PND or orthopnea. GI:  No changes in bowel movements, no abdominal pain, no bloating, anorexia, nausea, vomiting or heartburn. :  No frequency or dysuria. Denies incontinence or sexual dysfunction. Extremities:  No joint pain, stiffness or swelling  Back:.no pain or soreness  Skin:  No recent rashes or mole changes except pruritic rash neck as noted. Neurological:  No numbness, tingling, burning paresthesias or loss of motor strength. No syncope, dizziness, frequent headaches or memory loss. Hematologic:  No easy bruising  Lymphatic: No lymph node enlargement    Objective:     Vitals:    09/04/19 1342 09/04/19 1410   BP: 142/80 138/78   Pulse: 88    Resp: 16    Temp: 98 °F (36.7 °C)    SpO2: 97%    Weight: 199 lb (90.3 kg)    Height: 5' 3\" (1.6 m)    PainSc:   0 - No pain        Body mass index is 35.25 kg/m². Physical Examination:              General Appearance:  Well-developed, well-nourished, no acute distress. HEENT:      Ears:  The TMs and ear canals were clear. Eyes:  The pupillary responses were normal.  Extraocular muscle function intact. Lids and conjunctiva not injected. Funduscopic exam revealed sharp disc margins. Nares: Clear w/o edema or erythema  Pharynx:  Clear with teeth in good repair. No masses were noted. Neck:  Supple without thyromegaly or adenopathy. No JVD noted. No carotid                bruits. Lungs:  Clear to auscultation and percussion. Cardiac:  Regular rate and rhythm without murmur. PMI not displaced. No gallop, rub or click. Abdominal: Soft, non-tender, no hepata-spleenomegally or masses  Extremities:  No clubbing, cyanosis or edema.   Skin:  No rash or unusual mole changes noted except rash over neck predominantly anteriorly extending clearly across the midline but more on the right than the left and some papular areas on the right side all consistent with allergic dermatitis. Lymph Nodes:  None felt in the cervical, supraclavicular, axillary or inguinal region. Neurological: . DTRs 2+ and symmetric. Station and gait normal.   Hematologic:   No purpura or petechiae        Assessment/Plan:         1. Allergic contact dermatitis, unspecified trigger        Impressions/Plan:  Impression  1. Allergic dermatitis we will treat this with Depo-Medrol 40 IM and she will notify me if this is not effective  Recheck if not resolved otherwise per previous schedule. Orders Placed This Encounter    methylPREDNISolone acetate (DEPO-MEDROL) 40 mg/mL injection           No results found for any visits on 09/04/19. Chanda Acosta MD    The patient was given after the visit summary the patient verbalized an understanding of the plans and problems as explained.

## 2019-09-23 PROBLEM — Z00.00 MEDICARE ANNUAL WELLNESS VISIT, SUBSEQUENT: Status: RESOLVED | Noted: 2017-10-23 | Resolved: 2019-09-23

## 2019-11-21 PROBLEM — Z13.39 ALCOHOL SCREENING: Status: ACTIVE | Noted: 2019-11-21

## 2019-11-21 NOTE — PROGRESS NOTES
This is a Subsequent Medicare Annual Wellness Visit providing Personalized Prevention Plan Services (PPPS) (Performed 12 months after initial AWV and PPPS )    I have reviewed the patient's medical history in detail and updated the computerized patient record. She presents today for Medicare subsequent annual wellness examination and screening questionnaire. She is also here in follow-up of her multiple medical problems include hypertension, glucose intolerance, hyperlipidemia, DJD, CKD stage II, vitamin D deficiency, obesity and other medical problems. She is taking her medications and trying to follow her diet and trying to remain physically active. She currently denies any chest pain, shortness of breath, palpitations, PND, orthopnea or other cardiorespiratory complaints. She notes no GI or  complaints. She notes no headaches, dizziness or neurologic complaints. She has no current arthritic complaints and no other complaints on complete review of systems.     History     Past Medical History:   Diagnosis Date    Anxiety     Arthritis     Chronic constipation     CKD (chronic kidney disease) 7/17/2017    Colon polyps 7/17/2017    DJD (degenerative joint disease) 7/17/2017    Elevated LFTs 7/17/2017    Flu 02/19/2019    GERD (gastroesophageal reflux disease)     Glucose intolerance (impaired glucose tolerance) 7/17/2017    Hemorrhoids     internal & external- bleeds frequently    High cholesterol     Hyperlipidemia 7/17/2017    Hypertension     Hypertension with renal disease 7/17/2017    PT Education - High Blood Pressure (Essential Hypertension) *: blood pressure PT Education - How to access health information online: discussed with patient and provided information    Hypertension, renal 7/17/2017    Ill-defined condition     ringing in ears    Mild obesity 7/17/2017    Nausea & vomiting     Neoplasm of uncertain behavior of skin 7/17/2017    On statin therapy 7/17/2017    Osteopenia 7/17/2017    Primary angle-closure glaucoma 7/17/2017    Comments: OU    Skin cancer of face     as of 6/15/16 pt states \"removed years ago\"    Spinal stenosis       Past Surgical History:   Procedure Laterality Date    HX CHOLECYSTECTOMY  11/6/2014    Dr Lucila Peterson for glaucoma    HX HYSTERECTOMY      partial    HX PELVIC LAPAROSCOPY      Jono. oophorectomy    HX TUBAL LIGATION      [place] COLSC FLX W/REMOVAL LESION BY HOT BX FORCEPS  11/12/2012         [place] ERCP REMOVE CALCULI/DEBRIS BILIARY/PANCREAS DUCT  11/7/2014         [place] ERCP W/SPHINCTEROTOMY/PAPILLOTOMY  11/7/2014          Social History     Tobacco Use    Smoking status: Former Smoker     Packs/day: 1.50     Years: 35.00     Pack years: 52.50     Last attempt to quit: 11/4/2004     Years since quitting: 15.0    Smokeless tobacco: Never Used    Tobacco comment: quit smoking 6 yrs ago   Substance Use Topics    Alcohol use: No    Drug use: No     Current Outpatient Medications   Medication Sig Dispense Refill    raNITIdine (ZANTAC) 300 mg tab TAKE ONE TABLET BY MOUTH ONCE DAILY AT BEDTIME 90 Tab 3    pitavastatin calcium (LIVALO) 2 mg tablet Take 1 Tab by mouth nightly. 90 Tab 3    omeprazole (PRILOSEC) 20 mg capsule TAKE ONE CAPSULE BY MOUTH ONCE DAILY 90 Cap 3    hydroCHLOROthiazide (HYDRODIURIL) 25 mg tablet TAKE ONE TABLET BY MOUTH ONCE DAILY 90 Tab 3    irbesartan (AVAPRO) 300 mg tablet TAKE ONE TABLET BY MOUTH ONCE DAILY 90 Tab 3    vit C/E/Zn/coppr/lutein/zeaxan (PRESERVISION AREDS 2 PO) Take  by mouth. Indications: 2 pills dailly      biotin 10,000 mcg cap Take  by mouth daily (with dinner).  Cholecalciferol, Vitamin D3, 3,000 unit tab Take 1,000 Units by mouth every morning.  cyanocobalamin (VITAMIN B12) 500 mcg tablet Take 500 mcg by mouth every morning.  polyethylene glycol (MIRALAX) 17 gram/dose powder Take 17 g by mouth every morning.  Indications: CONSTIPATION       Allergies   Allergen Reactions    Iodinated Contrast Media Hives    Oxycodone-Aspirin Unknown (comments)     Family History   Problem Relation Age of Onset    Heart Disease Mother     Hypertension Mother     Cancer Mother         skin    Stroke Father     Breast Cancer Sister         onset: 76s    Breast Cancer Niece        Patient Active Problem List    Diagnosis    Primary osteoarthritis involving multiple joints    Mixed hyperlipidemia    Glucose intolerance (impaired glucose tolerance)    Hypertension with renal disease     PT Education - High Blood Pressure (Essential Hypertension) *: blood pressure  PT Education - How to access health information online: discussed with patient and provided information      Stage 2 chronic kidney disease    Severe obesity (BMI 35.0-39. 9) with comorbidity ([address]    Osteopenia of multiple sites    Vitamin D deficiency    Alcohol screening    Headache    Acute post-traumatic headache, not intractable    Contact dermatitis, allergic    Plantar wart    Acute pancreatitis    Right upper quadrant abdominal pain    Acute midline low back pain without sciatica    Medicare annual wellness visit, subsequent    Colon polyps    Primary angle-closure glaucoma     Comments: OU      Neoplasm of uncertain behavior of skin    Elevated LFTs    Grade IV hemorrhoids    Choledocholithiasis       Patient Care Team:  Latrice Anton MD as PCP - General (Internal Medicine)  Latrice Anton MD as PCP - [facility] Empaneled Provider    Depression Risk Factor Screening:     3 most recent PHQ Screens 11/25/2019   Little interest or pleasure in doing things Not at all   Feeling down, depressed, irritable, or hopeless Not at all   Total Score PHQ 2 0     Alcohol Risk Factor Screening:     Alcohol Risk Factor Screening:   Do you average 1 drink per night or more than 7 drinks a week:  No    On any one occasion in the past three months have you have had more than 3 drinks containing alcohol: No    Functional Ability and Level of Safety:     Fall Risk     Fall Risk Assessment, last 12 mths 11/25/2019   Able to walk? Yes   Fall in past 12 months? No   Fall with injury? -   Number of falls in past 12 months -   Fall Risk Score -       Hearing Loss   mild    Activities of Daily Living   Self-care. ADL Assessment 11/25/2019   Feeding yourself No Help Needed   Getting from bed to chair No Help Needed   Getting dressed No Help Needed   Bathing or showering No Help Needed   Walk across the room (includes cane/walker) No Help Needed   Using the telphone No Help Needed   Taking your medications No Help Needed   Preparing meals No Help Needed   Managing money (expenses/bills) No Help Needed   Moderately strenuous housework (laundry) No Help Needed   Shopping for personal items (toiletries/medicines) No Help Needed   Shopping for groceries No Help Needed   Driving No Help Needed   Climbing a flight of stairs No Help Needed   Getting to places beyond walking distances No Help Needed       Abuse Screen   Patient is not abused    Social History     Patient does not qualify to have social determinant information on file (likely too young). Social History Narrative    Not on file       Review of Systems      ROS:    Constitutional: She denies fevers, weight loss, sweats. Eyes: No blurred or double vision. ENT: No difficulty with swallowing, taste, speech or smell. NECK: no stiffness swelling or lymph node enlargement  Respiratory: No cough wheezing or shortness of breath. Cardiovascular: Denies chest pain, palpitations, unexplained indigestion or syncope. Breast: She has noted no masses or lumps and no discharge or axillary swelling  Gastrointestinal:  No changes in bowel movements, no abdominal pain, no bloating. Genitourinary: No discharge or abnormal bleeding or pain  Extremities: No joint pain, stiffness or swelling. Neurological:  No numbness, tingling, burring paresthesias or loss of motor strength. No syncope, dizziness or frequent headache  Skin:  No recent rashes or mole changes. Psychiatric/Behavioral:  Negative for depression. The patient is not nervous/anxious. HEMATOLOGIC: no easy bruising or bleeding gums  Endocrine: no sweats of urinary frequency or excessive thirst    Physical Examination     Evaluation of Cognitive Function:  Mood/affect:  happy  Appearance: age appropriate  Family member/caregiver input: none    Visit Vitals  /78 (BP 1 Location: Left arm, BP Patient Position: Sitting)   Pulse 66   Temp 97.8 °F (36.6 °C)   Resp 16   Ht 5' 3\" (1.6 m)   Wt 196 lb 9.6 oz (89.2 kg)   SpO2 97%   BMI 34.83 kg/m²     Vitals:    11/25/19 1054   BP: 136/78   Pulse: 66   Resp: 16   Temp: 97.8 °F (36.6 °C)   SpO2: 97%   Weight: 196 lb 9.6 oz (89.2 kg)   Height: 5' 3\" (1.6 m)   PainSc:   0 - No pain        PHYSICAL EXAM:    General appearance - alert, well appearing, and in no distress  Mental status - alert, oriented to person, place, and time  HEENT:  Ears - bilateral TM's and external ear canals clear  Eyes - pupillary responses were normal.  Extraocular muscle function intact. Lids and conjunctiva not injected. Fundoscopic exam revealed sharp disc margins. eye movements intact  Pharynx- clear with teeth in good repair. No masses were noted  Neck - supple without thyromegaly or burit. No JVD noted  Lungs - clear to auscultation and percussion  Cardiac- normal rate, regular rhythm without murmurs. PMI not displaced. No gallop, rub or click  Breast: deferred to GYN  Abdomen - flat, soft, non-tender without palpable organomegaly or mass. No pulsatile mass was felt, and not bruit was heard.   Bowel sounds were active   Female - deferred to GYN  Rectal - deferred to GYN  Extremities -  no clubbing cyanosis or edema  Lymphatics - no palpable lymphadenopathy, no hepatosplenomegaly  Peripheral vascular - Dorsalis pedis and posterior tibial pulses felt without difficulty  Skin - no rash or unusual mole change noted  Neurological - Cranial nerves II-XII grossly intact. Motor strength 5/5. DTR's 2+ and symmetric. Station and gait normal  Back exam - full range of motion, no tenderness, palpable spasm or pain on motion  Musculoskeletal - no joint tenderness, deformity or swelling  Hematologic: no purpura, petechiae or bruising    Results for orders placed or performed in visit on 76/22/93   METABOLIC PANEL, COMPREHENSIVE   Result Value Ref Range    Glucose 102 65 - 105 mg/dL    BUN 21.0 (H) 7.0 - 17.0 mg/dL    Creatinine 0.6 (L) 0.7 - 1.2 mg/dL    Sodium 141 137 - 145 mmol/L    Potassium 4.2 3.6 - 5.0 mmol/L    Chloride 102 98 - 107 mmol/L    CO2 31.0 22.0 - 32.0 mmol/L    Calcium 10.1 8.4 - 10.2 mg/dl    Protein, total 7.6 6.3 - 8.2 g/dL    Albumin 4.3 3.9 - 5.4 g/dL    ALT (SGPT) 30 0 - 35 U/L    AST (SGOT) 26.0 14.0 - 36.0 U/L    Alk.  phosphatase 92 38 - 126 U/L    Bilirubin, total 1.2 0.2 - 1.3 mg/dL    BUN/Creatinine ratio 35 Ratio    GFR est AA >60 mL/min/1.73m2    GFR est non-AA >60 mL/min/1.73m2    Globulin 3.30     A-G Ratio 1.3 Ratio    Anion gap 8 mmol/L   LIPID PANEL   Result Value Ref Range    Cholesterol, total 186 0 - 200 mg/dL    Triglyceride 162 0 - 200 mg/dL    HDL Cholesterol 56 35 - 130 mg/dL    VLDL 32 mg/dL    LDL, calculated 98 0 - 130 mg/dL    CHOL/HDL Ratio 3 0 - 4 Ratio    LDL/HDL Ratio 2 Ratio   CK   Result Value Ref Range    CK 58.00 30.00 - 135.00 U/L   HEMOGLOBIN A1C W/O EAG   Result Value Ref Range    Hemoglobin A1c 5.3 4.0 - 5.7 %   URINALYSIS W/O MICRO   Result Value Ref Range    Color Pale Yellow Pale Yellow - Yellow    CLARITY clear Clear    Glucose urine, 24 hr Negative Negative    Ketone Negative Negative    Bilirubin Negative Negative    Specific gravity 1.015 1.000 - 1.030    pH (UA) 7 5 - 7    Blood Negative Negative    Protein Negative Negative    Urobilinogen Negative Negative    Nitrites Negative Negative    Leukocyte Esterase Negative Negative Advice/Referrals/Counseling   Education and counseling provided:  Are appropriate based on today's review and evaluation  End-of-Life planning (with patient's consent)  Pneumococcal Vaccine  Influenza Vaccine  Colorectal cancer screening tests      Assessment/Plan     ASSESSMENT:   1. Hypertension with renal disease    2. Glucose intolerance (impaired glucose tolerance)    3. Mixed hyperlipidemia    4. Primary osteoarthritis involving multiple joints    5. Stage 2 chronic kidney disease    6. Osteopenia of multiple sites    7. Severe obesity (BMI 35.0-39. 9) with comorbidity ([address]    8. Vitamin D deficiency    9. Alcohol screening    10. Medicare annual wellness visit, subsequent    11. Encounter for immunization      Impression  1. Hypertension that is controlled so continue current therapy reviewed with her. 2.  Glucose intolerance repeat status pending a prior lab review not make adjustments if necessary. 3.  Hyperlipidemia prior lab reviewed and repeat status pending I will adjust if needed. 4. DJD that is stable  5. CKD stage II repeat status pending  6. Osteopenia reviewed prior bone density  7. Obesity we did discuss diet, exercise and weight reduction for overall health benefit. 8.  Vitamin D deficiency repeat status is pending  9. Annual alcohol screening is done and she currently does not drink alcohol at all. We discussed complications of drinking more than 1 drink per day in females with increased risk of liver disease and other GI effects as well as increased cardiovascular risk. Flu shot given today. Medicare subsequent annual wellness examination screening questionnaire is completed the results were reviewed with her and her questions were answered. Lifestyle recommendations modifications discussed and made. I will call with lab results and make further recommendations or adjustments if necessary. Follow-up in 3 months or sooner if there is a problem.     PLAN:  .  Orders Placed This Encounter    Influenza Vaccine Inactivated (IIV)(FLUAD), Subunit, Adjuvanted, IM, ([id number])    CBC WITH AUTOMATED DIFF    METABOLIC PANEL, COMPREHENSIVE ([facility])    LIPID PANEL ([facility])    CK ([facility])    HEMOGLOBIN A1C W/O EAG ([facility])    T4, FREE ([facility])    TSH 3RD GENERATION ([facility])    URINALYSIS W/O MICRO ([facility])    VITAMIN D, 25 HYDROXY ([facility])         ATTENTION:   This medical record was transcribed using an electronic medical records system. Although proofread, it may and can contain electronic and spelling errors. Other human spelling and other errors may be present. Corrections may be executed at a later time. Please feel free to contact us for any clarifications as needed. Follow-up and Dispositions    · Return in about 3 months (around 2/25/2020). Kenan Choi MD    Recommended healthy diet low in carbohydrates, fats, sodium and cholesterol. Recommended regular cardiovascular exercise 3-6 times per week for 30-60 minutes daily. Current Outpatient Medications   Medication Sig Dispense Refill    raNITIdine (ZANTAC) 300 mg tab TAKE ONE TABLET BY MOUTH ONCE DAILY AT BEDTIME 90 Tab 3    pitavastatin calcium (LIVALO) 2 mg tablet Take 1 Tab by mouth nightly. 90 Tab 3    omeprazole (PRILOSEC) 20 mg capsule TAKE ONE CAPSULE BY MOUTH ONCE DAILY 90 Cap 3    hydroCHLOROthiazide (HYDRODIURIL) 25 mg tablet TAKE ONE TABLET BY MOUTH ONCE DAILY 90 Tab 3    irbesartan (AVAPRO) 300 mg tablet TAKE ONE TABLET BY MOUTH ONCE DAILY 90 Tab 3    vit C/E/Zn/coppr/lutein/zeaxan (PRESERVISION AREDS 2 PO) Take  by mouth. Indications: 2 pills dailly      biotin 10,000 mcg cap Take  by mouth daily (with dinner).  Cholecalciferol, Vitamin D3, 3,000 unit tab Take 1,000 Units by mouth every morning.  cyanocobalamin (VITAMIN B12) 500 mcg tablet Take 500 mcg by mouth every morning.       polyethylene glycol (MIRALAX) 17 gram/dose powder Take 17 g by mouth every morning. Indications: CONSTIPATION         Results for orders placed or performed in visit on 69/80/09   METABOLIC PANEL, COMPREHENSIVE   Result Value Ref Range    Glucose 102 65 - 105 mg/dL    BUN 21.0 (H) 7.0 - 17.0 mg/dL    Creatinine 0.6 (L) 0.7 - 1.2 mg/dL    Sodium 141 137 - 145 mmol/L    Potassium 4.2 3.6 - 5.0 mmol/L    Chloride 102 98 - 107 mmol/L    CO2 31.0 22.0 - 32.0 mmol/L    Calcium 10.1 8.4 - 10.2 mg/dl    Protein, total 7.6 6.3 - 8.2 g/dL    Albumin 4.3 3.9 - 5.4 g/dL    ALT (SGPT) 30 0 - 35 U/L    AST (SGOT) 26.0 14.0 - 36.0 U/L    Alk. phosphatase 92 38 - 126 U/L    Bilirubin, total 1.2 0.2 - 1.3 mg/dL    BUN/Creatinine ratio 35 Ratio    GFR est AA >60 mL/min/1.73m2    GFR est non-AA >60 mL/min/1.73m2    Globulin 3.30     A-G Ratio 1.3 Ratio    Anion gap 8 mmol/L   LIPID PANEL   Result Value Ref Range    Cholesterol, total 186 0 - 200 mg/dL    Triglyceride 162 0 - 200 mg/dL    HDL Cholesterol 56 35 - 130 mg/dL    VLDL 32 mg/dL    LDL, calculated 98 0 - 130 mg/dL    CHOL/HDL Ratio 3 0 - 4 Ratio    LDL/HDL Ratio 2 Ratio   CK   Result Value Ref Range    CK 58.00 30.00 - 135.00 U/L   HEMOGLOBIN A1C W/O EAG   Result Value Ref Range    Hemoglobin A1c 5.3 4.0 - 5.7 %   URINALYSIS W/O MICRO   Result Value Ref Range    Color Pale Yellow Pale Yellow - Yellow    CLARITY clear Clear    Glucose urine, 24 hr Negative Negative    Ketone Negative Negative    Bilirubin Negative Negative    Specific gravity 1.015 1.000 - 1.030    pH (UA) 7 5 - 7    Blood Negative Negative    Protein Negative Negative    Urobilinogen Negative Negative    Nitrites Negative Negative    Leukocyte Esterase Negative Negative       Verbal and written instructions (see AVS) provided. Patient expresses understanding of diagnosis and treatment plan.     Valeri Marshall MD

## 2019-11-25 ENCOUNTER — OFFICE VISIT (OUTPATIENT)
Dept: INTERNAL MEDICINE CLINIC | Age: 74
End: 2019-11-25

## 2019-11-25 VITALS
HEIGHT: 63 IN | OXYGEN SATURATION: 97 % | RESPIRATION RATE: 16 BRPM | HEART RATE: 66 BPM | TEMPERATURE: 97.8 F | BODY MASS INDEX: 34.84 KG/M2 | WEIGHT: 196.6 LBS | SYSTOLIC BLOOD PRESSURE: 136 MMHG | DIASTOLIC BLOOD PRESSURE: 78 MMHG

## 2019-11-25 DIAGNOSIS — Z13.39 ALCOHOL SCREENING: ICD-10-CM

## 2019-11-25 DIAGNOSIS — R73.02 GLUCOSE INTOLERANCE (IMPAIRED GLUCOSE TOLERANCE): ICD-10-CM

## 2019-11-25 DIAGNOSIS — N18.2 STAGE 2 CHRONIC KIDNEY DISEASE: ICD-10-CM

## 2019-11-25 DIAGNOSIS — Z00.00 MEDICARE ANNUAL WELLNESS VISIT, SUBSEQUENT: ICD-10-CM

## 2019-11-25 DIAGNOSIS — E55.9 VITAMIN D DEFICIENCY: ICD-10-CM

## 2019-11-25 DIAGNOSIS — I12.9 HYPERTENSION WITH RENAL DISEASE: Primary | ICD-10-CM

## 2019-11-25 DIAGNOSIS — Z23 ENCOUNTER FOR IMMUNIZATION: ICD-10-CM

## 2019-11-25 DIAGNOSIS — M15.9 PRIMARY OSTEOARTHRITIS INVOLVING MULTIPLE JOINTS: ICD-10-CM

## 2019-11-25 DIAGNOSIS — E66.01 SEVERE OBESITY (BMI 35.0-39.9) WITH COMORBIDITY (HCC): ICD-10-CM

## 2019-11-25 DIAGNOSIS — E78.2 MIXED HYPERLIPIDEMIA: ICD-10-CM

## 2019-11-25 DIAGNOSIS — M85.89 OSTEOPENIA OF MULTIPLE SITES: ICD-10-CM

## 2019-11-25 LAB
A-G RATIO,AGRAT: 1.3 RATIO
ALBUMIN SERPL-MCNC: 4.3 G/DL (ref 3.9–5.4)
ALP SERPL-CCNC: 92 U/L (ref 38–126)
ALT SERPL-CCNC: 30 U/L (ref 0–35)
ANION GAP SERPL CALC-SCNC: 8 MMOL/L
AST SERPL W P-5'-P-CCNC: 26 U/L (ref 14–36)
BILIRUB SERPL-MCNC: 1.2 MG/DL (ref 0.2–1.3)
BILIRUB UR QL: NEGATIVE
BUN SERPL-MCNC: 21 MG/DL (ref 7–17)
BUN/CREATININE RATIO,BUCR: 35 RATIO
CALCIUM SERPL-MCNC: 10.1 MG/DL (ref 8.4–10.2)
CHLORIDE SERPL-SCNC: 102 MMOL/L (ref 98–107)
CHOL/HDL RATIO,CHHD: 3 RATIO (ref 0–4)
CHOLEST SERPL-MCNC: 186 MG/DL (ref 0–200)
CK SERPL-CCNC: 58 U/L (ref 30–135)
CLARITY: CLEAR
CO2 SERPL-SCNC: 31 MMOL/L (ref 22–32)
COLOR UR: NORMAL
CREAT SERPL-MCNC: 0.6 MG/DL (ref 0.7–1.2)
GLOBULIN,GLOB: 3.3
GLUCOSE 24H UR-MRATE: NEGATIVE G/(24.H)
GLUCOSE SERPL-MCNC: 102 MG/DL (ref 65–105)
HBA1C MFR BLD HPLC: 5.3 % (ref 4–5.7)
HDLC SERPL-MCNC: 56 MG/DL (ref 35–130)
HGB UR QL STRIP: NEGATIVE
KETONES UR QL STRIP.AUTO: NEGATIVE
LDL/HDL RATIO,LDHD: 2 RATIO
LDLC SERPL CALC-MCNC: 98 MG/DL (ref 0–130)
LEUKOCYTE ESTERASE: NEGATIVE
NITRITE UR QL STRIP.AUTO: NEGATIVE
PH UR STRIP: 7 [PH] (ref 5–7)
POTASSIUM SERPL-SCNC: 4.2 MMOL/L (ref 3.6–5)
PROT SERPL-MCNC: 7.6 G/DL (ref 6.3–8.2)
PROT UR STRIP-MCNC: NEGATIVE MG/DL
SODIUM SERPL-SCNC: 141 MMOL/L (ref 137–145)
SP GR UR REFRACTOMETRY: 1.01 (ref 1–1.03)
TRIGL SERPL-MCNC: 162 MG/DL (ref 0–200)
UROBILINOGEN UR QL STRIP.AUTO: NEGATIVE
VLDLC SERPL CALC-MCNC: 32 MG/DL

## 2019-11-25 NOTE — PROGRESS NOTES
Santos Mendoza  Identified pt with two pt identifiers(name and ). Chief Complaint   Patient presents with   Richard Lora Annual Wellness Visit       1. Have you been to the ER, urgent care clinic since your last visit? Hospitalized since your last visit? no    2. Have you seen or consulted any other health care providers outside of the [address] [place] [address] since your last visit? Include any pap smears or colon screening. no      Health Maintenance Topics with due status: Overdue       Topic Date Due    Shingrix Vaccine Age 50> 1997    GLAUCOMA SCREENING Q2Y 2012    Pneumococcal 65+ years 2016    COLONOSCOPY 2019    Influenza Age 9 to Adult 2019     Health Maintenance Topics with due status: Due On       Topic Date Due    MEDICARE YEARLY EXAM 2019     Health Maintenance Topics with due status: Not Due       Topic Last Completion Date    DTaP/Tdap/Td series 10/23/2017    BREAST CANCER SCRN MAMMOGRAM 2018     Health Maintenance Topics with due status: Completed       Topic Last Completion Date    Bone Densitometry (Dexa) Screening 2016    Hepatitis C Screening 10/23/2017           Medication reconciliation up to date and corrected with patient at this time. Today's provider has been notified of reason for visit, vitals and flowsheets obtained on patients. Reviewed record in preparation for visit, huddled with provider and have obtained necessary documentation.         Wt Readings from Last 3 Encounters:   19 199 lb (90.3 kg)   19 196 lb 9.6 oz (89.2 kg)   19 195 lb 3.2 oz (88.5 kg)     Temp Readings from Last 3 Encounters:   19 98 °F (36.7 °C)   19 98 °F (36.7 °C) (Oral)   19 97.8 °F (36.6 °C) (Oral)     BP Readings from Last 3 Encounters:   19 138/78   19 118/82   19 132/88     Pulse Readings from Last 3 Encounters:   19 88   19 80   19 70     There were no vitals filed for this visit. Learning Assessment:  :     Learning Assessment 7/17/2017 10/28/2014   PRIMARY LEARNER Patient Patient   HIGHEST LEVEL OF EDUCATION - PRIMARY LEARNER  2 YEARS OF COLLEGE -   BARRIERS PRIMARY LEARNER NONE -   PRIMARY LANGUAGE ENGLISH ENGLISH   LEARNER PREFERENCE PRIMARY DEMONSTRATION LISTENING   ANSWERED BY patient patient   RELATIONSHIP SELF SELF       Depression Screening:  :     3 most recent PHQ Screens 9/4/2019   Little interest or pleasure in doing things Not at all   Feeling down, depressed, irritable, or hopeless Not at all   Total Score PHQ 2 0       No flowsheet data found. Fall Risk Assessment:  :     Fall Risk Assessment, last 12 mths 9/4/2019   Able to walk? Yes   Fall in past 12 months? No   Fall with injury? -   Number of falls in past 12 months -   Fall Risk Score -       Abuse Screening:  :     Abuse Screening Questionnaire 11/25/2019 11/15/2018 5/10/2018 7/17/2017   Do you ever feel afraid of your partner? N N N N   Are you in a relationship with someone who physically or mentally threatens you? N N N N   Is it safe for you to go home?  Y Y Y Y       ADL Screening:  :     ADL Assessment 11/25/2019   Feeding yourself No Help Needed   Getting from bed to chair No Help Needed   Getting dressed No Help Needed   Bathing or showering No Help Needed   Walk across the room (includes cane/walker) No Help Needed   Using the telphone No Help Needed   Taking your medications No Help Needed   Preparing meals No Help Needed   Managing money (expenses/bills) No Help Needed   Moderately strenuous housework (laundry) No Help Needed   Shopping for personal items (toiletries/medicines) No Help Needed   Shopping for groceries No Help Needed   Driving No Help Needed   Climbing a flight of stairs No Help Needed   Getting to places beyond walking distances No Help Needed

## 2019-11-25 NOTE — PROGRESS NOTES
After obtaining consent and per verbal order from Dr. Rene Loza, patient received influenza vaccine given by Enoc Cabello and verified by Les Tristan. Fluad Influenza 0.5ml was given IM in right deltoid. Patient tolerated injection and was observed for 10 minutes post injection.  VIS was given.'

## 2019-11-25 NOTE — PATIENT INSTRUCTIONS
Back Pain: Care Instructions Your Care Instructions Back pain has many possible causes. It is often related to problems with muscles and ligaments of the back. It may also be related to problems with the nerves, discs, or bones of the back. Moving, lifting, standing, sitting, or sleeping in an awkward way can strain the back. Sometimes you don't notice the injury until later. Arthritis is another common cause of back pain. Although it may hurt a lot, back pain usually improves on its own within several weeks. Most people recover in 12 weeks or less. Using good home treatment and being careful not to stress your back can help you feel better sooner. Follow-up care is a key part of your treatment and safety. Be sure to make and go to all appointments, and call your doctor if you are having problems. It's also a good idea to know your test results and keep a list of the medicines you take. How can you care for yourself at home? · Sit or lie in positions that are most comfortable and reduce your pain. Try one of these positions when you lie down: ? Lie on your back with your knees bent and supported by large pillows. ? Lie on the floor with your legs on the seat of a sofa or chair. ? Lie on your side with your knees and hips bent and a pillow between your legs. ? Lie on your stomach if it does not make pain worse. · Do not sit up in bed, and avoid soft couches and twisted positions. Bed rest can help relieve pain at first, but it delays healing. Avoid bed rest after the first day of back pain. · Change positions every 30 minutes. If you must sit for long periods of time, take breaks from sitting. Get up and walk around, or lie in a comfortable position. · Try using a heating pad on a low or medium setting for 15 to 20 minutes every 2 or 3 hours. Try a warm shower in place of one session with the heating pad. · You can also try an ice pack for 10 to 15 minutes every 2 to 3 hours. Put a thin cloth between the ice pack and your skin. · Take pain medicines exactly as directed. ? If the doctor gave you a prescription medicine for pain, take it as prescribed. ? If you are not taking a prescription pain medicine, ask your doctor if you can take an over-the-counter medicine. · Take short walks several times a day. You can start with 5 to 10 minutes, 3 or 4 times a day, and work up to longer walks. Walk on level surfaces and avoid hills and stairs until your back is better. · Return to work and other activities as soon as you can. Continued rest without activity is usually not good for your back. · To prevent future back pain, do exercises to stretch and strengthen your back and stomach. Learn how to use good posture, safe lifting techniques, and proper body mechanics. When should you call for help? Call your doctor now or seek immediate medical care if: 
  · You have new or worsening numbness in your legs.  
  · You have new or worsening weakness in your legs. (This could make it hard to stand up.)  
  · You lose control of your bladder or bowels.  
 Watch closely for changes in your health, and be sure to contact your doctor if: 
  · You have a fever, lose weight, or don't feel well.  
  · You do not get better as expected. Where can you learn more? Go to http://rufina-ion.info/. Enter A365 in the search box to learn more about \"Back Pain: Care Instructions. \" Current as of: June 26, 2019 Content Version: 12.2 © 3685-3747 [employer]. Care instructions adapted under license by [employer] (which disclaims liability or warranty for this information). If you have questions about a medical condition or this instruction, always ask your healthcare professional. [place] [address] any warranty or liability for your use of this information.

## 2019-11-26 LAB
25(OH)D3 SERPL-MCNC: 45 NG/ML (ref 30–96)
BASOPHILS # BLD AUTO: 0.1 X10E3/UL (ref 0–0.2)
BASOPHILS NFR BLD AUTO: 1 %
EOSINOPHIL # BLD AUTO: 0.5 X10E3/UL (ref 0–0.4)
EOSINOPHIL NFR BLD AUTO: 6 %
ERYTHROCYTE [DISTWIDTH] IN BLOOD BY AUTOMATED COUNT: 13.7 % (ref 12.3–15.4)
HCT VFR BLD AUTO: 39.3 % (ref 34–46.6)
HGB BLD-MCNC: 13.5 G/DL (ref 11.1–15.9)
IMM GRANULOCYTES # BLD AUTO: 0 X10E3/UL (ref 0–0.1)
IMM GRANULOCYTES NFR BLD AUTO: 0 %
LYMPHOCYTES # BLD AUTO: 3.5 X10E3/UL (ref 0.7–3.1)
LYMPHOCYTES NFR BLD AUTO: 46 %
MCH RBC QN AUTO: 29.3 PG (ref 26.6–33)
MCHC RBC AUTO-ENTMCNC: 34.4 G/DL (ref 31.5–35.7)
MCV RBC AUTO: 85 FL (ref 79–97)
MONOCYTES # BLD AUTO: 0.5 X10E3/UL (ref 0.1–0.9)
MONOCYTES NFR BLD AUTO: 6 %
NEUTROPHILS # BLD AUTO: 3.2 X10E3/UL (ref 1.4–7)
NEUTROPHILS NFR BLD AUTO: 41 %
PLATELET # BLD AUTO: 255 X10E3/UL (ref 150–450)
RBC # BLD AUTO: 4.6 X10E6/UL (ref 3.77–5.28)
T4 FREE SERPL-MCNC: 0.94 NG/DL (ref 0.58–2.3)
TSH SERPL DL<=0.05 MIU/L-ACNC: 1.89 UIU/ML (ref 0.34–5.6)
WBC # BLD AUTO: 7.7 X10E3/UL (ref 3.4–10.8)

## 2019-12-16 DIAGNOSIS — I12.9 HYPERTENSION WITH RENAL DISEASE: ICD-10-CM

## 2019-12-16 DIAGNOSIS — K21.9 GASTROESOPHAGEAL REFLUX DISEASE WITHOUT ESOPHAGITIS: ICD-10-CM

## 2019-12-16 DIAGNOSIS — I10 ESSENTIAL HYPERTENSION: ICD-10-CM

## 2019-12-16 RX ORDER — RANITIDINE 300 MG/1
TABLET ORAL
Qty: 90 TAB | Refills: 3 | Status: SHIPPED | OUTPATIENT
Start: 2019-12-16 | End: 2020-05-04

## 2019-12-16 RX ORDER — IRBESARTAN 300 MG/1
TABLET ORAL
Qty: 90 TAB | Refills: 3 | Status: SHIPPED | OUTPATIENT
Start: 2019-12-16 | End: 2020-11-20

## 2019-12-16 RX ORDER — OMEPRAZOLE 20 MG/1
CAPSULE, DELAYED RELEASE ORAL
Qty: 90 CAP | Refills: 3 | Status: SHIPPED | OUTPATIENT
Start: 2019-12-16 | End: 2020-11-20

## 2019-12-16 RX ORDER — HYDROCHLOROTHIAZIDE 25 MG/1
TABLET ORAL
Qty: 90 TAB | Refills: 3 | Status: SHIPPED | OUTPATIENT
Start: 2019-12-16 | End: 2020-11-20

## 2019-12-16 RX ORDER — PITAVASTATIN CALCIUM 2.09 MG/1
TABLET, FILM COATED ORAL
Qty: 90 TAB | Refills: 3 | Status: SHIPPED | OUTPATIENT
Start: 2019-12-16 | End: 2020-11-20

## 2019-12-16 NOTE — TELEPHONE ENCOUNTER
RX refill request from the patient/pharmacy. Patient last seen 11- with labs, and next appt. scheduled for 03-  Requested Prescriptions     Pending Prescriptions Disp Refills    LIVALO 2 mg tablet [Pharmacy Med Name: LIVALO  2MG  TAB] 90 Tab 3     Sig: TAKE 1 TABLET BY MOUTH  EVERY NIGHT AT BEDTIME    hydroCHLOROthiazide (HYDRODIURIL) 25 mg tablet [Pharmacy Med Name: HYDROCHLOROTHIAZIDE  25MG  TAB] 90 Tab 3     Sig: TAKE 1 TABLET BY MOUTH  EVERY DAY    omeprazole (PRILOSEC) 20 mg capsule [Pharmacy Med Name: OMEPRAZOLE  20MG  CAP] 90 Cap 3     Sig: TAKE 1 CAPSULE BY MOUTH  EVERY DAY    irbesartan (AVAPRO) 300 mg tablet [Pharmacy Med Name: IRBESARTAN  300MG  TAB] 90 Tab 3     Sig: TAKE 1 TABLET BY MOUTH  EVERY DAY    raNITIdine (ZANTAC) 300 mg tab [Pharmacy Med Name: RANITIDINE  300MG  TAB] 90 Tab 3     Sig: TAKE 1 TABLET BY MOUTH  EVERY NIGHT AT BEDTIME   .

## 2019-12-21 PROBLEM — Z00.00 MEDICARE ANNUAL WELLNESS VISIT, SUBSEQUENT: Status: RESOLVED | Noted: 2017-10-23 | Resolved: 2019-12-21

## 2019-12-27 NOTE — MR AVS SNAPSHOT
Visit Information Date & Time Provider Department Dept. Phone Encounter #  
 8/9/2017  2:20 PM Rosalba Cuba, [address] [facility] [phone] [id number] Your Appointments 10/23/2017  9:20 AM  
FOLLOW UP 10 with [facility] Appt Note: 3 month flp - Hypertension glucose intolerance hyperlipidemia obesity DJD  
 [address] [id number] [address] [address] Upcoming Health Maintenance Date Due Hepatitis C Screening 1945 DTaP/Tdap/Td series (1 - Tdap) 8/2/1968 FOBT Q 1 YEAR AGE 50-75 8/2/1997 ZOSTER VACCINE AGE 60> 6/2/2007 GLAUCOMA SCREENING Q2Y 8/2/2012 MEDICARE YEARLY EXAM 8/2/2012 Pneumococcal 65+ Low/Medium Risk (2 of 2 - PPSV23) 7/6/2016 INFLUENZA AGE 9 TO ADULT 8/1/2017 BREAST CANCER SCRN MAMMOGRAM 9/16/2018 Allergies as of 8/9/2017  Review Complete On: 8/9/2017 By: Rebeca Andrews RN Severity Noted Reaction Type Reactions Iodinated Contrast- Oral And Iv Dye  07/17/2017    Hives Oxycodone-aspirin  07/17/2017    Unknown (comments) Simvastatin  07/17/2017    Myalgia Benicar [Olmesartan] Low 11/09/2012   Intolerance Cough Current Immunizations  Never Reviewed Name Date Influenza Vaccine 10/10/2016, 1/11/2016 Pneumococcal Conjugate (PCV-13) 7/6/2015 Pneumococcal Vaccine (Unspecified Type) 10/1/2010 Not reviewed this visit Vitals BP Pulse Temp Resp Height(growth percentile) Weight(growth percentile) 122/70 (BP 1 Location: Right arm, BP Patient Position: Sitting) 92 98 °F (36.7 °C) (Oral) 18 5' 1.5\" (1.562 m) 203 lb (92.1 kg) SpO2 BMI OB Status Smoking Status 92% 37.74 kg/m2 Hysterectomy Former Smoker BMI and BSA Data  Body Mass Index Body Surface Area  
 37.74 kg/m 2 2 m 2  
  
  
 Preferred Pharmacy Pharmacy Name Phone [facility] Vicente Cabrera [phone] Your Updated Medication List  
  
   
This list is accurate as of: 8/9/17  3:28 PM.  Always use your most recent med list.  
  
  
  
  
 biotin 10,000 mcg Cap Take  by mouth daily (with dinner). Cholecalciferol (Vitamin D3) 3,000 unit Tab Take 1,000 Units by mouth every morning. cyanocobalamin 500 mcg tablet Commonly known as:  VITAMIN B12 Take 500 mcg by mouth every morning. cyclobenzaprine 10 mg tablet Commonly known as:  FLEXERIL Take 10 mg by mouth two (2) times a day. hydroCHLOROthiazide 25 mg tablet Commonly known as:  HYDRODIURIL Take 25 mg by mouth every morning. irbesartan 300 mg tablet Commonly known as:  AVAPRO Take 300 mg by mouth every morning. Indications: HYPERTENSION MIRALAX 17 gram/dose powder Generic drug:  polyethylene glycol Take 17 g by mouth every morning. Indications: CONSTIPATION Omeprazole delayed release 20 mg tablet Commonly known as:  PRILOSEC D/R Take 20 mg by mouth every morning. raNITIdine 300 mg tablet Commonly known as:  ZANTAC Take 300 mg by mouth daily (with dinner). XANAX 0.25 mg tablet Generic drug:  ALPRAZolam  
Take 0.5 mg by mouth two (2) times daily as needed for Anxiety. Introducing [facility] SERVICES! Machelle Loaiza introduces Jaeger patient portal. Now you can access parts of your medical record, email your doctor's office, and request medication refills online. 1. In your internet browser, go to [email or web address] 2. Click on the First Time User? Click Here link in the Sign In box. You will see the New Member Sign Up page. 3. Enter your Jaeger Access Code exactly as it appears below. You will not need to use this code after youve completed the sign-up process.  If you do not sign up before the expiration date, you must request a new code. · Lumexis Access Code: [email or web address] Expires: 11/7/2017  2:06 PM 
 
4. Enter the last four digits of your Social Security Number (xxxx) and Date of Birth (mm/dd/yyyy) as indicated and click Submit. You will be taken to the next sign-up page. 5. Create a Lumexis ID. This will be your Lumexis login ID and cannot be changed, so think of one that is secure and easy to remember. 6. Create a Lumexis password. You can change your password at any time. 7. Enter your Password Reset Question and Answer. This can be used at a later time if you forget your password. 8. Enter your e-mail address. You will receive e-mail notification when new information is available in [address] 9. Click Sign Up. You can now view and download portions of your medical record. 10. Click the Download Summary menu link to download a portable copy of your medical information. If you have questions, please visit the Frequently Asked Questions section of the Lumexis website. Remember, Lumexis is NOT to be used for urgent needs. For medical emergencies, dial 911. Now available from your iPhone and Android! Please provide this summary of care documentation to your next provider. Your primary care clinician is listed as Yakov. If you have any questions after today's visit, please call [phone]. Stable

## 2020-01-17 ENCOUNTER — OFFICE VISIT (OUTPATIENT)
Dept: INTERNAL MEDICINE CLINIC | Age: 75
End: 2020-01-17

## 2020-01-17 VITALS
RESPIRATION RATE: 18 BRPM | OXYGEN SATURATION: 98 % | DIASTOLIC BLOOD PRESSURE: 72 MMHG | WEIGHT: 199.7 LBS | BODY MASS INDEX: 35.38 KG/M2 | TEMPERATURE: 97.8 F | HEART RATE: 94 BPM | SYSTOLIC BLOOD PRESSURE: 124 MMHG

## 2020-01-17 DIAGNOSIS — E66.01 SEVERE OBESITY (BMI 35.0-39.9) WITH COMORBIDITY (HCC): ICD-10-CM

## 2020-01-17 DIAGNOSIS — J11.1 INFLUENZA: Primary | ICD-10-CM

## 2020-01-17 RX ORDER — OSELTAMIVIR PHOSPHATE 75 MG/1
75 CAPSULE ORAL 2 TIMES DAILY
Qty: 10 CAP | Refills: 0 | Status: SHIPPED | OUTPATIENT
Start: 2020-01-17 | End: 2020-01-22

## 2020-01-17 NOTE — PROGRESS NOTES
Subjective:   Bailey Dietrich is a 69 y.o. female      Chief Complaint   Patient presents with    Cough     exposed to flu    Sore Throat        History of present illness: She presents today with coughing sore throat along with some myalgias and she thinks is a low-grade fever. She was exposed to the influenza B through her grandson. She denies any shortness of breath and has no sputum production. Patient Active Problem List   Diagnosis Code    Choledocholithiasis K80.50    Grade IV hemorrhoids K64.3    Colon polyps K63.5    Primary angle-closure glaucoma H40.20X0    Primary osteoarthritis involving multiple joints M15.0    Mixed hyperlipidemia E78.2    Glucose intolerance (impaired glucose tolerance) R73.02    Hypertension with renal disease I12.9    Osteopenia of multiple sites M85.89    Neoplasm of uncertain behavior of skin D48.5    Elevated LFTs R94.5    Stage 2 chronic kidney disease N18.2    Vitamin D deficiency E55.9    Acute midline low back pain without sciatica M54.5    Right upper quadrant abdominal pain R10.11    Acute pancreatitis K85.90    Plantar wart B07.0    Severe obesity (BMI 35.0-39. 9) with comorbidity ([facility] [address] E66.01    Contact dermatitis, allergic L23.9    Headache R51    Acute post-traumatic headache, not intractable G44.319    Alcohol screening Z13.39    Influenza J11.1      Past Medical History:   Diagnosis Date    Anxiety     Arthritis     Chronic constipation     CKD (chronic kidney disease) 7/17/2017    Colon polyps 7/17/2017    DJD (degenerative joint disease) 7/17/2017    Elevated LFTs 7/17/2017    Flu 02/19/2019    GERD (gastroesophageal reflux disease)     Glucose intolerance (impaired glucose tolerance) 7/17/2017    Hemorrhoids     internal & external- bleeds frequently    High cholesterol     Hyperlipidemia 7/17/2017    Hypertension     Hypertension with renal disease 7/17/2017    PT Education - High Blood Pressure (Essential Hypertension) *: blood pressure PT Education - How to access health information online: discussed with patient and provided information    Hypertension, renal 7/17/2017    Ill-defined condition     ringing in ears    Mild obesity 7/17/2017    Nausea & vomiting     Neoplasm of uncertain behavior of skin 7/17/2017    On statin therapy 7/17/2017    Osteopenia 7/17/2017    Primary angle-closure glaucoma 7/17/2017    Comments: OU    Skin cancer of face     as of 6/15/16 pt states \"removed years ago\"    Spinal stenosis       Allergies   Allergen Reactions    Iodinated Contrast Media Hives    Oxycodone-Aspirin Unknown (comments)      Family History   Problem Relation Age of Onset    Heart Disease Mother     Hypertension Mother     Cancer Mother         skin    Stroke Father     Breast Cancer Sister         onset: 76s    Breast Cancer Niece       Social History     Socioeconomic History    Marital status:      Spouse name: Not on file    Number of children: Not on file    Years of education: Not on file    Highest education level: Not on file   Occupational History    Not on file   Social Needs    Financial resource strain: Not on file    Food insecurity:     Worry: Not on file     Inability: Not on file    Transportation needs:     Medical: Not on file     Non-medical: Not on file   Tobacco Use    Smoking status: Former Smoker     Packs/day: 1.50     Years: 35.00     Pack years: 52.50     Last attempt to quit: 11/4/2004     Years since quitting: 15.2    Smokeless tobacco: Never Used    Tobacco comment: quit smoking 6 yrs ago   Substance and Sexual Activity    Alcohol use: No    Drug use: No    Sexual activity: Never   Lifestyle    Physical activity:     Days per week: Not on file     Minutes per session: Not on file    Stress: Not on file   Relationships    Social connections:     Talks on phone: Not on file     Gets together: Not on file     Attends Baptism service: Not on file Active member of club or organization: Not on file     Attends meetings of clubs or organizations: Not on file     Relationship status: Not on file    Intimate partner violence:     Fear of current or ex partner: Not on file     Emotionally abused: Not on file     Physically abused: Not on file     Forced sexual activity: Not on file   Other Topics Concern    Not on file   Social History Narrative    Not on file     Prior to Admission medications    Medication Sig Start Date End Date Taking? Authorizing Provider   oseltamivir (TAMIFLU) 75 mg capsule Take 1 Cap by mouth two (2) times a day for 5 days. 1/17/20 1/22/20 Yes Ulices Herr MD   LIVALO 2 mg tablet TAKE 1 TABLET BY MOUTH  EVERY NIGHT AT BEDTIME 12/16/19  Yes Ulices Herr MD   hydroCHLOROthiazide (HYDRODIURIL) 25 mg tablet TAKE 1 TABLET BY MOUTH  EVERY DAY 12/16/19  Yes Ulices Herr MD   omeprazole (PRILOSEC) 20 mg capsule TAKE 1 CAPSULE BY MOUTH  EVERY DAY 12/16/19  Yes Ulices Herr MD   irbesartan (AVAPRO) 300 mg tablet TAKE 1 TABLET BY MOUTH  EVERY DAY 12/16/19  Yes Ulices Herr MD   raNITIdine (ZANTAC) 300 mg tab TAKE 1 TABLET BY MOUTH  EVERY NIGHT AT BEDTIME 12/16/19  Yes Ulices Herr MD   vit C/E/Zn/coppr/lutein/zeaxan (PRESERVISION AREDS 2 PO) Take  by mouth. Indications: 2 pills dailly   Yes Provider, Historical   biotin 10,000 mcg cap Take  by mouth daily (with dinner). Yes Provider, Historical   Cholecalciferol, Vitamin D3, 3,000 unit tab Take 1,000 Units by mouth every morning. Yes Other, MD Celestino   cyanocobalamin (VITAMIN B12) 500 mcg tablet Take 500 mcg by mouth every morning. Yes Other, MD Celestino   polyethylene glycol (MIRALAX) 17 gram/dose powder Take 17 g by mouth every morning. Indications: CONSTIPATION   Yes Provider, Historical        Review of Systems              Constitutional:  She denies weight loss, sweats or fatigue.   Positive for fever myalgias              EYES: No blurred or double vision,               ENT: no nasal congestion, no headache or dizziness. No difficulty with               swallowing, taste, speech or smell. Respiratory: Positive nonproductive cough without wheezing or shortness of breath. No sputum production. Cardiac:  Denies chest pain, palpitations, unexplained indigestion, syncope, edema, PND or orthopnea. GI:  No changes in bowel movements, no abdominal pain, no bloating, anorexia, nausea, vomiting or heartburn. :  No frequency or dysuria. Denies incontinence or sexual dysfunction. Extremities:  No joint pain, stiffness or swelling  Back:.no pain or soreness  Skin:  No recent rashes or mole changes. Neurological:  No numbness, tingling, burning paresthesias or loss of motor strength. No syncope, dizziness, frequent headaches or memory loss. Hematologic:  No easy bruising  Lymphatic: No lymph node enlargement    Objective:     Vitals:    01/17/20 1126   BP: 124/72   Pulse: 94   Resp: 18   Temp: 97.8 °F (36.6 °C)   SpO2: 98%   Weight: 199 lb 11.2 oz (90.6 kg)   PainSc:   0 - No pain       Body mass index is 35.38 kg/m². Physical Examination:              General Appearance:  Well-developed, well-nourished, no acute distress. HEENT:      Ears:  The TMs and ear canals were clear. Eyes:  The pupillary responses were normal.  Extraocular muscle function intact. Lids and conjunctiva not injected. Funduscopic exam revealed sharp disc margins. Nares: Clear w/o edema or erythema  Pharynx:  Clear with teeth in good repair. No masses were noted. Neck:  Supple without thyromegaly or adenopathy. No JVD noted. No carotid                bruits. Lungs:  Clear to auscultation and percussion. Cardiac:  Regular rate and rhythm without murmur. PMI not displaced. No gallop, rub or click. Abdominal: Soft, non-tender, no hepata-spleenomegally or masses  Extremities:  No clubbing, cyanosis or edema. Skin:  No rash or unusual mole changes noted. Lymph Nodes:  None felt in the cervical, supraclavicular, axillary or inguinal region. Neurological: . DTRs 2+ and symmetric. Station and gait normal.   Hematologic:   No purpura or petechiae        Assessment/Plan:         1. Influenza    2. Severe obesity (BMI 35.0-39. 9) with comorbidity ([address]        Impressions/Plan:  Impression  1. Influenza her symptoms are consistent with diet and she has been exposed in her grandson so at this point I am not going to do the flu test I am going to treated with Tamiflu for 5 days  Obesity we did discuss diet, exercise and weight reduction  Recheck as previously scheduled. Orders Placed This Encounter    oseltamivir (TAMIFLU) 75 mg capsule           No results found for any visits on 01/17/20. Laurie Butler MD    The patient was given after the visit summary the patient verbalized an understanding of the plans and problems as explained.

## 2020-01-17 NOTE — PROGRESS NOTES
Chief Complaint   Patient presents with    Cough     exposed to flu    Sore Throat     . 1. Have you been to the ER, urgent care clinic since your last visit? Hospitalized since your last visit? No    2. Have you seen or consulted any other health care providers outside of the [address] [place] [address] since your last visit? Include any pap smears or colon screening.  No

## 2020-01-17 NOTE — PATIENT INSTRUCTIONS
Back Pain: Care Instructions Your Care Instructions Back pain has many possible causes. It is often related to problems with muscles and ligaments of the back. It may also be related to problems with the nerves, discs, or bones of the back. Moving, lifting, standing, sitting, or sleeping in an awkward way can strain the back. Sometimes you don't notice the injury until later. Arthritis is another common cause of back pain. Although it may hurt a lot, back pain usually improves on its own within several weeks. Most people recover in 12 weeks or less. Using good home treatment and being careful not to stress your back can help you feel better sooner. Follow-up care is a key part of your treatment and safety. Be sure to make and go to all appointments, and call your doctor if you are having problems. It's also a good idea to know your test results and keep a list of the medicines you take. How can you care for yourself at home? · Sit or lie in positions that are most comfortable and reduce your pain. Try one of these positions when you lie down: ? Lie on your back with your knees bent and supported by large pillows. ? Lie on the floor with your legs on the seat of a sofa or chair. ? Lie on your side with your knees and hips bent and a pillow between your legs. ? Lie on your stomach if it does not make pain worse. · Do not sit up in bed, and avoid soft couches and twisted positions. Bed rest can help relieve pain at first, but it delays healing. Avoid bed rest after the first day of back pain. · Change positions every 30 minutes. If you must sit for long periods of time, take breaks from sitting. Get up and walk around, or lie in a comfortable position. · Try using a heating pad on a low or medium setting for 15 to 20 minutes every 2 or 3 hours. Try a warm shower in place of one session with the heating pad. · You can also try an ice pack for 10 to 15 minutes every 2 to 3 hours. Put a thin cloth between the ice pack and your skin. · Take pain medicines exactly as directed. ? If the doctor gave you a prescription medicine for pain, take it as prescribed. ? If you are not taking a prescription pain medicine, ask your doctor if you can take an over-the-counter medicine. · Take short walks several times a day. You can start with 5 to 10 minutes, 3 or 4 times a day, and work up to longer walks. Walk on level surfaces and avoid hills and stairs until your back is better. · Return to work and other activities as soon as you can. Continued rest without activity is usually not good for your back. · To prevent future back pain, do exercises to stretch and strengthen your back and stomach. Learn how to use good posture, safe lifting techniques, and proper body mechanics. When should you call for help? Call your doctor now or seek immediate medical care if: 
  · You have new or worsening numbness in your legs.  
  · You have new or worsening weakness in your legs. (This could make it hard to stand up.)  
  · You lose control of your bladder or bowels.  
 Watch closely for changes in your health, and be sure to contact your doctor if: 
  · You have a fever, lose weight, or don't feel well.  
  · You do not get better as expected. Where can you learn more? Go to http://rufina-ion.info/. Enter Q238 in the search box to learn more about \"Back Pain: Care Instructions. \" Current as of: June 26, 2019 Content Version: 12.2 © 9920-5073 [employer]. Care instructions adapted under license by [employer] (which disclaims liability or warranty for this information). If you have questions about a medical condition or this instruction, always ask your healthcare professional. [place] [address] any warranty or liability for your use of this information.

## 2020-01-21 ENCOUNTER — OFFICE VISIT (OUTPATIENT)
Dept: INTERNAL MEDICINE CLINIC | Age: 75
End: 2020-01-21

## 2020-01-21 VITALS
HEART RATE: 76 BPM | SYSTOLIC BLOOD PRESSURE: 122 MMHG | RESPIRATION RATE: 18 BRPM | BODY MASS INDEX: 35.3 KG/M2 | OXYGEN SATURATION: 95 % | WEIGHT: 199.3 LBS | DIASTOLIC BLOOD PRESSURE: 72 MMHG | TEMPERATURE: 98.2 F

## 2020-01-21 DIAGNOSIS — G89.29 CHRONIC MIDLINE LOW BACK PAIN WITH LEFT-SIDED SCIATICA: Primary | ICD-10-CM

## 2020-01-21 DIAGNOSIS — M54.42 CHRONIC MIDLINE LOW BACK PAIN WITH LEFT-SIDED SCIATICA: Primary | ICD-10-CM

## 2020-01-21 PROBLEM — M54.40 CHRONIC MIDLINE LOW BACK PAIN WITH SCIATICA: Status: ACTIVE | Noted: 2020-01-21

## 2020-01-21 RX ORDER — PREDNISONE 5 MG/1
TABLET ORAL
Qty: 48 TAB | Refills: 0 | Status: SHIPPED | OUTPATIENT
Start: 2020-01-21 | End: 2020-02-07 | Stop reason: ALTCHOICE

## 2020-01-21 RX ORDER — DIAZEPAM 2 MG/1
TABLET ORAL
Qty: 1 TAB | Refills: 0 | Status: SHIPPED | OUTPATIENT
Start: 2020-01-21 | End: 2020-03-02 | Stop reason: ALTCHOICE

## 2020-01-21 NOTE — PATIENT INSTRUCTIONS
Back Pain: Care Instructions Your Care Instructions Back pain has many possible causes. It is often related to problems with muscles and ligaments of the back. It may also be related to problems with the nerves, discs, or bones of the back. Moving, lifting, standing, sitting, or sleeping in an awkward way can strain the back. Sometimes you don't notice the injury until later. Arthritis is another common cause of back pain. Although it may hurt a lot, back pain usually improves on its own within several weeks. Most people recover in 12 weeks or less. Using good home treatment and being careful not to stress your back can help you feel better sooner. Follow-up care is a key part of your treatment and safety. Be sure to make and go to all appointments, and call your doctor if you are having problems. It's also a good idea to know your test results and keep a list of the medicines you take. How can you care for yourself at home? · Sit or lie in positions that are most comfortable and reduce your pain. Try one of these positions when you lie down: ? Lie on your back with your knees bent and supported by large pillows. ? Lie on the floor with your legs on the seat of a sofa or chair. ? Lie on your side with your knees and hips bent and a pillow between your legs. ? Lie on your stomach if it does not make pain worse. · Do not sit up in bed, and avoid soft couches and twisted positions. Bed rest can help relieve pain at first, but it delays healing. Avoid bed rest after the first day of back pain. · Change positions every 30 minutes. If you must sit for long periods of time, take breaks from sitting. Get up and walk around, or lie in a comfortable position. · Try using a heating pad on a low or medium setting for 15 to 20 minutes every 2 or 3 hours. Try a warm shower in place of one session with the heating pad. · You can also try an ice pack for 10 to 15 minutes every 2 to 3 hours. Put a thin cloth between the ice pack and your skin. · Take pain medicines exactly as directed. ? If the doctor gave you a prescription medicine for pain, take it as prescribed. ? If you are not taking a prescription pain medicine, ask your doctor if you can take an over-the-counter medicine. · Take short walks several times a day. You can start with 5 to 10 minutes, 3 or 4 times a day, and work up to longer walks. Walk on level surfaces and avoid hills and stairs until your back is better. · Return to work and other activities as soon as you can. Continued rest without activity is usually not good for your back. · To prevent future back pain, do exercises to stretch and strengthen your back and stomach. Learn how to use good posture, safe lifting techniques, and proper body mechanics. When should you call for help? Call your doctor now or seek immediate medical care if: 
  · You have new or worsening numbness in your legs.  
  · You have new or worsening weakness in your legs. (This could make it hard to stand up.)  
  · You lose control of your bladder or bowels.  
 Watch closely for changes in your health, and be sure to contact your doctor if: 
  · You have a fever, lose weight, or don't feel well.  
  · You do not get better as expected. Where can you learn more? Go to http://rufina-ion.info/. Enter D913 in the search box to learn more about \"Back Pain: Care Instructions. \" Current as of: June 26, 2019 Content Version: 12.2 © 6614-7767 ????, Incorporated. Care instructions adapted under license by [employer] (which disclaims liability or warranty for this information). If you have questions about a medical condition or this instruction, always ask your healthcare professional. [place] [address] any warranty or liability for your use of this information.

## 2020-01-21 NOTE — PROGRESS NOTES
Subjective:   Stone Taylor is a 69 y.o. female      Chief Complaint   Patient presents with    Back Pain     radiates down back of right leg and numbness front of left leg        History of present illness: He presents complaints of low back pain is been going on for a while and now seems to have some radiation in the back of the right leg with a sensation that things are pulling as well as some numbness in the left leg over the lateral front aspect. She has had problems with it before and tried Flexeril which seemed to help some but never completely cleared it up and nothing else is completely cleared up. She notes no history of falls or recent trauma. Patient Active Problem List   Diagnosis Code    Choledocholithiasis K80.50    Grade IV hemorrhoids K64.3    Colon polyps K63.5    Primary angle-closure glaucoma H40.20X0    Primary osteoarthritis involving multiple joints M15.0    Mixed hyperlipidemia E78.2    Glucose intolerance (impaired glucose tolerance) R73.02    Hypertension with renal disease I12.9    Osteopenia of multiple sites M85.89    Neoplasm of uncertain behavior of skin D48.5    Elevated LFTs R94.5    Stage 2 chronic kidney disease N18.2    Vitamin D deficiency E55.9    Acute midline low back pain without sciatica M54.5    Right upper quadrant abdominal pain R10.11    Acute pancreatitis K85.90    Plantar wart B07.0    Severe obesity (BMI 35.0-39. 9) with comorbidity ([facility] [address] E66.01    Contact dermatitis, allergic L23.9    Headache R51    Acute post-traumatic headache, not intractable G44.319    Alcohol screening Z13.39    Influenza J11.1    Chronic midline low back pain with sciatica M54.40, G89.29      Past Medical History:   Diagnosis Date    Anxiety     Arthritis     Chronic constipation     CKD (chronic kidney disease) 7/17/2017    Colon polyps 7/17/2017    DJD (degenerative joint disease) 7/17/2017    Elevated LFTs 7/17/2017    Flu 02/19/2019    GERD (gastroesophageal reflux disease)     Glucose intolerance (impaired glucose tolerance) 7/17/2017    Hemorrhoids     internal & external- bleeds frequently    High cholesterol     Hyperlipidemia 7/17/2017    Hypertension     Hypertension with renal disease 7/17/2017    PT Education - High Blood Pressure (Essential Hypertension) *: blood pressure PT Education - How to access health information online: discussed with patient and provided information    Hypertension, renal 7/17/2017    Ill-defined condition     ringing in ears    Mild obesity 7/17/2017    Nausea & vomiting     Neoplasm of uncertain behavior of skin 7/17/2017    On statin therapy 7/17/2017    Osteopenia 7/17/2017    Primary angle-closure glaucoma 7/17/2017    Comments: OU    Skin cancer of face     as of 6/15/16 pt states \"removed years ago\"    Spinal stenosis       Allergies   Allergen Reactions    Iodinated Contrast Media Hives    Oxycodone-Aspirin Unknown (comments)      Family History   Problem Relation Age of Onset    Heart Disease Mother     Hypertension Mother     Cancer Mother         skin    Stroke Father     Breast Cancer Sister         onset: 76s    Breast Cancer Niece       Social History     Socioeconomic History    Marital status:      Spouse name: Not on file    Number of children: Not on file    Years of education: Not on file    Highest education level: Not on file   Occupational History    Not on file   Social Needs    Financial resource strain: Not on file    Food insecurity:     Worry: Not on file     Inability: Not on file    Transportation needs:     Medical: Not on file     Non-medical: Not on file   Tobacco Use    Smoking status: Former Smoker     Packs/day: 1.50     Years: 35.00     Pack years: 52.50     Last attempt to quit: 11/4/2004     Years since quitting: 15.2    Smokeless tobacco: Never Used    Tobacco comment: quit smoking 6 yrs ago   Substance and Sexual Activity    Alcohol use: No    Drug use: No    Sexual activity: Never   Lifestyle    Physical activity:     Days per week: Not on file     Minutes per session: Not on file    Stress: Not on file   Relationships    Social connections:     Talks on phone: Not on file     Gets together: Not on file     Attends [employer] service: Not on file     Active member of club or organization: Not on file     Attends meetings of clubs or organizations: Not on file     Relationship status: Not on file    Intimate partner violence:     Fear of current or ex partner: Not on file     Emotionally abused: Not on file     Physically abused: Not on file     Forced sexual activity: Not on file   Other Topics Concern    Not on file   Social History Narrative    Not on file     Prior to Admission medications    Medication Sig Start Date End Date Taking? Authorizing Provider   diazePAM (VALIUM) 2 mg tablet Take 1/2-hour prior to MRI 1/21/20  Yes Nisreen Garibay MD   predniSONE (STERAPRED) 5 mg dose pack See administration instruction per 5mg dose pack 1/21/20  Yes Nisreen Garibay MD   oseltamivir (TAMIFLU) 75 mg capsule Take 1 Cap by mouth two (2) times a day for 5 days. 1/17/20 1/22/20 Yes Nisreen Garibay MD   LIVALO 2 mg tablet TAKE 1 TABLET BY MOUTH  EVERY NIGHT AT BEDTIME 12/16/19  Yes Nisreen Garibay MD   hydroCHLOROthiazide (HYDRODIURIL) 25 mg tablet TAKE 1 TABLET BY MOUTH  EVERY DAY 12/16/19  Yes Nisreen Garibay MD   omeprazole (PRILOSEC) 20 mg capsule TAKE 1 CAPSULE BY MOUTH  EVERY DAY 12/16/19  Yes Nisreen Garibay MD   irbesartan (AVAPRO) 300 mg tablet TAKE 1 TABLET BY MOUTH  EVERY DAY 12/16/19  Yes Nisreen Garibay MD   raNITIdine (ZANTAC) 300 mg tab TAKE 1 TABLET BY MOUTH  EVERY NIGHT AT BEDTIME 12/16/19  Yes Nisreen Garibay MD   vit C/E/Zn/coppr/lutein/zeaxan (PRESERVISION AREDS 2 PO) Take  by mouth. Indications: 2 pills dailly   Yes Provider, Historical   biotin 10,000 mcg cap Take  by mouth daily (with dinner). Yes Provider, Historical   Cholecalciferol, Vitamin D3, 3,000 unit tab Take 1,000 Units by mouth every morning. Yes Other, MD Celestino   cyanocobalamin (VITAMIN B12) 500 mcg tablet Take 500 mcg by mouth every morning. Yes Other, MD Celestino   polyethylene glycol (MIRALAX) 17 gram/dose powder Take 17 g by mouth every morning. Indications: CONSTIPATION   Yes Provider, Historical        Review of Systems              Constitutional:  She denies fever, weight loss, sweats or fatigue. EYES: No blurred or double vision,               ENT: no nasal congestion, no headache or dizziness. No difficulty with               swallowing, taste, speech or smell. Respiratory:  No cough, wheezing or shortness of breath. No sputum production. Cardiac:  Denies chest pain, palpitations, unexplained indigestion, syncope, edema, PND or orthopnea. GI:  No changes in bowel movements, no abdominal pain, no bloating, anorexia, nausea, vomiting or heartburn. :  No frequency or dysuria. Denies incontinence or sexual dysfunction. Extremities:  No joint pain, stiffness or swelling  Back:. Possible pain is noted with discomfort in both legs  Skin:  No recent rashes or mole changes. Neurological:  No numbness, tingling, burning paresthesias or loss of motor strength. No syncope, dizziness, frequent headaches or memory loss. Hematologic:  No easy bruising  Lymphatic: No lymph node enlargement    Objective:     Vitals:    01/21/20 1418   BP: 122/72   Pulse: 76   Resp: 18   Temp: 98.2 °F (36.8 °C)   SpO2: 95%   Weight: 199 lb 4.8 oz (90.4 kg)   PainSc:   3   PainLoc: Back       Body mass index is 35.3 kg/m². Physical Examination:              General Appearance:  Well-developed, well-nourished, no acute distress. HEENT:      Ears:  The TMs and ear canals were clear. Eyes:  The pupillary responses were normal.  Extraocular muscle function intact. Lids and conjunctiva not injected.   Funduscopic exam revealed sharp disc margins. Nares: Clear w/o edema or erythema  Pharynx:  Clear with teeth in good repair. No masses were noted. Neck:  Supple without thyromegaly or adenopathy. No JVD noted. No carotid                bruits. Lungs:  Clear to auscultation and percussion. Cardiac:  Regular rate and rhythm without murmur. PMI not displaced. No gallop, rub or click. Abdominal: Soft, non-tender, no hepata-spleenomegally or masses  Extremities:  No clubbing, cyanosis or edema. Paraspinal discomfort lumbar region bilateral  Skin:  No rash or unusual mole changes noted. Lymph Nodes:  None felt in the cervical, supraclavicular, axillary or inguinal region. Neurological: . DTRs 2+ and symmetric. Station and gait normal.   Hematologic:   No purpura or petechiae        Assessment/Plan:         1. Chronic midline low back pain with left-sided sciatica        Impressions/Plan:  Impression  1. Chronic midline low back pain with left-sided sciatica also some discomfort in the right hip as well. X-ray lumbar spine today reveals some mild arthritic changes although nothing severe on plain x-ray. I reviewed x-rays and MRI done from 2 years ago and there were some disease at that time which seems to have progressed clinically so I went to schedule repeat MRI to further evaluate. I will give her a Medrol Dosepak to help with her symptoms. And I did give her 1 Valium tablet to take a half an hour before the MRI  Recheck to be determined based upon the above findings. Orders Placed This Encounter    XR SPINE LUMB 2 OR 3 V    MRI LUMB SPINE WO CONT    diazePAM (VALIUM) 2 mg tablet    predniSONE (STERAPRED) 5 mg dose pack       Follow-up and Dispositions    · Return TBD.          Results for orders placed or performed in visit on 01/21/20   XR SPINE LUMB 2 OR 3 V    Narrative    EXAM: XR SPINE LUMB 2 OR 3 V    INDICATION: Low back pain    COMPARISON: 10/23/2017    FINDINGS: AP, lateral and spot lateral views of the lumbar spine. There is normal alignment. The vertebral body heights are well-preserved. There  is disc space narrowing at L5-S1 with endplate hypertrophy. Facet arthropathy is  present at the L4-5 and L5-S1. There is no fracture, subluxation or other  abnormality. Right upper quadrant clips are present. There is constipation. Impression    IMPRESSION: Lower lumbar facet arthropathy. Carla Bautista MD    The patient was given after the visit summary the patient verbalized an understanding of the plans and problems as explained.

## 2020-01-21 NOTE — PROGRESS NOTES
.  Chief Complaint   Patient presents with    Back Pain     radiates down back of right leg and numbness front of left leg     1. Have you been to the ER, urgent care clinic since your last visit? Hospitalized since your last visit? No    2. Have you seen or consulted any other health care providers outside of the [address] [place] [address] since your last visit? Include any pap smears or colon screening.  No

## 2020-02-07 ENCOUNTER — OFFICE VISIT (OUTPATIENT)
Dept: INTERNAL MEDICINE CLINIC | Age: 75
End: 2020-02-07

## 2020-02-07 VITALS
HEART RATE: 89 BPM | WEIGHT: 196.7 LBS | TEMPERATURE: 97.6 F | OXYGEN SATURATION: 96 % | DIASTOLIC BLOOD PRESSURE: 68 MMHG | HEIGHT: 63 IN | SYSTOLIC BLOOD PRESSURE: 128 MMHG | BODY MASS INDEX: 34.85 KG/M2 | RESPIRATION RATE: 19 BRPM

## 2020-02-07 DIAGNOSIS — M54.50 ACUTE MIDLINE LOW BACK PAIN WITHOUT SCIATICA: ICD-10-CM

## 2020-02-07 DIAGNOSIS — F41.0 PANIC ATTACK: Primary | ICD-10-CM

## 2020-02-07 RX ORDER — ALPRAZOLAM 0.5 MG/1
0.5 TABLET ORAL
Qty: 50 TAB | Refills: 0 | Status: SHIPPED | OUTPATIENT
Start: 2020-02-07 | End: 2022-08-09 | Stop reason: SDUPTHER

## 2020-02-07 NOTE — PROGRESS NOTES
Chief Complaint   Patient presents with    Panic Attack     2-3 times daily    Headache     Visit Vitals  /68 (BP 1 Location: Left arm, BP Patient Position: Sitting)   Pulse 89   Temp 97.6 °F (36.4 °C) (Oral)   Resp 19   Ht 5' 3\" (1.6 m)   Wt 196 lb 11.2 oz (89.2 kg)   SpO2 96%   BMI 34.84 kg/m²     1. Have you been to the ER, urgent care clinic since your last visit? Hospitalized since your last visit? No    2. Have you seen or consulted any other health care providers outside of the [address] [place] [address] since your last visit? Include any pap smears or colon screening.  No

## 2020-02-07 NOTE — PATIENT INSTRUCTIONS
Back Pain: Care Instructions  Your Care Instructions    Back pain has many possible causes. It is often related to problems with muscles and ligaments of the back. It may also be related to problems with the nerves, discs, or bones of the back. Moving, lifting, standing, sitting, or sleeping in an awkward way can strain the back. Sometimes you don't notice the injury until later. Arthritis is another common cause of back pain. Although it may hurt a lot, back pain usually improves on its own within several weeks. Most people recover in 12 weeks or less. Using good home treatment and being careful not to stress your back can help you feel better sooner. Follow-up care is a key part of your treatment and safety. Be sure to make and go to all appointments, and call your doctor if you are having problems. It's also a good idea to know your test results and keep a list of the medicines you take. How can you care for yourself at home? · Sit or lie in positions that are most comfortable and reduce your pain. Try one of these positions when you lie down:  ? Lie on your back with your knees bent and supported by large pillows. ? Lie on the floor with your legs on the seat of a sofa or chair. ? Lie on your side with your knees and hips bent and a pillow between your legs. ? Lie on your stomach if it does not make pain worse. · Do not sit up in bed, and avoid soft couches and twisted positions. Bed rest can help relieve pain at first, but it delays healing. Avoid bed rest after the first day of back pain. · Change positions every 30 minutes. If you must sit for long periods of time, take breaks from sitting. Get up and walk around, or lie in a comfortable position. · Try using a heating pad on a low or medium setting for 15 to 20 minutes every 2 or 3 hours. Try a warm shower in place of one session with the heating pad. · You can also try an ice pack for 10 to 15 minutes every 2 to 3 hours.  Put a thin cloth between the ice pack and your skin. · Take pain medicines exactly as directed. ? If the doctor gave you a prescription medicine for pain, take it as prescribed. ? If you are not taking a prescription pain medicine, ask your doctor if you can take an over-the-counter medicine. · Take short walks several times a day. You can start with 5 to 10 minutes, 3 or 4 times a day, and work up to longer walks. Walk on level surfaces and avoid hills and stairs until your back is better. · Return to work and other activities as soon as you can. Continued rest without activity is usually not good for your back. · To prevent future back pain, do exercises to stretch and strengthen your back and stomach. Learn how to use good posture, safe lifting techniques, and proper body mechanics. When should you call for help? Call your doctor now or seek immediate medical care if:    · You have new or worsening numbness in your legs.     · You have new or worsening weakness in your legs. (This could make it hard to stand up.)     · You lose control of your bladder or bowels.    Watch closely for changes in your health, and be sure to contact your doctor if:    · You have a fever, lose weight, or don't feel well.     · You do not get better as expected. Where can you learn more? Go to http://rufina-ion.info/. Enter F456 in the search box to learn more about \"Back Pain: Care Instructions. \"  Current as of: June 26, 2019  Content Version: 12.2  © 5124-0261 [employer] Care instructions adapted under license by [employer] (which disclaims liability or warranty for this information). If you have questions about a medical condition or this instruction, always ask your healthcare professional. [place] [address] any warranty or liability for your use of this information.

## 2020-02-07 NOTE — PROGRESS NOTES
Subjective:   Roseanne Dickerson is a 69 y.o. female      Chief Complaint   Patient presents with    Panic Attack     2-3 times daily    Headache        History of present illness: She presents complaint a lot of stress and anxiety with some panic attacks which she feels like she is getting some swelling in her chest and head and this all seems to be related to anxiety which occurs 2-3 times a day. She feels like when they she gets that she gets a bad headache is hard to breathe and she has had panic attacks like this before for which she took Xanax and she feels like she may be she needs that again. She did say got worse when she was on a prednisone but she is been off the prednisone for about 4 days she feels like when she gets a panic attack she gets some normal sensation of swelling in her tongue and a dry mouth. She notes no exertional chest pain, palpitations, PND, orthopnea or other cardiorespiratory complaints. There are no GI or  complaints. She notes no other complaints. Patient Active Problem List   Diagnosis Code    Choledocholithiasis K80.50    Grade IV hemorrhoids K64.3    Colon polyps K63.5    Primary angle-closure glaucoma H40.20X0    Primary osteoarthritis involving multiple joints M15.0    Mixed hyperlipidemia E78.2    Glucose intolerance (impaired glucose tolerance) R73.02    Hypertension with renal disease I12.9    Osteopenia of multiple sites M85.89    Neoplasm of uncertain behavior of skin D48.5    Elevated LFTs R94.5    Stage 2 chronic kidney disease N18.2    Vitamin D deficiency E55.9    Acute midline low back pain without sciatica M54.5    Right upper quadrant abdominal pain R10.11    Acute pancreatitis K85.90    Plantar wart B07.0    Severe obesity (BMI 35.0-39. 9) with comorbidity ([address] E66.01    Contact dermatitis, allergic L23.9    Headache R51    Acute post-traumatic headache, not intractable G44.319    Alcohol screening Z13.39    Influenza J11.1    Chronic midline low back pain with sciatica M54.40, G89.29    Panic attack F41.0      Past Medical History:   Diagnosis Date    Anxiety     Arthritis     Chronic constipation     CKD (chronic kidney disease) 7/17/2017    Colon polyps 7/17/2017    DJD (degenerative joint disease) 7/17/2017    Elevated LFTs 7/17/2017    Flu 02/19/2019    GERD (gastroesophageal reflux disease)     Glucose intolerance (impaired glucose tolerance) 7/17/2017    Hemorrhoids     internal & external- bleeds frequently    High cholesterol     Hyperlipidemia 7/17/2017    Hypertension     Hypertension with renal disease 7/17/2017    PT Education - High Blood Pressure (Essential Hypertension) *: blood pressure PT Education - How to access health information online: discussed with patient and provided information    Hypertension, renal 7/17/2017    Ill-defined condition     ringing in ears    Mild obesity 7/17/2017    Nausea & vomiting     Neoplasm of uncertain behavior of skin 7/17/2017    On statin therapy 7/17/2017    Osteopenia 7/17/2017    Primary angle-closure glaucoma 7/17/2017    Comments: OU    Skin cancer of face     as of 6/15/16 pt states \"removed years ago\"    Spinal stenosis       Allergies   Allergen Reactions    Iodinated Contrast Media Hives    Oxycodone-Aspirin Unknown (comments)      Family History   Problem Relation Age of Onset    Heart Disease Mother     Hypertension Mother     Cancer Mother         skin    Stroke Father     Breast Cancer Sister         onset: 76s    Breast Cancer Niece       Social History     Socioeconomic History    Marital status:      Spouse name: Not on file    Number of children: Not on file    Years of education: Not on file    Highest education level: Not on file   Occupational History    Not on file   Social Needs    Financial resource strain: Not on file    Food insecurity:     Worry: Not on file     Inability: Not on file    Transportation needs: Medical: Not on file     Non-medical: Not on file   Tobacco Use    Smoking status: Former Smoker     Packs/day: 1.50     Years: 35.00     Pack years: 52.50     Last attempt to quit: 11/4/2004     Years since quitting: 15.2    Smokeless tobacco: Never Used    Tobacco comment: quit smoking 6 yrs ago   Substance and Sexual Activity    Alcohol use: No    Drug use: No    Sexual activity: Never   Lifestyle    Physical activity:     Days per week: Not on file     Minutes per session: Not on file    Stress: Not on file   Relationships    Social connections:     Talks on phone: Not on file     Gets together: Not on file     Attends Christianity service: Not on file     Active member of club or organization: Not on file     Attends meetings of clubs or organizations: Not on file     Relationship status: Not on file    Intimate partner violence:     Fear of current or ex partner: Not on file     Emotionally abused: Not on file     Physically abused: Not on file     Forced sexual activity: Not on file   Other Topics Concern    Not on file   Social History Narrative    Not on file     Prior to Admission medications    Medication Sig Start Date End Date Taking? Authorizing Provider   ALPRAZolam [facility]) 0.5 mg tablet Take 1 Tab by mouth three (3) times daily as needed for Anxiety.  Max Daily Amount: 1.5 mg. 2/7/20  Yes Melissa Diez MD   diazePAM (VALIUM) 2 mg tablet Take 1/2-hour prior to MRI 1/21/20  Yes Melissa Diez MD   LIVALO 2 mg tablet TAKE 1 TABLET BY MOUTH  EVERY NIGHT AT BEDTIME 12/16/19  Yes Melissa Diez MD   hydroCHLOROthiazide (HYDRODIURIL) 25 mg tablet TAKE 1 TABLET BY MOUTH  EVERY DAY 12/16/19  Yes Melsisa Diez MD   omeprazole (PRILOSEC) 20 mg capsule TAKE 1 CAPSULE BY MOUTH  EVERY DAY 12/16/19  Yes Melissa Diez MD   irbesartan (AVAPRO) 300 mg tablet TAKE 1 TABLET BY MOUTH  EVERY DAY 12/16/19  Yes Melissa Diez MD   raNITIdine (ZANTAC) 300 mg tab TAKE 1 TABLET BY MOUTH  EVERY NIGHT AT BEDTIME 12/16/19  Yes Dell Covington MD   vit C/E/Zn/coppr/lutein/zeaxan (PRESERVISION AREDS 2 PO) Take  by mouth. Indications: 2 pills dailly   Yes Provider, Historical   biotin 10,000 mcg cap Take  by mouth daily (with dinner). Yes Provider, Historical   Cholecalciferol, Vitamin D3, 3,000 unit tab Take 1,000 Units by mouth every morning. Yes Other, MD Celestino   cyanocobalamin (VITAMIN B12) 500 mcg tablet Take 500 mcg by mouth every morning. Yes Other, MD Celestino   polyethylene glycol (MIRALAX) 17 gram/dose powder Take 17 g by mouth every morning. Indications: CONSTIPATION   Yes Provider, Historical        Review of Systems              Constitutional:  She denies fever, weight loss, sweats or fatigue. EYES: No blurred or double vision,               ENT: no nasal congestion, no headache or dizziness. No difficulty with               swallowing, taste, speech or smell. Respiratory:  No cough, wheezing or shortness of breath. No sputum production. Cardiac:  Denies chest pain, palpitations, unexplained indigestion, syncope, edema, PND or orthopnea. GI:  No changes in bowel movements, no abdominal pain, no bloating, anorexia, nausea, vomiting or heartburn. :  No frequency or dysuria. Denies incontinence or sexual dysfunction. Extremities:  No joint pain, stiffness or swelling  Back:.no pain or soreness  Skin:  No recent rashes or mole changes. Neurological:  No numbness, tingling, burning paresthesias or loss of motor strength. No syncope, dizziness, frequent headaches or memory loss. Hematologic:  No easy bruising  Lymphatic: No lymph node enlargement compression  Psychiatric: Some panic attacks is noted and she does have increased stress with her sister now on hospice and her  being sick having recently had surgery for kidney stones.     Objective:     Vitals:    02/07/20 1600   BP: 128/68   Pulse: 89   Resp: 19   Temp: 97.6 °F (36.4 °C)   TempSrc: Oral SpO2: 96%   Weight: 196 lb 11.2 oz (89.2 kg)   Height: 5' 3\" (1.6 m)   PainSc:   0 - No pain       Body mass index is 34.84 kg/m². Physical Examination:              General Appearance:  Well-developed, well-nourished, no acute distress. HEENT:      Ears:  The TMs and ear canals were clear. Eyes:  The pupillary responses were normal.  Extraocular muscle function intact. Lids and conjunctiva not injected. Funduscopic exam revealed sharp disc margins. Nares: Clear w/o edema or erythema  Pharynx:  Clear with teeth in good repair. No masses were noted. Neck:  Supple without thyromegaly or adenopathy. No JVD noted. No carotid                bruits. Lungs:  Clear to auscultation and percussion. Cardiac:  Regular rate and rhythm without murmur. PMI not displaced. No gallop, rub or click. Abdominal: Soft, non-tender, no hepata-spleenomegally or masses  Extremities:  No clubbing, cyanosis or edema. Skin:  No rash or unusual mole changes noted. Lymph Nodes:  None felt in the cervical, supraclavicular, axillary or inguinal region. Neurological: . DTRs 2+ and symmetric. Station and gait normal.   Hematologic:   No purpura or petechiae        Assessment/Plan:         1. Panic attack    2. Acute midline low back pain without sciatica        Impressions/Plan:  Impression  1. Panic attacks at this point I am going to treat her with Xanax 0.5 3 times daily as needed #50 given  Recheck if not resolved. If she gets any change in symptoms she is to notify me. Follow-up otherwise per previous schedule. Orders Placed This Encounter    ALPRAZolam (XANAX) 0.5 mg tablet       Follow-up and Dispositions    · Return if symptoms worsen or fail to improve. No results found for any visits on 02/07/20. Garrett Vazquez MD    The patient was given after the visit summary the patient verbalized an understanding of the plans and problems as explained.

## 2020-02-12 ENCOUNTER — HOSPITAL ENCOUNTER (OUTPATIENT)
Dept: MRI IMAGING | Age: 75
Discharge: HOME | End: 2020-02-12
Attending: INTERNAL MEDICINE
Payer: MEDICARE

## 2020-02-12 DIAGNOSIS — G89.29 CHRONIC MIDLINE LOW BACK PAIN WITH LEFT-SIDED SCIATICA: ICD-10-CM

## 2020-02-12 DIAGNOSIS — M54.42 CHRONIC MIDLINE LOW BACK PAIN WITH LEFT-SIDED SCIATICA: ICD-10-CM

## 2020-02-12 PROCEDURE — 72148 MRI LUMBAR SPINE W/O DYE: CPT

## 2020-02-13 DIAGNOSIS — M54.50 LOW BACK PAIN, UNSPECIFIED BACK PAIN LATERALITY, UNSPECIFIED CHRONICITY, UNSPECIFIED WHETHER SCIATICA PRESENT: Primary | ICD-10-CM

## 2020-02-13 NOTE — PROGRESS NOTES
Pt returned call  Two pt identifiers confirmed  Informed patient per Dr. Margarito Foster that MRI showed L5-S1 disc disease with bilateral foraminal stenosis. Also advised patient per Dr. Margarito Foster that this does not appear to be changed from prior MRI but since she is having symptoms and get her set up to see Dr. Linda Diana. Referral was placed. Patient verbalized understanding of information discussed  with no further questions at this time.

## 2020-03-01 NOTE — PROGRESS NOTES
Chief Complaint   Patient presents with    Hypertension     3 month f/up    Chronic Kidney Disease    Cholesterol Problem    Blood sugar problem    Anxiety       SUBJECTIVE:    Vania Hodge is a 69 y.o. female who returns in follow-up for medical problems include hypertension, hyperlipidemia, glucose intolerance, anxiety, CKD stage II, DJD, obesity and other medical problems. She is taking her medications and trying to follow her diet and try and remain physically active. She currently denies any chest pain, shortness of breath, palpitations, PND, orthopnea or other cardiorespiratory complaints. She notes no GI or  complaints. She notes no headaches, dizziness or neurologic complaints. She has no current active arthritic complaints and there are no other complaints on complete review systems. Current Outpatient Medications   Medication Sig Dispense Refill    ALPRAZolam (XANAX) 0.5 mg tablet Take 1 Tab by mouth three (3) times daily as needed for Anxiety. Max Daily Amount: 1.5 mg. 50 Tab 0    LIVALO 2 mg tablet TAKE 1 TABLET BY MOUTH  EVERY NIGHT AT BEDTIME 90 Tab 3    hydroCHLOROthiazide (HYDRODIURIL) 25 mg tablet TAKE 1 TABLET BY MOUTH  EVERY DAY 90 Tab 3    omeprazole (PRILOSEC) 20 mg capsule TAKE 1 CAPSULE BY MOUTH  EVERY DAY 90 Cap 3    irbesartan (AVAPRO) 300 mg tablet TAKE 1 TABLET BY MOUTH  EVERY DAY 90 Tab 3    raNITIdine (ZANTAC) 300 mg tab TAKE 1 TABLET BY MOUTH  EVERY NIGHT AT BEDTIME 90 Tab 3    vit C/E/Zn/coppr/lutein/zeaxan (PRESERVISION AREDS 2 PO) Take  by mouth. Indications: 2 pills dailly      biotin 10,000 mcg cap Take  by mouth daily (with dinner).  Cholecalciferol, Vitamin D3, 3,000 unit tab Take 1,000 Units by mouth every morning.  cyanocobalamin (VITAMIN B12) 500 mcg tablet Take 500 mcg by mouth every morning.  polyethylene glycol (MIRALAX) 17 gram/dose powder Take 17 g by mouth every morning.  Indications: CONSTIPATION       Past Medical History:   Diagnosis Date    Anxiety     Arthritis     Chronic constipation     CKD (chronic kidney disease) 7/17/2017    Colon polyps 7/17/2017    DJD (degenerative joint disease) 7/17/2017    Elevated LFTs 7/17/2017    Flu 02/19/2019    GERD (gastroesophageal reflux disease)     Glucose intolerance (impaired glucose tolerance) 7/17/2017    Hemorrhoids     internal & external- bleeds frequently    High cholesterol     Hyperlipidemia 7/17/2017    Hypertension     Hypertension with renal disease 7/17/2017    PT Education - High Blood Pressure (Essential Hypertension) *: blood pressure PT Education - How to access health information online: discussed with patient and provided information    Hypertension, renal 7/17/2017    Ill-defined condition     ringing in ears    Mild obesity 7/17/2017    Nausea & vomiting     Neoplasm of uncertain behavior of skin 7/17/2017    On statin therapy 7/17/2017    Osteopenia 7/17/2017    Primary angle-closure glaucoma 7/17/2017    Comments: OU    Skin cancer of face     as of 6/15/16 pt states \"removed years ago\"    Spinal stenosis      Past Surgical History:   Procedure Laterality Date    HX CHOLECYSTECTOMY  11/6/2014    Dr Erwin Schaumann for glaucoma    HX HYSTERECTOMY      partial    HX PELVIC LAPAROSCOPY      Jono. oophorectomy    HX TUBAL LIGATION      IA COLSC FLX W/REMOVAL LESION BY HOT BX FORCEPS  11/12/2012         IA ERCP REMOVE CALCULI/DEBRIS BILIARY/PANCREAS DUCT  11/7/2014         IA ERCP W/SPHINCTEROTOMY/PAPILLOTOMY  11/7/2014          Allergies   Allergen Reactions    Iodinated Contrast Media Hives    Oxycodone-Aspirin Unknown (comments)       REVIEW OF SYSTEMS:  General: negative for - chills or fever, or weight loss or gain  ENT: negative for - headaches, nasal congestion or tinnitus  Eyes: no blurred or visual changes  Neck: No stiffness or swollen nodes  Respiratory: negative for - cough, hemoptysis, shortness of breath or wheezing  Cardiovascular : negative for - chest pain, edema, palpitations or shortness of breath  Gastrointestinal: negative for - abdominal pain, blood in stools, heartburn or nausea/vomiting  Genito-Urinary: no dysuria, trouble voiding, or hematuria  Musculoskeletal: negative for - gait disturbance, joint pain, joint stiffness or joint swelling  Neurological: no TIA or stroke symptoms  Hematologic: no bruises, no bleeding  Lymphatic: no swollen glands  Integument: no lumps, mole changes, nail changes or rash  Endocrine:no malaise/lethargy poly uria or polydipsia or unexpected weight changes        Social History     Socioeconomic History    Marital status:      Spouse name: Not on file    Number of children: Not on file    Years of education: Not on file    Highest education level: Not on file   Tobacco Use    Smoking status: Former Smoker     Packs/day: 1.50     Years: 35.00     Pack years: 52.50     Last attempt to quit: 11/4/2004     Years since quitting: 15.3    Smokeless tobacco: Never Used    Tobacco comment: quit smoking 6 yrs ago   Substance and Sexual Activity    Alcohol use: No    Drug use: No    Sexual activity: Never     Family History   Problem Relation Age of Onset    Heart Disease Mother     Hypertension Mother     Cancer Mother         skin    Stroke Father     Breast Cancer Sister         onset: 76s    Breast Cancer Niece        OBJECTIVE:     Visit Vitals  /76 (BP 1 Location: Left arm, BP Patient Position: Sitting)   Pulse 80   Temp 97.9 °F (36.6 °C)   Resp 16   Ht 5' 3\" (1.6 m)   Wt 197 lb 14.4 oz (89.8 kg)   SpO2 98%   BMI 35.06 kg/m²     CONSTITUTIONAL:   well nourished, appears age appropriate  EYES: sclera anicteric, PERRL, EOMI  ENMT:nares clear, moist mucous membranes, pharynx clear  NECK: supple.  Thyroid normal, No JVD or bruits  RESPIRATORY: Chest: clear to ascultation and percussion, normal inspiratory effort  CARDIOVASCULAR: Heart: regular rate and rhythm no murmurs, rubs or gallops, PMI not displaced, No thrills  GASTROINTESTINAL: Abdomen: non distended, soft, non tender, bowel sounds normal  HEMATOLOGIC: no purpura, petechiae or bruising  LYMPHATIC: No lymph node enlargemant  MUSCULOSKELETAL: Extremities: no edema or active synovitis, pulse 1+   INTEGUMENT: No unusual rashes or suspicious skin lesions noted. Nails appear normal.  PERIPHERAL VASCULAR: normal pulses femoral, PT and DP  NEUROLOGIC: non-focal exam, A & O X 3  PSYCHIATRIC:, appropriate affect     ASSESSMENT:   1. Hypertension with renal disease    2. Glucose intolerance (impaired glucose tolerance)    3. Mixed hyperlipidemia    4. Primary osteoarthritis involving multiple joints    5. Stage 2 chronic kidney disease    6. Severe obesity (BMI 35.0-39. 9) with comorbidity ([address]      Impression  1. Hypertension that is controlled so continue current therapy reviewed with her. 2.  Glucose intolerance repeat status pending a prior lab reviewed and I will make adjustments if necessary. 3.  Hyperlipidemia prior lab reviewed and repeat status pending I will adjust if needed. 4. DJD that is stable  5. CKD stage II repeat status pending  6. Obesity we did discuss diet, exercise and weight reduction for overall health benefit. I will call with lab results and make further recommendations or adjustments if necessary. Follow-up in 3 months or sooner if there is a problem. PLAN:  .  Orders Placed This Encounter    METABOLIC PANEL, COMPREHENSIVE ([facility])    LIPID PANEL ([facility])    [facility]    HEMOGLOBIN A1C WITH EAG         ATTENTION:   This medical record was transcribed using an electronic medical records system. Although proofread, it may and can contain electronic and spelling errors. Other human spelling and other errors may be present. Corrections may be executed at a later time.   Please feel free to contact us for any clarifications as needed. Follow-up and Dispositions    · Return in about 3 months (around 6/2/2020). No results found for any visits on 03/02/20. Diamond Cristobal MD    The patient verbalized understanding of the problems and plans as explained.

## 2020-03-02 ENCOUNTER — OFFICE VISIT (OUTPATIENT)
Dept: INTERNAL MEDICINE CLINIC | Age: 75
End: 2020-03-02

## 2020-03-02 VITALS
HEIGHT: 63 IN | OXYGEN SATURATION: 98 % | BODY MASS INDEX: 35.07 KG/M2 | SYSTOLIC BLOOD PRESSURE: 130 MMHG | WEIGHT: 197.9 LBS | HEART RATE: 80 BPM | RESPIRATION RATE: 16 BRPM | TEMPERATURE: 97.9 F | DIASTOLIC BLOOD PRESSURE: 76 MMHG

## 2020-03-02 DIAGNOSIS — R73.02 GLUCOSE INTOLERANCE (IMPAIRED GLUCOSE TOLERANCE): ICD-10-CM

## 2020-03-02 DIAGNOSIS — N18.2 STAGE 2 CHRONIC KIDNEY DISEASE: ICD-10-CM

## 2020-03-02 DIAGNOSIS — I12.9 HYPERTENSION WITH RENAL DISEASE: Primary | ICD-10-CM

## 2020-03-02 DIAGNOSIS — M15.9 PRIMARY OSTEOARTHRITIS INVOLVING MULTIPLE JOINTS: ICD-10-CM

## 2020-03-02 DIAGNOSIS — E78.2 MIXED HYPERLIPIDEMIA: ICD-10-CM

## 2020-03-02 DIAGNOSIS — E66.01 SEVERE OBESITY (BMI 35.0-39.9) WITH COMORBIDITY (HCC): ICD-10-CM

## 2020-03-02 LAB
A-G RATIO,AGRAT: 1.5 RATIO
ALBUMIN SERPL-MCNC: 4.4 G/DL (ref 3.9–5.4)
ALP SERPL-CCNC: 91 U/L (ref 38–126)
ALT SERPL-CCNC: 25 U/L (ref 0–35)
ANION GAP SERPL CALC-SCNC: 9 MMOL/L
AST SERPL W P-5'-P-CCNC: 27 U/L (ref 14–36)
BILIRUB SERPL-MCNC: 1 MG/DL (ref 0.2–1.3)
BUN SERPL-MCNC: 18 MG/DL (ref 7–17)
BUN/CREATININE RATIO,BUCR: 26 RATIO
CALCIUM SERPL-MCNC: 10.2 MG/DL (ref 8.4–10.2)
CHLORIDE SERPL-SCNC: 101 MMOL/L (ref 98–107)
CHOL/HDL RATIO,CHHD: 3 RATIO (ref 0–4)
CHOLEST SERPL-MCNC: 171 MG/DL (ref 0–200)
CK SERPL-CCNC: 49 U/L (ref 30–135)
CO2 SERPL-SCNC: 29 MMOL/L (ref 22–32)
CREAT SERPL-MCNC: 0.7 MG/DL (ref 0.7–1.2)
GLOBULIN,GLOB: 2.9
GLUCOSE SERPL-MCNC: 92 MG/DL (ref 65–105)
HDLC SERPL-MCNC: 55 MG/DL (ref 35–130)
LDL/HDL RATIO,LDHD: 2 RATIO
LDLC SERPL CALC-MCNC: 83 MG/DL (ref 0–130)
POTASSIUM SERPL-SCNC: 4.8 MMOL/L (ref 3.6–5)
PROT SERPL-MCNC: 7.3 G/DL (ref 6.3–8.2)
SODIUM SERPL-SCNC: 139 MMOL/L (ref 137–145)
TRIGL SERPL-MCNC: 163 MG/DL (ref 0–200)
VLDLC SERPL CALC-MCNC: 33 MG/DL

## 2020-03-02 NOTE — PROGRESS NOTES
Chief Complaint   Patient presents with    Hypertension     3 month f/up    Chronic Kidney Disease    Cholesterol Problem    Blood sugar problem    Anxiety     1. Have you been to the ER, urgent care clinic since your last visit? Hospitalized since your last visit? No    2. Have you seen or consulted any other health care providers outside of the [address] [place] [address] since your last visit? Include any pap smears or colon screening. Saw Dr. Aurea Araya for her back.   Set her up for PT

## 2020-03-02 NOTE — PATIENT INSTRUCTIONS
Back Pain: Care Instructions Your Care Instructions Back pain has many possible causes. It is often related to problems with muscles and ligaments of the back. It may also be related to problems with the nerves, discs, or bones of the back. Moving, lifting, standing, sitting, or sleeping in an awkward way can strain the back. Sometimes you don't notice the injury until later. Arthritis is another common cause of back pain. Although it may hurt a lot, back pain usually improves on its own within several weeks. Most people recover in 12 weeks or less. Using good home treatment and being careful not to stress your back can help you feel better sooner. Follow-up care is a key part of your treatment and safety. Be sure to make and go to all appointments, and call your doctor if you are having problems. It's also a good idea to know your test results and keep a list of the medicines you take. How can you care for yourself at home? · Sit or lie in positions that are most comfortable and reduce your pain. Try one of these positions when you lie down: ? Lie on your back with your knees bent and supported by large pillows. ? Lie on the floor with your legs on the seat of a sofa or chair. ? Lie on your side with your knees and hips bent and a pillow between your legs. ? Lie on your stomach if it does not make pain worse. · Do not sit up in bed, and avoid soft couches and twisted positions. Bed rest can help relieve pain at first, but it delays healing. Avoid bed rest after the first day of back pain. · Change positions every 30 minutes. If you must sit for long periods of time, take breaks from sitting. Get up and walk around, or lie in a comfortable position. · Try using a heating pad on a low or medium setting for 15 to 20 minutes every 2 or 3 hours. Try a warm shower in place of one session with the heating pad. · You can also try an ice pack for 10 to 15 minutes every 2 to 3 hours. Put a thin cloth between the ice pack and your skin. · Take pain medicines exactly as directed. ? If the doctor gave you a prescription medicine for pain, take it as prescribed. ? If you are not taking a prescription pain medicine, ask your doctor if you can take an over-the-counter medicine. · Take short walks several times a day. You can start with 5 to 10 minutes, 3 or 4 times a day, and work up to longer walks. Walk on level surfaces and avoid hills and stairs until your back is better. · Return to work and other activities as soon as you can. Continued rest without activity is usually not good for your back. · To prevent future back pain, do exercises to stretch and strengthen your back and stomach. Learn how to use good posture, safe lifting techniques, and proper body mechanics. When should you call for help? Call your doctor now or seek immediate medical care if: 
  · You have new or worsening numbness in your legs.  
  · You have new or worsening weakness in your legs. (This could make it hard to stand up.)  
  · You lose control of your bladder or bowels.  
 Watch closely for changes in your health, and be sure to contact your doctor if: 
  · You have a fever, lose weight, or don't feel well.  
  · You do not get better as expected. Where can you learn more? Go to http://rufina-ion.info/. Enter I287 in the search box to learn more about \"Back Pain: Care Instructions. \" Current as of: June 26, 2019 Content Version: 12.2 © 9814-2270 [employer]. Care instructions adapted under license by [employer] (which disclaims liability or warranty for this information). If you have questions about a medical condition or this instruction, always ask your healthcare professional. [place] [address] any warranty or liability for your use of this information.

## 2020-03-03 LAB
EST. AVERAGE GLUCOSE BLD GHB EST-MCNC: 123 MG/DL
HBA1C MFR BLD: 5.9 % (ref 4.8–5.6)

## 2020-05-04 ENCOUNTER — APPOINTMENT (OUTPATIENT)
Dept: GENERAL RADIOLOGY | Age: 75
End: 2020-05-04
Attending: EMERGENCY MEDICINE
Payer: MEDICARE

## 2020-05-04 ENCOUNTER — HOSPITAL ENCOUNTER (EMERGENCY)
Age: 75
Discharge: HOME | End: 2020-05-04
Attending: EMERGENCY MEDICINE | Admitting: EMERGENCY MEDICINE
Payer: MEDICARE

## 2020-05-04 VITALS
WEIGHT: 195.11 LBS | TEMPERATURE: 98 F | SYSTOLIC BLOOD PRESSURE: 129 MMHG | HEART RATE: 89 BPM | HEIGHT: 63 IN | OXYGEN SATURATION: 96 % | RESPIRATION RATE: 16 BRPM | DIASTOLIC BLOOD PRESSURE: 64 MMHG | BODY MASS INDEX: 34.57 KG/M2

## 2020-05-04 DIAGNOSIS — R06.02 SOB (SHORTNESS OF BREATH): ICD-10-CM

## 2020-05-04 DIAGNOSIS — Z71.89 EDUCATED ABOUT COVID-19 VIRUS INFECTION: ICD-10-CM

## 2020-05-04 DIAGNOSIS — I10 ACCELERATED HYPERTENSION: ICD-10-CM

## 2020-05-04 DIAGNOSIS — R07.9 ACUTE CHEST PAIN: Primary | ICD-10-CM

## 2020-05-04 DIAGNOSIS — F41.9 ANXIETY DISORDER, UNSPECIFIED TYPE: ICD-10-CM

## 2020-05-04 DIAGNOSIS — E87.6 ACUTE HYPOKALEMIA: ICD-10-CM

## 2020-05-04 LAB
ALBUMIN SERPL-MCNC: 3.8 G/DL (ref 3.5–5)
ALBUMIN/GLOB SERPL: 1.1 {RATIO} (ref 1.1–2.2)
ALP SERPL-CCNC: 95 U/L (ref 45–117)
ALT SERPL-CCNC: 25 U/L (ref 12–78)
ANION GAP SERPL CALC-SCNC: 7 MMOL/L (ref 5–15)
AST SERPL-CCNC: 17 U/L (ref 15–37)
BASOPHILS # BLD: 0 K/UL (ref 0–0.1)
BASOPHILS NFR BLD: 1 % (ref 0–1)
BILIRUB SERPL-MCNC: 0.6 MG/DL (ref 0.2–1)
BNP SERPL-MCNC: 33 PG/ML
BUN SERPL-MCNC: 17 MG/DL (ref 6–20)
BUN/CREAT SERPL: 20 (ref 12–20)
CALCIUM SERPL-MCNC: 9.3 MG/DL (ref 8.5–10.1)
CHLORIDE SERPL-SCNC: 103 MMOL/L (ref 97–108)
CK SERPL-CCNC: 75 U/L (ref 26–192)
CO2 SERPL-SCNC: 27 MMOL/L (ref 21–32)
CREAT SERPL-MCNC: 0.86 MG/DL (ref 0.55–1.02)
DIFFERENTIAL METHOD BLD: NORMAL
EOSINOPHIL # BLD: 0.1 K/UL (ref 0–0.4)
EOSINOPHIL NFR BLD: 2 % (ref 0–7)
ERYTHROCYTE [DISTWIDTH] IN BLOOD BY AUTOMATED COUNT: 13.2 % (ref 11.5–14.5)
FLUAV AG NPH QL IA: NEGATIVE
FLUBV AG NOSE QL IA: NEGATIVE
GLOBULIN SER CALC-MCNC: 3.6 G/DL (ref 2–4)
GLUCOSE SERPL-MCNC: 105 MG/DL (ref 65–100)
HCT VFR BLD AUTO: 41.6 % (ref 35–47)
HGB BLD-MCNC: 13.8 G/DL (ref 11.5–16)
IMM GRANULOCYTES # BLD AUTO: 0 K/UL (ref 0–0.04)
IMM GRANULOCYTES NFR BLD AUTO: 0 % (ref 0–0.5)
LYMPHOCYTES # BLD: 2.6 K/UL (ref 0.8–3.5)
LYMPHOCYTES NFR BLD: 36 % (ref 12–49)
MCH RBC QN AUTO: 28.1 PG (ref 26–34)
MCHC RBC AUTO-ENTMCNC: 33.2 G/DL (ref 30–36.5)
MCV RBC AUTO: 84.7 FL (ref 80–99)
MONOCYTES # BLD: 0.5 K/UL (ref 0–1)
MONOCYTES NFR BLD: 7 % (ref 5–13)
NEUTS SEG # BLD: 4 K/UL (ref 1.8–8)
NEUTS SEG NFR BLD: 54 % (ref 32–75)
NRBC # BLD: 0 K/UL (ref 0–0.01)
NRBC BLD-RTO: 0 PER 100 WBC
PLATELET # BLD AUTO: 235 K/UL (ref 150–400)
PMV BLD AUTO: 11.8 FL (ref 8.9–12.9)
POTASSIUM SERPL-SCNC: 3.4 MMOL/L (ref 3.5–5.1)
PROT SERPL-MCNC: 7.4 G/DL (ref 6.4–8.2)
RBC # BLD AUTO: 4.91 M/UL (ref 3.8–5.2)
SODIUM SERPL-SCNC: 137 MMOL/L (ref 136–145)
TROPONIN I SERPL-MCNC: <0.05 NG/ML
WBC # BLD AUTO: 7.2 K/UL (ref 3.6–11)

## 2020-05-04 PROCEDURE — 87635 SARS-COV-2 COVID-19 AMP PRB: CPT

## 2020-05-04 PROCEDURE — 82550 ASSAY OF CK (CPK): CPT

## 2020-05-04 PROCEDURE — 71045 X-RAY EXAM CHEST 1 VIEW: CPT

## 2020-05-04 PROCEDURE — 80053 COMPREHEN METABOLIC PANEL: CPT

## 2020-05-04 PROCEDURE — 87804 INFLUENZA ASSAY W/OPTIC: CPT

## 2020-05-04 PROCEDURE — 85025 COMPLETE CBC W/AUTO DIFF WBC: CPT

## 2020-05-04 PROCEDURE — 84484 ASSAY OF TROPONIN QUANT: CPT

## 2020-05-04 PROCEDURE — 36415 COLL VENOUS BLD VENIPUNCTURE: CPT

## 2020-05-04 PROCEDURE — 83880 ASSAY OF NATRIURETIC PEPTIDE: CPT

## 2020-05-04 PROCEDURE — 93005 ELECTROCARDIOGRAM TRACING: CPT

## 2020-05-04 PROCEDURE — 99284 EMERGENCY DEPT VISIT MOD MDM: CPT

## 2020-05-04 PROCEDURE — 74011250637 HC RX REV CODE- 250/637: Performed by: EMERGENCY MEDICINE

## 2020-05-04 RX ORDER — BENZONATATE 100 MG/1
100 CAPSULE ORAL
Qty: 30 CAP | Refills: 0 | Status: SHIPPED | OUTPATIENT
Start: 2020-05-04 | End: 2020-05-11

## 2020-05-04 RX ORDER — ACETAMINOPHEN 650 MG/1
650 SUPPOSITORY RECTAL
Status: DISCONTINUED | OUTPATIENT
Start: 2020-05-04 | End: 2020-05-04

## 2020-05-04 RX ORDER — BENZONATATE 100 MG/1
200 CAPSULE ORAL ONCE
Status: COMPLETED | OUTPATIENT
Start: 2020-05-04 | End: 2020-05-04

## 2020-05-04 RX ORDER — HYDROXYZINE 25 MG/1
25 TABLET, FILM COATED ORAL
Qty: 15 TAB | Refills: 0 | Status: SHIPPED | OUTPATIENT
Start: 2020-05-04 | End: 2020-05-11

## 2020-05-04 RX ORDER — GUAIFENESIN 100 MG/5ML
81 LIQUID (ML) ORAL DAILY
COMMUNITY
End: 2020-09-10 | Stop reason: ALTCHOICE

## 2020-05-04 RX ORDER — POTASSIUM CHLORIDE 20 MEQ/1
40 TABLET, EXTENDED RELEASE ORAL
Status: COMPLETED | OUTPATIENT
Start: 2020-05-04 | End: 2020-05-04

## 2020-05-04 RX ORDER — ACETAMINOPHEN 325 MG/1
650 TABLET ORAL
Status: DISCONTINUED | OUTPATIENT
Start: 2020-05-04 | End: 2020-05-04

## 2020-05-04 RX ADMIN — BENZONATATE 200 MG: 100 CAPSULE ORAL at 09:05

## 2020-05-04 RX ADMIN — POTASSIUM CHLORIDE 40 MEQ: 1500 TABLET, EXTENDED RELEASE ORAL at 09:05

## 2020-05-04 NOTE — PROGRESS NOTES
Nurse Clarification of the Prior to Admission Medication Regimen     The patient was interviewed regarding clarification of the prior to admission medication regimen. The individual(s) listed above were questioned regarding the patient's use of any other inhalers, topical products, over the counter medications, herbal medications, vitamin products or ophthalmic/nasal/otic medication use. Information Obtained From: patient    Recommendations/Findings: The following amendments were made to the patient's active medication list on file at [facility]:     1) Additions:    Aspirin       2) Removals:    Zantac       3) Changes:       PTA medication list was corrected to the following:     Prior to Admission Medications   Prescriptions Last Dose Informant Taking? ALPRAZolam (XANAX) 0.5 mg tablet   Yes   Sig: Take 1 Tab by mouth three (3) times daily as needed for Anxiety. Max Daily Amount: 1.5 mg. Cholecalciferol, Vitamin D3, 3,000 unit tab   Yes   Sig: Take 1,000 Units by mouth every morning. LIVALO 2 mg tablet   Yes   Sig: TAKE 1 TABLET BY MOUTH  EVERY NIGHT AT BEDTIME   aspirin 81 mg chewable tablet 5/4/2020 at Unknown time  Yes   Sig: Take 81 mg by mouth daily. biotin 10,000 mcg cap   Yes   Sig: Take  by mouth daily (with dinner). cyanocobalamin (VITAMIN B12) 500 mcg tablet   Yes   Sig: Take 500 mcg by mouth every morning. hydroCHLOROthiazide (HYDRODIURIL) 25 mg tablet   Yes   Sig: TAKE 1 TABLET BY MOUTH  EVERY DAY   irbesartan (AVAPRO) 300 mg tablet   Yes   Sig: TAKE 1 TABLET BY MOUTH  EVERY DAY   omeprazole (PRILOSEC) 20 mg capsule   Yes   Sig: TAKE 1 CAPSULE BY MOUTH  EVERY DAY   polyethylene glycol (MIRALAX) 17 gram/dose powder   Yes   Sig: Take 17 g by mouth every morning. Indications: CONSTIPATION   vit C/E/Zn/coppr/lutein/zeaxan (PRESERVISION AREDS 2 PO)   Yes   Sig: Take  by mouth.  Indications: 2 pills dailly      Facility-Administered Medications: None        Thank you,  Ada Escalona, RN

## 2020-05-04 NOTE — ED PROVIDER NOTES
EMERGENCY DEPARTMENT HISTORY AND PHYSICAL EXAM      Please note that this dictation was completed with [email or web address], the computer voice recognition software. Quite often unanticipated grammatical, syntax, homophones, and other interpretive errors are inadvertently transcribed by the computer software. Please disregard these errors and any errors that have escaped final proofreading. Thank you. Date: 5/4/2020  Patient Name: Alyssa Fuller  Patient Age and Sex: 69 y.o. female    History of Presenting Illness     Chief Complaint   Patient presents with    Chest Pain     pt presents with c/o chest pain that started this morning and shortness of breath. pt also reported hacking cough for several months    Shortness of Breath       History Provided By: Patient    HPI: Alyssa Fuller, 69 y.o. female with past medical history as documented below presents to the ED with c/o of 2 to 3 weeks of intermittent chest tightness with associated shortness of breath and productive cough. Patient states that for the past week or so she has been having intermittent low-grade fevers with productive cough. She has been taking Mucinex over-the-counter with some relief. She denies any known sick contacts or exposure to COVID-19. She denies any previous cardiac history. Patient states that she has not had any recent cardiac stress test or cardiology follow-up. She reports current pain is about 2 out of 10, non-radiating. She denies any lower extremity edema, recent prolonged travel. She currently takes aspirin daily but no other anticoagulation. She states she has a hx of anxiety and previously took Xanax which helped and stated the current COVID-19 pandemic has been causing increased anxiety the past week. She is unsure if the anxiety is contributing to her current sx's. Pt denies any other alleviating or exacerbating factors.  Additionally, pt specifically denies any recent fever, chills, headache, nausea, vomiting, abdominal pain, lightheadedness, dizziness, numbness, weakness, lower extremity swelling, heart palpitations, urinary sxs, diarrhea, constipation, melena, hematochezia. There are no other complaints, changes or physical findings at this time.      PCP: Tyler Damon MD    Past History   Past Medical History:  Past Medical History:   Diagnosis Date    Anxiety     Arthritis     Chronic constipation     CKD (chronic kidney disease) 7/17/2017    Colon polyps 7/17/2017    DJD (degenerative joint disease) 7/17/2017    Elevated LFTs 7/17/2017    Flu 02/19/2019    GERD (gastroesophageal reflux disease)     Glucose intolerance (impaired glucose tolerance) 7/17/2017    Hemorrhoids     internal & external- bleeds frequently    High cholesterol     Hyperlipidemia 7/17/2017    Hypertension     Hypertension with renal disease 7/17/2017    PT Education - High Blood Pressure (Essential Hypertension) *: blood pressure PT Education - How to access health information online: discussed with patient and provided information    Hypertension, renal 7/17/2017    Ill-defined condition     ringing in ears    Mild obesity 7/17/2017    Nausea & vomiting     Neoplasm of uncertain behavior of skin 7/17/2017    On statin therapy 7/17/2017    Osteopenia 7/17/2017    Primary angle-closure glaucoma 7/17/2017    Comments: OU    Skin cancer of face     as of 6/15/16 pt states \"removed years ago\"    Spinal stenosis        Past Surgical History:  Past Surgical History:   Procedure Laterality Date    HX CHOLECYSTECTOMY  11/6/2014    Dr April Robertson for glaucoma    HX HYSTERECTOMY      partial    HX PELVIC LAPAROSCOPY      Jono. oophorectomy    HX TUBAL LIGATION      IN COLSC FLX W/REMOVAL LESION BY HOT BX FORCEPS  11/12/2012         IN ERCP REMOVE CALCULI/DEBRIS BILIARY/PANCREAS DUCT  11/7/2014         IN ERCP W/SPHINCTEROTOMY/PAPILLOTOMY  11/7/2014            Family History:  Family History   Problem Relation Age of Onset    Heart Disease Mother     Hypertension Mother     Cancer Mother         skin    Stroke Father     Breast Cancer Sister         onset: 74s    Breast Cancer Niece        Social History:  Social History     Tobacco Use    Smoking status: Former Smoker     Packs/day: 1.50     Years: 35.00     Pack years: 52.50     Last attempt to quit: 11/4/2004     Years since quitting: 15.5    Smokeless tobacco: Never Used    Tobacco comment: quit smoking 6 yrs ago   Substance Use Topics    Alcohol use: No    Drug use: No       Allergies: Allergies   Allergen Reactions    Iodinated Contrast Media Hives    Oxycodone-Aspirin Unknown (comments)       Current Medications:  No current facility-administered medications on file prior to encounter. Current Outpatient Medications on File Prior to Encounter   Medication Sig Dispense Refill    aspirin 81 mg chewable tablet Take 81 mg by mouth daily.  ALPRAZolam (XANAX) 0.5 mg tablet Take 1 Tab by mouth three (3) times daily as needed for Anxiety. Max Daily Amount: 1.5 mg. 50 Tab 0    LIVALO 2 mg tablet TAKE 1 TABLET BY MOUTH  EVERY NIGHT AT BEDTIME 90 Tab 3    hydroCHLOROthiazide (HYDRODIURIL) 25 mg tablet TAKE 1 TABLET BY MOUTH  EVERY DAY 90 Tab 3    omeprazole (PRILOSEC) 20 mg capsule TAKE 1 CAPSULE BY MOUTH  EVERY DAY 90 Cap 3    irbesartan (AVAPRO) 300 mg tablet TAKE 1 TABLET BY MOUTH  EVERY DAY 90 Tab 3    vit C/E/Zn/coppr/lutein/zeaxan (PRESERVISION AREDS 2 PO) Take  by mouth. Indications: 2 pills dailly      biotin 10,000 mcg cap Take  by mouth daily (with dinner).  Cholecalciferol, Vitamin D3, 3,000 unit tab Take 1,000 Units by mouth every morning.  cyanocobalamin (VITAMIN B12) 500 mcg tablet Take 500 mcg by mouth every morning.  polyethylene glycol (MIRALAX) 17 gram/dose powder Take 17 g by mouth every morning.  Indications: CONSTIPATION      [DISCONTINUED] raNITIdine (ZANTAC) 300 mg tab TAKE 1 TABLET BY MOUTH  EVERY NIGHT AT BEDTIME 90 Tab 3       Review of Systems   Review of Systems   Constitutional: Negative. Negative for chills and fever. HENT: Positive for congestion. Negative for facial swelling, rhinorrhea, sore throat, trouble swallowing and voice change. Eyes: Negative. Respiratory: Positive for cough, chest tightness and shortness of breath. Negative for apnea and wheezing. Cardiovascular: Positive for chest pain. Negative for palpitations and leg swelling. Gastrointestinal: Negative. Negative for abdominal distention, abdominal pain, blood in stool, constipation, diarrhea, nausea and vomiting. Endocrine: Negative. Negative for cold intolerance, heat intolerance and polyuria. Genitourinary: Negative. Negative for difficulty urinating, dysuria, flank pain, frequency, hematuria and urgency. Musculoskeletal: Negative. Negative for arthralgias, back pain, myalgias, neck pain and neck stiffness. Skin: Negative. Negative for color change and rash. Neurological: Negative. Negative for dizziness, syncope, facial asymmetry, speech difficulty, weakness, light-headedness, numbness and headaches. Hematological: Negative. Does not bruise/bleed easily. Psychiatric/Behavioral: Negative. Negative for confusion and self-injury. The patient is not nervous/anxious. Physical Exam   Physical Exam  Vitals signs and nursing note reviewed. Constitutional:       General: She is not in acute distress. Appearance: She is well-developed. She is not diaphoretic. HENT:      Head: Normocephalic and atraumatic. Mouth/Throat:      Pharynx: No oropharyngeal exudate. Eyes:      Conjunctiva/sclera: Conjunctivae normal.      Pupils: Pupils are equal, round, and reactive to light. Neck:      Musculoskeletal: Normal range of motion. Cardiovascular:      Rate and Rhythm: Normal rate and regular rhythm. Heart sounds: Normal heart sounds. No murmur. No friction rub. No gallop.     Pulmonary: Effort: Pulmonary effort is normal. No respiratory distress. Breath sounds: Normal breath sounds. No wheezing or rales. Chest:      Chest wall: No tenderness. Abdominal:      General: Bowel sounds are normal. There is no distension. Palpations: Abdomen is soft. There is no mass. Tenderness: There is no abdominal tenderness. There is no guarding or rebound. Musculoskeletal: Normal range of motion. General: No tenderness or deformity. Skin:     General: Skin is warm. Findings: No rash. Neurological:      Mental Status: She is alert and oriented to person, place, and time. Cranial Nerves: No cranial nerve deficit. Motor: No abnormal muscle tone. Coordination: Coordination normal.      Deep Tendon Reflexes: Reflexes normal.         Diagnostic Study Results     Labs -  Recent Results (from the past 24 hour(s))   EKG, 12 LEAD, INITIAL    Collection Time: 05/04/20  7:36 AM   Result Value Ref Range    Ventricular Rate 95 BPM    Atrial Rate 95 BPM    P-R Interval 168 ms    QRS Duration 82 ms    Q-T Interval 352 ms    QTC Calculation (Bezet) 442 ms    Calculated P Axis 48 degrees    Calculated R Axis 38 degrees    Calculated T Axis 52 degrees    Diagnosis       Normal sinus rhythm  Normal ECG  When compared with ECG of 19-FEB-2019 03:16,  No significant change was found     CBC WITH AUTOMATED DIFF    Collection Time: 05/04/20  7:52 AM   Result Value Ref Range    WBC 7.2 3.6 - 11.0 K/uL    RBC 4.91 3.80 - 5.20 M/uL    HGB 13.8 11.5 - 16.0 g/dL    HCT 41.6 35.0 - 47.0 %    MCV 84.7 80.0 - 99.0 FL    MCH 28.1 26.0 - 34.0 PG    MCHC 33.2 30.0 - 36.5 g/dL    RDW 13.2 11.5 - 14.5 %    PLATELET 200 746 - 750 K/uL    MPV 11.8 8.9 - 12.9 FL    NRBC 0.0 0  WBC    ABSOLUTE NRBC 0.00 0.00 - 0.01 K/uL    NEUTROPHILS 54 32 - 75 %    LYMPHOCYTES 36 12 - 49 %    MONOCYTES 7 5 - 13 %    EOSINOPHILS 2 0 - 7 %    BASOPHILS 1 0 - 1 %    IMMATURE GRANULOCYTES 0 0.0 - 0.5 %    ABS. NEUTROPHILS 4.0 1.8 - 8.0 K/UL    ABS. LYMPHOCYTES 2.6 0.8 - 3.5 K/UL    ABS. MONOCYTES 0.5 0.0 - 1.0 K/UL    ABS. EOSINOPHILS 0.1 0.0 - 0.4 K/UL    ABS. BASOPHILS 0.0 0.0 - 0.1 K/UL    ABS. IMM. GRANS. 0.0 0.00 - 0.04 K/UL    DF AUTOMATED     METABOLIC PANEL, COMPREHENSIVE    Collection Time: 05/04/20  7:52 AM   Result Value Ref Range    Sodium 137 136 - 145 mmol/L    Potassium 3.4 (L) 3.5 - 5.1 mmol/L    Chloride 103 97 - 108 mmol/L    CO2 27 21 - 32 mmol/L    Anion gap 7 5 - 15 mmol/L    Glucose 105 (H) 65 - 100 mg/dL    BUN 17 6 - 20 MG/DL    Creatinine 0.86 0.55 - 1.02 MG/DL    BUN/Creatinine ratio 20 12 - 20      GFR est AA >60 >60 ml/min/1.73m2    GFR est non-AA >60 >60 ml/min/1.73m2    Calcium 9.3 8.5 - 10.1 MG/DL    Bilirubin, total 0.6 0.2 - 1.0 MG/DL    ALT (SGPT) 25 12 - 78 U/L    AST (SGOT) 17 15 - 37 U/L    Alk. phosphatase 95 45 - 117 U/L    Protein, total 7.4 6.4 - 8.2 g/dL    Albumin 3.8 3.5 - 5.0 g/dL    Globulin 3.6 2.0 - 4.0 g/dL    A-G Ratio 1.1 1.1 - 2.2     TROPONIN I    Collection Time: 05/04/20  7:52 AM   Result Value Ref Range    Troponin-I, Qt. <0.05 <0.05 ng/mL   CK W/ REFLX CKMB    Collection Time: 05/04/20  7:52 AM   Result Value Ref Range    CK 75 26 - 192 U/L   NT-PRO BNP    Collection Time: 05/04/20  7:52 AM   Result Value Ref Range    NT pro-BNP 33 <125 PG/ML   SARS-COV-2    Collection Time: 05/04/20  9:09 AM   Result Value Ref Range    Specimen source Nasopharyngeal      SARS-CoV-2 PENDING    INFLUENZA A+B VIRAL AGS    Collection Time: 05/04/20  9:09 AM   Result Value Ref Range    Influenza A Antigen Negative NEG      Influenza B Antigen Negative NEG         Radiologic Studies -   XR CHEST PORT   Final Result   IMPRESSION: No evidence of acute cardiopulmonary process. CT Results  (Last 48 hours)    None        CXR Results  (Last 48 hours)               05/04/20 0815  XR CHEST PORT Final result    Impression:  IMPRESSION: No evidence of acute cardiopulmonary process. Narrative:  INDICATION:  chest pain        COMPARISON: February 2019       FINDINGS: Single AP portable view of the chest obtained at 810 demonstrates a   stable cardiomediastinal silhouette. The lungs are clear bilaterally. No osseous   abnormalities are seen. Medical Decision Making   I am the first provider for this patient. I reviewed the vital signs, available nursing notes, past medical history, past surgical history, family history and social history. Vital Signs-Reviewed the patient's vital signs. Patient Vitals for the past 24 hrs:   Temp Pulse Resp BP SpO2   05/04/20 0930 98 °F (36.7 °C) 89 16 129/64 96 %   05/04/20 0733 97.9 °F (36.6 °C) 100 20 (!) 178/95 98 %       Pulse Oximetry Analysis - 98% on RA    Cardiac Monitor:   Rate: 89 bpm  Rhythm: Normal Sinus Rhythm      ED EKG interpretation:  Rhythm: normal sinus rhythm; and regular . Rate (approx.): 95; Axis: normal; P wave: normal; QRS interval: normal ; ST/T wave: normal; Other findings: normal. This EKG was interpreted by Nancy Fischer M.D. Records Reviewed: Nursing Notes, Old Medical Records, Previous electrocardiograms, Previous Radiology Studies and Previous Laboratory Studies    Provider Notes (Medical Decision Making):   DDx includes STEMI, NSTEMI, Angina, PE, Aortic Pathology, Chest Wall Pain, Pleurisy, Pneumonia, GERD/esophagitis, Anxiety. No cough/fever or focal lung findings to suggest pneumonia. No tachycardia, hypoxia or pleuritic component to suggest PE. Pulses symmetric and no extremely elevated BP/asymmetry or classic tearing sensation to suggest Aortic Dissection. Also, no neuro findings. No wretching/forceful vomiting to suggest esophageal disaster. Denies IV drug abuse, has native valves, no fevers/murmurs or skin lesions to suggest endocarditis. Will evaluate with EKG, labs, cardiac enzymes, chest x-ray. Will provide pain control and reassess. ED Course:   Initial assessment performed.  The patients presenting problems have been discussed, and they are in agreement with the care plan formulated and outlined with them. I have encouraged them to ask questions as they arise throughout their visit. HYPERTENSION COUNSELING  For 10 minutes, education was provided to the patient today regarding their hypertension. Patient is made aware of their elevated blood pressure and is instructed to follow up this week with their Primary Care for a recheck. Patient is counseled regarding consequences of chronic, uncontrolled hypertension including kidney disease, heart disease, stroke or even death. Patient states their understanding and agrees to follow up this week. Additionally, during their visit, I discussed sodium restriction, maintaining ideal body weight and regular exercise program as physiologic means to achieve blood pressure control. The patient will strive towards this. I reviewed our electronic medical record system for any past medical records that were available that may contribute to the patient's current condition, the nursing notes and vital signs from today's visit. Bassem Evans MD    ED Orders Placed :  Orders Placed This Encounter    XR CHEST PORT    CBC WITH AUTOMATED DIFF    METABOLIC PANEL, COMPREHENSIVE    TROPONIN I    CK W/REFLEX CKMB    NT-PRO BNP    SARS-COV-2    INFLUENZA A+B VIRAL AGS    CARDIAC/RESPIRATORY MONITORING    NURSING-MISCELLANEOUS: PPE required for any aerosolizing procedure (ie, intubation, bronchoscopy). Surgical mask on patient for any transport outside room. Patient should be single room, closed door with notification of droplet plus isolation. CON. ..    EKG 12 LEAD INITIAL    INSERT PERIPHERAL IV ONE TIME STAT    DISCONTD: acetaminophen (TYLENOL) tablet 650 mg    DISCONTD: acetaminophen (TYLENOL) suppository 650 mg    benzonatate (TESSALON) capsule 200 mg    aspirin 81 mg chewable tablet    potassium chloride (K-DUR, KLOR-CON) SR tablet 40 mEq    benzonatate (Tessalon Perles) 100 mg capsule    hydrOXYzine HCL (ATARAX) 25 mg tablet     ED Medications Administered:  Medications   benzonatate (TESSALON) capsule 200 mg (200 mg Oral Given 5/4/20 0905)   potassium chloride (K-DUR, KLOR-CON) SR tablet 40 mEq (40 mEq Oral Given 5/4/20 0905)         Progress Note:  Given concerns for COVID-19 infection in this patient, I spent extra time to ensure proper and full PPE was used for the initial assessment and for subsequent patient encounters for updates and reassessments. This was done to help combat the transmission of the virus to myself and other patients and staff in the emergency department. Progress note:  Pt notes feeling better after ED treatment. Pt ambulated with pulse ox with saturations maintain > 92% and tolerated well. Discussed lab and imaging findings with pt, specifically noting possible COVID-19 infection. Patient instructed on proper hygiene and self-quarantine, as well as lying prone for 3 hours a day. Pt instructed to self-isolate at home until 3 days after symptoms have resolved AND 7 days after symptoms first started, whichever is later. Provided with [facility] handout for likely COVID infection recommendations. Pt will follow up with health department or this emergency department immediately should symptoms worsen at any time as instructed. All questions have been answered, pt voiced understanding and agreement with plan. Yoshi Treviño MD    Progress Note:  Patient has been reassessed and reports feeling better and symptoms have improved significantly after ED treatment. Patient feels comfortable going home with close follow-up. Hoa Mackey's final labs and imaging have been reviewed with her and available family and/or caregiver. They have been counseled regarding her diagnosis.  She verbally conveys understanding and agreement of the signs, symptoms, diagnosis, treatment and prognosis and additionally agrees to follow up as recommended with Dr. Francis Marie MD and/or specialist in 24 - 48 hours. She also agrees with the care-plan we created together and conveys that all of her questions have been answered. I have also put together some discharge instructions for her that include: 1) educational information regarding their diagnosis, 2) how to care for their diagnosis at home, as well a 3) list of reasons why they would want to return to the ED prior to their follow-up appointment should the patient's condition change or symptoms worsen. I have answered all questions to the patient's satisfaction. Strict return precautions given. She both understood and agreed with plan as discussed. Vital signs stable for discharge. Pt very appreciative of care today. Disposition: Discharge  The pt is ready for discharge. The pt's signs, symptoms, diagnosis, and discharge instructions have been discussed and pt has conveyed their understanding. The pt is to follow up as recommended or return to ER should their symptoms worsen. Plan has been discussed and pt is in full agreement. Plan:  1. Return precautions as discussed. 2.   Discharge Medication List as of 5/4/2020  9:19 AM      START taking these medications    Details   benzonatate (Tessalon Perles) 100 mg capsule Take 1 Cap by mouth three (3) times daily as needed for Cough for up to 7 days. , Print, Disp-30 Cap, R-0      hydrOXYzine HCL (ATARAX) 25 mg tablet Take 1 Tab by mouth three (3) times daily as needed for Anxiety for up to 7 days. , Print, Disp-15 Tab, R-0         CONTINUE these medications which have NOT CHANGED    Details   aspirin 81 mg chewable tablet Take 81 mg by mouth daily. , Historical Med      ALPRAZolam (XANAX) 0.5 mg tablet Take 1 Tab by mouth three (3) times daily as needed for Anxiety.  Max Daily Amount: 1.5 mg., Print, Disp-50 Tab, R-0      LIVALO 2 mg tablet TAKE 1 TABLET BY MOUTH  EVERY NIGHT AT BEDTIME, Normal, Disp-90 Tab, R-3 hydroCHLOROthiazide (HYDRODIURIL) 25 mg tablet TAKE 1 TABLET BY MOUTH  EVERY DAY, Normal, Disp-90 Tab, R-3      omeprazole (PRILOSEC) 20 mg capsule TAKE 1 CAPSULE BY MOUTH  EVERY DAY, Normal, Disp-90 Cap, R-3      irbesartan (AVAPRO) 300 mg tablet TAKE 1 TABLET BY MOUTH  EVERY DAY, Normal, Disp-90 Tab, R-3      vit C/E/Zn/coppr/lutein/zeaxan (PRESERVISION AREDS 2 PO) Take  by mouth. Indications: 2 pills dailly, Historical Med      biotin 10,000 mcg cap Take  by mouth daily (with dinner). , Historical Med      Cholecalciferol, Vitamin D3, 3,000 unit tab Take 1,000 Units by mouth every morning., Historical Med      cyanocobalamin (VITAMIN B12) 500 mcg tablet Take 500 mcg by mouth every morning., Historical Med      polyethylene glycol (MIRALAX) 17 gram/dose powder Take 17 g by mouth every morning. Indications: CONSTIPATION, Historical Med           3. Follow-up Information     Follow up With Specialties Details Why Contact Info    Cici Kang MD Internal Medicine   [address]  [address] [address] [phone]      [facility] EMERGENCY DEPT Emergency Medicine  As needed, If symptoms worsen [address] [place] [address] Drive  [address] [facility]  [phone]    Loc Flynn MD Cardiology Schedule an appointment as soon as possible for a visit in 1 day  [address] [place] [address] Rd [phone]            Instructed to return to ED if worse  Diagnosis     Clinical Impression:   1. Acute chest pain    2. SOB (shortness of breath)    3. Accelerated hypertension    4. Educated About Covid-19 Virus Infection    5. Acute hypokalemia    6. Anxiety disorder, unspecified type      Attestation:  Randall Moscoso MD, am the attending of record for this patient. I personally performed the services described in this documentation on this date, 5/4/2020 for patient, Patrick Casillas.  I have reviewed the chart and verified that the record reflects my personal performance and is accurate and complete. This note will not be viewable in [address]

## 2020-05-04 NOTE — DISCHARGE INSTRUCTIONS
Prevention steps for patients with confirmed or suspected COVID-19 who do not need to be hospitalized    Timing for Self-Isolation    If you have been exposed but do not have symptoms:  If you suspect you have been exposed to someone with COVID-19 and don't have any symptoms, you should self-isolate at home for 14 days from the time of exposure. If you have symptoms whether or not you have been tested: If you have symptoms suggestive of COVID-19, such as fever or cough, regardless of whether you have been tested, you should self-isolate at home until 72 hours (3 days) after your symptoms have completely resolved AND 7 days after your symptoms first started, whichever is later. How to Properly Self-Isolate Due to COVID-19    Stay home except to get medical care  People who are mildly ill with COVID-19 are able to isolate at home during their illness. You should restrict activities outside your home, except for getting medical care. Do not go to work, school, or public areas. Avoid using public transportation, ride-sharing, or taxis. If you or a loved one must go out, please make sure to wash hands properly immediately on returning home. Disinfect touched surfaces. Do not touch your face. Separate yourself from other people and animals in your home  People: As much as possible, you should stay in a specific room and away from other people in your home. Also, you should use a separate bathroom, if available. Animals: You should restrict contact with pets and other animals while you are sick with COVID-19, just like you would around other people. Although there have not been reports of pets or other animals becoming sick with COVID-19, it is still recommended that people sick with COVID-19 limit contact with animals until more information is known about the virus. When possible, have another member of your household care for your animals while you are sick.  If you are sick with COVID-19, avoid contact with your pet, including petting, snuggling, being kissed or licked, and sharing food. If you must care for your pet or be around animals while you are sick, wash your hands before and after you interact with pets and wear a facemask. Call ahead before visiting your doctor  If you have a medical appointment, call the healthcare provider and tell them that you have or may have COVID-19. This will help the healthcare providers office take steps to keep other people from getting infected or exposed. Wear a facemask  You should wear a facemask when you are around other people (e.g., sharing a room or vehicle) or pets and before you enter a healthcare providers office. If you are not able to wear a facemask (for example, because it causes trouble breathing), then people who live with you should not stay in the same room with you, or they should wear a facemask if they enter your room. Cover your coughs and sneezes  Cover your mouth and nose with a tissue when you cough or sneeze. Throw used tissues in a lined trash can. Immediately wash your hands with soap and water for at least 20 seconds or, if soap and water are not available, clean your hands with an alcohol-based hand  that contains at least 60% alcohol. Clean your hands often  Wash your hands often with soap and water for at least 20 seconds, especially after blowing your nose, coughing, or sneezing; going to the bathroom; and before eating or preparing food. If soap and water are not readily available, use an alcohol-based hand  with at least 60% alcohol, covering all surfaces of your hands and rubbing them together until they feel dry. Soap and water are the best option if hands are visibly dirty. Avoid touching your eyes, nose, and mouth with unwashed hands. Avoid sharing personal household items  You should not share dishes, drinking glasses, cups, eating utensils, towels, or bedding with other people or pets in your home.  After using these items, they should be washed thoroughly with soap and water. Clean all high-touch surfaces everyday  High touch surfaces include counters, tabletops, doorknobs, bathroom fixtures, toilets, phones, keyboards, tablets, and bedside tables. Also, clean any surfaces that may have blood, stool, or body fluids on them. Use a household cleaning spray or wipe, according to the label instructions. Labels contain instructions for safe and effective use of the cleaning product including precautions you should take when applying the product, such as wearing gloves and making sure you have good ventilation during use of the product. Monitor your symptoms  Seek prompt medical attention if your illness is worsening (e.g., difficulty breathing). Before seeking care, call your healthcare provider and tell them that you have, or are being evaluated for, COVID-19. Put on a facemask before you enter the facility. These steps will help the healthcare providers office to keep other people in the office or waiting room from getting infected or exposed. Ask your healthcare provider to call the local or [facility] health department. Persons who are placed under active monitoring or facilitated self-monitoring should follow instructions provided by their local health department or occupational health professionals, as appropriate. When working with your local health department check their available hours. If you have a medical emergency and need to call 911, notify the dispatch personnel that you have, or are being evaluated for COVID-19. If possible, put on a facemask before emergency medical services arrive. Discontinuing home isolation  Patients with confirmed COVID-19 should remain under home isolation precautions until the risk of secondary transmission to others is thought to be low based on the above CDC recommendations.  The decision to discontinue home isolation precautions should be made on a case-by-case basis, in consultation with healthcare providers and state and local health departments. Oupatient testing  You can now get tested on an outpatient basis if you are showing symptoms of Covid19 at one of the [facility] or [facility] by appointment only. BETTER MED:  [email or web address] to make an appointment. [facility]IENT [facility]: [place] to make an appointment. This service is currently offered at the [facility] and [facility] includes the [facility] [place]. To make an appointment, call your preferred Center and enter 5 during the recording to speak with the [employer]. All Patient [facility], including the [facility], remain Open Every Day for Walk-in care of Illness and Injury. Who can be tested? In order to make an appointment to be tested, you must meet screening criteria, which are based on CDC guidance. The screening criteria include either of the following conditions:   You have a symptom or symptoms of COVID-19 (fever, coughing, shortness of breath, sore throat).  You are a healthcare worker or . What is the cost?  For insured patients, there is no out-of-pocket expense. The visit will be submitted to patients' insurance. [facility] accepts all major insurance plans, including Medicare and Medicaid. For self-pay patients, the cost is $90 for the exam plus a separate bill from the lab, which is $51.31 in [place]. What is the process for being tested? First, make an appointment by calling your local Designated Patient [facility]. Please have your insurance card on hand when you call. Bring your insurance card and picture ID with you to your appointment. Upon arrival, follow the Coronavirus Testing signs and park in a designated testing parking spot.  A staff member will meet you at your car to verify your information, complete your registration, check vitals, and take a sample for testing. The sample will be sent to a reference lab for testing. Self-quarantine until you receive your results. A nurse will call you once your results are available, and will provide you with guidance and answer any questions you may have. Further resources and [address]  Please visit the [facility] website for more information. [email or web address] residents with questions about COVID-19 can call the [facility] at [employer]. Patient Education        Chest Pain: Care Instructions  Your Care Instructions    There are many things that can cause chest pain. Some are not serious and will get better on their own in a few days. But some kinds of chest pain need more testing and treatment. Your doctor may have recommended a follow-up visit in the next 8 to 12 hours. If you are not getting better, you may need more tests or treatment. Even though your doctor has released you, you still need to watch for any problems. The doctor carefully checked you, but sometimes problems can develop later. If you have new symptoms or if your symptoms do not get better, get medical care right away. If you have worse or different chest pain or pressure that lasts more than 5 minutes or you passed out (lost consciousness), call 911 or seek other emergency help right away. A medical visit is only one step in your treatment. Even if you feel better, you still need to do what your doctor recommends, such as going to all suggested follow-up appointments and taking medicines exactly as directed. This will help you recover and help prevent future problems. How can you care for yourself at home? · Rest until you feel better. · Take your medicine exactly as prescribed. Call your doctor if you think you are having a problem with your medicine.   · Do not drive after taking a prescription pain medicine. When should you call for help? Call 911 if:    · You passed out (lost consciousness).     · You have severe difficulty breathing.     · You have symptoms of a heart attack. These may include:  ? Chest pain or pressure, or a strange feeling in your chest.  ? Sweating. ? Shortness of breath. ? Nausea or vomiting. ? Pain, pressure, or a strange feeling in your back, neck, jaw, or upper belly or in one or both shoulders or arms. ? Lightheadedness or sudden weakness. ? A fast or irregular heartbeat. After you call  911, the  may tell you to chew 1 adult-strength or 2 to 4 low-dose aspirin. Wait for an ambulance. Do not try to drive yourself.    Call your doctor today if:    · You have any trouble breathing.     · Your chest pain gets worse.     · You are dizzy or lightheaded, or you feel like you may faint.     · You are not getting better as expected.     · You are having new or different chest pain. Where can you learn more? Go to http://rufina-ion.info/  Enter A120 in the search box to learn more about \"Chest Pain: Care Instructions. \"  Current as of: June 26, 2019Content Version: 12.4  © 3952-0500 Healthwise, [employer]orporated. Care instructions adapted under license by [employer] (which disclaims liability or warranty for this information). If you have questions about a medical condition or this instruction, always ask your healthcare professional. [place] [address] any warranty or liability for your use of this information.

## 2020-05-05 ENCOUNTER — PATIENT OUTREACH (OUTPATIENT)
Dept: FAMILY MEDICINE CLINIC | Age: 75
End: 2020-05-05

## 2020-05-05 LAB
ATRIAL RATE: 95 BPM
CALCULATED P AXIS, ECG09: 48 DEGREES
CALCULATED R AXIS, ECG10: 38 DEGREES
CALCULATED T AXIS, ECG11: 52 DEGREES
DIAGNOSIS, 93000: NORMAL
P-R INTERVAL, ECG05: 168 MS
Q-T INTERVAL, ECG07: 352 MS
QRS DURATION, ECG06: 82 MS
QTC CALCULATION (BEZET), ECG08: 442 MS
SARS-COV-2, COV2NT: NOT DETECTED
SPECIMEN SOURCE, FCOV2M: NORMAL
VENTRICULAR RATE, ECG03: 95 BPM

## 2020-05-05 NOTE — PROGRESS NOTES
Patient contacted regarding COVID-19  risk. Care Transition Nurse/ Ambulatory Care Manager contacted the patient by telephone to perform post discharge assessment. Verified name and  with patient as identifiers. Provided introduction to self, and explanation of the CTN/ACM role, and reason for call due to risk factors for infection and/or exposure to COVID-19. Symptoms reviewed with patient who verbalized the following symptoms: cough, no new symptoms and no worsening symptoms. Due to no new or worsening symptoms encounter was not routed to provider for escalation. Patient has following risk factors of: chronic kidney disease. CTN/ACM reviewed discharge instructions, medical action plan and red flags such as increased shortness of breath, increasing fever and signs of decompensation with patient who verbalized understanding. Discussed exposure protocols and quarantine with CDC Guidelines What to do if you are sick with coronavirus disease .  Patient was given an opportunity for questions and concerns. The patient agrees to contact the [facility] exposure line [phone], [facility]  ([phone]) and PCP office for questions related to their healthcare. CTN/ACM provided contact information for future needs. Reviewed and educated patient on any new and changed medications related to discharge diagnosis. Patient/family/caregiver given information for [employer] and agrees to enroll no  Patient's preferred e-mail:  n/a  Patient's preferred phone number: n/a  Based on Loop alert triggers, patient will be contacted by nurse care manager for worsening symptoms. Plan to follow up in 7-14 days based on severity of symptoms and risk factors.

## 2020-05-05 NOTE — PROGRESS NOTES
The patient was called for notification of a NEGATIVE test result for COVID-19. The following information was given to the patient:    The COVID-19 test, also known as novel coronavirus, result was negative  Until your symptoms are fully resolved, you may still be contagious. We recommend that you remain isolated for 7 days minimum or 72 hours after your symptoms have completely resolved, whichever is longer. Continually monitor symptoms. Contact a medical provider if symptoms are worsening.    If you have any additional questions, reach out to your Primary Care Provider or Care Team.

## 2020-05-19 ENCOUNTER — PATIENT OUTREACH (OUTPATIENT)
Dept: FAMILY MEDICINE CLINIC | Age: 75
End: 2020-05-19

## 2020-05-19 NOTE — PROGRESS NOTES
Patient resolved from Transition of Care episode on 5/19/2020  Patient/family has been provided the following resources and education related to COVID-19:                         Signs, symptoms and red flags related to COVID-19            CDC exposure and quarantine guidelines            [facility] exposure contact - [phone]            Contact for their local Department of Health                 Patient currently reports that the following symptoms have improved:  cough, no new symptoms and no worsening symptoms. No further outreach scheduled with this CTN/ACM. Episode of Care resolved. Patient has this CTN/ACM contact information if future needs arise.

## 2020-06-03 NOTE — PROGRESS NOTES
Chief Complaint   Patient presents with    Follow-up     6 mo fu       SUBJECTIVE:    Ryan Cochran is a 69 y.o. female who returns in follow-up for her medical problems include hypertension, glucose intolerance, hyperlipidemia, DJD, CKD stage II, obesity and other medical problems. In addition her chronic problems she has an acute issue of some cough that has been going on now for a while that seems to be more intermittent and not on a persistent basis. She actually went to the emergency room on May 4 for evaluation of this complaining of a cough, shortness of breath and chest pain and at that time she had work-up in the emergency room that included a chest x-ray that was normal, EKG was normal, proBNP, CPK, troponin, CBC, BMP, flu test as well as COVID test all of which were negative. She was given a prescription for Tessalon which seem to help but did not get rid of the cough. I did completely review that emergency room record while the patient was here today. She does note that the cough that did not go completely away is still there. She feels like sometimes when she swallows things just on going the right way but she does not like food or liquids catch and esophagus area. She does not wake up at night coughing. She denies any current chest pain, shortness of breath, palpitations, PND, orthopnea or cardiorespiratory musculoskeletal cough. There are no current GI or  complaints. She notes no headaches, dizziness or neurologic complaints. She has no current active arthritic complaints and and no other complaints on complete review of systems. Current Outpatient Medications   Medication Sig Dispense Refill    benzonatate (TESSALON) 200 mg capsule Take 1 Cap by mouth three (3) times daily as needed for Cough for up to 7 days. 30 Cap 0    aspirin 81 mg chewable tablet Take 81 mg by mouth daily.       ALPRAZolam (XANAX) 0.5 mg tablet Take 1 Tab by mouth three (3) times daily as needed for Anxiety. Max Daily Amount: 1.5 mg. 50 Tab 0    LIVALO 2 mg tablet TAKE 1 TABLET BY MOUTH  EVERY NIGHT AT BEDTIME 90 Tab 3    hydroCHLOROthiazide (HYDRODIURIL) 25 mg tablet TAKE 1 TABLET BY MOUTH  EVERY DAY 90 Tab 3    omeprazole (PRILOSEC) 20 mg capsule TAKE 1 CAPSULE BY MOUTH  EVERY DAY 90 Cap 3    irbesartan (AVAPRO) 300 mg tablet TAKE 1 TABLET BY MOUTH  EVERY DAY 90 Tab 3    vit C/E/Zn/coppr/lutein/zeaxan (PRESERVISION AREDS 2 PO) Take  by mouth. Indications: 2 pills dailly      biotin 10,000 mcg cap Take  by mouth daily (with dinner).  Cholecalciferol, Vitamin D3, 3,000 unit tab Take 1,000 Units by mouth every morning.  cyanocobalamin (VITAMIN B12) 500 mcg tablet Take 500 mcg by mouth every morning.  polyethylene glycol (MIRALAX) 17 gram/dose powder Take 17 g by mouth every morning.  Indications: CONSTIPATION       Past Medical History:   Diagnosis Date    Anxiety     Arthritis     Chronic constipation     CKD (chronic kidney disease) 7/17/2017    Colon polyps 7/17/2017    DJD (degenerative joint disease) 7/17/2017    Elevated LFTs 7/17/2017    Flu 02/19/2019    GERD (gastroesophageal reflux disease)     Glucose intolerance (impaired glucose tolerance) 7/17/2017    Hemorrhoids     internal & external- bleeds frequently    High cholesterol     Hyperlipidemia 7/17/2017    Hypertension     Hypertension with renal disease 7/17/2017    PT Education - High Blood Pressure (Essential Hypertension) *: blood pressure PT Education - How to access health information online: discussed with patient and provided information    Hypertension, renal 7/17/2017    Ill-defined condition     ringing in ears    Mild obesity 7/17/2017    Nausea & vomiting     Neoplasm of uncertain behavior of skin 7/17/2017    On statin therapy 7/17/2017    Osteopenia 7/17/2017    Primary angle-closure glaucoma 7/17/2017    Comments: OU    Skin cancer of face     as of 6/15/16 pt states \"removed years ago\"  Spinal stenosis      Past Surgical History:   Procedure Laterality Date    HX CHOLECYSTECTOMY  11/6/2014    Dr Kelley Benton for glaucoma    HX HYSTERECTOMY      partial    HX PELVIC LAPAROSCOPY      Jono. oophorectomy    HX TUBAL LIGATION      ID COLSC FLX W/REMOVAL LESION BY HOT BX FORCEPS  11/12/2012         ID ERCP REMOVE CALCULI/DEBRIS BILIARY/PANCREAS DUCT  11/7/2014         ID ERCP W/SPHINCTEROTOMY/PAPILLOTOMY  11/7/2014          Allergies   Allergen Reactions    Iodinated Contrast Media Hives    Oxycodone-Aspirin Unknown (comments)       REVIEW OF SYSTEMS:  General: negative for - chills or fever, or weight loss or gain  ENT: negative for - headaches, nasal congestion or tinnitus  Eyes: no blurred or visual changes  Neck: No stiffness or swollen nodes  Respiratory: negative for - hemoptysis, shortness of breath or wheezing.   Positive for cough is noted  Cardiovascular : negative for - chest pain, edema, palpitations or shortness of breath  Gastrointestinal: negative for - abdominal pain, blood in stools, heartburn or nausea/vomiting  Genito-Urinary: no dysuria, trouble voiding, or hematuria  Musculoskeletal: negative for - gait disturbance, joint pain, joint stiffness or joint swelling  Neurological: no TIA or stroke symptoms  Hematologic: no bruises, no bleeding  Lymphatic: no swollen glands  Integument: no lumps, mole changes, nail changes or rash  Endocrine:no malaise/lethargy poly uria or polydipsia or unexpected weight changes        Social History     Socioeconomic History    Marital status:      Spouse name: Not on file    Number of children: Not on file    Years of education: Not on file    Highest education level: Not on file   Tobacco Use    Smoking status: Former Smoker     Packs/day: 1.50     Years: 35.00     Pack years: 52.50     Last attempt to quit: 11/4/2004     Years since quitting: 15.5    Smokeless tobacco: Never Used    Tobacco comment: quit smoking 6 yrs ago   Substance and Sexual Activity    Alcohol use: No    Drug use: No    Sexual activity: Never     Family History   Problem Relation Age of Onset    Heart Disease Mother     Hypertension Mother     Cancer Mother         skin    Stroke Father     Breast Cancer Sister         onset: 76s    Breast Cancer Niece        OBJECTIVE:     Visit Vitals  /60   Pulse 90   Temp 98 °F (36.7 °C) (Oral)   Resp 18   Ht 5' 3\" (1.6 m)   Wt 195 lb (88.5 kg)   SpO2 98%   BMI 34.54 kg/m²     CONSTITUTIONAL:   well nourished, appears age appropriate  EYES: sclera anicteric, PERRL, EOMI  ENMT:nares edematous with pale, moist mucous membranes, pharynx clear  NECK: supple. Thyroid normal, No JVD or bruits  RESPIRATORY: Chest: clear to ascultation and percussion, normal inspiratory effort  CARDIOVASCULAR: Heart: regular rate and rhythm no murmurs, rubs or gallops, PMI not displaced, No thrills, no peripheral edema  GASTROINTESTINAL: Abdomen: non distended, soft, non tender, bowel sounds normal  HEMATOLOGIC: no purpura, petechiae or bruising  LYMPHATIC: No lymph node enlargemant  MUSCULOSKELETAL: Extremities: no active synovitis, pulse 1+   INTEGUMENT: No unusual rashes or suspicious skin lesions noted. Nails appear normal.  PERIPHERAL VASCULAR: normal pulses femoral, PT and DP  NEUROLOGIC: non-focal exam, A & O X 3  PSYCHIATRIC:, appropriate affect     ASSESSMENT:   1. Hypertension with renal disease    2. Glucose intolerance (impaired glucose tolerance)    3. Mixed hyperlipidemia    4. Primary osteoarthritis involving multiple joints    5. Stage 2 chronic kidney disease    6. Severe obesity (BMI 35.0-39. 9) with comorbidity ([address]    7. Cough    8. Non-seasonal allergic rhinitis, unspecified trigger      Impression  1. Hypertension that is controlled so continue current therapy reviewed with her. 2.  Glucose intolerance repeat status pending a prior lab review not make adjustments if necessary.   3. Hyperlipidemia prior lab reviewed and repeat status pending and I will adjust if needed. 4. DJD that is stable  5. CKD stage II repeat status pending  6. Obesity we did discuss diet, exercise and weight reduction for overall health benefit and I note her weight is down 2 pounds  7. Cough I reviewed the complete emergency room record for her May 4 visit and work-up was negative as detailed above. I will go ahead at this point and renew the Tessalon although I did discuss with her possibility that we may have to consider a GI evaluation for an EGD to make sure there is not some esophageal abnormality causing her to intermittently aspirate  8. Allergic rhinitis could be playing some role in this although I do not see any significant postnasal drainage of some mild nasal edema  Tentative follow-up set up again for 3 months with a clear understanding I will see her sooner should it be a problem. I will call her lab results in interim. PLAN:  .  Orders Placed This Encounter    METABOLIC PANEL, COMPREHENSIVE ([facility])    LIPID PANEL ([facility])    CK ([facility])    HEMOGLOBIN A1C W/O EAG ([facility])    benzonatate (TESSALON) 200 mg capsule         ATTENTION:   This medical record was transcribed using an electronic medical records system. Although proofread, it may and can contain electronic and spelling errors. Other human spelling and other errors may be present. Corrections may be executed at a later time. Please feel free to contact us for any clarifications as needed. Follow-up and Dispositions    · Return in about 3 months (around 9/4/2020). No results found for any visits on 06/04/20. Char Camacho MD    The patient verbalized understanding of the problems and plans as explained.

## 2020-06-04 ENCOUNTER — OFFICE VISIT (OUTPATIENT)
Dept: INTERNAL MEDICINE CLINIC | Age: 75
End: 2020-06-04

## 2020-06-04 VITALS
WEIGHT: 195 LBS | OXYGEN SATURATION: 98 % | HEART RATE: 90 BPM | SYSTOLIC BLOOD PRESSURE: 126 MMHG | DIASTOLIC BLOOD PRESSURE: 60 MMHG | TEMPERATURE: 98 F | RESPIRATION RATE: 18 BRPM | BODY MASS INDEX: 34.55 KG/M2 | HEIGHT: 63 IN

## 2020-06-04 DIAGNOSIS — I12.9 HYPERTENSION WITH RENAL DISEASE: Primary | ICD-10-CM

## 2020-06-04 DIAGNOSIS — N18.2 STAGE 2 CHRONIC KIDNEY DISEASE: ICD-10-CM

## 2020-06-04 DIAGNOSIS — J30.89 NON-SEASONAL ALLERGIC RHINITIS, UNSPECIFIED TRIGGER: ICD-10-CM

## 2020-06-04 DIAGNOSIS — R05.9 COUGH: ICD-10-CM

## 2020-06-04 DIAGNOSIS — R73.02 GLUCOSE INTOLERANCE (IMPAIRED GLUCOSE TOLERANCE): ICD-10-CM

## 2020-06-04 DIAGNOSIS — E66.01 SEVERE OBESITY (BMI 35.0-39.9) WITH COMORBIDITY (HCC): ICD-10-CM

## 2020-06-04 DIAGNOSIS — M15.9 PRIMARY OSTEOARTHRITIS INVOLVING MULTIPLE JOINTS: ICD-10-CM

## 2020-06-04 DIAGNOSIS — E78.2 MIXED HYPERLIPIDEMIA: ICD-10-CM

## 2020-06-04 LAB
A-G RATIO,AGRAT: 1.4 RATIO
ALBUMIN SERPL-MCNC: 4.6 G/DL (ref 3.9–5.4)
ALP SERPL-CCNC: 108 U/L (ref 38–126)
ALT SERPL-CCNC: 23 U/L (ref 0–35)
ANION GAP SERPL CALC-SCNC: 12 MMOL/L
AST SERPL W P-5'-P-CCNC: 25 U/L (ref 14–36)
BILIRUB SERPL-MCNC: 0.9 MG/DL (ref 0.2–1.3)
BUN SERPL-MCNC: 20 MG/DL (ref 7–17)
BUN/CREATININE RATIO,BUCR: 29 RATIO
CALCIUM SERPL-MCNC: 10.5 MG/DL (ref 8.4–10.2)
CHLORIDE SERPL-SCNC: 103 MMOL/L (ref 98–107)
CHOL/HDL RATIO,CHHD: 3 RATIO (ref 0–4)
CHOLEST SERPL-MCNC: 176 MG/DL (ref 0–200)
CK SERPL-CCNC: 49 U/L (ref 30–135)
CO2 SERPL-SCNC: 30 MMOL/L (ref 22–32)
CREAT SERPL-MCNC: 0.7 MG/DL (ref 0.7–1.2)
GLOBULIN,GLOB: 3.2
GLUCOSE SERPL-MCNC: 102 MG/DL (ref 65–105)
HBA1C MFR BLD HPLC: 5.5 % (ref 4–5.7)
HDLC SERPL-MCNC: 59 MG/DL (ref 35–130)
LDL/HDL RATIO,LDHD: 2 RATIO
LDLC SERPL CALC-MCNC: 95 MG/DL (ref 0–130)
POTASSIUM SERPL-SCNC: 5 MMOL/L (ref 3.6–5)
PROT SERPL-MCNC: 7.8 G/DL (ref 6.3–8.2)
SODIUM SERPL-SCNC: 145 MMOL/L (ref 137–145)
TRIGL SERPL-MCNC: 109 MG/DL (ref 0–200)
VLDLC SERPL CALC-MCNC: 22 MG/DL

## 2020-06-04 RX ORDER — BENZONATATE 200 MG/1
200 CAPSULE ORAL
Qty: 30 CAP | Refills: 0 | Status: SHIPPED | OUTPATIENT
Start: 2020-06-04 | End: 2020-06-11

## 2020-06-04 NOTE — PROGRESS NOTES
Room:     Identified pt with two pt identifiers(name and ). Reviewed record in preparation for visit and have obtained necessary documentation. All patient medications has been reviewed. Chief Complaint   Patient presents with    Follow-up     6 mo fu       Health Maintenance Due   Topic    Shingrix Vaccine Age 50> (1 of 2)    GLAUCOMA SCREENING Q2Y     Pneumococcal 65+ years (2 of 2 - PPSV23)    Colonoscopy     Breast Cancer Screen Mammogram        There were no vitals filed for this visit. 1.Have you traveled outside of the [address] [place] [address] in the last month No    2. Have you been in close contact with someone who is suspected to have COVID-19 or has tested positive. No    3. Do you have any signs or symptoms. ? 4. Have you been to the ER, urgent care clinic since your last visit? Hospitalized since your last visit? No    5. Have you seen or consulted any other health care providers outside of the [address] [place] [address] since your last visit? Include any pap smears or colon screening. No        7. Are you fasting for blood work today? YES    8. Do you have an Advanced Directive/ Living Will in place? YES  If yes, do we have a copy on file YES  If no, would you like information NO    Patient is accompanied by self I have received verbal consent from Mary to discuss any/all medical information while they are present in the room.

## 2020-06-04 NOTE — PATIENT INSTRUCTIONS
Allergies: Care Instructions Your Care Instructions Allergies occur when your body's defense system (immune system) overreacts to certain substances. The immune system treats a harmless substance as if it were a harmful germ or virus. Many things can cause this overreaction, including pollens, medicine, food, dust, animal dander, and mold. Allergies can be mild or severe. Mild allergies can be managed with home treatment. But medicine may be needed to prevent problems. Managing your allergies is an important part of staying healthy. Your doctor may suggest that you have allergy testing to help find out what is causing your allergies. When you know what things trigger your symptoms, you can avoid them. This can prevent allergy symptoms and other health problems. For severe allergies that cause reactions that affect your whole body (anaphylactic reactions), your doctor may prescribe a shot of epinephrine to carry with you in case you have a severe reaction. Learn how to give yourself the shot and keep it with you at all times. Make sure it is not . Follow-up care is a key part of your treatment and safety. Be sure to make and go to all appointments, and call your doctor if you are having problems. It's also a good idea to know your test results and keep a list of the medicines you take. How can you care for yourself at home? · If you have been told by your doctor that dust or dust mites are causing your allergy, decrease the dust around your bed: 
? Wash sheets, pillowcases, and other bedding in hot water every week. ? Use dust-proof covers for pillows, duvets, and mattresses. Avoid plastic covers because they tear easily and do not \"breathe. \" Wash as instructed on the label. ? Do not use any blankets and pillows that you do not need. ? Use blankets that you can wash in your washing machine. ? Consider removing drapes and carpets, which attract and hold dust, from your bedroom. · If you are allergic to house dust and mites, do not use home humidifiers. Your doctor can suggest ways you can control dust and mites. · Look for signs of cockroaches. Cockroaches cause allergic reactions. Use cockroach baits to get rid of them. Then, clean your home well. Cockroaches like areas where grocery bags, newspapers, empty bottles, or cardboard boxes are stored. Do not keep these inside your home, and keep trash and food containers sealed. Seal off any spots where cockroaches might enter your home. · If you are allergic to mold, get rid of furniture, rugs, and drapes that smell musty. Check for mold in the bathroom. · If you are allergic to outdoor pollen or mold spores, use air-conditioning. Change or clean all filters every month. Keep windows closed. · If you are allergic to pollen, stay inside when pollen counts are high. Use a vacuum  with a HEPA filter or a double-thickness filter at least two times each week. · Stay inside when air pollution is bad. Avoid paint fumes, perfumes, and other strong odors. · Avoid conditions that make your allergies worse. Stay away from smoke. Do not smoke or let anyone else smoke in your house. Do not use fireplaces or wood-burning stoves. · If you are allergic to your pets, change the air filter in your furnace every month. Use high-efficiency filters. · If you are allergic to pet dander, keep pets outside or out of your bedroom. Old carpet and cloth furniture can hold a lot of animal dander. You may need to replace them. When should you call for help? Give an epinephrine shot if: 
· You think you are having a severe allergic reaction. · You have symptoms in more than one body area, such as mild nausea and an itchy mouth. After giving an epinephrine shot call 911, even if you feel better. [id number] if: 
· You have symptoms of a severe allergic reaction. These may include: 
? Sudden raised, red areas (hives) all over your body. ? Swelling of the throat, mouth, lips, or tongue. ? Trouble breathing. ? Passing out (losing consciousness). Or you may feel very lightheaded or suddenly feel weak, confused, or restless. · You have been given an epinephrine shot, even if you feel better. Call your doctor now or seek immediate medical care if: 
· You have symptoms of an allergic reaction, such as: ? A rash or hives (raised, red areas on the skin). ? Itching. ? Swelling. ? Belly pain, nausea, or vomiting. Watch closely for changes in your health, and be sure to contact your doctor if: 
· You do not get better as expected. Where can you learn more? Go to http://rufina-ion.info/ Enter U687 in the search box to learn more about \"Allergies: Care Instructions. \" Current as of: October 7, 2019               Content Version: 12.5 © 5429-6677 [employer]. Care instructions adapted under license by [employer] (which disclaims liability or warranty for this information). If you have questions about a medical condition or this instruction, always ask your healthcare professional. [place] [address] any warranty or liability for your use of this information.

## 2020-09-09 NOTE — PROGRESS NOTES
Chief Complaint   Patient presents with    Hypertension     3 month f/u    Diabetes    Headache     patient states the headaches she had back in Feb.  have been coming back in the last month lasting longer and more servere. SUBJECTIVE:    Louis Lynch is a 70 y.o. female who returns today in follow-up of her medical problems include hypertension, glucose intolerance, hyperlipidemia, CKD stage II, DJD, obesity, and other medical problems including allergic rhinitis. She also now has developed bitemporal headaches that come on a daily basis and are severe. She did have some head trauma back earlier this year in February and the headache seemed to go away but now have come back again. She notes no associated nausea, vomiting or visual symptoms other than decreased visual acuity and she did see an eye doctor who noted her pressures were okay but told her she did not have cataract surgery. She denies any chest pain, shortness of breath, palpitations, PND, orthopnea or other cardiac or respiratory complaints. She does note that she is questioning whether she could have a gas leak in her house and is wants to make sure she does not have a problem with that by blood testing. She notes no GI or  complaints. She notes no dizziness or memory loss or other neurologic complaints other than the headaches. She has no current active arthritic complaints and and no other complaints on complete review of systems. Current Outpatient Medications   Medication Sig Dispense Refill    diazePAM (VALIUM) 5 mg tablet TK 3 TS PO 30 MINUTES PRIOR TO APPOINTMENT      ALPRAZolam (XANAX) 0.5 mg tablet Take 1 Tab by mouth three (3) times daily as needed for Anxiety.  Max Daily Amount: 1.5 mg. 50 Tab 0    LIVALO 2 mg tablet TAKE 1 TABLET BY MOUTH  EVERY NIGHT AT BEDTIME 90 Tab 3    hydroCHLOROthiazide (HYDRODIURIL) 25 mg tablet TAKE 1 TABLET BY MOUTH  EVERY DAY 90 Tab 3    omeprazole (PRILOSEC) 20 mg capsule TAKE 1 CAPSULE BY MOUTH  EVERY DAY 90 Cap 3    irbesartan (AVAPRO) 300 mg tablet TAKE 1 TABLET BY MOUTH  EVERY DAY 90 Tab 3    vit C/E/Zn/coppr/lutein/zeaxan (PRESERVISION AREDS 2 PO) Take  by mouth. Indications: 2 pills dailly      biotin 10,000 mcg cap Take  by mouth daily (with dinner).  Cholecalciferol, Vitamin D3, 3,000 unit tab Take 1,000 Units by mouth every morning.  cyanocobalamin (VITAMIN B12) 500 mcg tablet Take 500 mcg by mouth every morning.  polyethylene glycol (MIRALAX) 17 gram/dose powder Take 17 g by mouth every morning.  Indications: CONSTIPATION       Past Medical History:   Diagnosis Date    Anxiety     Arthritis     Chronic constipation     CKD (chronic kidney disease) 7/17/2017    Colon polyps 7/17/2017    DJD (degenerative joint disease) 7/17/2017    Elevated LFTs 7/17/2017    Flu 02/19/2019    GERD (gastroesophageal reflux disease)     Glucose intolerance (impaired glucose tolerance) 7/17/2017    Hemorrhoids     internal & external- bleeds frequently    High cholesterol     Hyperlipidemia 7/17/2017    Hypertension     Hypertension with renal disease 7/17/2017    PT Education - High Blood Pressure (Essential Hypertension) *: blood pressure PT Education - How to access health information online: discussed with patient and provided information    Hypertension, renal 7/17/2017    Ill-defined condition     ringing in ears    Mild obesity 7/17/2017    Nausea & vomiting     Neoplasm of uncertain behavior of skin 7/17/2017    On statin therapy 7/17/2017    Osteopenia 7/17/2017    Primary angle-closure glaucoma 7/17/2017    Comments: OU    Skin cancer of face     as of 6/15/16 pt states \"removed years ago\"    Spinal stenosis      Past Surgical History:   Procedure Laterality Date    HX CHOLECYSTECTOMY  11/6/2014     [facility] Ill for glaucoma    HX HYSTERECTOMY      partial    HX PELVIC LAPAROSCOPY      Jono. oophorectomy    HX TUBAL LIGATION      [place] COLSC FLX W/REMOVAL LESION BY HOT BX FORCEPS  11/12/2012         [place] ERCP REMOVE CALCULI/DEBRIS BILIARY/PANCREAS DUCT  11/7/2014         [place] ERCP W/SPHINCTEROTOMY/PAPILLOTOMY  11/7/2014          Allergies   Allergen Reactions    Iodinated Contrast Media Hives    Oxycodone-Aspirin Unknown (comments)       REVIEW OF SYSTEMS:  General: negative for - chills or fever, or weight loss or gain  ENT: negative for - headaches, nasal congestion or tinnitus  Eyes: no blurred or visual changes  Neck: No stiffness or swollen nodes  Respiratory: negative for - cough, hemoptysis, shortness of breath or wheezing  Cardiovascular : negative for - chest pain, edema, palpitations or shortness of breath  Gastrointestinal: negative for - abdominal pain, blood in stools, heartburn or nausea/vomiting  Genito-Urinary: no dysuria, trouble voiding, or hematuria  Musculoskeletal: negative for - gait disturbance, joint pain, joint stiffness or joint swelling  Neurological: no TIA or stroke symptoms.   Positive for bitemporal headaches  Hematologic: no bruises, no bleeding  Lymphatic: no swollen glands  Integument: no lumps, mole changes, nail changes or rash  Endocrine:no malaise/lethargy poly uria or polydipsia or unexpected weight changes        Social History     Socioeconomic History    Marital status:      Spouse name: Not on file    Number of children: Not on file    Years of education: Not on file    Highest education level: Not on file   Tobacco Use    Smoking status: Former Smoker     Packs/day: 1.50     Years: 35.00     Pack years: 52.50     Last attempt to quit: 11/4/2004     Years since quitting: 15.8    Smokeless tobacco: Never Used    Tobacco comment: quit smoking 6 yrs ago   Substance and Sexual Activity    Alcohol use: No    Drug use: No    Sexual activity: Never     Family History   Problem Relation Age of Onset    Heart Disease Mother     Hypertension Mother     Cancer Mother         skin  Stroke Father     Breast Cancer Sister         onset: 76s    Breast Cancer Niece        OBJECTIVE:     Visit Vitals  /80 (BP 1 Location: Left arm, BP Patient Position: Sitting)   Pulse 68   Temp 98.1 °F (36.7 °C) (Oral)   Resp 16   Ht 5' 3\" (1.6 m)   Wt 196 lb 3.2 oz (89 kg)   SpO2 99%   BMI 34.76 kg/m²     CONSTITUTIONAL:   well nourished, appears age appropriate  EYES: sclera anicteric, PERRL, EOMI  ENMT:nares clear, moist mucous membranes, pharynx clear  NECK: supple. Thyroid normal, No JVD or bruits  RESPIRATORY: Chest: clear to ascultation and percussion, normal inspiratory effort  CARDIOVASCULAR: Heart: regular rate and rhythm no murmurs, rubs or gallops, PMI not displaced, No thrills, no peripheral edema  GASTROINTESTINAL: Abdomen: non distended, soft, non tender, bowel sounds normal  HEMATOLOGIC: no purpura, petechiae or bruising  LYMPHATIC: No lymph node enlargemant  MUSCULOSKELETAL: Extremities: no active synovitis, pulse 1+   INTEGUMENT: No unusual rashes or suspicious skin lesions noted. Nails appear normal.  PERIPHERAL VASCULAR: normal pulses femoral, PT and DP  NEUROLOGIC: non-focal exam, A & O X 3  PSYCHIATRIC:, appropriate affect     ASSESSMENT:   1. Hypertension with renal disease    2. Glucose intolerance (impaired glucose tolerance)    3. Mixed hyperlipidemia    4. Primary osteoarthritis involving multiple joints    5. Stage 2 chronic kidney disease    6. Severe obesity (BMI 35.0-39. 9) with comorbidity ([address]    7. Bilateral headaches      Impression  1. Hypertension that is controlled so continue current therapy reviewed with her. 2.  Glucose intolerance repeat status pending a prior lab reviewed now make adjustments if necessary. 3.  Hyperlipidemia prior lab reviewed and repeat status pending and I will adjust if needed. 4   DJD that is stable   5   CKD stage II repeat status pending  6. Obesity we did discuss diet, exercise and weight reduction for overall health benefit.   7. Bilateral headaches I am going to schedule an MRI to further evaluate this. I will check a carboxyhemoglobin level in light of her question concern for gas leak  Recheck scheduled 3 months or sooner should there be a problem. I will call with today's lab results. PLAN:  .  Orders Placed This Encounter    MRI BRAIN WO CONT    METABOLIC PANEL, COMPREHENSIVE ([facility])    LIPID PANEL ([facility])    CK ([facility])    HEMOGLOBIN A1C W/O EAG ([facility])    CARBOXY HEMOGLOBIN    diazePAM (VALIUM) 5 mg tablet         ATTENTION:   This medical record was transcribed using an electronic medical records system. Although proofread, it may and can contain electronic and spelling errors. Other human spelling and other errors may be present. Corrections may be executed at a later time. Please feel free to contact us for any clarifications as needed. Follow-up and Dispositions    · Return in about 3 months (around 12/10/2020). No results found for any visits on 09/10/20. Alma Wallace MD    The patient verbalized understanding of the problems and plans as explained.

## 2020-09-10 ENCOUNTER — HOSPITAL ENCOUNTER (OUTPATIENT)
Dept: MRI IMAGING | Age: 75
Discharge: HOME | End: 2020-09-10
Attending: INTERNAL MEDICINE
Payer: MEDICARE

## 2020-09-10 ENCOUNTER — OFFICE VISIT (OUTPATIENT)
Dept: INTERNAL MEDICINE CLINIC | Age: 75
End: 2020-09-10
Payer: MEDICARE

## 2020-09-10 VITALS
RESPIRATION RATE: 16 BRPM | SYSTOLIC BLOOD PRESSURE: 120 MMHG | HEIGHT: 63 IN | TEMPERATURE: 98.1 F | BODY MASS INDEX: 34.76 KG/M2 | OXYGEN SATURATION: 99 % | DIASTOLIC BLOOD PRESSURE: 80 MMHG | WEIGHT: 196.2 LBS | HEART RATE: 68 BPM

## 2020-09-10 DIAGNOSIS — E66.01 SEVERE OBESITY (BMI 35.0-39.9) WITH COMORBIDITY (HCC): ICD-10-CM

## 2020-09-10 DIAGNOSIS — M15.9 PRIMARY OSTEOARTHRITIS INVOLVING MULTIPLE JOINTS: ICD-10-CM

## 2020-09-10 DIAGNOSIS — R51.9 BILATERAL HEADACHES: ICD-10-CM

## 2020-09-10 DIAGNOSIS — N18.2 STAGE 2 CHRONIC KIDNEY DISEASE: ICD-10-CM

## 2020-09-10 DIAGNOSIS — I12.9 HYPERTENSION WITH RENAL DISEASE: Primary | ICD-10-CM

## 2020-09-10 DIAGNOSIS — R73.02 GLUCOSE INTOLERANCE (IMPAIRED GLUCOSE TOLERANCE): ICD-10-CM

## 2020-09-10 DIAGNOSIS — E78.2 MIXED HYPERLIPIDEMIA: ICD-10-CM

## 2020-09-10 LAB
A-G RATIO,AGRAT: 1.6 RATIO
ALBUMIN SERPL-MCNC: 4.5 G/DL (ref 3.9–5.4)
ALP SERPL-CCNC: 99 U/L (ref 38–126)
ALT SERPL-CCNC: 27 U/L (ref 0–35)
ANION GAP SERPL CALC-SCNC: 10 MMOL/L
AST SERPL W P-5'-P-CCNC: 28 U/L (ref 14–36)
BILIRUB SERPL-MCNC: 1.1 MG/DL (ref 0.2–1.3)
BUN SERPL-MCNC: 20 MG/DL (ref 7–17)
BUN/CREATININE RATIO,BUCR: 29 RATIO
CALCIUM SERPL-MCNC: 10 MG/DL (ref 8.4–10.2)
CHLORIDE SERPL-SCNC: 103 MMOL/L (ref 98–107)
CHOL/HDL RATIO,CHHD: 3 RATIO (ref 0–4)
CHOLEST SERPL-MCNC: 181 MG/DL (ref 0–200)
CK SERPL-CCNC: 44 U/L (ref 30–135)
CO2 SERPL-SCNC: 29 MMOL/L (ref 22–32)
CREAT SERPL-MCNC: 0.7 MG/DL (ref 0.7–1.2)
GLOBULIN,GLOB: 2.8
GLUCOSE SERPL-MCNC: 102 MG/DL (ref 65–105)
HBA1C MFR BLD HPLC: 5.6 % (ref 4–5.7)
HDLC SERPL-MCNC: 54 MG/DL (ref 35–130)
LDL/HDL RATIO,LDHD: 2 RATIO
LDLC SERPL CALC-MCNC: 92 MG/DL (ref 0–130)
POTASSIUM SERPL-SCNC: 4.8 MMOL/L (ref 3.6–5)
PROT SERPL-MCNC: 7.3 G/DL (ref 6.3–8.2)
SODIUM SERPL-SCNC: 142 MMOL/L (ref 137–145)
TRIGL SERPL-MCNC: 176 MG/DL (ref 0–200)
VLDLC SERPL CALC-MCNC: 35 MG/DL

## 2020-09-10 PROCEDURE — G9899 SCRN MAM PERF RSLTS DOC: HCPCS | Performed by: INTERNAL MEDICINE

## 2020-09-10 PROCEDURE — 3288F FALL RISK ASSESSMENT DOCD: CPT | Performed by: INTERNAL MEDICINE

## 2020-09-10 PROCEDURE — 80061 LIPID PANEL: CPT | Performed by: INTERNAL MEDICINE

## 2020-09-10 PROCEDURE — 70551 MRI BRAIN STEM W/O DYE: CPT

## 2020-09-10 PROCEDURE — G8536 NO DOC ELDER MAL SCRN: HCPCS | Performed by: INTERNAL MEDICINE

## 2020-09-10 PROCEDURE — 82550 ASSAY OF CK (CPK): CPT | Performed by: INTERNAL MEDICINE

## 2020-09-10 PROCEDURE — G8399 PT W/DXA RESULTS DOCUMENT: HCPCS | Performed by: INTERNAL MEDICINE

## 2020-09-10 PROCEDURE — G8427 DOCREV CUR MEDS BY ELIG CLIN: HCPCS | Performed by: INTERNAL MEDICINE

## 2020-09-10 PROCEDURE — 80053 COMPREHEN METABOLIC PANEL: CPT | Performed by: INTERNAL MEDICINE

## 2020-09-10 PROCEDURE — G8754 DIAS BP LESS 90: HCPCS | Performed by: INTERNAL MEDICINE

## 2020-09-10 PROCEDURE — 1100F PTFALLS ASSESS-DOCD GE2>/YR: CPT | Performed by: INTERNAL MEDICINE

## 2020-09-10 PROCEDURE — 1090F PRES/ABSN URINE INCON ASSESS: CPT | Performed by: INTERNAL MEDICINE

## 2020-09-10 PROCEDURE — G8752 SYS BP LESS 140: HCPCS | Performed by: INTERNAL MEDICINE

## 2020-09-10 PROCEDURE — 99214 OFFICE O/P EST MOD 30 MIN: CPT | Performed by: INTERNAL MEDICINE

## 2020-09-10 PROCEDURE — G8417 CALC BMI ABV UP PARAM F/U: HCPCS | Performed by: INTERNAL MEDICINE

## 2020-09-10 PROCEDURE — 3017F COLORECTAL CA SCREEN DOC REV: CPT | Performed by: INTERNAL MEDICINE

## 2020-09-10 PROCEDURE — 83036 HEMOGLOBIN GLYCOSYLATED A1C: CPT | Performed by: INTERNAL MEDICINE

## 2020-09-10 PROCEDURE — G8510 SCR DEP NEG, NO PLAN REQD: HCPCS | Performed by: INTERNAL MEDICINE

## 2020-09-10 RX ORDER — DIAZEPAM 5 MG/1
TABLET ORAL
COMMUNITY
Start: 2020-06-18 | End: 2021-06-10 | Stop reason: ALTCHOICE

## 2020-09-10 NOTE — PROGRESS NOTES
HIPAA verified by two patient identifiers. Pauline Zamudio is a 70 y.o. female    Chief Complaint   Patient presents with    Hypertension     3 month f/u    Diabetes    Headache     patient states the headaches she had back in Feb.  have been coming back in the last month lasting longer and more servere. Visit Vitals  /80 (BP 1 Location: Left arm, BP Patient Position: Sitting)   Pulse 68   Temp 98.1 °F (36.7 °C) (Oral)   Resp 16   Ht 5' 3\" (1.6 m)   Wt 196 lb 3.2 oz (89 kg)   SpO2 99%   BMI 34.76 kg/m²       Pain Scale: 0 - No pain/10  Pain Location:       Health Maintenance Due   Topic Date Due    Shingrix Vaccine Age 49> (1 of 2) 08/02/1997    GLAUCOMA SCREENING Q2Y  08/02/2012    Pneumococcal 65+ years (2 of 2 - PPSV23) 07/06/2016    Colonoscopy  07/11/2019    Breast Cancer Screen Mammogram  11/12/2019    Flu Vaccine (1) 09/01/2020         Coordination of Care Questionnaire:  :   1) Have you been to an emergency room, urgent care, or hospitalized since your last visit? If yes, where when, and reason for visit? no       2. Have seen or consulted any other health care provider since your last visit? If yes, where when, and reason for visit? NO      Patient is accompanied by self I have received verbal consent from Pauline Zamudio to discuss any/all medical information while they are present in the room.

## 2020-09-10 NOTE — PATIENT INSTRUCTIONS
Allergies: Care Instructions Your Care Instructions Allergies occur when your body's defense system (immune system) overreacts to certain substances. The immune system treats a harmless substance as if it were a harmful germ or virus. Many things can make this happen. These include pollens, medicine, food, dust, animal dander, and mold. Allergies can be mild or severe. Mild allergies can be managed with home treatment. But medicine may be needed to prevent problems. Managing your allergies is an important part of staying healthy. Your doctor may suggest that you have allergy testing to help find out what is causing your allergies. Severe allergies can cause reactions that affect your whole body (anaphylactic reactions). Your doctor may prescribe a shot of epinephrine to carry with you in case you have a severe reaction. Learn how to give yourself the shot and keep it with you at all times. Make sure it is not . Follow-up care is a key part of your treatment and safety. Be sure to make and go to all appointments, and call your doctor if you are having problems. It's also a good idea to know your test results and keep a list of the medicines you take. How can you care for yourself at home? · If you have been told by your doctor that dust or dust mites are causing your allergy, decrease the dust around your bed: 
? Wash sheets, pillowcases, and other bedding in hot water every week. ? Use dust-proof covers for pillows, duvets, and mattresses. Avoid plastic covers because they tear easily and do not \"breathe. \" Wash as instructed on the label. ? Do not use any blankets and pillows that you do not need. ? Use blankets that you can wash in your washing machine. ? Consider removing drapes and carpets, which attract and hold dust, from your bedroom. · If you are allergic to house dust and mites, do not use home humidifiers. Your doctor can suggest ways you can control dust and mites. · Look for signs of cockroaches. Cockroaches cause allergic reactions. Use cockroach baits to get rid of them. Then, clean your home well. Cockroaches like areas where grocery bags, newspapers, empty bottles, or cardboard boxes are stored. Do not keep these inside your home, and keep trash and food containers sealed. Seal off any spots where cockroaches might enter your home. · If you are allergic to mold, get rid of furniture, rugs, and drapes that smell musty. Check for mold in the bathroom. · If you are allergic to outdoor pollen or mold spores, use air-conditioning. Change or clean all filters every month. Keep windows closed. · If you are allergic to pollen, stay inside when pollen counts are high. Use a vacuum  with a HEPA filter or a double-thickness filter at least two times each week. · Stay inside when air pollution is bad. Avoid paint fumes, perfumes, and other strong odors. · Avoid conditions that make your allergies worse. Stay away from smoke. Do not smoke or let anyone else smoke in your house. Do not use fireplaces or wood-burning stoves. · If you are allergic to your pets, change the air filter in your furnace every month. Use high-efficiency filters. · If you are allergic to pet dander, keep pets outside or out of your bedroom. Old carpet and cloth furniture can hold a lot of animal dander. You may need to replace them. When should you call for help? Give an epinephrine shot if: 
  · You think you are having a severe allergic reaction.  
  · You have symptoms in more than one body area, such as mild nausea and an itchy mouth. After giving an epinephrine shot call 911, even if you feel better. Call 911 if: 
  · You have symptoms of a severe allergic reaction. These may include: 
? Sudden raised, red areas (hives) all over your body. ? Swelling of the throat, mouth, lips, or tongue. ? Trouble breathing. ? Passing out (losing consciousness).  Or you may feel very lightheaded or suddenly feel weak, confused, or restless.  
  · You have been given an epinephrine shot, even if you feel better. Call your doctor now or seek immediate medical care if: 
  · You have symptoms of an allergic reaction, such as: ? A rash or hives (raised, red areas on the skin). ? Itching. ? Swelling. ? Belly pain, nausea, or vomiting. Watch closely for changes in your health, and be sure to contact your doctor if: 
  · You do not get better as expected. Where can you learn more? Go to http://rufina-ion.info/ Enter V365 in the search box to learn more about \"Allergies: Care Instructions. \" Current as of: June 29, 2020               Content Version: 12.6 © 9940-1735 [employer]. Care instructions adapted under license by [employer] (which disclaims liability or warranty for this information). If you have questions about a medical condition or this instruction, always ask your healthcare professional. [address] any warranty or liability for your use of this information.

## 2020-09-12 LAB — COHGB MFR BLD: 2.5 % (ref 0–3.6)

## 2020-09-14 NOTE — PROGRESS NOTES
Called and spoke to patient  Two pt identifiers confirmed  Informed patient per Dr. Calvillo that labs are including carbon monoxide. Patient verbalized understanding of information discussed  with no further questions at this time.

## 2020-10-09 ENCOUNTER — TRANSCRIBE ORDER (OUTPATIENT)
Dept: SCHEDULING | Age: 75
End: 2020-10-09

## 2020-10-09 DIAGNOSIS — Z12.31 VISIT FOR SCREENING MAMMOGRAM: Primary | ICD-10-CM

## 2020-11-20 DIAGNOSIS — K21.9 GASTROESOPHAGEAL REFLUX DISEASE WITHOUT ESOPHAGITIS: ICD-10-CM

## 2020-11-20 DIAGNOSIS — I12.9 HYPERTENSION WITH RENAL DISEASE: ICD-10-CM

## 2020-11-20 DIAGNOSIS — I10 ESSENTIAL HYPERTENSION: ICD-10-CM

## 2020-11-20 RX ORDER — HYDROCHLOROTHIAZIDE 25 MG/1
TABLET ORAL
Qty: 90 TAB | Refills: 3 | Status: SHIPPED | OUTPATIENT
Start: 2020-11-20 | End: 2022-01-03

## 2020-11-20 RX ORDER — OMEPRAZOLE 20 MG/1
CAPSULE, DELAYED RELEASE ORAL
Qty: 90 CAP | Refills: 3 | Status: SHIPPED | OUTPATIENT
Start: 2020-11-20 | End: 2022-01-03

## 2020-11-20 RX ORDER — IRBESARTAN 300 MG/1
TABLET ORAL
Qty: 90 TAB | Refills: 3 | Status: SHIPPED | OUTPATIENT
Start: 2020-11-20 | End: 2022-01-03

## 2020-11-20 RX ORDER — PITAVASTATIN CALCIUM 2.09 MG/1
TABLET, FILM COATED ORAL
Qty: 90 TAB | Refills: 3 | Status: SHIPPED | OUTPATIENT
Start: 2020-11-20 | End: 2022-01-03

## 2020-11-20 NOTE — TELEPHONE ENCOUNTER
RX refill request from the patient/pharmacy. Patient last seen 09- with labs, and next appt. scheduled for 12-  Requested Prescriptions     Pending Prescriptions Disp Refills    irbesartan (AVAPRO) 300 mg tablet [Pharmacy Med Name: IRBESARTAN  300MG  TAB] 90 Tab 3     Sig: TAKE 1 TABLET BY MOUTH  EVERY DAY    Livalo 2 mg tablet [Pharmacy Med Name: LIVALO  2MG  TAB] 90 Tab 3     Sig: TAKE 1 TABLET BY MOUTH  EVERY NIGHT AT BEDTIME    hydroCHLOROthiazide (HYDRODIURIL) 25 mg tablet [Pharmacy Med Name: HYDROCHLOROTHIAZIDE  25MG  TAB] 90 Tab 3     Sig: TAKE 1 TABLET BY MOUTH  EVERY DAY    omeprazole (PRILOSEC) 20 mg capsule [Pharmacy Med Name: OMEPRAZOLE  20MG  CAP] 90 Cap 3     Sig: TAKE 1 CAPSULE BY MOUTH  EVERY DAY   .

## 2020-12-09 NOTE — PROGRESS NOTES
This is a Subsequent Medicare Annual Wellness Visit providing Personalized Prevention Plan Services (PPPS) (Performed 12 months after initial AWV and PPPS )    I have reviewed the patient's medical history in detail and updated the computerized patient record. She presents today for Medicare subsequent annual wellness examination and screening questionnaire. She is also in follow-up of her multiple medical problems include hypertension, glucose intolerance, hyperlipidemia, DJD with chronic back pain, allergic rhinitis, CKD stage II, vitamin D deficiency, obesity and other multiple medical problems. She is taking her medications and trying to follow her diet and remain physically active. She currently denies any chest pain, shortness of breath, palpitations, PND, orthopnea or other cardiac or respiratory complaints. She notes no current GI or  complaints. She has no headaches, dizziness or neurologic complaints. There are no current active arthritic complaints and she has no other complaints on complete review of systems. She does want to be tested for Covid antibodies as she thinks she may have had the disease back in February.     History     Past Medical History:   Diagnosis Date    Anxiety     Arthritis     Chronic constipation     CKD (chronic kidney disease) 7/17/2017    Colon polyps 7/17/2017    DJD (degenerative joint disease) 7/17/2017    Elevated LFTs 7/17/2017    Flu 02/19/2019    GERD (gastroesophageal reflux disease)     Glucose intolerance (impaired glucose tolerance) 7/17/2017    Hemorrhoids     internal & external- bleeds frequently    High cholesterol     Hyperlipidemia 7/17/2017    Hypertension     Hypertension with renal disease 7/17/2017    PT Education - High Blood Pressure (Essential Hypertension) *: blood pressure PT Education - How to access health information online: discussed with patient and provided information    Hypertension, renal 7/17/2017    Ill-defined condition     ringing in ears    Mild obesity 2017    Nausea & vomiting     Neoplasm of uncertain behavior of skin 2017    On statin therapy 2017    Osteopenia 2017    Primary angle-closure glaucoma 2017    Comments: OU    Skin cancer of face     as of 6/15/16 pt states \"removed years ago\"    Spinal stenosis       Past Surgical History:   Procedure Laterality Date    HX CATARACT REMOVAL Bilateral 2020    HX CHOLECYSTECTOMY  2014    Dr Cornell Antunez for glaucoma    HX HYSTERECTOMY      partial    HX PELVIC LAPAROSCOPY      Jono. oophorectomy    HX TUBAL LIGATION      [place] COLSC FLX W/REMOVAL LESION BY HOT BX FORCEPS  2012         [place] ERCP REMOVE CALCULI/DEBRIS BILIARY/PANCREAS DUCT  2014         [place] ERCP W/SPHINCTEROTOMY/PAPILLOTOMY  2014          Social History     Tobacco Use    Smoking status: Former Smoker     Packs/day: 1.50     Years: 35.00     Pack years: 52.50     Last attempt to quit: 2004     Years since quittin.1    Smokeless tobacco: Never Used    Tobacco comment: quit smoking 6 yrs ago   Substance Use Topics    Alcohol use: No    Drug use: No     Current Outpatient Medications   Medication Sig Dispense Refill    irbesartan (AVAPRO) 300 mg tablet TAKE 1 TABLET BY MOUTH  EVERY DAY 90 Tab 3    Livalo 2 mg tablet TAKE 1 TABLET BY MOUTH  EVERY NIGHT AT BEDTIME 90 Tab 3    hydroCHLOROthiazide (HYDRODIURIL) 25 mg tablet TAKE 1 TABLET BY MOUTH  EVERY DAY 90 Tab 3    omeprazole (PRILOSEC) 20 mg capsule TAKE 1 CAPSULE BY MOUTH  EVERY DAY 90 Cap 3    diazePAM (VALIUM) 5 mg tablet TK 3 TS PO 30 MINUTES PRIOR TO APPOINTMENT      ALPRAZolam (XANAX) 0.5 mg tablet Take 1 Tab by mouth three (3) times daily as needed for Anxiety. Max Daily Amount: 1.5 mg. 50 Tab 0    vit C/E/Zn/coppr/lutein/zeaxan (PRESERVISION AREDS 2 PO) Take  by mouth.  Indications: 2 pills dailly      biotin 10,000 mcg cap Take  by mouth daily (with dinner).  Cholecalciferol, Vitamin D3, 3,000 unit tab Take 1,000 Units by mouth every morning.  cyanocobalamin (VITAMIN B12) 500 mcg tablet Take 500 mcg by mouth every morning.  polyethylene glycol (MIRALAX) 17 gram/dose powder Take 17 g by mouth every morning. Indications: CONSTIPATION       Allergies   Allergen Reactions    Iodinated Contrast Media Hives    Oxycodone-Aspirin Unknown (comments)     Family History   Problem Relation Age of Onset    Heart Disease Mother     Hypertension Mother     Cancer Mother         skin    Stroke Father     Breast Cancer Sister         onset: 76s    Breast Cancer Niece        Patient Active Problem List    Diagnosis    Primary osteoarthritis involving multiple joints    Mixed hyperlipidemia    Glucose intolerance (impaired glucose tolerance)    Hypertension with renal disease     PT Education - High Blood Pressure (Essential Hypertension) *: blood pressure  PT Education - How to access health information online: discussed with patient and provided information      Stage 2 chronic kidney disease    Severe obesity (BMI 35.0-39. 9) with comorbidity ([address]    Osteopenia of multiple sites    Vitamin D deficiency    Exposure to COVID-19 virus    Cough    Non-seasonal allergic rhinitis    Panic attack    Chronic midline low back pain with sciatica    Influenza    Alcohol screening    Headache    Acute post-traumatic headache, not intractable    Contact dermatitis, allergic    Plantar wart    Acute pancreatitis    Right upper quadrant abdominal pain    Acute midline low back pain without sciatica    Medicare annual wellness visit, subsequent    Colon polyps    Primary angle-closure glaucoma     Comments: OU      Neoplasm of uncertain behavior of skin    Elevated LFTs    Grade IV hemorrhoids    Choledocholithiasis       Patient Care Team:  Best Lal MD as PCP - General (Internal Medicine)  Best Lal MD as PCP - [facility] Empaneled Provider    Depression Risk Factor Screening:     3 most recent PHQ Screens 12/10/2020   Little interest or pleasure in doing things Not at all   Feeling down, depressed, irritable, or hopeless Not at all   Total Score PHQ 2 0     Alcohol Risk Factor Screening:   Do you average more than 1 drink per night or more than 7 drinks a week:  No    On any one occasion in the past three months have you have had more than 3 drinks containing alcohol:  No    Functional Ability and Level of Safety:     Fall Risk     Fall Risk Assessment, last 12 mths 12/10/2020   Able to walk? Yes   Fall in past 12 months? No   Fall with injury? -   Number of falls in past 12 months -   Fall Risk Score -       Hearing Loss   mild    Activities of Daily Living   Self-care. ADL Assessment 12/10/2020   Feeding yourself No Help Needed   Getting from bed to chair No Help Needed   Getting dressed No Help Needed   Bathing or showering No Help Needed   Walk across the room (includes cane/walker) No Help Needed   Using the telphone No Help Needed   Taking your medications No Help Needed   Preparing meals No Help Needed   Managing money (expenses/bills) No Help Needed   Moderately strenuous housework (laundry) No Help Needed   Shopping for personal items (toiletries/medicines) No Help Needed   Shopping for groceries No Help Needed   Driving No Help Needed   Climbing a flight of stairs No Help Needed   Getting to places beyond walking distances No Help Needed       Abuse Screen   Patient is not abused    Social History     Social History Narrative    Not on file       Review of Systems      ROS:    Constitutional: She denies fevers, weight loss, sweats. Eyes: No blurred or double vision. ENT: No difficulty with swallowing, taste, speech or smell. NECK: no stiffness swelling or lymph node enlargement  Respiratory: No cough wheezing or shortness of breath.   Cardiovascular: Denies chest pain, palpitations, unexplained indigestion or syncope. Breast: She has noted no masses or lumps and no discharge or axillary swelling  Gastrointestinal:  No changes in bowel movements, no abdominal pain, no bloating. Genitourinary: No discharge or abnormal bleeding or pain  Extremities: No joint pain, stiffness or swelling. Neurological:  No numbness, tingling, burring paresthesias or loss of motor strength. No syncope, dizziness or frequent headache  Skin:  No recent rashes or mole changes. Psychiatric/Behavioral:  Negative for depression. The patient is not nervous/anxious. HEMATOLOGIC: no easy bruising or bleeding gums  Endocrine: no sweats of urinary frequency or excessive thirst    Physical Examination     Evaluation of Cognitive Function:  Mood/affect:  happy  Appearance: age appropriate  Family member/caregiver input: none    Visit Vitals  /78 (BP 1 Location: Left arm, BP Patient Position: Sitting)   Pulse 83   Temp 98.4 °F (36.9 °C) (Oral)   Resp 18   Ht 5' 3\" (1.6 m)   Wt 198 lb 14.4 oz (90.2 kg)   SpO2 98%   BMI 35.23 kg/m²     Vitals:    12/10/20 1102   BP: 132/78   Pulse: 83   Resp: 18   Temp: 98.4 °F (36.9 °C)   TempSrc: Oral   SpO2: 98%   Weight: 198 lb 14.4 oz (90.2 kg)   Height: 5' 3\" (1.6 m)   PainSc:   0 - No pain        PHYSICAL EXAM:    General appearance - alert, well appearing, and in no distress  Mental status - alert, oriented to person, place, and time  HEENT:  Ears - bilateral TM's and external ear canals clear  Eyes - pupillary responses were normal.  Extraocular muscle function intact. Lids and conjunctiva not injected. Fundoscopic exam revealed sharp disc margins. eye movements intact  Pharynx- clear with teeth in good repair. No masses were noted  Neck - supple without thyromegaly or burit. No JVD noted  Lungs - clear to auscultation and percussion  Cardiac- normal rate, regular rhythm without murmurs. PMI not displaced.   No gallop, rub or click  Breast: deferred to GYN  Abdomen - flat, soft, non-tender without palpable organomegaly or mass. No pulsatile mass was felt, and not bruit was heard. Bowel sounds were active   Female - deferred to GYN  Rectal - deferred to GYN  Extremities -  no clubbing cyanosis or edema  Lymphatics - no palpable lymphadenopathy, no hepatosplenomegaly  Peripheral vascular - Dorsalis pedis and posterior tibial pulses felt without difficulty  Skin - no rash or unusual mole change noted  Neurological - Cranial nerves II-XII grossly intact. Motor strength 5/5. DTR's 2+ and symmetric. Station and gait normal  Back exam - full range of motion, no tenderness, palpable spasm or pain on motion  Musculoskeletal - no joint tenderness, deformity or swelling  Hematologic: no purpura, petechiae or bruising    Results for orders placed or performed in visit on 60/37/85   METABOLIC PANEL, COMPREHENSIVE   Result Value Ref Range    Glucose 102 65 - 105 mg/dL    BUN 20.0 (H) 7.0 - 17.0 mg/dL    Creatinine 0.7 0.7 - 1.2 mg/dL    Sodium 142 137 - 145 mmol/L    Potassium 4.8 3.6 - 5.0 mmol/L    Chloride 103 98 - 107 mmol/L    CO2 29.0 22.0 - 32.0 mmol/L    Calcium 10.0 8.4 - 10.2 mg/dl    Protein, total 7.3 6.3 - 8.2 g/dL    Albumin 4.5 3.9 - 5.4 g/dL    ALT (SGPT) 27 0 - 35 U/L    AST (SGOT) 28.0 14.0 - 36.0 U/L    Alk.  phosphatase 99 38 - 126 U/L    Bilirubin, total 1.1 0.2 - 1.3 mg/dL    BUN/Creatinine ratio 29 Ratio    GFR est AA >60 mL/min/1.73m2    GFR est non-AA >60 mL/min/1.73m2    Globulin 2.80     A-G Ratio 1.6 Ratio    Anion gap 10 mmol/L   LIPID PANEL   Result Value Ref Range    Cholesterol, total 181 0 - 200 mg/dL    Triglyceride 176 0 - 200 mg/dL    HDL Cholesterol 54 35 - 130 mg/dL    VLDL 35 mg/dL    LDL, calculated 92 0 - 130 mg/dL    CHOL/HDL Ratio 3 0 - 4 Ratio    LDL/HDL Ratio 2 Ratio   CK   Result Value Ref Range    CK 44.00 30.00 - 135.00 U/L   HEMOGLOBIN A1C W/O EAG   Result Value Ref Range    Hemoglobin A1c 5.6 4.0 - 5.7 %   CARBOXYHEMOGLOBIN   Result Value Ref Range    Carbon monoxide, whole bld. 2.5 0.0 - 3.6 %       Advice/Referrals/Counseling   Education and counseling provided:  Are appropriate based on today's review and evaluation  End-of-Life planning (with patient's consent)  Pneumococcal Vaccine  Influenza Vaccine  Colorectal cancer screening tests      Assessment/Plan     ASSESSMENT:   1. Hypertension with renal disease    2. Glucose intolerance (impaired glucose tolerance)    3. Mixed hyperlipidemia    4. Primary osteoarthritis involving multiple joints    5. Stage 2 chronic kidney disease    6. Osteopenia of multiple sites    7. Vitamin D deficiency    8. Severe obesity (BMI 35.0-39. 9) with comorbidity ([address]    9. Elevated LFTs    10. Non-seasonal allergic rhinitis, unspecified trigger    11. Alcohol screening    12. Medicare annual wellness visit, subsequent    13. Exposure to COVID-19 virus      Impression  1. Hypertension that is controlled so continue current therapy reviewed with her. 2.  Glucose intolerance repeat status pending and prior lab reviewed not make adjustments if necessary. 3.  Hyperlipidemia prior lab reviewed and repeat status pending I will adjust if needed. 4. DJD that is stable  5. CKD stage II repeat status pending  6. Osteopenia I reviewed her prior bone density with her  7. Vitamin D deficiency repeat status is pending  8. Obesity we did discuss diet, exercise and weight reduction for overall health benefit. 9.  Elevated liver enzymes repeat status pending  10. Allergic rhinitis stable  11. Annual alcohol screening is done and I did caution regarding females on more than 1 drink per day with increased cardiovascular risk and increased risk of liver disease and other GI effects. She claims not to drink at all now. 12.  Desire for Covid testing as she thinks she may have had a disease back in February we will check antibody titer. Medicare subsequent annual wellness examination screening questionnaires completed today.   The results were reviewed with her and her questions were answered. Lifestyle recommendations modifications discussed and made. I will call with lab results and make further recommendations or adjustments if necessary. Follow-up schedule III months or sooner if there is a problem. PLAN:  .  Orders Placed This Encounter    CBC WITH AUTOMATED DIFF ([facility])    METABOLIC PANEL, COMPREHENSIVE ([facility])    LIPID PANEL ([facility])    CK ([facility])    HEMOGLOBIN A1C W/O EAG ([facility])    T4, FREE ([facility])    TSH 3RD GENERATION ([facility])    URINALYSIS W/O MICRO ([facility])    VITAMIN D, 25 HYDROXY ([facility])    SARS-COV-2 AB, IGG ([facility] Default)         ATTENTION:   This medical record was transcribed using an electronic medical records system. Although proofread, it may and can contain electronic and spelling errors. Other human spelling and other errors may be present. Corrections may be executed at a later time. Please feel free to contact us for any clarifications as needed. Follow-up and Dispositions    · Return in about 3 months (around 3/10/2021). Winifred Morales MD    Recommended healthy diet low in carbohydrates, fats, sodium and cholesterol. Recommended regular cardiovascular exercise 3-6 times per week for 30-60 minutes daily. Current Outpatient Medications   Medication Sig Dispense Refill    irbesartan (AVAPRO) 300 mg tablet TAKE 1 TABLET BY MOUTH  EVERY DAY 90 Tab 3    Livalo 2 mg tablet TAKE 1 TABLET BY MOUTH  EVERY NIGHT AT BEDTIME 90 Tab 3    hydroCHLOROthiazide (HYDRODIURIL) 25 mg tablet TAKE 1 TABLET BY MOUTH  EVERY DAY 90 Tab 3    omeprazole (PRILOSEC) 20 mg capsule TAKE 1 CAPSULE BY MOUTH  EVERY DAY 90 Cap 3    diazePAM (VALIUM) 5 mg tablet TK 3 TS PO 30 MINUTES PRIOR TO APPOINTMENT      ALPRAZolam (XANAX) 0.5 mg tablet Take 1 Tab by mouth three (3) times daily as needed for Anxiety.  Max Daily Amount: 1.5 mg. 50 Tab 0    vit C/E/Zn/coppr/lutein/zeaxan (PRESERVISION AREDS 2 PO) Take  by mouth. Indications: 2 pills dailly      biotin 10,000 mcg cap Take  by mouth daily (with dinner).  Cholecalciferol, Vitamin D3, 3,000 unit tab Take 1,000 Units by mouth every morning.  cyanocobalamin (VITAMIN B12) 500 mcg tablet Take 500 mcg by mouth every morning.  polyethylene glycol (MIRALAX) 17 gram/dose powder Take 17 g by mouth every morning. Indications: CONSTIPATION         No results found for any visits on 12/10/20. Verbal and written instructions (see AVS) provided. Patient expresses understanding of diagnosis and treatment plan.     Birdie Grier MD

## 2020-12-10 ENCOUNTER — OFFICE VISIT (OUTPATIENT)
Dept: INTERNAL MEDICINE CLINIC | Age: 75
End: 2020-12-10
Payer: MEDICARE

## 2020-12-10 VITALS
SYSTOLIC BLOOD PRESSURE: 132 MMHG | DIASTOLIC BLOOD PRESSURE: 78 MMHG | TEMPERATURE: 98.4 F | HEART RATE: 83 BPM | HEIGHT: 63 IN | BODY MASS INDEX: 35.24 KG/M2 | RESPIRATION RATE: 18 BRPM | WEIGHT: 198.9 LBS | OXYGEN SATURATION: 98 %

## 2020-12-10 DIAGNOSIS — Z00.00 MEDICARE ANNUAL WELLNESS VISIT, SUBSEQUENT: ICD-10-CM

## 2020-12-10 DIAGNOSIS — Z20.822 EXPOSURE TO COVID-19 VIRUS: ICD-10-CM

## 2020-12-10 DIAGNOSIS — R73.02 GLUCOSE INTOLERANCE (IMPAIRED GLUCOSE TOLERANCE): ICD-10-CM

## 2020-12-10 DIAGNOSIS — E55.9 VITAMIN D DEFICIENCY: ICD-10-CM

## 2020-12-10 DIAGNOSIS — I12.9 HYPERTENSION WITH RENAL DISEASE: Primary | ICD-10-CM

## 2020-12-10 DIAGNOSIS — N18.2 STAGE 2 CHRONIC KIDNEY DISEASE: ICD-10-CM

## 2020-12-10 DIAGNOSIS — R79.89 ELEVATED LFTS: ICD-10-CM

## 2020-12-10 DIAGNOSIS — M15.9 PRIMARY OSTEOARTHRITIS INVOLVING MULTIPLE JOINTS: ICD-10-CM

## 2020-12-10 DIAGNOSIS — E66.01 SEVERE OBESITY (BMI 35.0-39.9) WITH COMORBIDITY (HCC): ICD-10-CM

## 2020-12-10 DIAGNOSIS — M85.89 OSTEOPENIA OF MULTIPLE SITES: ICD-10-CM

## 2020-12-10 DIAGNOSIS — J30.89 NON-SEASONAL ALLERGIC RHINITIS, UNSPECIFIED TRIGGER: ICD-10-CM

## 2020-12-10 DIAGNOSIS — E78.2 MIXED HYPERLIPIDEMIA: ICD-10-CM

## 2020-12-10 DIAGNOSIS — Z13.39 ALCOHOL SCREENING: ICD-10-CM

## 2020-12-10 LAB
25(OH)D3 SERPL-MCNC: 44 NG/ML (ref 30–96)
A-G RATIO,AGRAT: 1.5 RATIO
ALBUMIN SERPL-MCNC: 4.7 G/DL (ref 3.9–5.4)
ALP SERPL-CCNC: 100 U/L (ref 38–126)
ALT SERPL-CCNC: 26 U/L (ref 0–35)
ANION GAP SERPL CALC-SCNC: 10 MMOL/L
AST SERPL W P-5'-P-CCNC: 30 U/L (ref 14–36)
BILIRUB SERPL-MCNC: 1.1 MG/DL (ref 0.2–1.3)
BILIRUB UR QL: NEGATIVE
BUN SERPL-MCNC: 19 MG/DL (ref 7–17)
BUN/CREATININE RATIO,BUCR: 27 RATIO
CALCIUM SERPL-MCNC: 10.1 MG/DL (ref 8.4–10.2)
CHLORIDE SERPL-SCNC: 102 MMOL/L (ref 98–107)
CHOL/HDL RATIO,CHHD: 4 RATIO (ref 0–4)
CHOLEST SERPL-MCNC: 199 MG/DL (ref 0–200)
CK SERPL-CCNC: 63 U/L (ref 30–135)
CLARITY: CLEAR
CO2 SERPL-SCNC: 31 MMOL/L (ref 22–32)
COLOR UR: NORMAL
CREAT SERPL-MCNC: 0.7 MG/DL (ref 0.7–1.2)
ERYTHROCYTE [DISTWIDTH] IN BLOOD BY AUTOMATED COUNT: 13.6 %
GLOBULIN,GLOB: 3.1
GLUCOSE 24H UR-MRATE: NEGATIVE G/(24.H)
GLUCOSE SERPL-MCNC: 103 MG/DL (ref 65–105)
HBA1C MFR BLD HPLC: 5.4 % (ref 4–5.7)
HCT VFR BLD AUTO: 45 % (ref 37–51)
HDLC SERPL-MCNC: 56 MG/DL (ref 35–130)
HGB BLD-MCNC: 14.5 G/DL (ref 12–18)
HGB UR QL STRIP: NEGATIVE
KETONES UR QL STRIP.AUTO: NEGATIVE
LDL/HDL RATIO,LDHD: 2 RATIO
LDLC SERPL CALC-MCNC: 110 MG/DL (ref 0–130)
LEUKOCYTE ESTERASE: NEGATIVE
LYMPHOCYTES ABSOLUTE: 3.6 K/UL (ref 0.6–4.1)
LYMPHOCYTES NFR BLD: 43.3 % (ref 10–58.5)
MCH RBC QN AUTO: 27.9 PG (ref 26–32)
MCHC RBC AUTO-ENTMCNC: 32.2 G/DL (ref 30–36)
MCV RBC AUTO: 86.7 FL (ref 80–97)
MONOCYTES ABS-DIF,2141: 0.5 K/UL (ref 0–1.8)
MONOCYTES NFR BLD: 6.2 % (ref 0.1–24)
NEUTROPHILS # BLD: 50.5 % (ref 37–92)
NEUTROPHILS ABS,2156: 4.1 K/UL (ref 2–7.8)
NITRITE UR QL STRIP.AUTO: NEGATIVE
PH UR STRIP: 7 [PH] (ref 5–7)
PLATELET # BLD AUTO: 232 K/UL (ref 140–440)
POTASSIUM SERPL-SCNC: 4.2 MMOL/L (ref 3.6–5)
PROT SERPL-MCNC: 7.8 G/DL (ref 6.3–8.2)
PROT UR STRIP-MCNC: NEGATIVE MG/DL
RBC # BLD AUTO: 5.19 M/UL (ref 4.2–6.3)
SODIUM SERPL-SCNC: 143 MMOL/L (ref 137–145)
SP GR UR REFRACTOMETRY: 1.01 (ref 1–1.03)
T4 FREE SERPL-MCNC: 1.08 NG/DL (ref 0.58–2.3)
TRIGL SERPL-MCNC: 165 MG/DL (ref 0–200)
TSH SERPL DL<=0.05 MIU/L-ACNC: 1.98 UIU/ML (ref 0.34–5.6)
UROBILINOGEN UR QL STRIP.AUTO: NEGATIVE
VLDLC SERPL CALC-MCNC: 33 MG/DL
WBC # BLD AUTO: 8.2 K/UL (ref 4.1–10.9)

## 2020-12-10 PROCEDURE — 82306 VITAMIN D 25 HYDROXY: CPT | Performed by: INTERNAL MEDICINE

## 2020-12-10 PROCEDURE — G8754 DIAS BP LESS 90: HCPCS | Performed by: INTERNAL MEDICINE

## 2020-12-10 PROCEDURE — 80053 COMPREHEN METABOLIC PANEL: CPT | Performed by: INTERNAL MEDICINE

## 2020-12-10 PROCEDURE — 84443 ASSAY THYROID STIM HORMONE: CPT | Performed by: INTERNAL MEDICINE

## 2020-12-10 PROCEDURE — G8427 DOCREV CUR MEDS BY ELIG CLIN: HCPCS | Performed by: INTERNAL MEDICINE

## 2020-12-10 PROCEDURE — G8399 PT W/DXA RESULTS DOCUMENT: HCPCS | Performed by: INTERNAL MEDICINE

## 2020-12-10 PROCEDURE — 83036 HEMOGLOBIN GLYCOSYLATED A1C: CPT | Performed by: INTERNAL MEDICINE

## 2020-12-10 PROCEDURE — 3017F COLORECTAL CA SCREEN DOC REV: CPT | Performed by: INTERNAL MEDICINE

## 2020-12-10 PROCEDURE — G8510 SCR DEP NEG, NO PLAN REQD: HCPCS | Performed by: INTERNAL MEDICINE

## 2020-12-10 PROCEDURE — 80061 LIPID PANEL: CPT | Performed by: INTERNAL MEDICINE

## 2020-12-10 PROCEDURE — G0439 PPPS, SUBSEQ VISIT: HCPCS | Performed by: INTERNAL MEDICINE

## 2020-12-10 PROCEDURE — G8417 CALC BMI ABV UP PARAM F/U: HCPCS | Performed by: INTERNAL MEDICINE

## 2020-12-10 PROCEDURE — 82550 ASSAY OF CK (CPK): CPT | Performed by: INTERNAL MEDICINE

## 2020-12-10 PROCEDURE — 99214 OFFICE O/P EST MOD 30 MIN: CPT | Performed by: INTERNAL MEDICINE

## 2020-12-10 PROCEDURE — G9899 SCRN MAM PERF RSLTS DOC: HCPCS | Performed by: INTERNAL MEDICINE

## 2020-12-10 PROCEDURE — G8752 SYS BP LESS 140: HCPCS | Performed by: INTERNAL MEDICINE

## 2020-12-10 PROCEDURE — 1090F PRES/ABSN URINE INCON ASSESS: CPT | Performed by: INTERNAL MEDICINE

## 2020-12-10 PROCEDURE — 81003 URINALYSIS AUTO W/O SCOPE: CPT | Performed by: INTERNAL MEDICINE

## 2020-12-10 PROCEDURE — 1101F PT FALLS ASSESS-DOCD LE1/YR: CPT | Performed by: INTERNAL MEDICINE

## 2020-12-10 PROCEDURE — 85025 COMPLETE CBC W/AUTO DIFF WBC: CPT | Performed by: INTERNAL MEDICINE

## 2020-12-10 PROCEDURE — G8536 NO DOC ELDER MAL SCRN: HCPCS | Performed by: INTERNAL MEDICINE

## 2020-12-10 PROCEDURE — 84439 ASSAY OF FREE THYROXINE: CPT | Performed by: INTERNAL MEDICINE

## 2020-12-10 NOTE — PATIENT INSTRUCTIONS
Back Pain: Care Instructions Your Care Instructions Back pain has many possible causes. It is often related to problems with muscles and ligaments of the back. It may also be related to problems with the nerves, discs, or bones of the back. Moving, lifting, standing, sitting, or sleeping in an awkward way can strain the back. Sometimes you don't notice the injury until later. Arthritis is another common cause of back pain. Although it may hurt a lot, back pain usually improves on its own within several weeks. Most people recover in 12 weeks or less. Using good home treatment and being careful not to stress your back can help you feel better sooner. Follow-up care is a key part of your treatment and safety. Be sure to make and go to all appointments, and call your doctor if you are having problems. It's also a good idea to know your test results and keep a list of the medicines you take. How can you care for yourself at home? · Sit or lie in positions that are most comfortable and reduce your pain. Try one of these positions when you lie down: ? Lie on your back with your knees bent and supported by large pillows. ? Lie on the floor with your legs on the seat of a sofa or chair. ? Lie on your side with your knees and hips bent and a pillow between your legs. ? Lie on your stomach if it does not make pain worse. · Do not sit up in bed, and avoid soft couches and twisted positions. Bed rest can help relieve pain at first, but it delays healing. Avoid bed rest after the first day of back pain. · Change positions every 30 minutes. If you must sit for long periods of time, take breaks from sitting. Get up and walk around, or lie in a comfortable position. · Try using a heating pad on a low or medium setting for 15 to 20 minutes every 2 or 3 hours. Try a warm shower in place of one session with the heating pad. · You can also try an ice pack for 10 to 15 minutes every 2 to 3 hours. Put a thin cloth between the ice pack and your skin. · Take pain medicines exactly as directed. ? If the doctor gave you a prescription medicine for pain, take it as prescribed. ? If you are not taking a prescription pain medicine, ask your doctor if you can take an over-the-counter medicine. · Take short walks several times a day. You can start with 5 to 10 minutes, 3 or 4 times a day, and work up to longer walks. Walk on level surfaces and avoid hills and stairs until your back is better. · Return to work and other activities as soon as you can. Continued rest without activity is usually not good for your back. · To prevent future back pain, do exercises to stretch and strengthen your back and stomach. Learn how to use good posture, safe lifting techniques, and proper body mechanics. When should you call for help? Call your doctor now or seek immediate medical care if: 
  · You have new or worsening numbness in your legs.  
  · You have new or worsening weakness in your legs. (This could make it hard to stand up.)  
  · You lose control of your bladder or bowels. Watch closely for changes in your health, and be sure to contact your doctor if: 
  · You have a fever, lose weight, or don't feel well.  
  · You do not get better as expected. Where can you learn more? Go to http://www.gray.com/ Enter A779 in the search box to learn more about \"Back Pain: Care Instructions. \" Current as of: March 2, 2020               Content Version: 12.6 © 5307-0300 [employer]. Care instructions adapted under license by [employer] (which disclaims liability or warranty for this information). If you have questions about a medical condition or this instruction, always ask your healthcare professional. [place] [address] any warranty or liability for your use of this information.

## 2020-12-10 NOTE — PROGRESS NOTES
Freida Frank is a 70 y.o. female     Chief Complaint   Patient presents with    Annual Wellness Visit     medicare wellness visit       Visit Vitals  /78 (BP 1 Location: Left arm, BP Patient Position: Sitting)   Pulse 83   Temp 98.4 °F (36.9 °C) (Oral)   Resp 18   Ht 5' 3\" (1.6 m)   Wt 198 lb 14.4 oz (90.2 kg)   SpO2 98%   BMI 35.23 kg/m²       Health Maintenance Due   Topic Date Due    GLAUCOMA SCREENING Q2Y  08/02/2012    Pneumococcal 65+ years (2 of 2 - PPSV23) 07/06/2016    Colorectal Cancer Screening Combo  07/11/2019    Shingrix Vaccine Age 50> (2 of 2) 10/21/2019    Breast Cancer Screen Mammogram  11/12/2019    Medicare Yearly Exam  11/25/2020       1. Have you been to the ER, urgent care clinic since your last visit? Hospitalized since your last visit? No    2. Have you seen or consulted any other health care providers outside of the [address] [place] [address] since your last visit? Include any pap smears or colon screening.  No

## 2020-12-12 LAB — DIASORIN SARS-COV-2 AB, IGG, RCVG3T: NEGATIVE

## 2021-01-08 PROBLEM — Z00.00 MEDICARE ANNUAL WELLNESS VISIT, SUBSEQUENT: Status: RESOLVED | Noted: 2017-10-23 | Resolved: 2021-01-08

## 2021-02-11 ENCOUNTER — HOSPITAL ENCOUNTER (OUTPATIENT)
Dept: MAMMOGRAPHY | Age: 76
Discharge: HOME | End: 2021-02-11
Attending: INTERNAL MEDICINE
Payer: MEDICARE

## 2021-02-11 DIAGNOSIS — Z12.31 VISIT FOR SCREENING MAMMOGRAM: ICD-10-CM

## 2021-02-11 PROCEDURE — 77067 SCR MAMMO BI INCL CAD: CPT

## 2021-02-16 ENCOUNTER — HOSPITAL ENCOUNTER (OUTPATIENT)
Dept: ULTRASOUND IMAGING | Age: 76
Discharge: HOME | End: 2021-02-16
Attending: INTERNAL MEDICINE
Payer: MEDICARE

## 2021-02-16 ENCOUNTER — TRANSCRIBE ORDER (OUTPATIENT)
Dept: SCHEDULING | Age: 76
End: 2021-02-16

## 2021-02-16 ENCOUNTER — HOSPITAL ENCOUNTER (OUTPATIENT)
Dept: MAMMOGRAPHY | Age: 76
Discharge: HOME | End: 2021-02-16
Attending: INTERNAL MEDICINE
Payer: MEDICARE

## 2021-02-16 DIAGNOSIS — N63.13 BREAST LUMP ON RIGHT SIDE AT 7 O'CLOCK POSITION: Primary | ICD-10-CM

## 2021-02-16 DIAGNOSIS — R92.8 ABNORMAL MAMMOGRAM OF RIGHT BREAST: ICD-10-CM

## 2021-02-16 PROCEDURE — 76642 ULTRASOUND BREAST LIMITED: CPT

## 2021-02-16 PROCEDURE — 77065 DX MAMMO INCL CAD UNI: CPT

## 2021-02-26 ENCOUNTER — HOSPITAL ENCOUNTER (OUTPATIENT)
Dept: MAMMOGRAPHY | Age: 76
Discharge: HOME | End: 2021-02-26
Attending: INTERNAL MEDICINE
Payer: MEDICARE

## 2021-02-26 ENCOUNTER — HOSPITAL ENCOUNTER (OUTPATIENT)
Dept: ULTRASOUND IMAGING | Age: 76
Discharge: HOME | End: 2021-02-26
Attending: INTERNAL MEDICINE
Payer: MEDICARE

## 2021-02-26 DIAGNOSIS — R92.8 ABNORMAL MAMMOGRAM OF RIGHT BREAST: ICD-10-CM

## 2021-02-26 DIAGNOSIS — N63.13 BREAST LUMP ON RIGHT SIDE AT 7 O'CLOCK POSITION: ICD-10-CM

## 2021-02-26 PROCEDURE — 74011000250 HC RX REV CODE- 250: Performed by: RADIOLOGY

## 2021-02-26 PROCEDURE — 88305 TISSUE EXAM BY PATHOLOGIST: CPT

## 2021-02-26 PROCEDURE — 77065 DX MAMMO INCL CAD UNI: CPT

## 2021-02-26 PROCEDURE — 2709999900 HC NON-CHARGEABLE SUPPLY

## 2021-02-26 PROCEDURE — A4648 IMPLANTABLE TISSUE MARKER: HCPCS

## 2021-02-26 PROCEDURE — 88341 IMHCHEM/IMCYTCHM EA ADD ANTB: CPT

## 2021-02-26 PROCEDURE — 88342 IMHCHEM/IMCYTCHM 1ST ANTB: CPT

## 2021-02-26 RX ORDER — LIDOCAINE HYDROCHLORIDE 10 MG/ML
5 INJECTION, SOLUTION EPIDURAL; INFILTRATION; INTRACAUDAL; PERINEURAL
Status: COMPLETED | OUTPATIENT
Start: 2021-02-26 | End: 2021-02-26

## 2021-02-26 RX ORDER — LIDOCAINE HYDROCHLORIDE AND EPINEPHRINE 10; 10 MG/ML; UG/ML
30 INJECTION, SOLUTION INFILTRATION; PERINEURAL ONCE
Status: COMPLETED | OUTPATIENT
Start: 2021-02-26 | End: 2021-02-26

## 2021-02-26 RX ADMIN — LIDOCAINE HYDROCHLORIDE AND EPINEPHRINE 300 MG: 10; 10 INJECTION, SOLUTION INFILTRATION; PERINEURAL at 13:00

## 2021-02-26 RX ADMIN — LIDOCAINE HYDROCHLORIDE 5 ML: 10 INJECTION, SOLUTION EPIDURAL; INFILTRATION; INTRACAUDAL; PERINEURAL at 13:00

## 2021-02-26 NOTE — PROGRESS NOTES
Patient in ultrasound room, [facility] ultrasound tech prepping patient.

## 2021-02-26 NOTE — DISCHARGE INSTRUCTIONS
[address]  [phone]      Breast Biopsy Discharge Instructions          1. After the biopsy, we will place a clean covered ice pack over the biopsy site, within the bra - you should leave the ice pack on 30 minutes and then remove the ice pack for 1-2 hours until bedtime. If needed you can continue applying ice the following day. It is a good idea to wear your bra for support, both day and night unless this causes you discomfort. 2. You may take Tylenol (two tablets) every 4 to 6 hours as needed for pain. Do not take aspirin or aspirin products (e.g. ibuprofen, Advil, Motrin) as these may cause more bleeding. 3. You may expect some bruising and skin discoloration in the biopsy area. This is normal and generally should resolve in 5 to 7 days. 4. Most women do not find it necessary to restrict their activities after the procedure. You should rest as needed on the day of your biopsy. The next day, if you are feeling okay, you may resume your regular work/activity schedule. Avoid strenuous activity and heavy lifting, jogging, aerobics, or vacuuming for 48 hours after the procedure. 5. 48 hours after your biopsy, remove the large outer dressing and leave the steri-strips (tiny pieces of tape) in place. The steri-strips will usually fall off in a few days. You may shower 48 hours after your biopsy and you may get the steri-strips wet. If still present after 4 days, you may gently peel the strips off. Keep the area clean and dry and shower daily. 6. If you have bleeding from the incision area, hold firm pressure on the area for 20 minutes. This should control any slight oozing that might occur.   If you develop persistent bleeding or pain which does not respond to the above measures or if you develop a fever, excessive swelling, redness, heat or drainage, please call the Radiology Nurse [phone] during normal business hours (7 a.m. - 5 p.m.). After business hours, call [phone] and ask for the on-call Radiology Nurse to be paged, or your referring physician. 7. You will receive your biopsy results (pathology report) in 3-5 business days - the radiologist will review your results and you will receive a call from the radiology department and/or from your doctor.

## 2021-03-10 ENCOUNTER — OFFICE VISIT (OUTPATIENT)
Dept: INTERNAL MEDICINE CLINIC | Age: 76
End: 2021-03-10
Payer: MEDICARE

## 2021-03-10 ENCOUNTER — IMMUNIZATION (OUTPATIENT)
Dept: INTERNAL MEDICINE CLINIC | Age: 76
End: 2021-03-10

## 2021-03-10 VITALS
BODY MASS INDEX: 35.44 KG/M2 | TEMPERATURE: 98.6 F | DIASTOLIC BLOOD PRESSURE: 82 MMHG | WEIGHT: 200 LBS | OXYGEN SATURATION: 98 % | HEART RATE: 76 BPM | HEIGHT: 63 IN | SYSTOLIC BLOOD PRESSURE: 137 MMHG | RESPIRATION RATE: 18 BRPM

## 2021-03-10 DIAGNOSIS — Z23 ENCOUNTER FOR IMMUNIZATION: Primary | ICD-10-CM

## 2021-03-10 DIAGNOSIS — M15.9 PRIMARY OSTEOARTHRITIS INVOLVING MULTIPLE JOINTS: ICD-10-CM

## 2021-03-10 DIAGNOSIS — E66.01 SEVERE OBESITY (BMI 35.0-39.9) WITH COMORBIDITY (HCC): ICD-10-CM

## 2021-03-10 DIAGNOSIS — I12.9 HYPERTENSION WITH RENAL DISEASE: Primary | ICD-10-CM

## 2021-03-10 DIAGNOSIS — E78.2 MIXED HYPERLIPIDEMIA: ICD-10-CM

## 2021-03-10 DIAGNOSIS — R73.02 GLUCOSE INTOLERANCE (IMPAIRED GLUCOSE TOLERANCE): ICD-10-CM

## 2021-03-10 DIAGNOSIS — N18.2 STAGE 2 CHRONIC KIDNEY DISEASE: ICD-10-CM

## 2021-03-10 LAB — HBA1C MFR BLD HPLC: 5.8 % (ref 4–5.7)

## 2021-03-10 PROCEDURE — G9899 SCRN MAM PERF RSLTS DOC: HCPCS | Performed by: INTERNAL MEDICINE

## 2021-03-10 PROCEDURE — 3017F COLORECTAL CA SCREEN DOC REV: CPT | Performed by: INTERNAL MEDICINE

## 2021-03-10 PROCEDURE — 1101F PT FALLS ASSESS-DOCD LE1/YR: CPT | Performed by: INTERNAL MEDICINE

## 2021-03-10 PROCEDURE — 80053 COMPREHEN METABOLIC PANEL: CPT | Performed by: INTERNAL MEDICINE

## 2021-03-10 PROCEDURE — 80061 LIPID PANEL: CPT | Performed by: INTERNAL MEDICINE

## 2021-03-10 PROCEDURE — G8536 NO DOC ELDER MAL SCRN: HCPCS | Performed by: INTERNAL MEDICINE

## 2021-03-10 PROCEDURE — 82550 ASSAY OF CK (CPK): CPT | Performed by: INTERNAL MEDICINE

## 2021-03-10 PROCEDURE — 91300 COVID-19, MRNA, LNP-S, PF, 30MCG/0.3ML DOSE(PFIZER): CPT | Performed by: FAMILY MEDICINE

## 2021-03-10 PROCEDURE — G8399 PT W/DXA RESULTS DOCUMENT: HCPCS | Performed by: INTERNAL MEDICINE

## 2021-03-10 PROCEDURE — 0001A COVID-19, MRNA, LNP-S, PF, 30MCG/0.3ML DOSE(PFIZER): CPT | Performed by: FAMILY MEDICINE

## 2021-03-10 PROCEDURE — 83036 HEMOGLOBIN GLYCOSYLATED A1C: CPT | Performed by: INTERNAL MEDICINE

## 2021-03-10 PROCEDURE — G8417 CALC BMI ABV UP PARAM F/U: HCPCS | Performed by: INTERNAL MEDICINE

## 2021-03-10 PROCEDURE — 99214 OFFICE O/P EST MOD 30 MIN: CPT | Performed by: INTERNAL MEDICINE

## 2021-03-10 PROCEDURE — G8510 SCR DEP NEG, NO PLAN REQD: HCPCS | Performed by: INTERNAL MEDICINE

## 2021-03-10 PROCEDURE — 1090F PRES/ABSN URINE INCON ASSESS: CPT | Performed by: INTERNAL MEDICINE

## 2021-03-10 PROCEDURE — G8427 DOCREV CUR MEDS BY ELIG CLIN: HCPCS | Performed by: INTERNAL MEDICINE

## 2021-03-10 NOTE — PROGRESS NOTES
HIPAA verified by two patient identifiers. Pradeep Burciaga is a 70 y.o. female    Chief Complaint   Patient presents with    Hypertension     3 mon f/u    Diabetes    Cholesterol Problem       Visit Vitals  /82 (BP 1 Location: Left upper arm, BP Patient Position: Sitting, BP Cuff Size: Adult)   Pulse 76   Temp 98.6 °F (37 °C) (Oral)   Resp 18   Ht 5' 3\" (1.6 m)   Wt 200 lb (90.7 kg)   SpO2 98%   BMI 35.43 kg/m²       Pain Scale: 0 - No pain/10  Pain Location:       Health Maintenance Due   Topic Date Due    COVID-19 Vaccine (1 of 2) Never done    GLAUCOMA SCREENING Q2Y  Never done    Pneumococcal 65+ years (2 of 2 - PPSV23) 07/06/2016    Colorectal Cancer Screening Combo  07/11/2019    Shingrix Vaccine Age 50> (2 of 2) 10/21/2019         Coordination of Care Questionnaire:  :   1) Have you been to an emergency room, urgent care, or hospitalized since your last visit? If yes, where when, and reason for visit? no       2. Have seen or consulted any other health care provider since your last visit? If yes, where when, and reason for visit? NO      Patient is accompanied by self I have received verbal consent from Pradeep Burciaga to discuss any/all medical information while they are present in the room.

## 2021-03-10 NOTE — PROGRESS NOTES
Chief Complaint   Patient presents with    Hypertension     3 mon f/u    Diabetes    Cholesterol Problem       SUBJECTIVE:    Stuart Kelley is a 70 y.o. female returns in follow-up for medical problems include hypertension, glucose intolerance, hyperlipidemia, DJD, CKD stage II, obesity and other medical problems. She is taking her medications and trying to follow her diet and remain physically active. She currently denies any chest pain, shortness of breath, palpitations, PND, orthopnea or other cardiac or respiratory complaints. She notes no current GI or  complaints. She notes no headaches, dizziness or neurologic complaints. There are no current active arthritic complaints and and no other complaints on complete review of systems. Current Outpatient Medications   Medication Sig Dispense Refill    irbesartan (AVAPRO) 300 mg tablet TAKE 1 TABLET BY MOUTH  EVERY DAY 90 Tab 3    Livalo 2 mg tablet TAKE 1 TABLET BY MOUTH  EVERY NIGHT AT BEDTIME 90 Tab 3    hydroCHLOROthiazide (HYDRODIURIL) 25 mg tablet TAKE 1 TABLET BY MOUTH  EVERY DAY 90 Tab 3    omeprazole (PRILOSEC) 20 mg capsule TAKE 1 CAPSULE BY MOUTH  EVERY DAY 90 Cap 3    diazePAM (VALIUM) 5 mg tablet TK 3 TS PO 30 MINUTES PRIOR TO APPOINTMENT      ALPRAZolam (XANAX) 0.5 mg tablet Take 1 Tab by mouth three (3) times daily as needed for Anxiety. Max Daily Amount: 1.5 mg. 50 Tab 0    vit C/E/Zn/coppr/lutein/zeaxan (PRESERVISION AREDS 2 PO) Take  by mouth. Indications: 2 pills dailly      biotin 10,000 mcg cap Take  by mouth daily (with dinner).  Cholecalciferol, Vitamin D3, 3,000 unit tab Take 1,000 Units by mouth every morning.  cyanocobalamin (VITAMIN B12) 500 mcg tablet Take 500 mcg by mouth every morning.  polyethylene glycol (MIRALAX) 17 gram/dose powder Take 17 g by mouth every morning.  Indications: CONSTIPATION       Past Medical History:   Diagnosis Date    Anxiety     Arthritis     Chronic constipation  CKD (chronic kidney disease) 7/17/2017    Colon polyps 7/17/2017    DJD (degenerative joint disease) 7/17/2017    Elevated LFTs 7/17/2017    Flu 02/19/2019    GERD (gastroesophageal reflux disease)     Glucose intolerance (impaired glucose tolerance) 7/17/2017    Hemorrhoids     internal & external- bleeds frequently    High cholesterol     Hyperlipidemia 7/17/2017    Hypertension     Hypertension with renal disease 7/17/2017    PT Education - High Blood Pressure (Essential Hypertension) *: blood pressure PT Education - How to access health information online: discussed with patient and provided information    Hypertension, renal 7/17/2017    Ill-defined condition     ringing in ears    Menopause     Mild obesity 7/17/2017    Nausea & vomiting     Neoplasm of uncertain behavior of skin 7/17/2017    On statin therapy 7/17/2017    Osteopenia 7/17/2017    Primary angle-closure glaucoma 7/17/2017    Comments: OU    Skin cancer of face     as of 6/15/16 pt states \"removed years ago\"    Spinal stenosis      Past Surgical History:   Procedure Laterality Date    HX BREAST BIOPSY Right 02/26/2021    HX CATARACT REMOVAL Bilateral 2020    HX CHOLECYSTECTOMY  11/6/2014    Dr Derian Whitten for glaucoma    HX HYSTERECTOMY      partial    HX OOPHORECTOMY Bilateral     HX PELVIC LAPAROSCOPY      Jono. oophorectomy    HX TUBAL LIGATION      [place] COLSC FLX W/REMOVAL LESION BY HOT BX FORCEPS  11/12/2012         [place] ERCP REMOVE CALCULI/DEBRIS BILIARY/PANCREAS DUCT  11/7/2014         [place] ERCP W/SPHINCTEROTOMY/PAPILLOTOMY  11/7/2014          Allergies   Allergen Reactions    Iodinated Contrast Media Hives    Oxycodone-Aspirin Unknown (comments)     Patient states this in not a true allergy       REVIEW OF SYSTEMS:  General: negative for - chills or fever, or weight loss or gain  ENT: negative for - headaches, nasal congestion or tinnitus  Eyes: no blurred or visual changes  Neck: No stiffness or swollen nodes  Respiratory: negative for - cough, hemoptysis, shortness of breath or wheezing  Cardiovascular : negative for - chest pain, edema, palpitations or shortness of breath  Gastrointestinal: negative for - abdominal pain, blood in stools, heartburn or nausea/vomiting  Genito-Urinary: no dysuria, trouble voiding, or hematuria  Musculoskeletal: negative for - gait disturbance, joint pain, joint stiffness or joint swelling  Neurological: no TIA or stroke symptoms  Hematologic: no bruises, no bleeding  Lymphatic: no swollen glands  Integument: no lumps, mole changes, nail changes or rash  Endocrine:no malaise/lethargy poly uria or polydipsia or unexpected weight changes        Social History     Socioeconomic History    Marital status:      Spouse name: Not on file    Number of children: Not on file    Years of education: Not on file    Highest education level: Not on file   Tobacco Use    Smoking status: Former Smoker     Packs/day: 1.50     Years: 35.00     Pack years: 52.50     Quit date: 2004     Years since quittin.3    Smokeless tobacco: Never Used    Tobacco comment: quit smoking 6 yrs ago   Substance and Sexual Activity    Alcohol use: No    Drug use: No    Sexual activity: Never     Family History   Problem Relation Age of Onset    Heart Disease Mother     Hypertension Mother     Cancer Mother         skin    Stroke Father     Breast Cancer Sister         onset: 76s    Breast Cancer Niece        OBJECTIVE:     Visit Vitals  /82 (BP 1 Location: Left upper arm, BP Patient Position: Sitting, BP Cuff Size: Adult)   Pulse 76   Temp 98.6 °F (37 °C) (Oral)   Resp 18   Ht 5' 3\" (1.6 m)   Wt 200 lb (90.7 kg)   SpO2 98%   BMI 35.43 kg/m²     CONSTITUTIONAL:   well nourished, appears age appropriate  EYES: sclera anicteric, PERRL, EOMI  ENMT:nares clear, moist mucous membranes, pharynx clear  NECK: supple.  Thyroid normal, No JVD or bruits  RESPIRATORY: Chest: clear to ascultation and percussion, normal inspiratory effort  CARDIOVASCULAR: Heart: regular rate and rhythm no murmurs, rubs or gallops, PMI not displaced, No thrills, no peripheral edema  GASTROINTESTINAL: Abdomen: non distended, soft, non tender, bowel sounds normal  HEMATOLOGIC: no purpura, petechiae or bruising  LYMPHATIC: No lymph node enlargemant  MUSCULOSKELETAL: Extremities: no active synovitis, pulse 1+   INTEGUMENT: No unusual rashes or suspicious skin lesions noted. Nails appear normal.  PERIPHERAL VASCULAR: normal pulses femoral, PT and DP  NEUROLOGIC: non-focal exam, A & O X 3  PSYCHIATRIC:, appropriate affect     ASSESSMENT:   1. Hypertension with renal disease    2. Glucose intolerance (impaired glucose tolerance)    3. Mixed hyperlipidemia    4. Primary osteoarthritis involving multiple joints    5. Stage 2 chronic kidney disease    6. Severe obesity (BMI 35.0-39. 9) with comorbidity ([facility] [address]      Impression  1. Hypertension that is controlled so continue current therapy reviewed with her. 2.  Glucose intolerance repeat status pending a prior lab reviewed now make adjustments if necessary. 3.  Hyperlipidemia prior lab reviewed repeat status pending I will adjust if needed. 4. DJD that is stable  5. CKD stage II repeat status pending  6. Obesity I did discuss diet, exercise and weight reduction for overall health benefit. I will call the lab and make further recommendations or adjustments if necessary. Follow-up in 3 months or sooner if there is a problem. PLAN:  .  Orders Placed This Encounter    METABOLIC PANEL, COMPREHENSIVE ([facility])    LIPID PANEL ([facility])    CK ([facility])    HEMOGLOBIN A1C W/O EAG ([facility])         ATTENTION:   This medical record was transcribed using an electronic medical records system. Although proofread, it may and can contain electronic and spelling errors. Other human spelling and other errors may be present. Corrections may be executed at a later time. Please feel free to contact us for any clarifications as needed. No results found for any visits on 03/10/21. Sita Tanner MD    The patient verbalized understanding of the problems and plans as explained.

## 2021-03-11 LAB
A-G RATIO,AGRAT: 1.5 RATIO
ALBUMIN SERPL-MCNC: 4.3 G/DL (ref 3.9–5.4)
ALP SERPL-CCNC: 111 U/L (ref 38–126)
ALT SERPL-CCNC: 31 U/L (ref 0–35)
ANION GAP SERPL CALC-SCNC: 11 MMOL/L
AST SERPL W P-5'-P-CCNC: 31 U/L (ref 14–36)
BILIRUB SERPL-MCNC: 1 MG/DL (ref 0.2–1.3)
BUN SERPL-MCNC: 21 MG/DL (ref 7–17)
BUN/CREATININE RATIO,BUCR: 30 RATIO
CALCIUM SERPL-MCNC: 10.5 MG/DL (ref 8.4–10.2)
CHLORIDE SERPL-SCNC: 99 MMOL/L (ref 98–107)
CHOL/HDL RATIO,CHHD: 3 RATIO (ref 0–4)
CHOLEST SERPL-MCNC: 182 MG/DL (ref 0–200)
CK SERPL-CCNC: 70 U/L (ref 30–135)
CO2 SERPL-SCNC: 28 MMOL/L (ref 22–32)
CREAT SERPL-MCNC: 0.7 MG/DL (ref 0.7–1.2)
GLOBULIN,GLOB: 2.9
GLUCOSE SERPL-MCNC: 105 MG/DL (ref 65–105)
HDLC SERPL-MCNC: 62 MG/DL (ref 35–130)
LDL/HDL RATIO,LDHD: 1 RATIO
LDLC SERPL CALC-MCNC: 93 MG/DL (ref 0–130)
POTASSIUM SERPL-SCNC: 4.5 MMOL/L (ref 3.6–5)
PROT SERPL-MCNC: 7.2 G/DL (ref 6.3–8.2)
SODIUM SERPL-SCNC: 138 MMOL/L (ref 137–145)
TRIGL SERPL-MCNC: 137 MG/DL (ref 0–200)
VLDLC SERPL CALC-MCNC: 27 MG/DL

## 2021-03-16 ENCOUNTER — HOSPITAL ENCOUNTER (EMERGENCY)
Age: 76
Discharge: HOME | End: 2021-03-16
Attending: STUDENT IN AN ORGANIZED HEALTH CARE EDUCATION/TRAINING PROGRAM
Payer: MEDICARE

## 2021-03-16 ENCOUNTER — APPOINTMENT (OUTPATIENT)
Dept: GENERAL RADIOLOGY | Age: 76
End: 2021-03-16
Attending: EMERGENCY MEDICINE
Payer: MEDICARE

## 2021-03-16 VITALS
TEMPERATURE: 98.2 F | SYSTOLIC BLOOD PRESSURE: 175 MMHG | WEIGHT: 198.41 LBS | OXYGEN SATURATION: 97 % | BODY MASS INDEX: 35.16 KG/M2 | RESPIRATION RATE: 16 BRPM | DIASTOLIC BLOOD PRESSURE: 77 MMHG | HEART RATE: 84 BPM | HEIGHT: 63 IN

## 2021-03-16 DIAGNOSIS — S90.111A CONTUSION OF RIGHT GREAT TOE WITHOUT DAMAGE TO NAIL, INITIAL ENCOUNTER: Primary | ICD-10-CM

## 2021-03-16 PROCEDURE — 99282 EMERGENCY DEPT VISIT SF MDM: CPT

## 2021-03-16 PROCEDURE — 73630 X-RAY EXAM OF FOOT: CPT

## 2021-03-16 NOTE — ED NOTES
Isaac Castillo PA-C reviewed discharge instructions with the patient. The patient verbalized understanding. All questions and concerns were addressed. The patient declined a wheelchair and is discharged ambulatory in the care of family members with instructions and prescriptions in hand. Pt is alert and oriented x 4. Respirations are clear and unlabored.

## 2021-03-16 NOTE — ED PROVIDER NOTES
EMERGENCY DEPARTMENT HISTORY AND PHYSICAL EXAM      Date: 3/16/2021  Patient Name: Char Garcia    History of Presenting Illness     Chief Complaint   Patient presents with    Toe Pain     rt great toe, medial and lateral sides, pain starting sunday and then purple discoloration. no injury       History Provided By: Patient    HPI: Char Garcia, 70 y.o. female history of anxiety and arthritis and others as below presents ambulatory with her daughter to the ED with cc of a day or so of essentially painless purple discoloration of the right medial lateral great toe. She tells me there is some foot pain when she goes to step off with the right foot but no significant pain of the toe. She denies any known injury. There has been no chest pain or shortness of breath. She is concerned because she received her first COVID-19 vaccine last Wednesday and noticed these symptoms on Sunday. She denies any other joint symptoms or discoloration. There are no other complaints, changes, or physical findings at this time. PCP: Fabien Vanegas MD    Current Outpatient Medications   Medication Sig Dispense Refill    irbesartan (AVAPRO) 300 mg tablet TAKE 1 TABLET BY MOUTH  EVERY DAY 90 Tab 3    Livalo 2 mg tablet TAKE 1 TABLET BY MOUTH  EVERY NIGHT AT BEDTIME 90 Tab 3    hydroCHLOROthiazide (HYDRODIURIL) 25 mg tablet TAKE 1 TABLET BY MOUTH  EVERY DAY 90 Tab 3    omeprazole (PRILOSEC) 20 mg capsule TAKE 1 CAPSULE BY MOUTH  EVERY DAY 90 Cap 3    diazePAM (VALIUM) 5 mg tablet TK 3 TS PO 30 MINUTES PRIOR TO APPOINTMENT      ALPRAZolam (XANAX) 0.5 mg tablet Take 1 Tab by mouth three (3) times daily as needed for Anxiety. Max Daily Amount: 1.5 mg. 50 Tab 0    vit C/E/Zn/coppr/lutein/zeaxan (PRESERVISION AREDS 2 PO) Take  by mouth. Indications: 2 pills dailly      biotin 10,000 mcg cap Take  by mouth daily (with dinner).       Cholecalciferol, Vitamin D3, 3,000 unit tab Take 1,000 Units by mouth every morning.  cyanocobalamin (VITAMIN B12) 500 mcg tablet Take 500 mcg by mouth every morning.  polyethylene glycol (MIRALAX) 17 gram/dose powder Take 17 g by mouth every morning.  Indications: CONSTIPATION       Past History     Past Medical History:  Past Medical History:   Diagnosis Date    Anxiety     Arthritis     Chronic constipation     CKD (chronic kidney disease) 7/17/2017    Colon polyps 7/17/2017    DJD (degenerative joint disease) 7/17/2017    Elevated LFTs 7/17/2017    Flu 02/19/2019    GERD (gastroesophageal reflux disease)     Glucose intolerance (impaired glucose tolerance) 7/17/2017    Hemorrhoids     internal & external- bleeds frequently    High cholesterol     Hyperlipidemia 7/17/2017    Hypertension     Hypertension with renal disease 7/17/2017    PT Education - High Blood Pressure (Essential Hypertension) *: blood pressure PT Education - How to access health information online: discussed with patient and provided information    Hypertension, renal 7/17/2017    Ill-defined condition     ringing in ears    Menopause     Mild obesity 7/17/2017    Nausea & vomiting     Neoplasm of uncertain behavior of skin 7/17/2017    On statin therapy 7/17/2017    Osteopenia 7/17/2017    Primary angle-closure glaucoma 7/17/2017    Comments: OU    Skin cancer of face     as of 6/15/16 pt states \"removed years ago\"    Spinal stenosis        Past Surgical History:  Past Surgical History:   Procedure Laterality Date    HX BREAST BIOPSY Right 02/26/2021    HX CATARACT REMOVAL Bilateral 2020    HX CHOLECYSTECTOMY  11/6/2014    Dr Morton Deal for glaucoma    HX HYSTERECTOMY      partial    HX OOPHORECTOMY Bilateral     HX PELVIC LAPAROSCOPY      Jono. oophorectomy    HX TUBAL LIGATION      [place] COLSC FLX W/REMOVAL LESION BY HOT BX FORCEPS  11/12/2012         [place] ERCP REMOVE CALCULI/DEBRIS BILIARY/PANCREAS DUCT  11/7/2014         [place] ERCP W/SPHINCTEROTOMY/PAPILLOTOMY  2014            Family History:  Family History   Problem Relation Age of Onset    Heart Disease Mother     Hypertension Mother     Cancer Mother         skin    Stroke Father     Breast Cancer Sister         onset: 74s    Breast Cancer Niece        Social History:  Social History     Tobacco Use    Smoking status: Former Smoker     Packs/day: 1.50     Years: 35.00     Pack years: 52.50     Quit date: 2004     Years since quittin.3    Smokeless tobacco: Never Used    Tobacco comment: quit smoking 6 yrs ago   Substance Use Topics    Alcohol use: No    Drug use: No       Allergies: Allergies   Allergen Reactions    Iodinated Contrast Media Hives    Oxycodone-Aspirin Unknown (comments)     Patient states this in not a true allergy     Review of Systems   Review of Systems   Constitutional: Negative for fatigue and fever. HENT: Negative for ear pain and sore throat. Eyes: Negative for pain, redness and visual disturbance. Respiratory: Negative for cough and shortness of breath. Cardiovascular: Negative for chest pain and palpitations. Gastrointestinal: Negative for abdominal pain, nausea and vomiting. Genitourinary: Negative for dysuria, frequency and urgency. Musculoskeletal: Negative for back pain, gait problem, neck pain and neck stiffness. Right great toe discoloration right foot pain   Skin: Negative for rash and wound. Neurological: Negative for dizziness, weakness, light-headedness, numbness and headaches. Physical Exam   Physical Exam  Vitals signs and nursing note reviewed. Constitutional:       General: She is not in acute distress. Appearance: She is well-developed. She is not toxic-appearing. HENT:      Head: Normocephalic and atraumatic. Jaw: No trismus. Right Ear: External ear normal.      Left Ear: External ear normal.      Nose: Nose normal.      Mouth/Throat:      Pharynx: Uvula midline.    Eyes: General: No scleral icterus. Conjunctiva/sclera: Conjunctivae normal.      Pupils: Pupils are equal, round, and reactive to light. Neck:      Musculoskeletal: Full passive range of motion without pain and normal range of motion. Cardiovascular:      Rate and Rhythm: Normal rate and regular rhythm. Comments:   Heart rate is 84 my exam  Pulmonary:      Effort: Pulmonary effort is normal. No tachypnea, accessory muscle usage or respiratory distress. Breath sounds: No decreased breath sounds or wheezing. Abdominal:      Palpations: Abdomen is soft. Tenderness: There is no abdominal tenderness. Musculoskeletal: Normal range of motion. Feet:       Comments:   RIGHT GREAT TOE:  No lower extremity edema  Dorsalis pedis pulse is palpable  Brisk capillary refill  Normal sensation  There is an area of purplish discoloration of the medial lateral aspect of the great toe consistent with limited ecchymosis  There is no significant tenderness to palpation  She has full active range of motion full-strength with extension and flexion of the great toe    There is no calf redness or tenderness  Cordero squeeze elicits plantar flexion   Skin:     Findings: No rash. Neurological:      Mental Status: She is alert and oriented to person, place, and time. She is not disoriented. GCS: GCS eye subscore is 4. GCS verbal subscore is 5. GCS motor subscore is 6. Cranial Nerves: No cranial nerve deficit. Psychiatric:         Speech: Speech normal.       Diagnostic Study Results     Labs -   No results found for this or any previous visit (from the past 12 hour(s)). Radiologic Studies -   XR FOOT RT MIN 3 V   Final Result   No acute abnormality. No plain film evidence of osteomyelitis. CT Results  (Last 48 hours)    None        CXR Results  (Last 48 hours)    None        Medical Decision Making   I am the first provider for this patient.     I reviewed the vital signs, available nursing notes, past medical history, past surgical history, family history and social history. Vital Signs-Reviewed the patient's vital signs. Patient Vitals for the past 12 hrs:   Temp Pulse Resp BP SpO2   03/16/21 1541  84      03/16/21 1334 98.2 °F (36.8 °C) (!) 117 16 (!) 175/77 97 %       Pulse Oximetry Analysis - 97% on RA    Records Reviewed: Nursing Notes, Old Medical Records, Previous Radiology Studies and Previous Laboratory Studies    Provider Notes (Medical Decision Making):   DDx: Fracture, contusion, strain, sprain    ED Course:   Initial assessment performed. The patients presenting problems have been discussed, and they are in agreement with the care plan formulated and outlined with them. I have encouraged them to ask questions as they arise throughout their visit. Disposition:  Discharge    PLAN:  1. Discharge Medication List as of 3/16/2021  3:47 PM        2. Follow-up Information     Follow up With Specialties Details Why Contact Info    Shay Ag MD Internal Medicine Call  PRIMARY CARE: call to schedule follow up [facility]  [phone]      Evan Barger DPM Foot and Ankle Surgery Call  PODIATRY: as needed if symptoms worsen or persist [facility]  [address] [place] [address] 83. [phone]          Return to ED if worse     Diagnosis     Clinical Impression:   1.  Contusion of right great toe without damage to nail, initial encounter

## 2021-03-31 ENCOUNTER — IMMUNIZATION (OUTPATIENT)
Dept: INTERNAL MEDICINE CLINIC | Age: 76
End: 2021-03-31
Payer: MEDICARE

## 2021-03-31 DIAGNOSIS — Z23 ENCOUNTER FOR IMMUNIZATION: Primary | ICD-10-CM

## 2021-03-31 PROCEDURE — 91300 COVID-19, MRNA, LNP-S, PF, 30MCG/0.3ML DOSE(PFIZER): CPT | Performed by: FAMILY MEDICINE

## 2021-03-31 PROCEDURE — 0002A COVID-19, MRNA, LNP-S, PF, 30MCG/0.3ML DOSE(PFIZER): CPT | Performed by: FAMILY MEDICINE

## 2021-06-09 NOTE — PROGRESS NOTES
Chief Complaint   Patient presents with    Hypertension     3 month follow up    Cholesterol Problem       SUBJECTIVE:    Sandy Long is a 70 y.o. female who returns in follow-up for medical problems include hypertension, hyperlipidemia, glucose intolerance, DJD, CKD stage II, obesity, low back pain at this point this back pain has progressed to the point where she is getting some numbness in the left leg. The left leg does not give out on her. She notes no chest pain, shortness of breath, palpitations, PND, orthopnea or other cardiac or respiratory complaints. She notes no GI or  complaints. She notes no headaches, dizziness or neurologic complaints except for some numbness in the left leg. Her arthritic complaints are confined to the back as noted. Current Outpatient Medications   Medication Sig Dispense Refill    irbesartan (AVAPRO) 300 mg tablet TAKE 1 TABLET BY MOUTH  EVERY DAY 90 Tab 3    Livalo 2 mg tablet TAKE 1 TABLET BY MOUTH  EVERY NIGHT AT BEDTIME 90 Tab 3    hydroCHLOROthiazide (HYDRODIURIL) 25 mg tablet TAKE 1 TABLET BY MOUTH  EVERY DAY 90 Tab 3    omeprazole (PRILOSEC) 20 mg capsule TAKE 1 CAPSULE BY MOUTH  EVERY DAY 90 Cap 3    ALPRAZolam (XANAX) 0.5 mg tablet Take 1 Tab by mouth three (3) times daily as needed for Anxiety. Max Daily Amount: 1.5 mg. 50 Tab 0    vit C/E/Zn/coppr/lutein/zeaxan (PRESERVISION AREDS 2 PO) Take  by mouth. Indications: 2 pills dailly      biotin 10,000 mcg cap Take  by mouth daily (with dinner).  Cholecalciferol, Vitamin D3, 3,000 unit tab Take 1,000 Units by mouth every morning.  cyanocobalamin (VITAMIN B12) 500 mcg tablet Take 500 mcg by mouth every morning.  polyethylene glycol (MIRALAX) 17 gram/dose powder Take 17 g by mouth every morning.  Indications: CONSTIPATION       Past Medical History:   Diagnosis Date    Anxiety     Arthritis     Chronic constipation     CKD (chronic kidney disease) 7/17/2017    Colon polyps 7/17/2017    DJD (degenerative joint disease) 7/17/2017    Elevated LFTs 7/17/2017    Flu 02/19/2019    GERD (gastroesophageal reflux disease)     Glucose intolerance (impaired glucose tolerance) 7/17/2017    Hemorrhoids     internal & external- bleeds frequently    High cholesterol     Hyperlipidemia 7/17/2017    Hypertension     Hypertension with renal disease 7/17/2017    PT Education - High Blood Pressure (Essential Hypertension) *: blood pressure PT Education - How to access health information online: discussed with patient and provided information    Hypertension, renal 7/17/2017    Ill-defined condition     ringing in ears    Menopause     Mild obesity 7/17/2017    Nausea & vomiting     Neoplasm of uncertain behavior of skin 7/17/2017    On statin therapy 7/17/2017    Osteopenia 7/17/2017    Primary angle-closure glaucoma 7/17/2017    Comments: OU    Skin cancer of face     as of 6/15/16 pt states \"removed years ago\"    Spinal stenosis      Past Surgical History:   Procedure Laterality Date    HX BREAST BIOPSY Right 02/26/2021    HX CATARACT REMOVAL Bilateral 2020    HX CHOLECYSTECTOMY  11/6/2014    Dr Nicolás Carr for glaucoma    HX HYSTERECTOMY      partial    HX OOPHORECTOMY Bilateral     HX PELVIC LAPAROSCOPY      Jono. oophorectomy    HX TUBAL LIGATION      [place] COLSC FLX W/REMOVAL LESION BY HOT BX FORCEPS  11/12/2012         [place] ERCP REMOVE CALCULI/DEBRIS BILIARY/PANCREAS DUCT  11/7/2014         [place] ERCP W/SPHINCTEROTOMY/PAPILLOTOMY  11/7/2014          Allergies   Allergen Reactions    Iodinated Contrast Media Hives    Oxycodone-Aspirin Unknown (comments)     Patient states this in not a true allergy       REVIEW OF SYSTEMS:  General: negative for - chills or fever, or weight loss or gain  ENT: negative for - headaches, nasal congestion or tinnitus  Eyes: no blurred or visual changes  Neck: No stiffness or swollen nodes  Respiratory: negative for - cough, hemoptysis, shortness of breath or wheezing  Cardiovascular : negative for - chest pain, edema, palpitations or shortness of breath  Gastrointestinal: negative for - abdominal pain, blood in stools, heartburn or nausea/vomiting  Genito-Urinary: no dysuria, trouble voiding, or hematuria  Musculoskeletal: negative for - gait disturbance, joint pain, joint stiffness or joint swelling. Positive for low back pain radiating to the left leg  Neurological: no TIA or stroke symptoms  Hematologic: no bruises, no bleeding  Lymphatic: no swollen glands  Integument: no lumps, mole changes, nail changes or rash  Endocrine:no malaise/lethargy poly uria or polydipsia or unexpected weight changes        Social History     Socioeconomic History    Marital status:      Spouse name: Not on file    Number of children: Not on file    Years of education: Not on file    Highest education level: Not on file   Tobacco Use    Smoking status: Former Smoker     Packs/day: 1.50     Years: 35.00     Pack years: 52.50     Quit date: 2004     Years since quittin.6    Smokeless tobacco: Never Used    Tobacco comment: quit smoking 6 yrs ago   Vaping Use    Vaping Use: Never used   Substance and Sexual Activity    Alcohol use: No    Drug use: No    Sexual activity: Never     Social Determinants of Health     Financial Resource Strain:     Difficulty of Paying Living Expenses:    Food Insecurity:     Worried About Running Out of Food in the Last Year:     Ran Out of Food in the Last Year:    Transportation Needs:     Lack of Transportation (Medical):      Lack of Transportation (Non-Medical):    Physical Activity:     Days of Exercise per Week:     Minutes of Exercise per Session:    Stress:     Feeling of Stress :    Social Connections:     Frequency of Communication with Friends and Family:     Frequency of Social Gatherings with Friends and Family:     Attends Worship Services:     Active Member of Clubs or Organizations:     Attends Club or Organization Meetings:     Marital Status:      Family History   Problem Relation Age of Onset    Heart Disease Mother     Hypertension Mother     Cancer Mother         skin    Stroke Father     Breast Cancer Sister         onset: 76s    Breast Cancer Niece        OBJECTIVE:     Visit Vitals  /76   Pulse 77   Temp 98.3 °F (36.8 °C) (Temporal)   Resp 17   Ht 5' 3\" (1.6 m)   Wt 199 lb 8 oz (90.5 kg)   SpO2 98%   BMI 35.34 kg/m²     CONSTITUTIONAL:   well nourished, appears age appropriate  EYES: sclera anicteric, PERRL, EOMI  ENMT:nares clear, moist mucous membranes, pharynx clear  NECK: supple. Thyroid normal, No JVD or bruits  RESPIRATORY: Chest: clear to ascultation and percussion, normal inspiratory effort  CARDIOVASCULAR: Heart: regular rate and rhythm no murmurs, rubs or gallops, PMI not displaced, No thrills, no peripheral edema  GASTROINTESTINAL: Abdomen: non distended, soft, non tender, bowel sounds normal  HEMATOLOGIC: no purpura, petechiae or bruising  LYMPHATIC: No lymph node enlargemant  MUSCULOSKELETAL: Extremities: no active synovitis, pulse 1+. Left lower lumbar paraspinal tenderness. Straight leg raising on the left does elicit some discomfort at about 60 degrees. INTEGUMENT: No unusual rashes or suspicious skin lesions noted. Nails appear normal.  PERIPHERAL VASCULAR: normal pulses femoral, PT and DP  NEUROLOGIC: non-focal exam, A & O X 3  PSYCHIATRIC:, appropriate affect     ASSESSMENT:   1. Hypertension with renal disease    2. Glucose intolerance (impaired glucose tolerance)    3. Mixed hyperlipidemia    4. Primary osteoarthritis involving multiple joints    5. Stage 2 chronic kidney disease    6. Severe obesity (BMI 35.0-39. 9) with comorbidity ([address]    7. Acute midline low back pain without sciatica    8. Chronic left-sided low back pain with left-sided sciatica      Impression  1.   Hypertension that is controlled so continue current therapy reviewed with her. 2.  Glucose intolerance repeat status pending and prior lab reviewed not make adjustments if necessary. 3.  Hyperlipidemia prior lab reviewed and repeat status pending I will adjust if needed. 4. DJD stable except for the low back  5. CKD stage II repeat status pending  6. Obesity I did discuss diet, exercise and weight reduction for overall health benefit. 7.  Low back pain with radiation to the left side of scheduled an MRI to further evaluate this. I reviewed her lumbar spine film done January 2020 which revealed some degenerative changes. I will recheck her again in 3 months. I will call with lab results and lumbar spine MRI results. PLAN:  .  Orders Placed This Encounter    MRI LUMB SPINE WO CONT    METABOLIC PANEL, COMPREHENSIVE    LIPID PANEL    CK    AMB POC HEMOGLOBIN A1C         ATTENTION:   This medical record was transcribed using an electronic medical records system. Although proofread, it may and can contain electronic and spelling errors. Other human spelling and other errors may be present. Corrections may be executed at a later time. Please feel free to contact us for any clarifications as needed. Follow-up and Dispositions    · Return in about 3 months (around 9/10/2021). No results found for any visits on 06/10/21. Luba Villeda MD    The patient verbalized understanding of the problems and plans as explained.

## 2021-06-10 ENCOUNTER — OFFICE VISIT (OUTPATIENT)
Dept: INTERNAL MEDICINE CLINIC | Age: 76
End: 2021-06-10
Payer: MEDICARE

## 2021-06-10 VITALS
SYSTOLIC BLOOD PRESSURE: 138 MMHG | HEIGHT: 63 IN | TEMPERATURE: 98.3 F | RESPIRATION RATE: 17 BRPM | BODY MASS INDEX: 35.35 KG/M2 | OXYGEN SATURATION: 98 % | HEART RATE: 77 BPM | WEIGHT: 199.5 LBS | DIASTOLIC BLOOD PRESSURE: 76 MMHG

## 2021-06-10 DIAGNOSIS — G89.29 CHRONIC LEFT-SIDED LOW BACK PAIN WITH LEFT-SIDED SCIATICA: ICD-10-CM

## 2021-06-10 DIAGNOSIS — N18.2 STAGE 2 CHRONIC KIDNEY DISEASE: ICD-10-CM

## 2021-06-10 DIAGNOSIS — R73.02 GLUCOSE INTOLERANCE (IMPAIRED GLUCOSE TOLERANCE): ICD-10-CM

## 2021-06-10 DIAGNOSIS — E66.01 SEVERE OBESITY (BMI 35.0-39.9) WITH COMORBIDITY (HCC): ICD-10-CM

## 2021-06-10 DIAGNOSIS — M54.50 ACUTE MIDLINE LOW BACK PAIN WITHOUT SCIATICA: ICD-10-CM

## 2021-06-10 DIAGNOSIS — I12.9 HYPERTENSION WITH RENAL DISEASE: Primary | ICD-10-CM

## 2021-06-10 DIAGNOSIS — M54.42 CHRONIC LEFT-SIDED LOW BACK PAIN WITH LEFT-SIDED SCIATICA: ICD-10-CM

## 2021-06-10 DIAGNOSIS — M15.9 PRIMARY OSTEOARTHRITIS INVOLVING MULTIPLE JOINTS: ICD-10-CM

## 2021-06-10 DIAGNOSIS — E78.2 MIXED HYPERLIPIDEMIA: ICD-10-CM

## 2021-06-10 LAB — HBA1C MFR BLD HPLC: 5.7 % (ref 4.5–5.7)

## 2021-06-10 PROCEDURE — 83036 HEMOGLOBIN GLYCOSYLATED A1C: CPT | Performed by: INTERNAL MEDICINE

## 2021-06-10 PROCEDURE — G8417 CALC BMI ABV UP PARAM F/U: HCPCS | Performed by: INTERNAL MEDICINE

## 2021-06-10 PROCEDURE — G9899 SCRN MAM PERF RSLTS DOC: HCPCS | Performed by: INTERNAL MEDICINE

## 2021-06-10 PROCEDURE — G8427 DOCREV CUR MEDS BY ELIG CLIN: HCPCS | Performed by: INTERNAL MEDICINE

## 2021-06-10 PROCEDURE — 1090F PRES/ABSN URINE INCON ASSESS: CPT | Performed by: INTERNAL MEDICINE

## 2021-06-10 PROCEDURE — G8536 NO DOC ELDER MAL SCRN: HCPCS | Performed by: INTERNAL MEDICINE

## 2021-06-10 PROCEDURE — G8399 PT W/DXA RESULTS DOCUMENT: HCPCS | Performed by: INTERNAL MEDICINE

## 2021-06-10 PROCEDURE — 3017F COLORECTAL CA SCREEN DOC REV: CPT | Performed by: INTERNAL MEDICINE

## 2021-06-10 PROCEDURE — G8510 SCR DEP NEG, NO PLAN REQD: HCPCS | Performed by: INTERNAL MEDICINE

## 2021-06-10 PROCEDURE — 1101F PT FALLS ASSESS-DOCD LE1/YR: CPT | Performed by: INTERNAL MEDICINE

## 2021-06-10 PROCEDURE — 99214 OFFICE O/P EST MOD 30 MIN: CPT | Performed by: INTERNAL MEDICINE

## 2021-06-10 NOTE — PROGRESS NOTES
Chief Complaint   Patient presents with    Hypertension     3 month follow up    Cholesterol Problem     Visit Vitals  BP (!) 150/92 (BP 1 Location: Left upper arm, BP Patient Position: Sitting, BP Cuff Size: Adult)   Pulse 77   Temp 98.3 °F (36.8 °C) (Temporal)   Resp 17   Ht 5' 3\" (1.6 m)   Wt 199 lb 8 oz (90.5 kg)   SpO2 98%   BMI 35.34 kg/m²     1. Have you been to the ER, urgent care clinic since your last visit? Hospitalized since your last visit? No    2. Have you seen or consulted any other health care providers outside of the [address] [place] [address] since your last visit? Include any pap smears or colon screening.  No

## 2021-06-11 LAB
ALBUMIN SERPL-MCNC: 4.1 G/DL (ref 3.5–5)
ALBUMIN/GLOB SERPL: 1.2 {RATIO} (ref 1.1–2.2)
ALP SERPL-CCNC: 114 U/L (ref 45–117)
ALT SERPL-CCNC: 31 U/L (ref 12–78)
ANION GAP SERPL CALC-SCNC: 5 MMOL/L (ref 5–15)
AST SERPL-CCNC: 20 U/L (ref 15–37)
BILIRUB SERPL-MCNC: 0.9 MG/DL (ref 0.2–1)
BUN SERPL-MCNC: 15 MG/DL (ref 6–20)
BUN/CREAT SERPL: 22 (ref 12–20)
CALCIUM SERPL-MCNC: 9.9 MG/DL (ref 8.5–10.1)
CHLORIDE SERPL-SCNC: 104 MMOL/L (ref 97–108)
CHOLEST SERPL-MCNC: 192 MG/DL
CK SERPL-CCNC: 71 U/L (ref 26–192)
CO2 SERPL-SCNC: 30 MMOL/L (ref 21–32)
CREAT SERPL-MCNC: 0.69 MG/DL (ref 0.55–1.02)
GLOBULIN SER CALC-MCNC: 3.3 G/DL (ref 2–4)
GLUCOSE SERPL-MCNC: 99 MG/DL (ref 65–100)
HDLC SERPL-MCNC: 66 MG/DL
HDLC SERPL: 2.9 {RATIO} (ref 0–5)
LDLC SERPL CALC-MCNC: 100.6 MG/DL (ref 0–100)
POTASSIUM SERPL-SCNC: 4.6 MMOL/L (ref 3.5–5.1)
PROT SERPL-MCNC: 7.4 G/DL (ref 6.4–8.2)
SODIUM SERPL-SCNC: 139 MMOL/L (ref 136–145)
TRIGL SERPL-MCNC: 127 MG/DL (ref ?–150)
VLDLC SERPL CALC-MCNC: 25.4 MG/DL

## 2021-06-27 ENCOUNTER — HOSPITAL ENCOUNTER (OUTPATIENT)
Dept: MRI IMAGING | Age: 76
Discharge: HOME | End: 2021-06-27
Attending: INTERNAL MEDICINE
Payer: MEDICARE

## 2021-06-27 DIAGNOSIS — G89.29 CHRONIC LEFT-SIDED LOW BACK PAIN WITH LEFT-SIDED SCIATICA: ICD-10-CM

## 2021-06-27 DIAGNOSIS — M54.42 CHRONIC LEFT-SIDED LOW BACK PAIN WITH LEFT-SIDED SCIATICA: ICD-10-CM

## 2021-06-27 PROCEDURE — 72148 MRI LUMBAR SPINE W/O DYE: CPT

## 2021-06-28 NOTE — PROGRESS NOTES
MRI shows progression of her arthritis with mild to moderate stenosis.   Appointment to see Dr. Stephanie Roche

## 2021-06-30 DIAGNOSIS — M48.061 SPINAL STENOSIS OF LUMBAR REGION, UNSPECIFIED WHETHER NEUROGENIC CLAUDICATION PRESENT: Primary | ICD-10-CM

## 2021-06-30 NOTE — PROGRESS NOTES
MRI shows progression of her arthritis with mild to moderate stenosis. Appointment to see Dr. Kelley Carlisle. Referral generated. And referral coordinator to call and schedule appointment for patient.

## 2021-09-14 ENCOUNTER — OFFICE VISIT (OUTPATIENT)
Dept: INTERNAL MEDICINE CLINIC | Age: 76
End: 2021-09-14
Payer: MEDICARE

## 2021-09-14 VITALS
OXYGEN SATURATION: 97 % | HEIGHT: 63 IN | WEIGHT: 202.5 LBS | DIASTOLIC BLOOD PRESSURE: 80 MMHG | TEMPERATURE: 98 F | SYSTOLIC BLOOD PRESSURE: 125 MMHG | HEART RATE: 80 BPM | RESPIRATION RATE: 18 BRPM | BODY MASS INDEX: 35.88 KG/M2

## 2021-09-14 DIAGNOSIS — N18.2 STAGE 2 CHRONIC KIDNEY DISEASE: ICD-10-CM

## 2021-09-14 DIAGNOSIS — E66.01 SEVERE OBESITY (BMI 35.0-39.9) WITH COMORBIDITY (HCC): ICD-10-CM

## 2021-09-14 DIAGNOSIS — M15.9 PRIMARY OSTEOARTHRITIS INVOLVING MULTIPLE JOINTS: ICD-10-CM

## 2021-09-14 DIAGNOSIS — R73.02 GLUCOSE INTOLERANCE (IMPAIRED GLUCOSE TOLERANCE): ICD-10-CM

## 2021-09-14 DIAGNOSIS — I12.9 HYPERTENSION WITH RENAL DISEASE: Primary | ICD-10-CM

## 2021-09-14 DIAGNOSIS — E78.2 MIXED HYPERLIPIDEMIA: ICD-10-CM

## 2021-09-14 LAB
ALBUMIN SERPL-MCNC: 3.7 G/DL (ref 3.5–5)
ALBUMIN/GLOB SERPL: 1 {RATIO} (ref 1.1–2.2)
ALP SERPL-CCNC: 104 U/L (ref 45–117)
ALT SERPL-CCNC: 34 U/L (ref 12–78)
ANION GAP SERPL CALC-SCNC: 6 MMOL/L (ref 5–15)
AST SERPL-CCNC: 24 U/L (ref 15–37)
BILIRUB SERPL-MCNC: 0.7 MG/DL (ref 0.2–1)
BUN SERPL-MCNC: 18 MG/DL (ref 6–20)
BUN/CREAT SERPL: 24 (ref 12–20)
CALCIUM SERPL-MCNC: 10 MG/DL (ref 8.5–10.1)
CHLORIDE SERPL-SCNC: 105 MMOL/L (ref 97–108)
CHOLEST SERPL-MCNC: 188 MG/DL
CK SERPL-CCNC: 69 U/L (ref 26–192)
CO2 SERPL-SCNC: 28 MMOL/L (ref 21–32)
CREAT SERPL-MCNC: 0.76 MG/DL (ref 0.55–1.02)
EST. AVERAGE GLUCOSE BLD GHB EST-MCNC: 123 MG/DL
GLOBULIN SER CALC-MCNC: 3.7 G/DL (ref 2–4)
GLUCOSE SERPL-MCNC: 94 MG/DL (ref 65–100)
HBA1C MFR BLD: 5.9 % (ref 4–5.6)
HDLC SERPL-MCNC: 60 MG/DL
HDLC SERPL: 3.1 {RATIO} (ref 0–5)
LDLC SERPL CALC-MCNC: 101.2 MG/DL (ref 0–100)
POTASSIUM SERPL-SCNC: 4.2 MMOL/L (ref 3.5–5.1)
PROT SERPL-MCNC: 7.4 G/DL (ref 6.4–8.2)
SODIUM SERPL-SCNC: 139 MMOL/L (ref 136–145)
TRIGL SERPL-MCNC: 134 MG/DL (ref ?–150)
VLDLC SERPL CALC-MCNC: 26.8 MG/DL

## 2021-09-14 PROCEDURE — G8427 DOCREV CUR MEDS BY ELIG CLIN: HCPCS | Performed by: INTERNAL MEDICINE

## 2021-09-14 PROCEDURE — G8399 PT W/DXA RESULTS DOCUMENT: HCPCS | Performed by: INTERNAL MEDICINE

## 2021-09-14 PROCEDURE — G8510 SCR DEP NEG, NO PLAN REQD: HCPCS | Performed by: INTERNAL MEDICINE

## 2021-09-14 PROCEDURE — 1101F PT FALLS ASSESS-DOCD LE1/YR: CPT | Performed by: INTERNAL MEDICINE

## 2021-09-14 PROCEDURE — G8536 NO DOC ELDER MAL SCRN: HCPCS | Performed by: INTERNAL MEDICINE

## 2021-09-14 PROCEDURE — G8417 CALC BMI ABV UP PARAM F/U: HCPCS | Performed by: INTERNAL MEDICINE

## 2021-09-14 PROCEDURE — 99214 OFFICE O/P EST MOD 30 MIN: CPT | Performed by: INTERNAL MEDICINE

## 2021-09-14 PROCEDURE — 3017F COLORECTAL CA SCREEN DOC REV: CPT | Performed by: INTERNAL MEDICINE

## 2021-09-14 PROCEDURE — G9899 SCRN MAM PERF RSLTS DOC: HCPCS | Performed by: INTERNAL MEDICINE

## 2021-09-14 PROCEDURE — 1090F PRES/ABSN URINE INCON ASSESS: CPT | Performed by: INTERNAL MEDICINE

## 2021-09-14 RX ORDER — PHENOBARBITAL 32.4 MG/1
TABLET ORAL
COMMUNITY
Start: 2021-06-10 | End: 2022-03-17 | Stop reason: ALTCHOICE

## 2021-09-14 RX ORDER — DIAZEPAM 5 MG/1
TABLET ORAL
COMMUNITY
Start: 2021-06-10 | End: 2022-09-19 | Stop reason: ALTCHOICE

## 2021-09-14 NOTE — PROGRESS NOTES
HIPAA verified by two patient identifiers. Emily Jessica is a 78 y.o. female    Chief Complaint   Patient presents with    Hypertension     3 months    GERD    Chronic Kidney Disease    Cholesterol Problem       Visit Vitals  /80 (BP 1 Location: Left upper arm, BP Patient Position: Sitting, BP Cuff Size: Large adult)   Pulse 80   Temp 98 °F (36.7 °C) (Oral)   Resp 18   Ht 5' 3\" (1.6 m)   Wt 202 lb 8 oz (91.9 kg)   SpO2 97%   BMI 35.87 kg/m²       Pain Scale: 0 - No pain/10  Pain Location:       Health Maintenance Due   Topic Date Due    Pneumococcal 65+ years (2 of 2 - PPSV23) 07/06/2016    Colorectal Cancer Screening Combo  07/11/2019    Shingrix Vaccine Age 50> (2 of 2) 10/21/2019    Flu Vaccine (1) 09/01/2021         Coordination of Care Questionnaire:  :   1) Have you been to an emergency room, urgent care, or hospitalized since your last visit? If yes, where when, and reason for visit? no       2. Have seen or consulted any other health care provider since your last visit? If yes, where when, and reason for visit? NO      Patient is accompanied by self I have received verbal consent from Emily Jessica to discuss any/all medical information while they are present in the room.

## 2021-09-14 NOTE — PATIENT INSTRUCTIONS
Allergies: Care Instructions  Your Care Instructions     Allergies occur when your body's defense system (immune system) overreacts to certain substances. The immune system treats a harmless substance as if it were a harmful germ or virus. Many things can make this happen. These include pollens, medicine, food, dust, animal dander, and mold. Allergies can be mild or severe. Mild allergies can be managed with home treatment. But medicine may be needed to prevent problems. Managing your allergies is an important part of staying healthy. Your doctor may suggest that you have allergy testing to help find out what is causing your allergies. Severe allergies can cause reactions that affect your whole body (anaphylactic reactions). Your doctor may prescribe a shot of epinephrine to carry with you in case you have a severe reaction. Learn how to give yourself the shot and keep it with you at all times. Make sure it is not . Follow-up care is a key part of your treatment and safety. Be sure to make and go to all appointments, and call your doctor if you are having problems. It's also a good idea to know your test results and keep a list of the medicines you take. How can you care for yourself at home? · If you have been told by your doctor that dust or dust mites are causing your allergy, decrease the dust around your bed:  ? Wash sheets, pillowcases, and other bedding in hot water every week. ? Use dust-proof covers for pillows, duvets, and mattresses. Avoid plastic covers because they tear easily and do not \"breathe. \" Wash as instructed on the label. ? Do not use any blankets and pillows that you do not need. ? Use blankets that you can wash in your washing machine. ? Consider removing drapes and carpets, which attract and hold dust, from your bedroom. · If you are allergic to house dust and mites, do not use home humidifiers. Your doctor can suggest ways you can control dust and mites.   · Look for signs of cockroaches. Cockroaches cause allergic reactions. Use cockroach baits to get rid of them. Then, clean your home well. Cockroaches like areas where grocery bags, newspapers, empty bottles, or cardboard boxes are stored. Do not keep these inside your home, and keep trash and food containers sealed. Seal off any spots where cockroaches might enter your home. · If you are allergic to mold, get rid of furniture, rugs, and drapes that smell musty. Check for mold in the bathroom. · If you are allergic to outdoor pollen or mold spores, use air-conditioning. Change or clean all filters every month. Keep windows closed. · If you are allergic to pollen, stay inside when pollen counts are high. Use a vacuum  with a HEPA filter or a double-thickness filter at least two times each week. · Stay inside when air pollution is bad. Avoid paint fumes, perfumes, and other strong odors. · Avoid conditions that make your allergies worse. Stay away from smoke. Do not smoke or let anyone else smoke in your house. Do not use fireplaces or wood-burning stoves. · If you are allergic to your pets, change the air filter in your furnace every month. Use high-efficiency filters. · If you are allergic to pet dander, keep pets outside or out of your bedroom. Old carpet and cloth furniture can hold a lot of animal dander. You may need to replace them. When should you call for help? Give an epinephrine shot if:    · You think you are having a severe allergic reaction.     · You have symptoms in more than one body area, such as mild nausea and an itchy mouth. After giving an epinephrine shot call 911, even if you feel better. Call 911 if:    · You have symptoms of a severe allergic reaction. These may include:  ? Sudden raised, red areas (hives) all over your body. ? Swelling of the throat, mouth, lips, or tongue. ? Trouble breathing. ? Passing out (losing consciousness).  Or you may feel very lightheaded or suddenly feel weak, confused, or restless.     · You have been given an epinephrine shot, even if you feel better. Call your doctor now or seek immediate medical care if:    · You have symptoms of an allergic reaction, such as:  ? A rash or hives (raised, red areas on the skin). ? Itching. ? Swelling. ? Belly pain, nausea, or vomiting. Watch closely for changes in your health, and be sure to contact your doctor if:    · You do not get better as expected. Where can you learn more? Go to http://www.floyd.com/  Enter W171 in the search box to learn more about \"Allergies: Care Instructions. \"  Current as of: November 6, 2020               Content Version: 12.8  © 2006-2021 [employer]. Care instructions adapted under license by [employer] (which disclaims liability or warranty for this information). If you have questions about a medical condition or this instruction, always ask your healthcare professional. [place] [address] any warranty or liability for your use of this information.

## 2021-09-14 NOTE — PROGRESS NOTES
Chief Complaint   Patient presents with    Hypertension     3 months    GERD    Chronic Kidney Disease    Cholesterol Problem       SUBJECTIVE:    Juan Rodriguez is a 78 y.o. female who returns in follow-up for medical problems include hypertension, glucose intolerance, hyperlipidemia, CKD, DJD, anxiety and other mild medical problems. She is taking her medications and trying to follow her diet and try and remain physically active. She did have a panic attack while here in office but that resolved after taking her medication. She currently denies any chest pain, shortness of breath, palpitations, PND, orthopnea or other cardiac or respiratory complaints. There are no GI or  complaints. No headaches, dizziness or neurologic complaints. There are no current active arthritic complaints and there are no other complaints on complete review of systems. Current Outpatient Medications   Medication Sig Dispense Refill    irbesartan (AVAPRO) 300 mg tablet TAKE 1 TABLET BY MOUTH  EVERY DAY 90 Tab 3    Livalo 2 mg tablet TAKE 1 TABLET BY MOUTH  EVERY NIGHT AT BEDTIME 90 Tab 3    hydroCHLOROthiazide (HYDRODIURIL) 25 mg tablet TAKE 1 TABLET BY MOUTH  EVERY DAY 90 Tab 3    omeprazole (PRILOSEC) 20 mg capsule TAKE 1 CAPSULE BY MOUTH  EVERY DAY 90 Cap 3    ALPRAZolam (XANAX) 0.5 mg tablet Take 1 Tab by mouth three (3) times daily as needed for Anxiety. Max Daily Amount: 1.5 mg. 50 Tab 0    vit C/E/Zn/coppr/lutein/zeaxan (PRESERVISION AREDS 2 PO) Take  by mouth. Indications: 2 pills dailly      biotin 10,000 mcg cap Take  by mouth daily (with dinner).  Cholecalciferol, Vitamin D3, 3,000 unit tab Take 1,000 Units by mouth every morning.  cyanocobalamin (VITAMIN B12) 500 mcg tablet Take 500 mcg by mouth every morning.  polyethylene glycol (MIRALAX) 17 gram/dose powder Take 17 g by mouth every morning.  Indications: CONSTIPATION      diazePAM (VALIUM) 5 mg tablet TAKE 3 TABLETS BY MOUTH 30 MINUTES PRIOR TO APPOINTMENT      PHENobarbitaL (LUMINAL) 32.4 mg tablet TAKE 1 TABLET BY MOUTH 30 MINUTES PRIOR TO APPOINTMENT       Past Medical History:   Diagnosis Date    Anxiety     Arthritis     Chronic constipation     CKD (chronic kidney disease) 7/17/2017    Colon polyps 7/17/2017    DJD (degenerative joint disease) 7/17/2017    Elevated LFTs 7/17/2017    Flu 02/19/2019    GERD (gastroesophageal reflux disease)     Glucose intolerance (impaired glucose tolerance) 7/17/2017    Hemorrhoids     internal & external- bleeds frequently    High cholesterol     Hyperlipidemia 7/17/2017    Hypertension     Hypertension with renal disease 7/17/2017    PT Education - High Blood Pressure (Essential Hypertension) *: blood pressure PT Education - How to access health information online: discussed with patient and provided information    Hypertension, renal 7/17/2017    Ill-defined condition     ringing in ears    Menopause     Mild obesity 7/17/2017    Nausea & vomiting     Neoplasm of uncertain behavior of skin 7/17/2017    On statin therapy 7/17/2017    Osteopenia 7/17/2017    Primary angle-closure glaucoma 7/17/2017    Comments: OU    Skin cancer of face     as of 6/15/16 pt states \"removed years ago\"    Spinal stenosis      Past Surgical History:   Procedure Laterality Date    HX BREAST BIOPSY Right 02/26/2021    HX CATARACT REMOVAL Bilateral 2020    HX CHOLECYSTECTOMY  11/6/2014    Dr Manas [place]    HX HEENT      laser for glaucoma    HX HYSTERECTOMY      partial    HX OOPHORECTOMY Bilateral     HX PELVIC LAPAROSCOPY      Jono. oophorectomy    HX TUBAL LIGATION      [place] COLSC FLX W/REMOVAL LESION BY HOT BX FORCEPS  11/12/2012         [place] ERCP REMOVE CALCULI/DEBRIS BILIARY/PANCREAS DUCT  11/7/2014         [place] ERCP W/SPHINCTEROTOMY/PAPILLOTOMY  11/7/2014          Allergies   Allergen Reactions    Iodinated Contrast Media Hives    Oxycodone-Aspirin Unknown (comments)     Patient states this in not a true allergy       REVIEW OF SYSTEMS:  General: negative for - chills or fever, or weight loss or gain  ENT: negative for - headaches, nasal congestion or tinnitus  Eyes: no blurred or visual changes  Neck: No stiffness or swollen nodes  Respiratory: negative for - cough, hemoptysis, shortness of breath or wheezing  Cardiovascular : negative for - chest pain, edema, palpitations or shortness of breath  Gastrointestinal: negative for - abdominal pain, blood in stools, heartburn or nausea/vomiting  Genito-Urinary: no dysuria, trouble voiding, or hematuria  Musculoskeletal: negative for - gait disturbance, joint pain, joint stiffness or joint swelling  Neurological: no TIA or stroke symptoms  Hematologic: no bruises, no bleeding  Lymphatic: no swollen glands  Integument: no lumps, mole changes, nail changes or rash  Endocrine:no malaise/lethargy poly uria or polydipsia or unexpected weight changes        Social History     Socioeconomic History    Marital status:      Spouse name: Not on file    Number of children: Not on file    Years of education: Not on file    Highest education level: Not on file   Tobacco Use    Smoking status: Former Smoker     Packs/day: 1.50     Years: 35.00     Pack years: 52.50     Quit date: 2004     Years since quittin.8    Smokeless tobacco: Never Used    Tobacco comment: quit smoking 6 yrs ago   Vaping Use    Vaping Use: Never used   Substance and Sexual Activity    Alcohol use: No    Drug use: No    Sexual activity: Never     Social Determinants of Health     Financial Resource Strain:     Difficulty of Paying Living Expenses:    Food Insecurity:     Worried About Running Out of Food in the Last Year:     Ran Out of Food in the Last Year:    Transportation Needs:     Lack of Transportation (Medical):      Lack of Transportation (Non-Medical):    Physical Activity:     Days of Exercise per Week:     Minutes of Exercise per Session: Stress:     Feeling of Stress :    Social Connections:     Frequency of Communication with Friends and Family:     Frequency of Social Gatherings with Friends and Family:     Attends Sikhism Services:     Active Member of Clubs or Organizations:     Attends Club or Organization Meetings:     Marital Status:      Family History   Problem Relation Age of Onset    Heart Disease Mother     Hypertension Mother     Cancer Mother         skin    Stroke Father     Breast Cancer Sister         onset: 76s    Breast Cancer Niece        OBJECTIVE:     Visit Vitals  /80 (BP 1 Location: Left upper arm, BP Patient Position: Sitting, BP Cuff Size: Large adult)   Pulse 80   Temp 98 °F (36.7 °C) (Oral)   Resp 18   Ht 5' 3\" (1.6 m)   Wt 202 lb 8 oz (91.9 kg)   SpO2 97%   BMI 35.87 kg/m²     CONSTITUTIONAL:   well nourished, appears age appropriate  EYES: sclera anicteric, PERRL, EOMI  ENMT:nares clear, moist mucous membranes, pharynx clear  NECK: supple. Thyroid normal, No JVD or bruits  RESPIRATORY: Chest: clear to ascultation and percussion, normal inspiratory effort  CARDIOVASCULAR: Heart: regular rate and rhythm no murmurs, rubs or gallops, PMI not displaced, No thrills, no peripheral edema  GASTROINTESTINAL: Abdomen: non distended, soft, non tender, bowel sounds normal  HEMATOLOGIC: no purpura, petechiae or bruising  LYMPHATIC: No lymph node enlargemant  MUSCULOSKELETAL: Extremities: no active synovitis, pulse 1+   INTEGUMENT: No unusual rashes or suspicious skin lesions noted. Nails appear normal.  PERIPHERAL VASCULAR: normal pulses femoral, PT and DP  NEUROLOGIC: non-focal exam, A & O X 3  PSYCHIATRIC:, appropriate affect     ASSESSMENT:   1. Hypertension with renal disease    2. Glucose intolerance (impaired glucose tolerance)    3. Mixed hyperlipidemia    4. Primary osteoarthritis involving multiple joints    5. Stage 2 chronic kidney disease    6. Severe obesity (BMI 35.0-39. 9) with comorbidity ([address] Impression  1. Hypertension is controlled so continue current therapy reviewed with her. 2.  Glucose intolerance repeat status pending and prior lab review not make adjustments if necessary. 3.  Hyperlipidemia prior lab reviewed repeat status pending I will adjust if needed. 4. DJD that is stable  5. CKD stage III repeat status pending  6. Obesity weight is slightly up and we discussed diet, exercise and weight reduction for overall health benefit. I will recheck her again in 3 months or sooner should there be a problem. I will call with lab results in interim. PLAN:  .  Orders Placed This Encounter    METABOLIC PANEL, COMPREHENSIVE    LIPID PANEL    CK    HEMOGLOBIN A1C WITH EAG    diazePAM (VALIUM) 5 mg tablet    PHENobarbitaL (LUMINAL) 32.4 mg tablet         ATTENTION:   This medical record was transcribed using an electronic medical records system. Although proofread, it may and can contain electronic and spelling errors. Other human spelling and other errors may be present. Corrections may be executed at a later time. Please feel free to contact us for any clarifications as needed. Follow-up and Dispositions    · Return in about 3 months (around 12/14/2021). No results found for any visits on 09/14/21. Sierra Lora MD    The patient verbalized understanding of the problems and plans as explained.

## 2021-09-22 ENCOUNTER — TRANSCRIBE ORDER (OUTPATIENT)
Dept: SCHEDULING | Age: 76
End: 2021-09-22

## 2021-09-22 DIAGNOSIS — M54.16 LUMBAR RADICULOPATHY: Primary | ICD-10-CM

## 2021-09-23 ENCOUNTER — TRANSCRIBE ORDER (OUTPATIENT)
Dept: SCHEDULING | Age: 76
End: 2021-09-23

## 2021-09-28 ENCOUNTER — HOSPITAL ENCOUNTER (OUTPATIENT)
Dept: INTERVENTIONAL RADIOLOGY/VASCULAR | Age: 76
Discharge: HOME | End: 2021-09-28
Attending: SPECIALIST
Payer: MEDICARE

## 2021-09-28 VITALS
WEIGHT: 200 LBS | SYSTOLIC BLOOD PRESSURE: 118 MMHG | BODY MASS INDEX: 32.14 KG/M2 | TEMPERATURE: 98 F | DIASTOLIC BLOOD PRESSURE: 65 MMHG | OXYGEN SATURATION: 97 % | HEART RATE: 76 BPM | HEIGHT: 66 IN | RESPIRATION RATE: 20 BRPM

## 2021-09-28 DIAGNOSIS — M54.16 LUMBAR RADICULOPATHY: ICD-10-CM

## 2021-09-28 PROCEDURE — 64483 NJX AA&/STRD TFRM EPI L/S 1: CPT

## 2021-09-28 PROCEDURE — A9585 GADOBUTROL INJECTION: HCPCS | Performed by: STUDENT IN AN ORGANIZED HEALTH CARE EDUCATION/TRAINING PROGRAM

## 2021-09-28 PROCEDURE — 74011000250 HC RX REV CODE- 250: Performed by: STUDENT IN AN ORGANIZED HEALTH CARE EDUCATION/TRAINING PROGRAM

## 2021-09-28 PROCEDURE — 2709999900 HC NON-CHARGEABLE SUPPLY

## 2021-09-28 PROCEDURE — 74011250636 HC RX REV CODE- 250/636: Performed by: STUDENT IN AN ORGANIZED HEALTH CARE EDUCATION/TRAINING PROGRAM

## 2021-09-28 RX ORDER — DEXAMETHASONE SODIUM PHOSPHATE 10 MG/ML
15 INJECTION INTRAMUSCULAR; INTRAVENOUS ONCE
Status: COMPLETED | OUTPATIENT
Start: 2021-09-28 | End: 2021-09-28

## 2021-09-28 RX ORDER — LIDOCAINE HYDROCHLORIDE 10 MG/ML
4 INJECTION, SOLUTION EPIDURAL; INFILTRATION; INTRACAUDAL; PERINEURAL ONCE
Status: COMPLETED | OUTPATIENT
Start: 2021-09-28 | End: 2021-09-28

## 2021-09-28 RX ORDER — LIDOCAINE HYDROCHLORIDE 20 MG/ML
20 INJECTION, SOLUTION INFILTRATION; PERINEURAL ONCE
Status: COMPLETED | OUTPATIENT
Start: 2021-09-28 | End: 2021-09-28

## 2021-09-28 RX ADMIN — LIDOCAINE HYDROCHLORIDE 5 ML: 20 INJECTION, SOLUTION INFILTRATION; PERINEURAL at 13:57

## 2021-09-28 RX ADMIN — DEXAMETHASONE SODIUM PHOSPHATE 10 MG: 10 INJECTION, SOLUTION INTRAMUSCULAR; INTRAVENOUS at 14:00

## 2021-09-28 RX ADMIN — GADOBUTROL 2 ML: 604.72 INJECTION INTRAVENOUS at 13:58

## 2021-09-28 RX ADMIN — LIDOCAINE HYDROCHLORIDE 2 ML: 10 INJECTION, SOLUTION EPIDURAL; INFILTRATION; INTRACAUDAL; PERINEURAL at 13:59

## 2021-09-28 NOTE — DISCHARGE INSTRUCTIONS
[address]  Special Procedures/Radiology Department      Steroidal Injection      Go home and rest.     No vigorous physical activity today. Be aware that numbness and/or tingling can occur up to 24 hours after the injection. No driving today. Resume your previous diet. Resume your previous medications. Depending on your job, you may return to work in 25 to 48 hours. It may take up to one week after the injection to see a change or an improvement in your symptoms. Be sure to follow up with your physician. Tell your physician if the injection helped with your symptoms or if the injection did nothing for your symptoms. For minor discomfort, you can take Tylenol, as directed on the label.       If you have any questions or concerns, please call [phone] and ask for the nurse on-call

## 2021-12-14 ENCOUNTER — OFFICE VISIT (OUTPATIENT)
Dept: INTERNAL MEDICINE CLINIC | Age: 76
End: 2021-12-14
Payer: MEDICARE

## 2021-12-14 VITALS
WEIGHT: 196.1 LBS | BODY MASS INDEX: 34.75 KG/M2 | TEMPERATURE: 98 F | HEIGHT: 63 IN | DIASTOLIC BLOOD PRESSURE: 82 MMHG | OXYGEN SATURATION: 98 % | HEART RATE: 112 BPM | RESPIRATION RATE: 18 BRPM | SYSTOLIC BLOOD PRESSURE: 133 MMHG

## 2021-12-14 DIAGNOSIS — K63.5 POLYP OF COLON, UNSPECIFIED PART OF COLON, UNSPECIFIED TYPE: ICD-10-CM

## 2021-12-14 DIAGNOSIS — E78.2 MIXED HYPERLIPIDEMIA: ICD-10-CM

## 2021-12-14 DIAGNOSIS — E66.01 SEVERE OBESITY (BMI 35.0-39.9) WITH COMORBIDITY (HCC): ICD-10-CM

## 2021-12-14 DIAGNOSIS — I12.9 HYPERTENSION WITH RENAL DISEASE: Primary | ICD-10-CM

## 2021-12-14 DIAGNOSIS — R79.89 ELEVATED LFTS: ICD-10-CM

## 2021-12-14 DIAGNOSIS — M54.42 CHRONIC MIDLINE LOW BACK PAIN WITH LEFT-SIDED SCIATICA: ICD-10-CM

## 2021-12-14 DIAGNOSIS — Z00.00 MEDICARE ANNUAL WELLNESS VISIT, SUBSEQUENT: ICD-10-CM

## 2021-12-14 DIAGNOSIS — E55.9 VITAMIN D DEFICIENCY: ICD-10-CM

## 2021-12-14 DIAGNOSIS — M85.89 OSTEOPENIA OF MULTIPLE SITES: ICD-10-CM

## 2021-12-14 DIAGNOSIS — Z13.39 ALCOHOL SCREENING: ICD-10-CM

## 2021-12-14 DIAGNOSIS — R73.02 GLUCOSE INTOLERANCE (IMPAIRED GLUCOSE TOLERANCE): ICD-10-CM

## 2021-12-14 DIAGNOSIS — Z23 NEEDS FLU SHOT: ICD-10-CM

## 2021-12-14 DIAGNOSIS — G89.29 CHRONIC MIDLINE LOW BACK PAIN WITH LEFT-SIDED SCIATICA: ICD-10-CM

## 2021-12-14 DIAGNOSIS — M15.9 PRIMARY OSTEOARTHRITIS INVOLVING MULTIPLE JOINTS: ICD-10-CM

## 2021-12-14 DIAGNOSIS — N18.2 STAGE 2 CHRONIC KIDNEY DISEASE: ICD-10-CM

## 2021-12-14 PROCEDURE — G8510 SCR DEP NEG, NO PLAN REQD: HCPCS | Performed by: INTERNAL MEDICINE

## 2021-12-14 PROCEDURE — 3017F COLORECTAL CA SCREEN DOC REV: CPT | Performed by: INTERNAL MEDICINE

## 2021-12-14 PROCEDURE — G8417 CALC BMI ABV UP PARAM F/U: HCPCS | Performed by: INTERNAL MEDICINE

## 2021-12-14 PROCEDURE — 1090F PRES/ABSN URINE INCON ASSESS: CPT | Performed by: INTERNAL MEDICINE

## 2021-12-14 PROCEDURE — G8536 NO DOC ELDER MAL SCRN: HCPCS | Performed by: INTERNAL MEDICINE

## 2021-12-14 PROCEDURE — G9899 SCRN MAM PERF RSLTS DOC: HCPCS | Performed by: INTERNAL MEDICINE

## 2021-12-14 PROCEDURE — 1101F PT FALLS ASSESS-DOCD LE1/YR: CPT | Performed by: INTERNAL MEDICINE

## 2021-12-14 PROCEDURE — G0439 PPPS, SUBSEQ VISIT: HCPCS | Performed by: INTERNAL MEDICINE

## 2021-12-14 PROCEDURE — 90694 VACC AIIV4 NO PRSRV 0.5ML IM: CPT | Performed by: INTERNAL MEDICINE

## 2021-12-14 PROCEDURE — G8427 DOCREV CUR MEDS BY ELIG CLIN: HCPCS | Performed by: INTERNAL MEDICINE

## 2021-12-14 PROCEDURE — G8399 PT W/DXA RESULTS DOCUMENT: HCPCS | Performed by: INTERNAL MEDICINE

## 2021-12-14 PROCEDURE — G0008 ADMIN INFLUENZA VIRUS VAC: HCPCS | Performed by: INTERNAL MEDICINE

## 2021-12-14 PROCEDURE — 99214 OFFICE O/P EST MOD 30 MIN: CPT | Performed by: INTERNAL MEDICINE

## 2021-12-14 NOTE — PROGRESS NOTES
HIPAA verified by two patient identifiers. Shy Muñoz is a 78 y.o. female    Chief Complaint   Patient presents with   [facility] Annual Wellness Visit       Visit Vitals  /82 (BP 1 Location: Left upper arm, BP Patient Position: Sitting, BP Cuff Size: Large adult)   Pulse (!) 112   Temp 98 °F (36.7 °C) (Oral)   Resp 18   Ht 5' 3\" (1.6 m)   Wt 196 lb 1.6 oz (89 kg)   SpO2 98%   BMI 34.74 kg/m²       Pain Scale: 0 - No pain/10  Pain Location:       Health Maintenance Due   Topic Date Due    Pneumococcal 65+ years (2 of 2 - PPSV23) 07/06/2016    Colorectal Cancer Screening Combo  07/11/2019    Shingrix Vaccine Age 50> (2 of 2) 10/21/2019    Flu Vaccine (1) 09/01/2021    COVID-19 Vaccine (3 - Booster for Suarez Peter series) 09/30/2021    Medicare Yearly Exam  12/11/2021         Coordination of Care Questionnaire:  :   1) Have you been to an emergency room, urgent care, or hospitalized since your last visit? If yes, where when, and reason for visit? no       2. Have seen or consulted any other health care provider since your last visit? If yes, where when, and reason for visit? NO      Patient is accompanied by self I have received verbal consent from Shy Muñoz to discuss any/all medical information while they are present in the room.

## 2021-12-14 NOTE — PROGRESS NOTES
This is a Subsequent Medicare Annual Wellness Visit providing Personalized Prevention Plan Services (PPPS) (Performed 12 months after initial AWV and PPPS )    I have reviewed the patient's medical history in detail and updated the computerized patient record. She returns for Medicare subsequent annual wellness examination and screening questionnaire. She also is returns in follow-up regarding her multiple medical problems include hypertension, glucose intolerance, hyperlipidemia, history colonic polypectomy, chronic midline low back pain, allergic rhinitis, DJD, osteopenia, CKD stage II, prior elevated liver enzymes, vitamin D deficiency, obesity and other multiple medical problems. She has had 1 cortisone shot in her back and it really did not seem to help and she is still having lots of back pain. She denies any chest pain, shortness of breath, palpitations, PND, orthopnea or other cardiac respiratory complaints. She notes no GI or  complaints. She is now started diet and working on trying to lose weight by eating 6 small meals per day. She denies any headaches, dizziness or neurologic complaints other than she is noting a little bit intermittent numbness of the left leg which to me sounds like is coming from the back. Other than the back there are no other current arthritic complaints and she has no other complaints on complete review of systems.     History     Past Medical History:   Diagnosis Date    Anxiety     Arthritis     Chronic constipation     CKD (chronic kidney disease) 7/17/2017    Colon polyps 7/17/2017    DJD (degenerative joint disease) 7/17/2017    Elevated LFTs 7/17/2017    Flu 02/19/2019    GERD (gastroesophageal reflux disease)     Glucose intolerance (impaired glucose tolerance) 7/17/2017    Hemorrhoids     internal & external- bleeds frequently    High cholesterol     Hyperlipidemia 7/17/2017    Hypertension     Hypertension with renal disease 7/17/2017    PT Education - High Blood Pressure (Essential Hypertension) *: blood pressure PT Education - How to access health information online: discussed with patient and provided information    Hypertension, renal 2017    Ill-defined condition     ringing in ears    Menopause     Mild obesity 2017    Nausea & vomiting     Neoplasm of uncertain behavior of skin 2017    On statin therapy 2017    Osteopenia 2017    Primary angle-closure glaucoma 2017    Comments: OU    Skin cancer of face     as of 6/15/16 pt states \"removed years ago\"    Spinal stenosis       Past Surgical History:   Procedure Laterality Date    HX BREAST BIOPSY Right 2021    HX CATARACT REMOVAL Bilateral 2020    HX CHOLECYSTECTOMY  2014    Dr Juan Francisco Melgoza for glaucoma    HX HYSTERECTOMY      partial    HX OOPHORECTOMY Bilateral     HX PELVIC LAPAROSCOPY      Jono. oophorectomy    HX TUBAL LIGATION      IR INJ FORAMIN EPID LUMB ANES/STER SNGL  2021    [place] COL[facility] FLX W/REMOVAL LESION BY HOT BX FORCEPS  2012         [facility] ER[facility] REMOVE CALCULI/DEBRIS BILIARY/PANCREAS DUCT  2014         [place] ER[facility] W/SPHINCTEROTOMY/PAPILLOTOMY  2014          Social History     Tobacco Use    Smoking status: Former Smoker     Packs/day: 1.50     Years: 35.00     Pack years: 52.50     Quit date: 2004     Years since quittin.1    Smokeless tobacco: Never Used    Tobacco comment: quit smoking 6 yrs ago   Vaping Use    Vaping Use: Never used   Substance Use Topics    Alcohol use: No    Drug use: No     Current Outpatient Medications   Medication Sig Dispense Refill    diazePAM (VALIUM) 5 mg tablet TAKE 3 TABLETS BY MOUTH 30 MINUTES PRIOR TO APPOINTMENT      PHENobarbitaL (LUMINAL) 32.4 mg tablet TAKE 1 TABLET BY MOUTH 30 MINUTES PRIOR TO APPOINTMENT      irbesartan (AVAPRO) 300 mg tablet TAKE 1 TABLET BY MOUTH  EVERY DAY 90 Tab 3    Livalo 2 mg tablet TAKE 1 TABLET BY MOUTH EVERY NIGHT AT BEDTIME 90 Tab 3    hydroCHLOROthiazide (HYDRODIURIL) 25 mg tablet TAKE 1 TABLET BY MOUTH  EVERY DAY 90 Tab 3    omeprazole (PRILOSEC) 20 mg capsule TAKE 1 CAPSULE BY MOUTH  EVERY DAY 90 Cap 3    ALPRAZolam (XANAX) 0.5 mg tablet Take 1 Tab by mouth three (3) times daily as needed for Anxiety. Max Daily Amount: 1.5 mg. 50 Tab 0    vit C/E/Zn/coppr/lutein/zeaxan (PRESERVISION AREDS 2 PO) Take  by mouth. Indications: 2 pills dailly      biotin 10,000 mcg cap Take  by mouth daily (with dinner).  Cholecalciferol, Vitamin D3, 3,000 unit tab Take 1,000 Units by mouth every morning.  cyanocobalamin (VITAMIN B12) 500 mcg tablet Take 500 mcg by mouth every morning.  polyethylene glycol (MIRALAX) 17 gram/dose powder Take 17 g by mouth every morning. Indications: CONSTIPATION       Allergies   Allergen Reactions    Iodinated Contrast Media Hives    Oxycodone-Aspirin Unknown (comments)     Patient states this in not a true allergy     Family History   Problem Relation Age of Onset    Heart Disease Mother     Hypertension Mother     Cancer Mother         skin    Stroke Father     Breast Cancer Sister         onset: 76s    Breast Cancer Niece        Patient Active Problem List    Diagnosis    Primary osteoarthritis involving multiple joints    Mixed hyperlipidemia    Glucose intolerance (impaired glucose tolerance)    Hypertension with renal disease     PT Education - High Blood Pressure (Essential Hypertension) *: blood pressure  PT Education - How to access health information online: discussed with patient and provided information      Stage 2 chronic kidney disease    Severe obesity (BMI 35.0-39. 9) with comorbidity ([facility] [address]    Osteopenia of multiple sites    Vitamin D deficiency    Exposure to COVID-19 virus    Cough    Non-seasonal allergic rhinitis    Panic attack    Chronic midline low back pain with sciatica    Influenza    Alcohol screening    Headache    Acute post-traumatic headache, not intractable    Contact dermatitis, allergic    Plantar wart    Acute pancreatitis    Right upper quadrant abdominal pain    Acute midline low back pain without sciatica    Medicare annual wellness visit, subsequent    Colon polyps    Primary angle-closure glaucoma     Comments: OU      Neoplasm of uncertain behavior of skin    Elevated LFTs    Grade IV hemorrhoids    Choledocholithiasis       Patient Care Team:  Mague Castillo MD as PCP - General (Internal Medicine)  [facility]ague Cast[facility]    Depression Risk Factor Screening:     3 most recent PHQ Screens 12/14/2021   Little interest or pleasure in doing things Not at all   Feeling down, depressed, irritable, or hopeless Not at all   Total Score PHQ 2 0     Alcohol Risk Factor Screening:   Do you average more than 1 drink per night or more than 7 drinks a week:  No    On any one occasion in the past three months have you have had more than 3 drinks containing alcohol:  No    Functional Ability and Level of Safety:     Fall Risk     Fall Risk Assessment, last 12 mths 12/14/2021   Able to walk? Yes   Fall in past 12 months? 0   Do you feel unsteady? 0   Are you worried about falling 0   Number of falls in past 12 months -   Fall with injury? -       Hearing Loss   mild    Activities of Daily Living   Self-care.    ADL Assessment 12/14/2021   Feeding yourself No Help Needed   Getting from bed to chair No Help Needed   Getting dressed No Help Needed   Bathing or showering No Help Needed   Walk across the room (includes cane/walker) No Help Needed   Using the telphone No Help Needed   Taking your medications No Help Needed   Preparing meals No Help Needed   Managing money (expenses/bills) No Help Needed   Moderately strenuous housework (laundry) No Help Needed   Shopping for personal items (toiletries/medicines) No Help Needed   Shopping for groceries No Help Needed   Driving No Help Needed Climbing a flight of stairs No Help Needed   Getting to places beyond walking distances No Help Needed       Abuse Screen   Patient is not abused    Social History     Social History Narrative    Not on file       Review of Systems      ROS:    Constitutional: She denies fevers, weight loss, sweats. Eyes: No blurred or double vision. ENT: No difficulty with swallowing, taste, speech or smell. NECK: no stiffness swelling or lymph node enlargement  Respiratory: No cough wheezing or shortness of breath. Cardiovascular: Denies chest pain, palpitations, unexplained indigestion or syncope. Breast: She has noted no masses or lumps and no discharge or axillary swelling  Gastrointestinal:  No changes in bowel movements, no abdominal pain, no bloating. Genitourinary: No discharge or abnormal bleeding or pain  Extremities: No joint pain, stiffness or swelling. Persistent back pain with some mild intermittent numbness in the left leg. One epidural cortisone shot did not help. She is not interested in surgery. Neurological:  No numbness, tingling, burring paresthesias or loss of motor strength. No syncope, dizziness or frequent headache  Skin:  No recent rashes or mole changes. Psychiatric/Behavioral:  Negative for depression. The patient is not nervous/anxious.    HEMATOLOGIC: no easy bruising or bleeding gums  Endocrine: no sweats of urinary frequency or excessive thirst    Physical Examination     Evaluation of Cognitive Function:  Mood/affect:  happy  Appearance: age appropriate  Family member/caregiver input: None    Vitals:    12/14/21 1121   BP: 133/82   Pulse: (!) 112   Resp: 18   Temp: 98 °F (36.7 °C)   TempSrc: Oral   SpO2: 98%   Weight: 196 lb 1.6 oz (89 kg)   Height: 5' 3\" (1.6 m)   PainSc:   0 - No pain        PHYSICAL EXAM:    General appearance - alert, well appearing, and in no distress  Mental status - alert, oriented to person, place, and time  HEENT:  Ears - bilateral TM's and external ear canals clear  Eyes - pupillary responses were normal.  Extraocular muscle function intact. Lids and conjunctiva not injected. Fundoscopic exam revealed sharp disc margins. eye movements intact  Pharynx- clear with teeth in good repair. No masses were noted  Neck - supple without thyromegaly or burit. No JVD noted  Lungs - clear to auscultation and percussion  Cardiac- normal rate, regular rhythm without murmurs. PMI not displaced. No gallop, rub or click  Breast: deferred to GYN  Abdomen - flat, soft, non-tender without palpable organomegaly or mass. No pulsatile mass was felt, and not bruit was heard. Bowel sounds were active   Female - deferred to GYN  Rectal - deferred to GYN  Extremities -  no clubbing cyanosis or edema  Lymphatics - no palpable lymphadenopathy, no hepatosplenomegaly  Peripheral vascular - Dorsalis pedis and posterior tibial pulses felt without difficulty  Skin - no rash or unusual mole change noted  Neurological - Cranial nerves II-XII grossly intact. Motor strength 5/5. DTR's 2+ and symmetric.   Station and gait normal  Back exam - full range of motion, no tenderness, palpable spasm or pain on motion  Musculoskeletal - no joint tenderness, deformity or swelling  Hematologic: no purpura, petechiae or bruising    Results for orders placed or performed in visit on 09/14/21   HEMOGLOBIN A1C WITH EAG   Result Value Ref Range    Hemoglobin A1c 5.9 (H) 4.0 - 5.6 %    Est. average glucose 123 mg/dL   CK   Result Value Ref Range    CK 69 26 - 192 U/L   LIPID PANEL   Result Value Ref Range    Cholesterol, total 188 <200 MG/DL    Triglyceride 134 <150 MG/DL    HDL Cholesterol 60 MG/DL    LDL, calculated 101.2 (H) 0 - 100 MG/DL    VLDL, calculated 26.8 MG/DL    CHOL/HDL Ratio 3.1 0.0 - 5.0     METABOLIC PANEL, COMPREHENSIVE   Result Value Ref Range    Sodium 139 136 - 145 mmol/L    Potassium 4.2 3.5 - 5.1 mmol/L    Chloride 105 97 - 108 mmol/L    CO2 28 21 - 32 mmol/L    Anion gap 6 5 - 15 mmol/L    Glucose 94 65 - 100 mg/dL    BUN 18 6 - 20 MG/DL    Creatinine 0.76 0.55 - 1.02 MG/DL    BUN/Creatinine ratio 24 (H) 12 - 20      GFR est AA >60 >60 ml/min/1.73m2    GFR est non-AA >60 >60 ml/min/1.73m2    Calcium 10.0 8.5 - 10.1 MG/DL    Bilirubin, total 0.7 0.2 - 1.0 MG/DL    ALT (SGPT) 34 12 - 78 U/L    AST (SGOT) 24 15 - 37 U/L    Alk. phosphatase 104 45 - 117 U/L    Protein, total 7.4 6.4 - 8.2 g/dL    Albumin 3.7 3.5 - 5.0 g/dL    Globulin 3.7 2.0 - 4.0 g/dL    A-G Ratio 1.0 (L) 1.1 - 2.2         Advice/Referrals/Counseling   Education and counseling provided:  Are appropriate based on today's review and evaluation  End-of-Life planning (with patient's consent)  Pneumococcal Vaccine  Influenza Vaccine  Colorectal cancer screening tests      Assessment/Plan     ASSESSMENT:   1. Hypertension with renal disease    2. Glucose intolerance (impaired glucose tolerance)    3. Mixed hyperlipidemia    4. Primary osteoarthritis involving multiple joints    5. Stage 2 chronic kidney disease    6. Osteopenia of multiple sites    7. Vitamin D deficiency    8. Severe obesity (BMI 35.0-39. 9) with comorbidity ([address]    9. Polyp of colon, unspecified part of colon, unspecified type    10. Elevated LFTs    11. Alcohol screening    12. Chronic midline low back pain with left-sided sciatica    13. Medicare annual wellness visit, subsequent    14. Needs flu shot      Impression  1. Hypertension that is controlled so we will continue current therapy reviewed with her. 2.  Glucose intolerance repeat status pending a prior lab review not make adjustments if necessary. 3.  Hyperlipidemia prior lab reviewed repeat status pending I will adjust if needed. 4. DJD that is stable  5. CKD stage II repeat status pending  6. Osteopenia reviewed prior bone density with her  7. Vitamin D deficiency repeat status is pending  8. Obesity we did discuss diet, exercise and weight reduction for overall health benefit.   9.  History colonic polypectomy she is up-to-date on colonoscopy  10. Prior elevated liver enzymes repeat status is pending  11   Annual alcohol screening is done she has a rare alcoholic beverage on a social occasion which we discussed that is a more than 1/day in females with increased cardiovascular risk and increased risk of liver disease and other GI effects. 5 minutes spent on this discussion today. 12.  Chronic midline low back pain we discussed the fact that she did not seem to get any benefit from one epidural cortisone shot. I think with some discomfort in the left leg she clearly needs to have that further evaluated and if does not respond to epidural cortisone shot and I think surgery is in her future although she is not keen on the idea of surgery. I discussed the absolute importance of that to her. Flu shot given today. Medicare subsequent annual wellness examination screening questionnaires completed today. The results were reviewed with her and her questions were answered. Lifestyle recommendations and modifications discussed and made. I will call the lab results and make further recommendations or adjustments if necessary. Follow-up in 3 months or sooner if there is a problem. PLAN:  .  Orders Placed This Encounter    Influenza Vaccine, QUAD, 65 Yrs +  IM  (Fluad 96717 )    CBC WITH AUTOMATED DIFF    METABOLIC PANEL, COMPREHENSIVE    LIPID PANEL    CK    HEMOGLOBIN A1C WITH EAG    T4 (THYROXINE)    TSH 3RD GENERATION    URINALYSIS W/ REFLEX CULTURE    VITAMIN D, 25 HYDROXY         ATTENTION:   This medical record was transcribed using an electronic medical records system. Although proofread, it may and can contain electronic and spelling errors. Other human spelling and other errors may be present. Corrections may be executed at a later time. Please feel free to contact us for any clarifications as needed.       Follow-up and Dispositions    · Return in about 3 months (around 3/14/2022). Stevie Billy MD    Recommended healthy diet low in carbohydrates, fats, sodium and cholesterol. Recommended regular cardiovascular exercise 3-6 times per week for 30-60 minutes daily. Current Outpatient Medications   Medication Sig Dispense Refill    diazePAM (VALIUM) 5 mg tablet TAKE 3 TABLETS BY MOUTH 30 MINUTES PRIOR TO APPOINTMENT      PHENobarbitaL (LUMINAL) 32.4 mg tablet TAKE 1 TABLET BY MOUTH 30 MINUTES PRIOR TO APPOINTMENT      irbesartan (AVAPRO) 300 mg tablet TAKE 1 TABLET BY MOUTH  EVERY DAY 90 Tab 3    Livalo 2 mg tablet TAKE 1 TABLET BY MOUTH  EVERY NIGHT AT BEDTIME 90 Tab 3    hydroCHLOROthiazide (HYDRODIURIL) 25 mg tablet TAKE 1 TABLET BY MOUTH  EVERY DAY 90 Tab 3    omeprazole (PRILOSEC) 20 mg capsule TAKE 1 CAPSULE BY MOUTH  EVERY DAY 90 Cap 3    ALPRAZolam (XANAX) 0.5 mg tablet Take 1 Tab by mouth three (3) times daily as needed for Anxiety. Max Daily Amount: 1.5 mg. 50 Tab 0    vit C/E/Zn/coppr/lutein/zeaxan (PRESERVISION AREDS 2 PO) Take  by mouth. Indications: 2 pills dailly      biotin 10,000 mcg cap Take  by mouth daily (with dinner).  Cholecalciferol, Vitamin D3, 3,000 unit tab Take 1,000 Units by mouth every morning.  cyanocobalamin (VITAMIN B12) 500 mcg tablet Take 500 mcg by mouth every morning.  polyethylene glycol (MIRALAX) 17 gram/dose powder Take 17 g by mouth every morning. Indications: CONSTIPATION         No results found for any visits on 12/14/21. Verbal and written instructions (see AVS) provided. Patient expresses understanding of diagnosis and treatment plan.     Stevie Billy MD

## 2021-12-14 NOTE — PATIENT INSTRUCTIONS
Medicare Wellness Visit, Female     The best way to live healthy is to have a lifestyle where you eat a well-balanced diet, exercise regularly, limit alcohol use, and quit all forms of tobacco/nicotine, if applicable. Regular preventive services are another way to keep healthy. Preventive services (vaccines, screening tests, monitoring & exams) can help personalize your care plan, which helps you manage your own care. Screening tests can find health problems at the earliest stages, when they are easiest to treat. [facility] follows the current, evidence-based guidelines published by the [facility] ([facility]) when recommending preventive services for our patients. Because we follow these guidelines, sometimes recommendations change over time as research supports it. (For example, mammograms used to be recommended annually. Even though Medicare will still pay for an annual mammogram, the newer guidelines recommend a mammogram every two years for women of average risk). Of course, you and your doctor may decide to screen more often for some diseases, based on your risk and your co-morbidities (chronic disease you are already diagnosed with). Preventive services for you include:  - Medicare offers their members a free annual wellness visit, which is time for you and your primary care provider to discuss and plan for your preventive service needs. Take advantage of this benefit every year!  -All adults over the age of 72 should receive the recommended pneumonia vaccines. Current USPSTF guidelines recommend a series of two vaccines for the best pneumonia protection.   -All adults should have a flu vaccine yearly and a tetanus vaccine every 10 years.   -All adults age 48 and older should receive the shingles vaccines (series of two vaccines).       -All adults age 38-68 who are overweight should have a diabetes screening test once every three years.   -All adults born between [birth year] and 1963 should be screened once for Hepatitis C.  -Other screening tests and preventive services for persons with diabetes include: an eye exam to screen for diabetic retinopathy, a kidney function test, a foot exam, and stricter control over your cholesterol.   -Cardiovascular screening for adults with routine risk involves an electrocardiogram (ECG) at intervals determined by your doctor.   -Colorectal cancer screenings should be done for adults age 54-65 with no increased risk factors for colorectal cancer. There are a number of acceptable methods of screening for this type of cancer. Each test has its own benefits and drawbacks. Discuss with your doctor what is most appropriate for you during your annual wellness visit. The different tests include: colonoscopy (considered the best screening method), a fecal occult blood test, a fecal DNA test, and sigmoidoscopy.    -A bone mass density test is recommended when a woman turns 65 to screen for osteoporosis. This test is only recommended one time, as a screening. Some providers will use this same test as a disease monitoring tool if you already have osteoporosis. -Breast cancer screenings are recommended every other year for women of normal risk, age 54-69.  -Cervical cancer screenings for women over age 72 are only recommended with certain risk factors.      Here is a list of your current Health Maintenance items (your personalized list of preventive services) with a due date:  Health Maintenance Due   Topic Date Due    Pneumococcal Vaccine (2 of 2 - PPSV23) 07/06/2016    Colorectal Screening  07/11/2019    Shingles Vaccine (2 of 2) 10/21/2019    Yearly Flu Vaccine (1) 09/01/2021    COVID-19 Vaccine (3 - Booster for Suarez Peter series) 09/30/2021    Annual Well Visit  12/11/2021

## 2021-12-14 NOTE — PROGRESS NOTES
After obtaining consent and per verbal order from Dr. Calvillo, patient received influenza vaccine given by Harman Ye and verified by Tommy Kuhn. Fluad Influenza 0.5ml was given IM in right deltoid. Patient tolerated injection and was observed for 10 minutes post injection. VIS was given.

## 2021-12-15 LAB
25(OH)D3 SERPL-MCNC: 45.4 NG/ML (ref 30–100)
ALBUMIN SERPL-MCNC: 4.4 G/DL (ref 3.5–5)
ALBUMIN/GLOB SERPL: 1.4 {RATIO} (ref 1.1–2.2)
ALP SERPL-CCNC: 115 U/L (ref 45–117)
ALT SERPL-CCNC: 38 U/L (ref 12–78)
ANION GAP SERPL CALC-SCNC: 8 MMOL/L (ref 5–15)
APPEARANCE UR: CLEAR
AST SERPL-CCNC: 23 U/L (ref 15–37)
BACTERIA URNS QL MICRO: NEGATIVE /HPF
BASOPHILS # BLD: 0.1 K/UL (ref 0–0.1)
BASOPHILS NFR BLD: 1 % (ref 0–1)
BILIRUB SERPL-MCNC: 0.9 MG/DL (ref 0.2–1)
BILIRUB UR QL: NEGATIVE
BUN SERPL-MCNC: 22 MG/DL (ref 6–20)
BUN/CREAT SERPL: 27 (ref 12–20)
CALCIUM SERPL-MCNC: 10.2 MG/DL (ref 8.5–10.1)
CHLORIDE SERPL-SCNC: 102 MMOL/L (ref 97–108)
CHOLEST SERPL-MCNC: 126 MG/DL
CK SERPL-CCNC: 84 U/L (ref 26–192)
CO2 SERPL-SCNC: 28 MMOL/L (ref 21–32)
COLOR UR: ABNORMAL
CREAT SERPL-MCNC: 0.83 MG/DL (ref 0.55–1.02)
DIFFERENTIAL METHOD BLD: NORMAL
EOSINOPHIL # BLD: 0.1 K/UL (ref 0–0.4)
EOSINOPHIL NFR BLD: 2 % (ref 0–7)
EPITH CASTS URNS QL MICRO: ABNORMAL /LPF
ERYTHROCYTE [DISTWIDTH] IN BLOOD BY AUTOMATED COUNT: 13.8 % (ref 11.5–14.5)
EST. AVERAGE GLUCOSE BLD GHB EST-MCNC: 111 MG/DL
GLOBULIN SER CALC-MCNC: 3.1 G/DL (ref 2–4)
GLUCOSE SERPL-MCNC: 107 MG/DL (ref 65–100)
GLUCOSE UR STRIP.AUTO-MCNC: NEGATIVE MG/DL
HBA1C MFR BLD: 5.5 % (ref 4–5.6)
HCT VFR BLD AUTO: 44.5 % (ref 35–47)
HDLC SERPL-MCNC: 51 MG/DL
HDLC SERPL: 2.5 {RATIO} (ref 0–5)
HGB BLD-MCNC: 14.1 G/DL (ref 11.5–16)
HGB UR QL STRIP: NEGATIVE
IMM GRANULOCYTES # BLD AUTO: 0 K/UL (ref 0–0.04)
IMM GRANULOCYTES NFR BLD AUTO: 0 % (ref 0–0.5)
KETONES UR QL STRIP.AUTO: NEGATIVE MG/DL
LDLC SERPL CALC-MCNC: 59.4 MG/DL (ref 0–100)
LEUKOCYTE ESTERASE UR QL STRIP.AUTO: ABNORMAL
LYMPHOCYTES # BLD: 3.1 K/UL (ref 0.8–3.5)
LYMPHOCYTES NFR BLD: 45 % (ref 12–49)
MCH RBC QN AUTO: 28.2 PG (ref 26–34)
MCHC RBC AUTO-ENTMCNC: 31.7 G/DL (ref 30–36.5)
MCV RBC AUTO: 89 FL (ref 80–99)
MONOCYTES # BLD: 0.5 K/UL (ref 0–1)
MONOCYTES NFR BLD: 7 % (ref 5–13)
NEUTS SEG # BLD: 3 K/UL (ref 1.8–8)
NEUTS SEG NFR BLD: 45 % (ref 32–75)
NITRITE UR QL STRIP.AUTO: NEGATIVE
NRBC # BLD: 0 K/UL (ref 0–0.01)
NRBC BLD-RTO: 0 PER 100 WBC
PH UR STRIP: 7.5 [PH] (ref 5–8)
PLATELET # BLD AUTO: 283 K/UL (ref 150–400)
PMV BLD AUTO: 12.5 FL (ref 8.9–12.9)
POTASSIUM SERPL-SCNC: 4.8 MMOL/L (ref 3.5–5.1)
PROT SERPL-MCNC: 7.5 G/DL (ref 6.4–8.2)
PROT UR STRIP-MCNC: NEGATIVE MG/DL
RBC # BLD AUTO: 5 M/UL (ref 3.8–5.2)
RBC #/AREA URNS HPF: ABNORMAL /HPF (ref 0–5)
SODIUM SERPL-SCNC: 138 MMOL/L (ref 136–145)
SP GR UR REFRACTOMETRY: 1.01 (ref 1–1.03)
T4 SERPL-MCNC: 10.1 UG/DL (ref 4.8–13.9)
TRIGL SERPL-MCNC: 78 MG/DL (ref ?–150)
TSH SERPL DL<=0.05 MIU/L-ACNC: 1.91 UIU/ML (ref 0.36–3.74)
UA: UC IF INDICATED,UAUC: ABNORMAL
UROBILINOGEN UR QL STRIP.AUTO: 0.2 EU/DL (ref 0.2–1)
VLDLC SERPL CALC-MCNC: 15.6 MG/DL
WBC # BLD AUTO: 6.7 K/UL (ref 3.6–11)
WBC URNS QL MICRO: ABNORMAL /HPF (ref 0–4)

## 2021-12-21 RX ORDER — LEVOFLOXACIN 250 MG/1
250 TABLET ORAL DAILY
Qty: 7 TABLET | Refills: 0 | Status: SHIPPED | OUTPATIENT
Start: 2021-12-21 | End: 2021-12-28

## 2021-12-21 NOTE — TELEPHONE ENCOUNTER
RX refill request from the patient/pharmacy. Patient last seen 12- with labs, and next appt. scheduled for 03-  Requested Prescriptions     Pending Prescriptions Disp Refills    levoFLOXacin (LEVAQUIN) 250 mg tablet 7 Tablet 0     Sig: Take 1 Tablet by mouth daily for 7 days. Shira Paez

## 2021-12-21 NOTE — PROGRESS NOTES
Has UTI so Levaquin 250 every day for 7 days, Other labs OK. Discussed lab with patient. Rx sent to patient's pharmacy. Need to get a f/up ua and culture after treatment.

## 2022-01-01 DIAGNOSIS — I10 ESSENTIAL HYPERTENSION: ICD-10-CM

## 2022-01-01 DIAGNOSIS — K21.9 GASTROESOPHAGEAL REFLUX DISEASE WITHOUT ESOPHAGITIS: ICD-10-CM

## 2022-01-01 DIAGNOSIS — I12.9 HYPERTENSION WITH RENAL DISEASE: ICD-10-CM

## 2022-01-03 RX ORDER — IRBESARTAN 300 MG/1
TABLET ORAL
Qty: 90 TABLET | Refills: 3 | Status: SHIPPED | OUTPATIENT
Start: 2022-01-03

## 2022-01-03 RX ORDER — OMEPRAZOLE 20 MG/1
CAPSULE, DELAYED RELEASE ORAL
Qty: 90 CAPSULE | Refills: 3 | Status: SHIPPED | OUTPATIENT
Start: 2022-01-03

## 2022-01-03 RX ORDER — PITAVASTATIN CALCIUM 2.09 MG/1
TABLET, FILM COATED ORAL
Qty: 90 TABLET | Refills: 3 | Status: SHIPPED | OUTPATIENT
Start: 2022-01-03

## 2022-01-03 RX ORDER — HYDROCHLOROTHIAZIDE 25 MG/1
TABLET ORAL
Qty: 90 TABLET | Refills: 3 | Status: SHIPPED | OUTPATIENT
Start: 2022-01-03

## 2022-01-03 NOTE — TELEPHONE ENCOUNTER
RX refill request from the patient/pharmacy. Patient last seen 12- with labs, and next appt. scheduled for 03-  Requested Prescriptions     Pending Prescriptions Disp Refills    irbesartan (AVAPRO) 300 mg tablet [Pharmacy Med Name: Irbesartan 300 MG Oral Tablet] 90 Tablet 3     Sig: TAKE 1 TABLET BY MOUTH  DAILY    omeprazole (PRILOSEC) 20 mg capsule [Pharmacy Med Name: Omeprazole 20 MG Oral Capsule Delayed Release] 90 Capsule 3     Sig: TAKE 1 CAPSULE BY MOUTH  DAILY    hydroCHLOROthiazide (HYDRODIURIL) 25 mg tablet [Pharmacy Med Name: hydroCHLOROthiazide 25 MG Oral Tablet] 90 Tablet 3     Sig: TAKE 1 TABLET BY MOUTH  DAILY    Livalo 2 mg tablet [Pharmacy Med Name: LIVALO  2MG  TAB] 90 Tablet 3     Sig: TAKE 1 TABLET BY MOUTH  EVERY NIGHT AT BEDTIME   .

## 2022-01-12 PROBLEM — Z00.00 MEDICARE ANNUAL WELLNESS VISIT, SUBSEQUENT: Status: RESOLVED | Noted: 2017-10-23 | Resolved: 2022-01-12

## 2022-01-27 ENCOUNTER — TRANSCRIBE ORDER (OUTPATIENT)
Dept: SCHEDULING | Age: 77
End: 2022-01-27

## 2022-01-27 DIAGNOSIS — Z12.31 SCREENING MAMMOGRAM FOR HIGH-RISK PATIENT: Primary | ICD-10-CM

## 2022-02-24 ENCOUNTER — HOSPITAL ENCOUNTER (OUTPATIENT)
Dept: MAMMOGRAPHY | Age: 77
Discharge: HOME | End: 2022-02-24
Attending: INTERNAL MEDICINE
Payer: MEDICARE

## 2022-02-24 DIAGNOSIS — Z12.31 SCREENING MAMMOGRAM FOR HIGH-RISK PATIENT: ICD-10-CM

## 2022-02-24 PROCEDURE — 77063 BREAST TOMOSYNTHESIS BI: CPT

## 2022-03-16 PROBLEM — Z20.822 EXPOSURE TO COVID-19 VIRUS: Status: RESOLVED | Noted: 2020-12-10 | Resolved: 2022-03-16

## 2022-03-17 ENCOUNTER — OFFICE VISIT (OUTPATIENT)
Dept: INTERNAL MEDICINE CLINIC | Age: 77
End: 2022-03-17
Payer: MEDICARE

## 2022-03-17 VITALS
HEART RATE: 89 BPM | DIASTOLIC BLOOD PRESSURE: 80 MMHG | RESPIRATION RATE: 18 BRPM | SYSTOLIC BLOOD PRESSURE: 124 MMHG | BODY MASS INDEX: 31.34 KG/M2 | WEIGHT: 176.9 LBS | TEMPERATURE: 97.6 F | OXYGEN SATURATION: 98 % | HEIGHT: 63 IN

## 2022-03-17 DIAGNOSIS — R73.02 GLUCOSE INTOLERANCE (IMPAIRED GLUCOSE TOLERANCE): ICD-10-CM

## 2022-03-17 DIAGNOSIS — E78.2 MIXED HYPERLIPIDEMIA: ICD-10-CM

## 2022-03-17 DIAGNOSIS — N18.2 STAGE 2 CHRONIC KIDNEY DISEASE: ICD-10-CM

## 2022-03-17 DIAGNOSIS — E66.01 SEVERE OBESITY (BMI 35.0-39.9) WITH COMORBIDITY (HCC): ICD-10-CM

## 2022-03-17 DIAGNOSIS — M15.9 PRIMARY OSTEOARTHRITIS INVOLVING MULTIPLE JOINTS: ICD-10-CM

## 2022-03-17 DIAGNOSIS — I12.9 HYPERTENSION WITH RENAL DISEASE: Primary | ICD-10-CM

## 2022-03-17 LAB
ALBUMIN SERPL-MCNC: 4 G/DL (ref 3.5–5)
ALBUMIN/GLOB SERPL: 1.3 {RATIO} (ref 1.1–2.2)
ALP SERPL-CCNC: 106 U/L (ref 45–117)
ALT SERPL-CCNC: 35 U/L (ref 12–78)
ANION GAP SERPL CALC-SCNC: 2 MMOL/L (ref 5–15)
AST SERPL-CCNC: 19 U/L (ref 15–37)
BILIRUB SERPL-MCNC: 0.8 MG/DL (ref 0.2–1)
BUN SERPL-MCNC: 19 MG/DL (ref 6–20)
BUN/CREAT SERPL: 26 (ref 12–20)
CALCIUM SERPL-MCNC: 10 MG/DL (ref 8.5–10.1)
CHLORIDE SERPL-SCNC: 104 MMOL/L (ref 97–108)
CHOLEST SERPL-MCNC: 155 MG/DL
CK SERPL-CCNC: 64 U/L (ref 26–192)
CO2 SERPL-SCNC: 31 MMOL/L (ref 21–32)
CREAT SERPL-MCNC: 0.73 MG/DL (ref 0.55–1.02)
EST. AVERAGE GLUCOSE BLD GHB EST-MCNC: 120 MG/DL
GLOBULIN SER CALC-MCNC: 3.1 G/DL (ref 2–4)
GLUCOSE SERPL-MCNC: 103 MG/DL (ref 65–100)
HBA1C MFR BLD: 5.8 % (ref 4–5.6)
HDLC SERPL-MCNC: 50 MG/DL
HDLC SERPL: 3.1 {RATIO} (ref 0–5)
LDLC SERPL CALC-MCNC: 88.2 MG/DL (ref 0–100)
POTASSIUM SERPL-SCNC: 4 MMOL/L (ref 3.5–5.1)
PROT SERPL-MCNC: 7.1 G/DL (ref 6.4–8.2)
SODIUM SERPL-SCNC: 137 MMOL/L (ref 136–145)
TRIGL SERPL-MCNC: 84 MG/DL (ref ?–150)
VLDLC SERPL CALC-MCNC: 16.8 MG/DL

## 2022-03-17 PROCEDURE — 1101F PT FALLS ASSESS-DOCD LE1/YR: CPT | Performed by: INTERNAL MEDICINE

## 2022-03-17 PROCEDURE — 99214 OFFICE O/P EST MOD 30 MIN: CPT | Performed by: INTERNAL MEDICINE

## 2022-03-17 PROCEDURE — G8427 DOCREV CUR MEDS BY ELIG CLIN: HCPCS | Performed by: INTERNAL MEDICINE

## 2022-03-17 PROCEDURE — G8399 PT W/DXA RESULTS DOCUMENT: HCPCS | Performed by: INTERNAL MEDICINE

## 2022-03-17 PROCEDURE — G8752 SYS BP LESS 140: HCPCS | Performed by: INTERNAL MEDICINE

## 2022-03-17 PROCEDURE — G8417 CALC BMI ABV UP PARAM F/U: HCPCS | Performed by: INTERNAL MEDICINE

## 2022-03-17 PROCEDURE — G8510 SCR DEP NEG, NO PLAN REQD: HCPCS | Performed by: INTERNAL MEDICINE

## 2022-03-17 PROCEDURE — G8536 NO DOC ELDER MAL SCRN: HCPCS | Performed by: INTERNAL MEDICINE

## 2022-03-17 PROCEDURE — G9899 SCRN MAM PERF RSLTS DOC: HCPCS | Performed by: INTERNAL MEDICINE

## 2022-03-17 PROCEDURE — 1090F PRES/ABSN URINE INCON ASSESS: CPT | Performed by: INTERNAL MEDICINE

## 2022-03-17 PROCEDURE — 3017F COLORECTAL CA SCREEN DOC REV: CPT | Performed by: INTERNAL MEDICINE

## 2022-03-17 PROCEDURE — G8754 DIAS BP LESS 90: HCPCS | Performed by: INTERNAL MEDICINE

## 2022-03-17 NOTE — PATIENT INSTRUCTIONS
Allergies: Care Instructions  Overview     Allergies occur when your body's defense system (immune system) overreacts to certain substances. The immune system treats a harmless substance as if it were a harmful germ or virus. Many things can make this happen. These include pollens, medicine, food, dust, animal dander, and mold. Allergies can be mild or severe. Mild allergies can be managed with home treatment. But medicine may be needed to prevent problems. Managing your allergies is an important part of staying healthy. Your doctor may suggest that you have allergy testing to help find out what is causing your allergies. Severe allergies can cause reactions that affect your whole body (anaphylactic reactions). Your doctor may prescribe a shot of epinephrine to carry with you in case you have a severe reaction. Learn how to give yourself the shot and keep it with you at all times. Make sure it is not . Follow-up care is a key part of your treatment and safety. Be sure to make and go to all appointments, and call your doctor if you are having problems. It's also a good idea to know your test results and keep a list of the medicines you take. How can you care for yourself at home? · If you have been told by your doctor that dust or dust mites are causing your allergy, decrease the dust around your bed:  ? Wash sheets, pillowcases, and other bedding in hot water every week. ? Use dust-proof covers for pillows, duvets, and mattresses. Avoid plastic covers because they tear easily and do not \"breathe. \" Wash as instructed on the label. ? Do not use any blankets and pillows that you do not need. ? Use blankets that you can wash in your washing machine. ? Consider removing drapes and carpets, which attract and hold dust, from your bedroom. · If you are allergic to house dust and mites, do not use home humidifiers. Your doctor can suggest ways you can control dust and mites.   · Look for signs of cockroaches. Cockroaches cause allergic reactions. Use cockroach baits to get rid of them. Then, clean your home well. Cockroaches like areas where grocery bags, newspapers, empty bottles, or cardboard boxes are stored. Do not keep these inside your home, and keep trash and food containers sealed. Seal off any spots where cockroaches might enter your home. · If you are allergic to mold, get rid of furniture, rugs, and drapes that smell musty. Check for mold in the bathroom. · If you are allergic to outdoor pollen or mold spores, use air-conditioning. Change or clean all filters every month. Keep windows closed. · If you are allergic to pollen, stay inside when pollen counts are high. Use a vacuum  with a HEPA filter or a double-thickness filter at least two times each week. · Stay inside when air pollution is bad. Avoid paint fumes, perfumes, and other strong odors. · Avoid conditions that make your allergies worse. Stay away from smoke. Do not smoke or let anyone else smoke in your house. Do not use fireplaces or wood-burning stoves. · If you are allergic to your pets, change the air filter in your furnace every month. Use high-efficiency filters. · If you are allergic to pet dander, keep pets outside or out of your bedroom. Old carpet and cloth furniture can hold a lot of animal dander. You may need to replace them. When should you call for help? Give an epinephrine shot if:    · You think you are having a severe allergic reaction.     · You have symptoms in more than one body area, such as mild nausea and an itchy mouth. After giving an epinephrine shot call 911, even if you feel better. Call 911 if:    · You have symptoms of a severe allergic reaction. These may include:  ? Sudden raised, red areas (hives) all over your body. ? Swelling of the throat, mouth, lips, or tongue. ? Trouble breathing. ? Passing out (losing consciousness).  Or you may feel very lightheaded or suddenly feel weak, confused, or restless.     · You have been given an epinephrine shot, even if you feel better. Call your doctor now or seek immediate medical care if:    · You have symptoms of an allergic reaction, such as:  ? A rash or hives (raised, red areas on the skin). ? Itching. ? Swelling. ? Belly pain, nausea, or vomiting. Watch closely for changes in your health, and be sure to contact your doctor if:    · You do not get better as expected. Where can you learn more? Go to http://www.floyd.com/  Enter W171 in the search box to learn more about \"Allergies: Care Instructions. \"  Current as of: February 10, 2021               Content Version: 13.2  © 2006-2022 [employer]. Care instructions adapted under license by [employer] (which disclaims liability or warranty for this information). If you have questions about a medical condition or this instruction, always ask your healthcare professional. [place] [address] any warranty or liability for your use of this information.

## 2022-03-17 NOTE — PROGRESS NOTES
HIPAA verified by two patient identifiers. Catrachito Page is a 78 y.o. female    Chief Complaint   Patient presents with    Hypertension     3 month    Blood sugar problem    Osteoarthritis    Chronic Kidney Disease    Cholesterol Problem       Visit Vitals  /80 (BP 1 Location: Left upper arm, BP Patient Position: Sitting, BP Cuff Size: Large adult)   Pulse 89   Temp 97.6 °F (36.4 °C) (Oral)   Resp 18   Ht 5' 3\" (1.6 m)   Wt 176 lb 14.4 oz (80.2 kg)   SpO2 98%   BMI 31.34 kg/m²       Pain Scale: 0 - No pain/10  Pain Location:       Health Maintenance Due   Topic Date Due    Pneumococcal 65+ years (2 of 2 - PPSV23) 07/06/2016    Colorectal Cancer Screening Combo  07/11/2019    Shingrix Vaccine Age 50> (2 of 2) 10/21/2019    COVID-19 Vaccine (3 - Booster for Pfizer series) 08/31/2021         Coordination of Care Questionnaire:  :   1) Have you been to an emergency room, urgent care, or hospitalized since your last visit? If yes, where when, and reason for visit? no       2. Have seen or consulted any other health care provider since your last visit? If yes, where when, and reason for visit? NO      Patient is accompanied by self I have received verbal consent from Catrachito Page to discuss any/all medical information while they are present in the room.

## 2022-03-17 NOTE — PROGRESS NOTES
Chief Complaint   Patient presents with    Hypertension     3 month    Blood sugar problem    Osteoarthritis    Chronic Kidney Disease    Cholesterol Problem       SUBJECTIVE:    Michael Gimenez is a 78 y.o. female in follow-up for medical problems include hypertension, hyperlipidemia, glucose intolerance, CKD stage II, DJD, chronic back pain and other mild medical problems. She does think she is going need to have another epidural cortisone in her back where she is already had 1. She denies any chest pain, shortness of breath, palpitations, PND, orthopnea or other cardiac or respiratory complaints. There are no current GI or  complaints. There are no headaches, dizziness or neurologic complaints. There are no current active arthritic complaints and and no other complaints on complete review of systems. Current Outpatient Medications   Medication Sig Dispense Refill    irbesartan (AVAPRO) 300 mg tablet TAKE 1 TABLET BY MOUTH  DAILY 90 Tablet 3    omeprazole (PRILOSEC) 20 mg capsule TAKE 1 CAPSULE BY MOUTH  DAILY 90 Capsule 3    hydroCHLOROthiazide (HYDRODIURIL) 25 mg tablet TAKE 1 TABLET BY MOUTH  DAILY 90 Tablet 3    Livalo 2 mg tablet TAKE 1 TABLET BY MOUTH  EVERY NIGHT AT BEDTIME 90 Tablet 3    diazePAM (VALIUM) 5 mg tablet TAKE 3 TABLETS BY MOUTH 30 MINUTES PRIOR TO APPOINTMENT      ALPRAZolam (XANAX) 0.5 mg tablet Take 1 Tab by mouth three (3) times daily as needed for Anxiety. Max Daily Amount: 1.5 mg. 50 Tab 0    vit C/E/Zn/coppr/lutein/zeaxan (PRESERVISION AREDS 2 PO) Take  by mouth. Indications: 2 pills dailly      biotin 10,000 mcg cap Take  by mouth daily (with dinner).  Cholecalciferol, Vitamin D3, 3,000 unit tab Take 1,000 Units by mouth every morning.  cyanocobalamin (VITAMIN B12) 500 mcg tablet Take 500 mcg by mouth every morning.  polyethylene glycol (MIRALAX) 17 gram/dose powder Take 17 g by mouth every morning.  Indications: CONSTIPATION      PHENobarbitaL (LUMINAL) 32.4 mg tablet TAKE 1 TABLET BY MOUTH 30 MINUTES PRIOR TO APPOINTMENT       Past Medical History:   Diagnosis Date    Anxiety     Arthritis     Chronic constipation     CKD (chronic kidney disease) 7/17/2017    Colon polyps 7/17/2017    DJD (degenerative joint disease) 7/17/2017    Elevated LFTs 7/17/2017    Flu 02/19/2019    GERD (gastroesophageal reflux disease)     Glucose intolerance (impaired glucose tolerance) 7/17/2017    Hemorrhoids     internal & external- bleeds frequently    High cholesterol     Hyperlipidemia 7/17/2017    Hypertension     Hypertension with renal disease 7/17/2017    PT Education - High Blood Pressure (Essential Hypertension) *: blood pressure PT Education - How to access health information online: discussed with patient and provided information    Hypertension, renal 7/17/2017    Ill-defined condition     ringing in ears    Menopause     Mild obesity 7/17/2017    Nausea & vomiting     Neoplasm of uncertain behavior of skin 7/17/2017    On statin therapy 7/17/2017    Osteopenia 7/17/2017    Primary angle-closure glaucoma 7/17/2017    Comments: OU    Skin cancer of face     as of 6/15/16 pt states \"removed years ago\"    Spinal stenosis      Past Surgical History:   Procedure Laterality Date    HX BREAST BIOPSY Right 02/26/2021    HX CATARACT REMOVAL Bilateral 2020    HX CHOLECYSTECTOMY  11/6/2014    Dr Leyla Pruitt    HX HEENT      laser for glaucoma    HX HYSTERECTOMY      partial    HX OOPHORECTOMY Bilateral     HX PELVIC LAPAROSCOPY      Jono. oophorectomy    HX TUBAL LIGATION      IR INJ FORAMIN EPID LUMB ANES/STER SNGL  9/28/2021    VT COLSC FLX W/REMOVAL LESION BY HOT BX FORCEPS  11/12/2012         VT ERCP REMOVE CALCULI/DEBRIS BILIARY/PANCREAS DUCT  11/7/2014         VT ERCP W/SPHINCTEROTOMY/PAPILLOTOMY  11/7/2014          Allergies   Allergen Reactions    Iodinated Contrast Media Hives    Oxycodone-Aspirin Unknown (comments)     Patient states this in not a true allergy       REVIEW OF SYSTEMS:  General: negative for - chills or fever, or weight loss or gain  ENT: negative for - headaches, nasal congestion or tinnitus  Eyes: no blurred or visual changes  Neck: No stiffness or swollen nodes  Respiratory: negative for - cough, hemoptysis, shortness of breath or wheezing  Cardiovascular : negative for - chest pain, edema, palpitations or shortness of breath  Gastrointestinal: negative for - abdominal pain, blood in stools, heartburn or nausea/vomiting  Genito-Urinary: no dysuria, trouble voiding, or hematuria  Musculoskeletal: negative for - gait disturbance, joint pain, joint stiffness or joint swelling  Neurological: no TIA or stroke symptoms  Hematologic: no bruises, no bleeding  Lymphatic: no swollen glands  Integument: no lumps, mole changes, nail changes or rash  Endocrine:no malaise/lethargy poly uria or polydipsia or unexpected weight changes        Social History     Socioeconomic History    Marital status:    Tobacco Use    Smoking status: Former Smoker     Packs/day: 1.50     Years: 35.00     Pack years: 52.50     Quit date: 2004     Years since quittin.3    Smokeless tobacco: Never Used    Tobacco comment: quit smoking 6 yrs ago   Vaping Use    Vaping Use: Never used   Substance and Sexual Activity    Alcohol use: No    Drug use: No    Sexual activity: Never     Family History   Problem Relation Age of Onset    Heart Disease Mother     Hypertension Mother     Cancer Mother         skin    Stroke Father     Breast Cancer Sister         onset: 76s    Breast Cancer Niece        OBJECTIVE:     Visit Vitals  /80 (BP 1 Location: Left upper arm, BP Patient Position: Sitting, BP Cuff Size: Large adult)   Pulse 89   Temp 97.6 °F (36.4 °C) (Oral)   Resp 18   Ht 5' 3\" (1.6 m)   Wt 176 lb 14.4 oz (80.2 kg)   SpO2 98%   BMI 31.34 kg/m²     CONSTITUTIONAL:   well nourished, appears age appropriate  EYES: sclera anicteric, PERRL, EOMI  ENMT:nares clear, moist mucous membranes, pharynx clear  NECK: supple. Thyroid normal, No JVD or bruits  RESPIRATORY: Chest: clear to ascultation and percussion, normal inspiratory effort  CARDIOVASCULAR: Heart: regular rate and rhythm no murmurs, rubs or gallops, PMI not displaced, No thrills, no peripheral edema  GASTROINTESTINAL: Abdomen: non distended, soft, non tender, bowel sounds normal  HEMATOLOGIC: no purpura, petechiae or bruising  LYMPHATIC: No lymph node enlargemant  MUSCULOSKELETAL: Extremities: no active synovitis, pulse 1+   INTEGUMENT: No unusual rashes or suspicious skin lesions noted. Nails appear normal.  PERIPHERAL VASCULAR: normal pulses femoral, PT and DP  NEUROLOGIC: non-focal exam, A & O X 3  PSYCHIATRIC:, appropriate affect     ASSESSMENT:   1. Hypertension with renal disease    2. Glucose intolerance (impaired glucose tolerance)    3. Mixed hyperlipidemia    4. Primary osteoarthritis involving multiple joints    5. Stage 2 chronic kidney disease    6. Severe obesity (BMI 35.0-39. 9) with comorbidity ([address]      Impression  1. Hypertension that is controlled so continue current therapy reviewed with her. 2.  Glucose intolerance repeat status pending a prior lab reviewed and I will adjust if needed. 3   Hyperlipidemia prior lab reviewed and I will adjust if needed. 4. DJD that is stable  5. CKD stage II repeat status pending   6. Obesity I did discuss diet, exercise and weight reduction for overall health benefit. Follow-up 3 months or sooner if there is a problem. I will call with today's lab results. PLAN:  .  Orders Placed This Encounter    METABOLIC PANEL, COMPREHENSIVE    LIPID PANEL    CK    HEMOGLOBIN A1C WITH EAG         ATTENTION:   This medical record was transcribed using an electronic medical records system. Although proofread, it may and can contain electronic and spelling errors.   Other human spelling and other errors may be present. Corrections may be executed at a later time. Please feel free to contact us for any clarifications as needed. Follow-up and Dispositions    · Return in about 3 months (around 6/17/2022). No results found for any visits on 03/17/22. Boston Amador MD    The patient verbalized understanding of the problems and plans as explained.

## 2022-03-18 PROBLEM — R51.9 HEADACHE: Status: ACTIVE | Noted: 2019-03-05

## 2022-03-18 PROBLEM — E55.9 VITAMIN D DEFICIENCY: Status: ACTIVE | Noted: 2017-07-17

## 2022-03-18 PROBLEM — H40.20X0 PRIMARY ANGLE-CLOSURE GLAUCOMA: Status: ACTIVE | Noted: 2017-07-17

## 2022-03-18 PROBLEM — M85.89 OSTEOPENIA OF MULTIPLE SITES: Status: ACTIVE | Noted: 2017-07-17

## 2022-03-19 PROBLEM — R79.89 ELEVATED LFTS: Status: ACTIVE | Noted: 2017-07-17

## 2022-03-19 PROBLEM — E66.01 SEVERE OBESITY (BMI 35.0-39.9) WITH COMORBIDITY (HCC): Status: ACTIVE | Noted: 2018-05-10

## 2022-03-19 PROBLEM — G89.29 CHRONIC MIDLINE LOW BACK PAIN WITH SCIATICA: Status: ACTIVE | Noted: 2020-01-21

## 2022-03-19 PROBLEM — D48.5 NEOPLASM OF UNCERTAIN BEHAVIOR OF SKIN: Status: ACTIVE | Noted: 2017-07-17

## 2022-03-19 PROBLEM — R05.9 COUGH: Status: ACTIVE | Noted: 2020-06-04

## 2022-03-19 PROBLEM — R10.11 RIGHT UPPER QUADRANT ABDOMINAL PAIN: Status: ACTIVE | Noted: 2018-01-16

## 2022-03-19 PROBLEM — N18.2 STAGE 2 CHRONIC KIDNEY DISEASE: Status: ACTIVE | Noted: 2017-07-17

## 2022-03-19 PROBLEM — B07.0 PLANTAR WART: Status: ACTIVE | Noted: 2018-02-05

## 2022-03-19 PROBLEM — M15.0 PRIMARY OSTEOARTHRITIS INVOLVING MULTIPLE JOINTS: Status: ACTIVE | Noted: 2017-07-17

## 2022-03-19 PROBLEM — F41.0 PANIC ATTACK: Status: ACTIVE | Noted: 2020-02-07

## 2022-03-19 PROBLEM — K63.5 COLON POLYPS: Status: ACTIVE | Noted: 2017-07-17

## 2022-03-19 PROBLEM — L23.9 CONTACT DERMATITIS, ALLERGIC: Status: ACTIVE | Noted: 2018-07-18

## 2022-03-19 PROBLEM — Z13.39 ALCOHOL SCREENING: Status: ACTIVE | Noted: 2019-11-21

## 2022-03-19 PROBLEM — M54.40 CHRONIC MIDLINE LOW BACK PAIN WITH SCIATICA: Status: ACTIVE | Noted: 2020-01-21

## 2022-03-19 PROBLEM — J30.89 NON-SEASONAL ALLERGIC RHINITIS: Status: ACTIVE | Noted: 2020-06-04

## 2022-03-19 PROBLEM — I12.9 HYPERTENSION WITH RENAL DISEASE: Status: ACTIVE | Noted: 2017-07-17

## 2022-03-19 PROBLEM — M15.9 PRIMARY OSTEOARTHRITIS INVOLVING MULTIPLE JOINTS: Status: ACTIVE | Noted: 2017-07-17

## 2022-03-19 PROBLEM — M54.50 ACUTE MIDLINE LOW BACK PAIN WITHOUT SCIATICA: Status: ACTIVE | Noted: 2017-10-23

## 2022-03-19 PROBLEM — K85.90 ACUTE PANCREATITIS: Status: ACTIVE | Noted: 2018-01-22

## 2022-03-19 PROBLEM — J11.1 INFLUENZA: Status: ACTIVE | Noted: 2020-01-17

## 2022-03-20 PROBLEM — E78.2 MIXED HYPERLIPIDEMIA: Status: ACTIVE | Noted: 2017-07-17

## 2022-03-20 PROBLEM — Z20.822 EXPOSURE TO COVID-19 VIRUS: Status: RESOLVED | Noted: 2020-12-10 | Resolved: 2022-03-16

## 2022-03-20 PROBLEM — G44.319 ACUTE POST-TRAUMATIC HEADACHE, NOT INTRACTABLE: Status: ACTIVE | Noted: 2019-03-05

## 2022-03-20 PROBLEM — R73.02 GLUCOSE INTOLERANCE (IMPAIRED GLUCOSE TOLERANCE): Status: ACTIVE | Noted: 2017-07-17

## 2022-05-27 ENCOUNTER — TELEPHONE (OUTPATIENT)
Dept: INTERNAL MEDICINE CLINIC | Age: 77
End: 2022-05-27

## 2022-05-27 NOTE — TELEPHONE ENCOUNTER
----- Message from [facility] sent at 5/26/2022 11:36 AM EDT -----  Subject: Referral Request    QUESTIONS   Reason for referral request? Patient would like Dr. Shannan Monge input on a   Urogynecologist she was referred from Dr. Pardo [place] she is being sent there   for dropped bladder they are trying to help her find one and she would   also like you to know call home number first   Has the physician seen you for this condition before? No   Preferred Specialist (if applicable)? Do you already have an appointment scheduled? No  Additional Information for Provider?   ---------------------------------------------------------------------------  --------------  CALL BACK INFO  What is the best way for the office to contact you? OK to leave message on   voicemail  Preferred Call Back Phone Number? [phone]  ---------------------------------------------------------------------------  --------------  SCRIPT ANSWERS  Relationship to Patient?  Self

## 2022-06-19 NOTE — PROGRESS NOTES
Chief Complaint   Patient presents with    Hypertension     3 month follow up       SUBJECTIVE:    Eugenio Wilson is a 78 y.o. female who returns in follow-up for medical problems include hypertension, glucose intolerance, hyperlipidemia, history colonic polypectomy, CKD stage II, obesity and other medical problems. She is developed a problem with progressive urinary incontinence and is seeing Dr. Mounika Ordaz who is recommended surgery. She denies any chest pain, shortness of breath, palpitations, PND, orthopnea or other cardiorespiratory complaints. She notes no GI or  complaints except for the incontinence. She notes no headaches, dizziness or neurologic complaints except she does have some pain shooting down her left leg and she also has chronic back pain for which she has had 1 epidural cortisone shot but has not had the second 1 yet. She denies any history of falls or trauma. She denies any other arthritic complaints and there are no other complaints on complete review of systems. Current Outpatient Medications   Medication Sig Dispense Refill    irbesartan (AVAPRO) 300 mg tablet TAKE 1 TABLET BY MOUTH  DAILY 90 Tablet 3    omeprazole (PRILOSEC) 20 mg capsule TAKE 1 CAPSULE BY MOUTH  DAILY 90 Capsule 3    hydroCHLOROthiazide (HYDRODIURIL) 25 mg tablet TAKE 1 TABLET BY MOUTH  DAILY 90 Tablet 3    Livalo 2 mg tablet TAKE 1 TABLET BY MOUTH  EVERY NIGHT AT BEDTIME 90 Tablet 3    ALPRAZolam (XANAX) 0.5 mg tablet Take 1 Tab by mouth three (3) times daily as needed for Anxiety. Max Daily Amount: 1.5 mg. 50 Tab 0    vit C/E/Zn/coppr/lutein/zeaxan (PRESERVISION AREDS 2 PO) Take  by mouth. Indications: 2 pills dailly      biotin 10,000 mcg cap Take  by mouth daily (with dinner).  Cholecalciferol, Vitamin D3, 3,000 unit tab Take 1,000 Units by mouth every morning.  cyanocobalamin (VITAMIN B12) 500 mcg tablet Take 500 mcg by mouth every morning.       polyethylene glycol (MIRALAX) 17 gram/dose powder Take 17 g by mouth every morning.  Indications: CONSTIPATION      diazePAM (VALIUM) 5 mg tablet TAKE 3 TABLETS BY MOUTH 30 MINUTES PRIOR TO APPOINTMENT       Past Medical History:   Diagnosis Date    Anxiety     Arthritis     Chronic constipation     CKD (chronic kidney disease) 7/17/2017    Colon polyps 7/17/2017    DJD (degenerative joint disease) 7/17/2017    Elevated LFTs 7/17/2017    Flu 02/19/2019    GERD (gastroesophageal reflux disease)     Glucose intolerance (impaired glucose tolerance) 7/17/2017    Hemorrhoids     internal & external- bleeds frequently    High cholesterol     Hyperlipidemia 7/17/2017    Hypertension     Hypertension with renal disease 7/17/2017    PT Education - High Blood Pressure (Essential Hypertension) *: blood pressure PT Education - How to access health information online: discussed with patient and provided information    Hypertension, renal 7/17/2017    Ill-defined condition     ringing in ears    Menopause     Mild obesity 7/17/2017    Nausea & vomiting     Neoplasm of uncertain behavior of skin 7/17/2017    On statin therapy 7/17/2017    Osteopenia 7/17/2017    Primary angle-closure glaucoma 7/17/2017    Comments: OU    Skin cancer of face     as of 6/15/16 pt states \"removed years ago\"    Spinal stenosis      Past Surgical History:   Procedure Laterality Date    HX BREAST BIOPSY Right 02/26/2021    HX CATARACT REMOVAL Bilateral 2020    HX CHOLECYSTECTOMY  11/6/2014    Dr Theo Peñaloza    HX HEENT      laser for glaucoma    HX HYSTERECTOMY      partial    HX OOPHORECTOMY Bilateral     HX PELVIC LAPAROSCOPY      Jono. oophorectomy    HX TUBAL LIGATION      IR INJ FORAMIN EPID LUMB ANES/STER SNGL  9/28/2021    MS COLSC FLX W/REMOVAL LESION BY HOT BX FORCEPS  11/12/2012         MS ERCP REMOVE CALCULI/DEBRIS BILIARY/PANCREAS DUCT  11/7/2014         MS ERCP W/SPHINCTEROTOMY/PAPILLOTOMY  11/7/2014          Allergies   Allergen Reactions    Iodinated Contrast Media Hives    Oxycodone-Aspirin Unknown (comments)     Patient states this in not a true allergy       REVIEW OF SYSTEMS:  General: negative for - chills or fever, or weight loss or gain  ENT: negative for - headaches, nasal congestion or tinnitus  Eyes: no blurred or visual changes  Neck: No stiffness or swollen nodes  Respiratory: negative for - cough, hemoptysis, shortness of breath or wheezing  Cardiovascular : negative for - chest pain, edema, palpitations or shortness of breath  Gastrointestinal: negative for - abdominal pain, blood in stools, heartburn or nausea/vomiting  Genito-Urinary: no dysuria, trouble voiding, or hematuria  Musculoskeletal: negative for - gait disturbance, joint pain, joint stiffness or joint swelling  Neurological: no TIA or stroke symptoms  Hematologic: no bruises, no bleeding  Lymphatic: no swollen glands  Integument: no lumps, mole changes, nail changes or rash  Endocrine:no malaise/lethargy poly uria or polydipsia or unexpected weight changes        Social History     Socioeconomic History    Marital status:    Tobacco Use    Smoking status: Former Smoker     Packs/day: 1.50     Years: 35.00     Pack years: 52.50     Quit date: 2004     Years since quittin.6    Smokeless tobacco: Never Used    Tobacco comment: quit smoking 6 yrs ago   Vaping Use    Vaping Use: Never used   Substance and Sexual Activity    Alcohol use: No    Drug use: No    Sexual activity: Never     Family History   Problem Relation Age of Onset    Heart Disease Mother     Hypertension Mother     Cancer Mother         skin    Stroke Father     Breast Cancer Sister         onset: 76s    Breast Cancer Niece        OBJECTIVE:     Visit Vitals  /82 (BP 1 Location: Left upper arm, BP Patient Position: Sitting, BP Cuff Size: Adult)   Pulse 70   Temp 97.6 °F (36.4 °C) (Oral)   Resp 17   Ht 5' 3\" (1.6 m)   Wt 167 lb 8 oz (76 kg)   SpO2 98%   BMI 29.67 kg/m²     CONSTITUTIONAL:   well nourished, appears age appropriate  EYES: sclera anicteric, PERRL, EOMI  ENMT:nares clear, moist mucous membranes, pharynx clear  NECK: supple. Thyroid normal, No JVD or bruits  RESPIRATORY: Chest: clear to ascultation and percussion, normal inspiratory effort  CARDIOVASCULAR: Heart: regular rate and rhythm no murmurs, rubs or gallops, PMI not displaced, No thrills, no peripheral edema  GASTROINTESTINAL: Abdomen: non distended, soft, non tender, bowel sounds normal  HEMATOLOGIC: no purpura, petechiae or bruising  LYMPHATIC: No lymph node enlargemant  MUSCULOSKELETAL: Extremities: no active synovitis, pulse 1+   INTEGUMENT: No unusual rashes or suspicious skin lesions noted. Nails appear normal.  PERIPHERAL VASCULAR: normal pulses femoral, PT and DP  NEUROLOGIC: non-focal exam, A & O X 3  PSYCHIATRIC:, appropriate affect     ASSESSMENT:   1. Hypertension with renal disease    2. Glucose intolerance (impaired glucose tolerance)    3. Mixed hyperlipidemia    4. Primary osteoarthritis involving multiple joints    5. Stage 2 chronic kidney disease    6. Severe obesity (BMI 35.0-39. 9) with comorbidity ([address]      Impression  1. Hypertension that is controlled so continue current therapy reviewed with her. 2.  Glucose intolerance repeat status pending and prior lab reviewed and I will adjust if needed. 3.  Hyperlipidemia prior lab reviewed repeat status pending I will adjust if needed. 4. DJD all stable except for the back. Since she has had 1 epidural cortisone shot I would suggest that she call the neurosurgeon and get set up for a second 1.  5.  CKD stage II repeat status pending  6. Obesity we did discuss diet, exercise and weight reduction for overall health benefit. 7.  Urinary incontinence I have recommended she go with Dr. Cassie Frazier recommendation for the surgery and as far as the type of surgery I would have to defer that to Dr. Cassie Frazier expertise.   Follow-up with me again in 3 months or sooner should the be a problem. I will call the lab results in interim. PLAN:  .  Orders Placed This Encounter    LIPID PANEL    METABOLIC PANEL, COMPREHENSIVE    CK    HEMOGLOBIN A1C WITH EAG         ATTENTION:   This medical record was transcribed using an electronic medical records system. Although proofread, it may and can contain electronic and spelling errors. Other human spelling and other errors may be present. Corrections may be executed at a later time. Please feel free to contact us for any clarifications as needed. Follow-up and Dispositions    · Return in about 5 months (around 11/20/2022). No results found for any visits on 06/20/22. Rock Eligio MD    The patient verbalized understanding of the problems and plans as explained.

## 2022-06-20 ENCOUNTER — OFFICE VISIT (OUTPATIENT)
Dept: INTERNAL MEDICINE CLINIC | Age: 77
End: 2022-06-20
Payer: MEDICARE

## 2022-06-20 VITALS
WEIGHT: 167.5 LBS | BODY MASS INDEX: 29.68 KG/M2 | SYSTOLIC BLOOD PRESSURE: 128 MMHG | TEMPERATURE: 97.6 F | OXYGEN SATURATION: 98 % | RESPIRATION RATE: 17 BRPM | HEIGHT: 63 IN | HEART RATE: 70 BPM | DIASTOLIC BLOOD PRESSURE: 82 MMHG

## 2022-06-20 DIAGNOSIS — I12.9 HYPERTENSION WITH RENAL DISEASE: Primary | ICD-10-CM

## 2022-06-20 DIAGNOSIS — E66.01 SEVERE OBESITY (BMI 35.0-39.9) WITH COMORBIDITY (HCC): ICD-10-CM

## 2022-06-20 DIAGNOSIS — E78.2 MIXED HYPERLIPIDEMIA: ICD-10-CM

## 2022-06-20 DIAGNOSIS — R73.02 GLUCOSE INTOLERANCE (IMPAIRED GLUCOSE TOLERANCE): ICD-10-CM

## 2022-06-20 DIAGNOSIS — M15.9 PRIMARY OSTEOARTHRITIS INVOLVING MULTIPLE JOINTS: ICD-10-CM

## 2022-06-20 DIAGNOSIS — N18.2 STAGE 2 CHRONIC KIDNEY DISEASE: ICD-10-CM

## 2022-06-20 PROCEDURE — 1101F PT FALLS ASSESS-DOCD LE1/YR: CPT | Performed by: INTERNAL MEDICINE

## 2022-06-20 PROCEDURE — G8536 NO DOC ELDER MAL SCRN: HCPCS | Performed by: INTERNAL MEDICINE

## 2022-06-20 PROCEDURE — 1123F ACP DISCUSS/DSCN MKR DOCD: CPT | Performed by: INTERNAL MEDICINE

## 2022-06-20 PROCEDURE — G8752 SYS BP LESS 140: HCPCS | Performed by: INTERNAL MEDICINE

## 2022-06-20 PROCEDURE — 99214 OFFICE O/P EST MOD 30 MIN: CPT | Performed by: INTERNAL MEDICINE

## 2022-06-20 PROCEDURE — G8427 DOCREV CUR MEDS BY ELIG CLIN: HCPCS | Performed by: INTERNAL MEDICINE

## 2022-06-20 PROCEDURE — G9899 SCRN MAM PERF RSLTS DOC: HCPCS | Performed by: INTERNAL MEDICINE

## 2022-06-20 PROCEDURE — G8754 DIAS BP LESS 90: HCPCS | Performed by: INTERNAL MEDICINE

## 2022-06-20 PROCEDURE — G8510 SCR DEP NEG, NO PLAN REQD: HCPCS | Performed by: INTERNAL MEDICINE

## 2022-06-20 PROCEDURE — G8399 PT W/DXA RESULTS DOCUMENT: HCPCS | Performed by: INTERNAL MEDICINE

## 2022-06-20 PROCEDURE — G8417 CALC BMI ABV UP PARAM F/U: HCPCS | Performed by: INTERNAL MEDICINE

## 2022-06-20 PROCEDURE — 1090F PRES/ABSN URINE INCON ASSESS: CPT | Performed by: INTERNAL MEDICINE

## 2022-06-20 PROCEDURE — 3017F COLORECTAL CA SCREEN DOC REV: CPT | Performed by: INTERNAL MEDICINE

## 2022-06-20 NOTE — PROGRESS NOTES
Chief Complaint   Patient presents with    Hypertension     3 month follow up     Visit Vitals  /82 (BP 1 Location: Left upper arm, BP Patient Position: Sitting, BP Cuff Size: Adult)   Pulse 70   Temp 97.6 °F (36.4 °C) (Oral)   Resp 17   Ht 5' 3\" (1.6 m)   Wt 167 lb 8 oz (76 kg)   SpO2 98%   BMI 29.67 kg/m²     1. Have you been to the ER, urgent care clinic since your last visit? Hospitalized since your last visit? No    2. Have you seen or consulted any other health care providers outside of the [address] [place] [address] since your last visit? Include any pap smears or colon screening.  Dr. Nyla Quintero

## 2022-06-20 NOTE — PATIENT INSTRUCTIONS
Allergies: Care Instructions  Overview     Allergies occur when your body's defense system (immune system) overreacts to certain substances. The immune system treats a harmless substance as if it were a harmful germ or virus. Many things can make this happen. These include pollens, medicine, food, dust, animal dander, and mold. Allergies can be mild or severe. Mild allergies can be managed with home treatment. But medicine may be needed to prevent problems. Managing your allergies is an important part of staying healthy. Your doctor may suggest that you have allergy testing to help find out what is causing your allergies. Severe allergies can cause reactions that affect your whole body (anaphylactic reactions). Your doctor may prescribe a shot of epinephrine to carry with you in case you have a severe reaction. Learn how to give yourself the shot and keep it with you at all times. Make sure it is not . Follow-up care is a key part of your treatment and safety. Be sure to make and go to all appointments, and call your doctor if you are having problems. It's also a good idea to know your test results and keep a list of the medicines you take. How can you care for yourself at home? · If you have been told by your doctor that dust or dust mites are causing your allergy, decrease the dust around your bed:  ? Wash sheets, pillowcases, and other bedding in hot water every week. ? Use dust-proof covers for pillows, duvets, and mattresses. Avoid plastic covers because they tear easily and do not \"breathe. \" Wash as instructed on the label. ? Do not use any blankets and pillows that you do not need. ? Use blankets that you can wash in your washing machine. ? Consider removing drapes and carpets, which attract and hold dust, from your bedroom. · If you are allergic to house dust and mites, do not use home humidifiers. Your doctor can suggest ways you can control dust and mites.   · Look for signs of cockroaches. Cockroaches cause allergic reactions. Use cockroach baits to get rid of them. Then, clean your home well. Cockroaches like areas where grocery bags, newspapers, empty bottles, or cardboard boxes are stored. Do not keep these inside your home, and keep trash and food containers sealed. Seal off any spots where cockroaches might enter your home. · If you are allergic to mold, get rid of furniture, rugs, and drapes that smell musty. Check for mold in the bathroom. · If you are allergic to outdoor pollen or mold spores, use air-conditioning. Change or clean all filters every month. Keep windows closed. · If you are allergic to pollen, stay inside when pollen counts are high. Use a vacuum  with a HEPA filter or a double-thickness filter at least two times each week. · Stay inside when air pollution is bad. Avoid paint fumes, perfumes, and other strong odors. · Avoid conditions that make your allergies worse. Stay away from smoke. Do not smoke or let anyone else smoke in your house. Do not use fireplaces or wood-burning stoves. · If you are allergic to your pets, change the air filter in your furnace every month. Use high-efficiency filters. · If you are allergic to pet dander, keep pets outside or out of your bedroom. Old carpet and cloth furniture can hold a lot of animal dander. You may need to replace them. When should you call for help? Give an epinephrine shot if:    · You think you are having a severe allergic reaction.     · You have symptoms in more than one body area, such as mild nausea and an itchy mouth. After giving an epinephrine shot call 911, even if you feel better. Call 911 if:    · You have symptoms of a severe allergic reaction. These may include:  ? Sudden raised, red areas (hives) all over your body. ? Swelling of the throat, mouth, lips, or tongue. ? Trouble breathing. ? Passing out (losing consciousness).  Or you may feel very lightheaded or suddenly feel weak, confused, or restless.     · You have been given an epinephrine shot, even if you feel better. Call your doctor now or seek immediate medical care if:    · You have symptoms of an allergic reaction, such as:  ? A rash or hives (raised, red areas on the skin). ? Itching. ? Swelling. ? Belly pain, nausea, or vomiting. Watch closely for changes in your health, and be sure to contact your doctor if:    · You do not get better as expected. Where can you learn more? Go to http://www.floyd.com/  Enter W171 in the search box to learn more about \"Allergies: Care Instructions. \"  Current as of: February 10, 2021               Content Version: 13.2  © 2006-2022 [employer]. Care instructions adapted under license by [employer] (which disclaims liability or warranty for this information). If you have questions about a medical condition or this instruction, always ask your healthcare professional. [place] [address] any warranty or liability for your use of this information.

## 2022-06-21 LAB
ALBUMIN SERPL-MCNC: 4 G/DL (ref 3.5–5)
ALBUMIN/GLOB SERPL: 1.3 {RATIO} (ref 1.1–2.2)
ALP SERPL-CCNC: 110 U/L (ref 45–117)
ALT SERPL-CCNC: 42 U/L (ref 12–78)
ANION GAP SERPL CALC-SCNC: 5 MMOL/L (ref 5–15)
AST SERPL-CCNC: 23 U/L (ref 15–37)
BILIRUB SERPL-MCNC: 0.7 MG/DL (ref 0.2–1)
BUN SERPL-MCNC: 22 MG/DL (ref 6–20)
BUN/CREAT SERPL: 31 (ref 12–20)
CALCIUM SERPL-MCNC: 9.8 MG/DL (ref 8.5–10.1)
CHLORIDE SERPL-SCNC: 107 MMOL/L (ref 97–108)
CHOLEST SERPL-MCNC: 164 MG/DL
CK SERPL-CCNC: 56 U/L (ref 26–192)
CO2 SERPL-SCNC: 30 MMOL/L (ref 21–32)
CREAT SERPL-MCNC: 0.7 MG/DL (ref 0.55–1.02)
EST. AVERAGE GLUCOSE BLD GHB EST-MCNC: 120 MG/DL
GLOBULIN SER CALC-MCNC: 3.1 G/DL (ref 2–4)
GLUCOSE SERPL-MCNC: 95 MG/DL (ref 65–100)
HBA1C MFR BLD: 5.8 % (ref 4–5.6)
HDLC SERPL-MCNC: 59 MG/DL
HDLC SERPL: 2.8 {RATIO} (ref 0–5)
LDLC SERPL CALC-MCNC: 86.4 MG/DL (ref 0–100)
POTASSIUM SERPL-SCNC: 4.9 MMOL/L (ref 3.5–5.1)
PROT SERPL-MCNC: 7.1 G/DL (ref 6.4–8.2)
SODIUM SERPL-SCNC: 142 MMOL/L (ref 136–145)
TRIGL SERPL-MCNC: 93 MG/DL (ref ?–150)
VLDLC SERPL CALC-MCNC: 18.6 MG/DL

## 2022-08-09 DIAGNOSIS — F41.0 PANIC ATTACK: ICD-10-CM

## 2022-08-09 RX ORDER — ALPRAZOLAM 0.5 MG/1
0.5 TABLET ORAL
Qty: 50 TABLET | Refills: 0 | Status: SHIPPED | OUTPATIENT
Start: 2022-08-09

## 2022-08-09 NOTE — TELEPHONE ENCOUNTER
PCP: Loyd Guy MD    Last appt: Visit date not found  Future Appointments   Date Time Provider Preston Perez   9/20/2022 10:20 AM Loyd Guy MD [facility] AM[facility]       Last refilled:2/7/20    Requested Prescriptions     Pending Prescriptions Disp Refills    ALPRAZolam (XANAX) 0.5 mg tablet 50 Tablet 0     Sig: Take 1 Tablet by mouth three (3) times daily as needed for Anxiety.  Max Daily Amount: 1.5 mg.

## 2022-09-20 ENCOUNTER — OFFICE VISIT (OUTPATIENT)
Dept: INTERNAL MEDICINE CLINIC | Age: 77
End: 2022-09-20
Payer: MEDICARE

## 2022-09-20 VITALS
BODY MASS INDEX: 29.93 KG/M2 | HEIGHT: 63 IN | HEART RATE: 79 BPM | DIASTOLIC BLOOD PRESSURE: 76 MMHG | OXYGEN SATURATION: 97 % | TEMPERATURE: 98.5 F | WEIGHT: 168.9 LBS | RESPIRATION RATE: 17 BRPM | SYSTOLIC BLOOD PRESSURE: 122 MMHG

## 2022-09-20 DIAGNOSIS — E78.2 MIXED HYPERLIPIDEMIA: ICD-10-CM

## 2022-09-20 DIAGNOSIS — G95.19 NEUROGENIC CLAUDICATION (HCC): ICD-10-CM

## 2022-09-20 DIAGNOSIS — E66.01 SEVERE OBESITY (BMI 35.0-39.9) WITH COMORBIDITY (HCC): ICD-10-CM

## 2022-09-20 DIAGNOSIS — R73.02 GLUCOSE INTOLERANCE (IMPAIRED GLUCOSE TOLERANCE): ICD-10-CM

## 2022-09-20 DIAGNOSIS — M15.9 PRIMARY OSTEOARTHRITIS INVOLVING MULTIPLE JOINTS: ICD-10-CM

## 2022-09-20 DIAGNOSIS — I12.9 HYPERTENSION WITH RENAL DISEASE: Primary | ICD-10-CM

## 2022-09-20 DIAGNOSIS — N18.2 STAGE 2 CHRONIC KIDNEY DISEASE: ICD-10-CM

## 2022-09-20 PROCEDURE — 99214 OFFICE O/P EST MOD 30 MIN: CPT | Performed by: INTERNAL MEDICINE

## 2022-09-20 PROCEDURE — G8536 NO DOC ELDER MAL SCRN: HCPCS | Performed by: INTERNAL MEDICINE

## 2022-09-20 PROCEDURE — 1090F PRES/ABSN URINE INCON ASSESS: CPT | Performed by: INTERNAL MEDICINE

## 2022-09-20 PROCEDURE — G8510 SCR DEP NEG, NO PLAN REQD: HCPCS | Performed by: INTERNAL MEDICINE

## 2022-09-20 PROCEDURE — G8417 CALC BMI ABV UP PARAM F/U: HCPCS | Performed by: INTERNAL MEDICINE

## 2022-09-20 PROCEDURE — 3017F COLORECTAL CA SCREEN DOC REV: CPT | Performed by: INTERNAL MEDICINE

## 2022-09-20 PROCEDURE — G8752 SYS BP LESS 140: HCPCS | Performed by: INTERNAL MEDICINE

## 2022-09-20 PROCEDURE — G8754 DIAS BP LESS 90: HCPCS | Performed by: INTERNAL MEDICINE

## 2022-09-20 PROCEDURE — 1123F ACP DISCUSS/DSCN MKR DOCD: CPT | Performed by: INTERNAL MEDICINE

## 2022-09-20 PROCEDURE — G8399 PT W/DXA RESULTS DOCUMENT: HCPCS | Performed by: INTERNAL MEDICINE

## 2022-09-20 PROCEDURE — G8427 DOCREV CUR MEDS BY ELIG CLIN: HCPCS | Performed by: INTERNAL MEDICINE

## 2022-09-20 PROCEDURE — 1101F PT FALLS ASSESS-DOCD LE1/YR: CPT | Performed by: INTERNAL MEDICINE

## 2022-09-20 NOTE — PROGRESS NOTES
HIPAA verified by two patient identifiers. Grady Giron is a 78 y.o. female    Chief Complaint   Patient presents with    Hypertension    Blood sugar problem    Osteoarthritis    Cholesterol Problem     3 month       Visit Vitals  /76 (BP 1 Location: Left upper arm, BP Patient Position: Sitting, BP Cuff Size: Adult long)   Pulse 79   Temp 98.5 °F (36.9 °C) (Oral)   Resp 17   Ht 5' 3\" (1.6 m)   Wt 168 lb 14.4 oz (76.6 kg)   SpO2 97%   BMI 29.92 kg/m²       Pain Scale: 0 - No pain/10  Pain Location:       Health Maintenance Due   Topic Date Due    Pneumococcal 65+ years (2 - PPSV23 or PCV20) 07/06/2016    Colorectal Cancer Screening Combo  07/11/2019    Shingrix Vaccine Age 50> (2 of 2) 10/21/2019    COVID-19 Vaccine (3 - Booster for Pfizer series) 08/31/2021    Flu Vaccine (1) 09/01/2022         Coordination of Care Questionnaire:  :   1) Have you been to an emergency room, urgent care, or hospitalized since your last visit? If yes, where when, and reason for visit? no       2. Have seen or consulted any other health care provider since your last visit? If yes, where when, and reason for visit? NO      Patient is accompanied by self I have received verbal consent from Grady Giron to discuss any/all medical information while they are present in the room.

## 2022-09-20 NOTE — PROGRESS NOTES
Chief Complaint   Patient presents with    Hypertension    Blood sugar problem    Osteoarthritis    Cholesterol Problem     3 month       SUBJECTIVE:    Gardenia Ormond is a 78 y.o. female who returns in follow-up for medical problems include hypertension, glucose intolerance, hyperlipidemia, DJD, CKD stage II and other medical problems. She did have surgery on her bladder at [facility] done a few weeks ago and seems to be doing okay although burning on the right side but apparently she had a stitch that was still present after the surgery and that has now been removed. She is now noting some problem with some burning in her left leg if she walks over 15 minutes. It seems to go down the back of her leg. She notes that she is okay as long as she rests but when she walks she gets that on a consistent basis. She has previously been evaluated by neurosurgery and previously had an epidural cortisone shot which really did not help a lot but she never went back to get the second shot. She denies any chest pain, shortness of breath, palpitations, PND, orthopnea or other cardiac or respiratory complaints. She notes no current GI or  complaints. She has no headaches, dizziness or neurologic complaints except for the burning in the left leg with walking. She has no current arthritic complaints other than related to that and there are no other complaints on complete view of systems. Current Outpatient Medications   Medication Sig Dispense Refill    ALPRAZolam (XANAX) 0.5 mg tablet Take 1 Tablet by mouth three (3) times daily as needed for Anxiety.  Max Daily Amount: 1.5 mg. 50 Tablet 0    irbesartan (AVAPRO) 300 mg tablet TAKE 1 TABLET BY MOUTH  DAILY 90 Tablet 3    omeprazole (PRILOSEC) 20 mg capsule TAKE 1 CAPSULE BY MOUTH  DAILY 90 Capsule 3    hydroCHLOROthiazide (HYDRODIURIL) 25 mg tablet TAKE 1 TABLET BY MOUTH  DAILY 90 Tablet 3    Livalo 2 mg tablet TAKE 1 TABLET BY MOUTH  EVERY NIGHT AT BEDTIME 90 Tablet 3    vit C/E/Zn/coppr/lutein/zeaxan (PRESERVISION AREDS 2 PO) Take  by mouth. Indications: 2 pills dailly      biotin 10,000 mcg cap Take  by mouth daily (with dinner). Cholecalciferol, Vitamin D3, 3,000 unit tab Take 1,000 Units by mouth every morning. cyanocobalamin (VITAMIN B12) 500 mcg tablet Take 500 mcg by mouth every morning. polyethylene glycol (MIRALAX) 17 gram/dose powder Take 17 g by mouth every morning.  Indications: CONSTIPATION       Past Medical History:   Diagnosis Date    Anxiety     Arthritis     Chronic constipation     CKD (chronic kidney disease) 7/17/2017    Colon polyps 7/17/2017    DJD (degenerative joint disease) 7/17/2017    Elevated LFTs 7/17/2017    Flu 02/19/2019    GERD (gastroesophageal reflux disease)     Glucose intolerance (impaired glucose tolerance) 7/17/2017    Hemorrhoids     internal & external- bleeds frequently    High cholesterol     Hyperlipidemia 7/17/2017    Hypertension     Hypertension with renal disease 7/17/2017    PT Education - High Blood Pressure (Essential Hypertension) *: blood pressure PT Education - How to access health information online: discussed with patient and provided information    Hypertension, renal 7/17/2017    Ill-defined condition     ringing in ears    Menopause     Mild obesity 7/17/2017    Nausea & vomiting     Neoplasm of uncertain behavior of skin 7/17/2017    On statin therapy 7/17/2017    Osteopenia 7/17/2017    Primary angle-closure glaucoma 7/17/2017    Comments: OU    Skin cancer of face     as of 6/15/16 pt states \"removed years ago\"    Spinal stenosis      Past Surgical History:   Procedure Laterality Date    HX BREAST BIOPSY Right 02/26/2021    HX CATARACT REMOVAL Bilateral 2020    HX CHOLECYSTECTOMY  11/6/2014    Dr Hazel Foster    HX HEENT      laser for glaucoma    HX HYSTERECTOMY      partial    HX OOPHORECTOMY Bilateral     HX PELVIC LAPAROSCOPY      Jono. oophorectomy    HX TUBAL LIGATION      IR INJ Betoasmita Ankush EPID LUMB ANES/STER SNGL  2021    OK COLSC FLX W/REMOVAL LESION BY HOT BX FORCEPS  2012         OK ERCP REMOVE CALCULI/DEBRIS BILIARY/PANCREAS DUCT  2014         OK ERCP W/SPHINCTEROTOMY/PAPILLOTOMY  2014          Allergies   Allergen Reactions    Iodinated Contrast Media Hives    Oxycodone-Aspirin Unknown (comments)     Patient states this in not a true allergy       REVIEW OF SYSTEMS:  General: negative for - chills or fever, or weight loss or gain  ENT: negative for - headaches, nasal congestion or tinnitus  Eyes: no blurred or visual changes  Neck: No stiffness or swollen nodes  Respiratory: negative for - cough, hemoptysis, shortness of breath or wheezing  Cardiovascular : negative for - chest pain, edema, palpitations or shortness of breath  Gastrointestinal: negative for - abdominal pain, blood in stools, heartburn or nausea/vomiting  Genito-Urinary: no dysuria, trouble voiding, or hematuria  Musculoskeletal: negative for - gait disturbance, joint pain, joint stiffness or joint swelling  Neurological: no TIA or stroke symptoms  Hematologic: no bruises, no bleeding  Lymphatic: no swollen glands  Integument: no lumps, mole changes, nail changes or rash  Endocrine:no malaise/lethargy poly uria or polydipsia or unexpected weight changes        Social History     Socioeconomic History    Marital status:    Tobacco Use    Smoking status: Former     Packs/day: 1.50     Years: 35.00     Pack years: 52.50     Types: Cigarettes     Quit date: 2004     Years since quittin.8    Smokeless tobacco: Never    Tobacco comments:     quit smoking 6 yrs ago   Vaping Use    Vaping Use: Never used   Substance and Sexual Activity    Alcohol use: No    Drug use: No    Sexual activity: Never     Family History   Problem Relation Age of Onset    Heart Disease Mother     Hypertension Mother     Cancer Mother         skin    Stroke Father     Breast Cancer Sister         onset: 76s    Breast Cancer Niece        OBJECTIVE:     Visit Vitals  /76 (BP 1 Location: Left upper arm, BP Patient Position: Sitting, BP Cuff Size: Adult long)   Pulse 79   Temp 98.5 °F (36.9 °C) (Oral)   Resp 17   Ht 5' 3\" (1.6 m)   Wt 168 lb 14.4 oz (76.6 kg)   SpO2 97%   BMI 29.92 kg/m²     CONSTITUTIONAL:   well nourished, appears age appropriate  EYES: sclera anicteric, PERRL, EOMI  ENMT:nares clear, moist mucous membranes, pharynx clear  NECK: supple. Thyroid normal, No JVD or bruits  RESPIRATORY: Chest: clear to ascultation and percussion, normal inspiratory effort  CARDIOVASCULAR: Heart: regular rate and rhythm no murmurs, rubs or gallops, PMI not displaced, No thrills, no peripheral edema  GASTROINTESTINAL: Abdomen: non distended, soft, non tender, bowel sounds normal  HEMATOLOGIC: no purpura, petechiae or bruising  LYMPHATIC: No lymph node enlargemant  MUSCULOSKELETAL: Extremities: no active synovitis, pulse 1+   INTEGUMENT: No unusual rashes or suspicious skin lesions noted. Nails appear normal.  PERIPHERAL VASCULAR: normal pulses femoral, PT and DP  NEUROLOGIC: non-focal exam, A & O X 3  PSYCHIATRIC:, appropriate affect     ASSESSMENT:   1. Hypertension with renal disease    2. Glucose intolerance (impaired glucose tolerance)    3. Mixed hyperlipidemia    4. Primary osteoarthritis involving multiple joints    5. Stage 2 chronic kidney disease    6. Severe obesity (BMI 35.0-39. 9) with comorbidity ([address]    7. Neurogenic claudication (HCC)      Impression  1. Hypertension that is controlled so continue current therapy reviewed with her. 2.  Glucose intolerance repeat status pending prior lab reviewed  3. Hyperlipidemia prior lab reviewed repeat status pending  4. DJD stable except for the back neck #5 CKD stage III repeat status pending  6 obesity we did discuss diet, exercise weight reduction for overall health benefit.   7.  Neurogenic claudication she did have previous epidural cortisone shot last September by  [facility]. Lumbar spine film today shows arthritic changes but no acute changes from previous x-ray. I have recommended she get back in touch with her neurosurgeon to get set up for another epidural cortisone shot as I reviewed the chart her last epidural cortisone shot was done September 2021 so its been a year. Follow-up me in 3 months or sooner should to be a problem. I will call with lab results in interim. PLAN:  .  Orders Placed This Encounter    XR SPINE LUMB 2 OR 3 V    METABOLIC PANEL, COMPREHENSIVE    LIPID PANEL    CK    HEMOGLOBIN A1C WITH EAG         ATTENTION:   This medical record was transcribed using an electronic medical records system. Although proofread, it may and can contain electronic and spelling errors. Other human spelling and other errors may be present. Corrections may be executed at a later time. Please feel free to contact us for any clarifications as needed. Follow-up and Dispositions    Return in about 3 months (around 12/20/2022). No results found for any visits on 09/20/22. Jerica Dolan MD    The patient verbalized understanding of the problems and plans as explained.

## 2022-09-22 LAB
ALBUMIN SERPL-MCNC: 4.1 G/DL (ref 3.5–5)
ALBUMIN/GLOB SERPL: 1.4 {RATIO} (ref 1.1–2.2)
ALP SERPL-CCNC: 105 U/L (ref 45–117)
ALT SERPL-CCNC: 26 U/L (ref 12–78)
ANION GAP SERPL CALC-SCNC: 6 MMOL/L (ref 5–15)
AST SERPL-CCNC: 18 U/L (ref 15–37)
BILIRUB SERPL-MCNC: 1 MG/DL (ref 0.2–1)
BUN SERPL-MCNC: 16 MG/DL (ref 6–20)
BUN/CREAT SERPL: 22 (ref 12–20)
CALCIUM SERPL-MCNC: 9.9 MG/DL (ref 8.5–10.1)
CHLORIDE SERPL-SCNC: 106 MMOL/L (ref 97–108)
CHOLEST SERPL-MCNC: 181 MG/DL
CK SERPL-CCNC: 49 U/L (ref 26–192)
CO2 SERPL-SCNC: 28 MMOL/L (ref 21–32)
CREAT SERPL-MCNC: 0.73 MG/DL (ref 0.55–1.02)
EST. AVERAGE GLUCOSE BLD GHB EST-MCNC: 120 MG/DL
GLOBULIN SER CALC-MCNC: 3 G/DL (ref 2–4)
GLUCOSE SERPL-MCNC: 94 MG/DL (ref 65–100)
HBA1C MFR BLD: 5.8 % (ref 4–5.6)
HDLC SERPL-MCNC: 75 MG/DL
HDLC SERPL: 2.4 {RATIO} (ref 0–5)
LDLC SERPL CALC-MCNC: 84 MG/DL (ref 0–100)
POTASSIUM SERPL-SCNC: 4.2 MMOL/L (ref 3.5–5.1)
PROT SERPL-MCNC: 7.1 G/DL (ref 6.4–8.2)
SODIUM SERPL-SCNC: 140 MMOL/L (ref 136–145)
TRIGL SERPL-MCNC: 110 MG/DL (ref ?–150)
VLDLC SERPL CALC-MCNC: 22 MG/DL

## 2022-12-19 PROBLEM — E66.01 SEVERE OBESITY (BMI 35.0-39.9) WITH COMORBIDITY (HCC): Status: RESOLVED | Noted: 2018-05-10 | Resolved: 2022-12-19

## 2022-12-19 PROBLEM — R10.11 RIGHT UPPER QUADRANT ABDOMINAL PAIN: Status: RESOLVED | Noted: 2018-01-16 | Resolved: 2022-12-19

## 2022-12-19 PROBLEM — R05.9 COUGH: Status: RESOLVED | Noted: 2020-06-04 | Resolved: 2022-12-19

## 2022-12-19 PROBLEM — M54.50 ACUTE MIDLINE LOW BACK PAIN WITHOUT SCIATICA: Status: RESOLVED | Noted: 2017-10-23 | Resolved: 2022-12-19

## 2022-12-19 PROBLEM — Z00.00 MEDICARE ANNUAL WELLNESS VISIT, SUBSEQUENT: Status: ACTIVE | Noted: 2017-10-23

## 2022-12-19 PROBLEM — R51.9 HEADACHE: Status: RESOLVED | Noted: 2019-03-05 | Resolved: 2022-12-19

## 2022-12-19 NOTE — PROGRESS NOTES
This is a Subsequent Medicare Annual Wellness Visit providing Personalized Prevention Plan Services (PPPS) (Performed 12 months after initial AWV and PPPS )    I have reviewed the patient's medical history in detail and updated the computerized patient record. She returns today for a Medicare subsequent annual wellness examination and screening questionnaire. She is also in follow-up of her multiple medical problems include hypertension, glucose intolerance, hyperlipidemia, history colonic polypectomy, history of pancreatitis with choledocholithiasis, chronic midline low back pain, allergic rhinitis, DJD, osteopenia, vitamin D deficiency, CKD stage II, prior elevated liver enzymes, obesity and other multiple medical problems. She had also has a new problem of chest pressure at night that has been well for several months she said it gets better after taking a Xanax. She thinks is related to the COVID shot. It only happens when she gets tired which is predominantly at nighttime. If she does have the chest pressure she cannot lay down to sleep and has to sit in the recliner. She does note it only during the daytime if she gets tired and sits down to rest but no discomfort with activity. When she gets the chest pressure it seems to start in the lateral aspect on both sides and goes anteriorly. She does occasion note some discomfort in her neck she thinks more the left than the right and occasionally notes some discomfort in the arms again more the left than the right. She denies associated shortness of breath, palpitations, PND,, orthopnea, lightheadedness, diaphoresis or other associated symptoms. She notes no GI or  complaints. She notes no headaches, dizziness or neurologic complaints. She has no change of her chronic arthritic complaints and there are no other complaints on complete review of systems.     History     Past Medical History:   Diagnosis Date    Anxiety     Arthritis     Chronic constipation     CKD (chronic kidney disease) 2017    Colon polyps 2017    DJD (degenerative joint disease) 2017    Elevated LFTs 2017    Flu 2019    GERD (gastroesophageal reflux disease)     Glucose intolerance (impaired glucose tolerance) 2017    Hemorrhoids     internal & external- bleeds frequently    High cholesterol     Hyperlipidemia 2017    Hypertension     Hypertension with renal disease 2017    PT Education - High Blood Pressure (Essential Hypertension) *: blood pressure PT Education - How to access health information online: discussed with patient and provided information    Hypertension, renal 2017    Ill-defined condition     ringing in ears    Menopause     Mild obesity 2017    Nausea & vomiting     Neoplasm of uncertain behavior of skin 2017    On statin therapy 2017    Osteopenia 2017    Primary angle-closure glaucoma 2017    Comments: OU    Skin cancer of face     as of 6/15/16 pt states \"removed years ago\"    Spinal stenosis       Past Surgical History:   Procedure Laterality Date    HX BREAST BIOPSY Right 2021    HX CATARACT REMOVAL Bilateral 2020    HX CHOLECYSTECTOMY  2014    Dr Caro Bending    HX HEENT      laser for glaucoma    HX HYSTERECTOMY      partial    HX OOPHORECTOMY Bilateral     HX PELVIC LAPAROSCOPY      Jono. oophorectomy    HX TUBAL LIGATION      HX UROLOGICAL  2022    bladder surgery Dr. Walter Hogan EPID LUMB ANES/STER SNGL  2021    [place] COLSC FLX W/REMOVAL LESION BY HOT BX FORCEPS  2012         [place] ERCP REMOVE CALCULI/DEBRIS BILIARY/PANCREAS DUCT  2014         [place] ERCP W/SPHINCTEROTOMY/PAPILLOTOMY  2014          Social History     Tobacco Use    Smoking status: Former     Packs/day: 1.50     Years: 35.00     Pack years: 52.50     Types: Cigarettes     Quit date: 2004     Years since quittin.1    Smokeless tobacco: Never    Tobacco comments:     quit smoking 6 yrs ago   Vaping Use    Vaping Use: Never used   Substance Use Topics    Alcohol use: No    Drug use: No     Current Outpatient Medications   Medication Sig Dispense Refill    ALPRAZolam (XANAX) 0.5 mg tablet Take 1 Tablet by mouth three (3) times daily as needed for Anxiety. Max Daily Amount: 1.5 mg. 50 Tablet 0    irbesartan (AVAPRO) 300 mg tablet TAKE 1 TABLET BY MOUTH  DAILY 90 Tablet 3    omeprazole (PRILOSEC) 20 mg capsule TAKE 1 CAPSULE BY MOUTH  DAILY 90 Capsule 3    hydroCHLOROthiazide (HYDRODIURIL) 25 mg tablet TAKE 1 TABLET BY MOUTH  DAILY 90 Tablet 3    Livalo 2 mg tablet TAKE 1 TABLET BY MOUTH  EVERY NIGHT AT BEDTIME 90 Tablet 3    vit C/E/Zn/coppr/lutein/zeaxan (PRESERVISION AREDS 2 PO) Take  by mouth. Indications: 2 pills dailly      biotin 10,000 mcg cap Take  by mouth daily (with dinner). Cholecalciferol, Vitamin D3, 3,000 unit tab Take 1,000 Units by mouth every morning. cyanocobalamin (VITAMIN B12) 500 mcg tablet Take 500 mcg by mouth every morning. polyethylene glycol (MIRALAX) 17 gram/dose powder Take 17 g by mouth every morning.  Indications: CONSTIPATION       Allergies   Allergen Reactions    Iodinated Contrast Media Hives    Oxycodone-Aspirin Unknown (comments)     Patient states this in not a true allergy     Family History   Problem Relation Age of Onset    Heart Disease Mother     Hypertension Mother     Cancer Mother         skin    Stroke Father     Breast Cancer Sister         onset: 76s    Breast Cancer Niece        Patient Active Problem List    Diagnosis    Primary osteoarthritis involving multiple joints    Mixed hyperlipidemia    Glucose intolerance (impaired glucose tolerance)    Hypertension with renal disease     PT Education - High Blood Pressure (Essential Hypertension) *: blood pressure  PT Education - How to access health information online: discussed with patient and provided information      Stage 2 chronic kidney disease    Osteopenia of multiple sites    Vitamin D deficiency    Chest pain    Non-seasonal allergic rhinitis    Panic attack    Chronic midline low back pain with sciatica    Influenza    Alcohol screening    Acute post-traumatic headache, not intractable    Contact dermatitis, allergic    Plantar wart    Acute pancreatitis    Medicare annual wellness visit, subsequent    Colon polyps    Primary angle-closure glaucoma     Comments: OU      Neoplasm of uncertain behavior of skin    Elevated LFTs    Grade IV hemorrhoids    Choledocholithiasis       Patient Care Team:  Steven Andrew MD as PCP - General (Internal Medicine Physician)  Steven Andrew MD as PCP - [facility] Empaneled Provider    Depression Risk Factor Screening:     3 most recent PHQ Screens 12/20/2022   Little interest or pleasure in doing things Not at all   Feeling down, depressed, irritable, or hopeless Not at all   Total Score PHQ 2 0     Alcohol Risk Factor Screening:   Do you average more than 1 drink per night or more than 7 drinks a week:  No    On any one occasion in the past three months have you have had more than 3 drinks containing alcohol:  No    Functional Ability and Level of Safety:     Fall Risk     Fall Risk Assessment, last 12 mths 12/20/2022   Able to walk? Yes   Fall in past 12 months? 0   Do you feel unsteady? 0   Are you worried about falling 0   Number of falls in past 12 months -   Fall with injury? -       Hearing Loss   mild    Activities of Daily Living   Self-care.    ADL Assessment 12/20/2022   Feeding yourself No Help Needed   Getting from bed to chair No Help Needed   Getting dressed No Help Needed   Bathing or showering No Help Needed   Walk across the room (includes cane/walker) No Help Needed   Using the telphone No Help Needed   Taking your medications No Help Needed   Preparing meals No Help Needed   Managing money (expenses/bills) No Help Needed   Moderately strenuous housework (laundry) No Help Needed   Shopping for personal items (toiletries/medicines) No Help Needed   Shopping for groceries No Help Needed   Driving No Help Needed   Climbing a flight of stairs No Help Needed   Getting to places beyond walking distances No Help Needed       Abuse Screen   Patient is not abused    Social History     Social History Narrative    Not on file       Review of Systems      ROS:    Constitutional: She denies fevers, weight loss, sweats. Eyes: No blurred or double vision. ENT: No difficulty with swallowing, taste, speech or smell. NECK: no stiffness swelling or lymph node enlargement  Respiratory: No cough wheezing or shortness of breath. Cardiovascular: Denies chest pain although chest pressure as noted above w/o palpitations, unexplained indigestion or syncope. Breast: She has noted no masses or lumps and no discharge or axillary swelling  Gastrointestinal:  No changes in bowel movements, no abdominal pain, no bloating. Genitourinary: No discharge or abnormal bleeding or pain  Extremities: No joint pain, stiffness or swelling. Neurological:  No numbness, tingling, burring paresthesias or loss of motor strength. No syncope, dizziness or frequent headache  Skin:  No recent rashes or mole changes. Psychiatric/Behavioral:  Negative for depression. The patient is not nervous/anxious.    HEMATOLOGIC: no easy bruising or bleeding gums  Endocrine: no sweats of urinary frequency or excessive thirst     Physical Examination     Evaluation of Cognitive Function:  Mood/affect:  happy  Appearance: age appropriate  Family member/caregiver input: None    Vitals:    12/20/22 1031   BP: 126/78   Pulse: 76   Resp: 16   Temp: 97.7 °F (36.5 °C)   TempSrc: Oral   SpO2: 98%   Weight: 177 lb 1.6 oz (80.3 kg)   Height: 5' 3\" (1.6 m)   PainSc:   0 - No pain        PHYSICAL EXAM:    General appearance - alert, well appearing, and in no distress  Mental status - alert, oriented to person, place, and time  HEENT:  Ears - bilateral TM's and external ear canals clear  Eyes - pupillary responses were normal.  Extraocular muscle function intact. Lids and conjunctiva not injected. Fundoscopic exam revealed sharp disc margins. eye movements intact  Pharynx- clear with teeth in good repair. No masses were noted  Neck - supple without thyromegaly or burit. No JVD noted  Lungs - clear to auscultation and percussion  Cardiac- normal rate, regular rhythm without murmurs. PMI not displaced. No gallop, rub or click  Breast: deferred to GYN  Abdomen - flat, soft, non-tender without palpable organomegaly or mass. No pulsatile mass was felt, and not bruit was heard. Bowel sounds were active   Female - deferred to GYN  Rectal - deferred to GYN  Extremities -  no clubbing cyanosis or edema  Lymphatics - no palpable lymphadenopathy, no hepatosplenomegaly  Peripheral vascular - Dorsalis pedis and posterior tibial pulses felt without difficulty  Skin - no rash or unusual mole change noted  Neurological - Cranial nerves II-XII grossly intact. Motor strength 5/5. DTR's 2+ and symmetric.   Station and gait normal  Back exam - full range of motion, no tenderness, palpable spasm or pain on motion  Musculoskeletal - no joint tenderness, deformity or swelling  Hematologic: no purpura, petechiae or bruising     Results for orders placed or performed in visit on 09/20/22   HEMOGLOBIN A1C WITH EAG   Result Value Ref Range    Hemoglobin A1c 5.8 (H) 4.0 - 5.6 %    Est. average glucose 120 mg/dL   CK   Result Value Ref Range    CK 49 26 - 192 U/L   LIPID PANEL   Result Value Ref Range    Cholesterol, total 181 <200 MG/DL    Triglyceride 110 <150 MG/DL    HDL Cholesterol 75 MG/DL    LDL, calculated 84 0 - 100 MG/DL    VLDL, calculated 22 MG/DL    CHOL/HDL Ratio 2.4 0.0 - 5.0     METABOLIC PANEL, COMPREHENSIVE   Result Value Ref Range    Sodium 140 136 - 145 mmol/L    Potassium 4.2 3.5 - 5.1 mmol/L    Chloride 106 97 - 108 mmol/L    CO2 28 21 - 32 mmol/L    Anion gap 6 5 - 15 mmol/L    Glucose 94 65 - 100 mg/dL    BUN 16 6 - 20 MG/DL    Creatinine 0.73 0.55 - 1.02 MG/DL    BUN/Creatinine ratio 22 (H) 12 - 20      GFR est AA >60 >60 ml/min/1.73m2    GFR est non-AA >60 >60 ml/min/1.73m2    Calcium 9.9 8.5 - 10.1 MG/DL    Bilirubin, total 1.0 0.2 - 1.0 MG/DL    ALT (SGPT) 26 12 - 78 U/L    AST (SGOT) 18 15 - 37 U/L    Alk. phosphatase 105 45 - 117 U/L    Protein, total 7.1 6.4 - 8.2 g/dL    Albumin 4.1 3.5 - 5.0 g/dL    Globulin 3.0 2.0 - 4.0 g/dL    A-G Ratio 1.4 1.1 - 2.2         Advice/Referrals/Counseling   Education and counseling provided:  Are appropriate based on today's review and evaluation  End-of-Life planning (with patient's consent)  Pneumococcal Vaccine  Influenza Vaccine  Colorectal cancer screening tests      Assessment/Plan     ASSESSMENT:   1. Hypertension with renal disease    2. Glucose intolerance (impaired glucose tolerance)    3. Mixed hyperlipidemia    4. Primary osteoarthritis involving multiple joints    5. Stage 2 chronic kidney disease    6. Osteopenia of multiple sites    7. Vitamin D deficiency    8. Chronic midline low back pain with left-sided sciatica    9. Polyp of colon, unspecified part of colon, unspecified type    10. Elevated LFTs    11. Non-seasonal allergic rhinitis, unspecified trigger    12. Alcohol screening    13. Medicare annual wellness visit, subsequent    14. Chest pain, unspecified type      Impression  1. Hypertension that is controlled so continue current therapy reviewed with her. 2.  Glucose intolerance repeat status pending prior lab reviewed  3. Hyperlipidemia prior lab reviewed repeat status pending  4. DJD that seems to be stable  5. CKD stage II repeat status pending  6. Osteopenia reviewed prior bone density  7. Vitamin D deficiency repeat status pending  8. Chronic midline low back pain seems stable  9. History colonic polypectomy up-to-date on colonoscopy according to her  10. Prior elevated liver enzymes repeat status pending  11. Allergic rhinitis stable   12. Annual alcohol screening is done and she has a rare drink of alcohol was that she was at Thanksgiving she had some apple cider with vodka and it. We did spend 8 minutes discussing excessive alcohol intake in females who averaged more than 1 drink per day with increased cardiovascular risk and increased risk of liver disease and other GI problems. 13.  Chest pain seems atypical; however cannot rule out cardiac cause. I do not think this is related to the COVID-vaccine which is what she thinks is related to. EKG obtained today no acute process. Chest x-ray obtained today is within normal limits. At this point I think she needs a nuclear stress test.  I clearly do not think this chest pain is related to her COVID-vaccine at all number so informed her. Medicare subsequent annual wellness examination screening questionnaire is completed today. Results reviewed with her and her questions were answered. Lifestyle recommendations modifications discussed and made. Follow-up in 3 months or sooner if there is a problem. I will call with lab results in interim. I will also call her with the nuclear stress test report. I did tell her if she developed increased frequency of the chest pain or change in character or prolonged chest pain prior to getting the stress test notify me or go to the emergency room. PLAN:  .  Orders Placed This Encounter    XR CHEST PA LAT    CBC WITH AUTOMATED DIFF    METABOLIC PANEL, COMPREHENSIVE    LIPID PANEL    CK    HEMOGLOBIN A1C WITH EAG    T4 (THYROXINE)    TSH 3RD GENERATION    URINALYSIS W/ REFLEX CULTURE    VITAMIN D, 25 HYDROXY    AMB POC EKG ROUTINE W/ 12 LEADS, INTER & REP         ATTENTION:   This medical record was transcribed using an electronic medical records system. Although proofread, it may and can contain electronic and spelling errors. Other human spelling and other errors may be present.   Corrections may be executed at a later time.  Please feel free to contact us for any clarifications as needed. Louis Hamlin MD     Recommended healthy diet low in carbohydrates, fats, sodium and cholesterol. Recommended regular cardiovascular exercise 3-6 times per week for 30-60 minutes daily. Current Outpatient Medications   Medication Sig Dispense Refill    ALPRAZolam (XANAX) 0.5 mg tablet Take 1 Tablet by mouth three (3) times daily as needed for Anxiety. Max Daily Amount: 1.5 mg. 50 Tablet 0    irbesartan (AVAPRO) 300 mg tablet TAKE 1 TABLET BY MOUTH  DAILY 90 Tablet 3    omeprazole (PRILOSEC) 20 mg capsule TAKE 1 CAPSULE BY MOUTH  DAILY 90 Capsule 3    hydroCHLOROthiazide (HYDRODIURIL) 25 mg tablet TAKE 1 TABLET BY MOUTH  DAILY 90 Tablet 3    Livalo 2 mg tablet TAKE 1 TABLET BY MOUTH  EVERY NIGHT AT BEDTIME 90 Tablet 3    vit C/E/Zn/coppr/lutein/zeaxan (PRESERVISION AREDS 2 PO) Take  by mouth. Indications: 2 pills dailly      biotin 10,000 mcg cap Take  by mouth daily (with dinner). Cholecalciferol, Vitamin D3, 3,000 unit tab Take 1,000 Units by mouth every morning. cyanocobalamin (VITAMIN B12) 500 mcg tablet Take 500 mcg by mouth every morning. polyethylene glycol (MIRALAX) 17 gram/dose powder Take 17 g by mouth every morning. Indications: CONSTIPATION         No results found for any visits on 12/20/22. Verbal and written instructions (see AVS) provided. Patient expresses understanding of diagnosis and treatment plan.     Louis Hamlin MD

## 2022-12-20 ENCOUNTER — OFFICE VISIT (OUTPATIENT)
Dept: INTERNAL MEDICINE CLINIC | Age: 77
End: 2022-12-20
Payer: MEDICARE

## 2022-12-20 VITALS
HEART RATE: 76 BPM | SYSTOLIC BLOOD PRESSURE: 126 MMHG | RESPIRATION RATE: 16 BRPM | HEIGHT: 63 IN | DIASTOLIC BLOOD PRESSURE: 78 MMHG | WEIGHT: 177.1 LBS | TEMPERATURE: 97.7 F | BODY MASS INDEX: 31.38 KG/M2 | OXYGEN SATURATION: 98 %

## 2022-12-20 DIAGNOSIS — M15.9 PRIMARY OSTEOARTHRITIS INVOLVING MULTIPLE JOINTS: ICD-10-CM

## 2022-12-20 DIAGNOSIS — N18.2 STAGE 2 CHRONIC KIDNEY DISEASE: ICD-10-CM

## 2022-12-20 DIAGNOSIS — M54.42 CHRONIC MIDLINE LOW BACK PAIN WITH LEFT-SIDED SCIATICA: ICD-10-CM

## 2022-12-20 DIAGNOSIS — E55.9 VITAMIN D DEFICIENCY: ICD-10-CM

## 2022-12-20 DIAGNOSIS — R79.89 ELEVATED LFTS: ICD-10-CM

## 2022-12-20 DIAGNOSIS — Z13.39 ALCOHOL SCREENING: ICD-10-CM

## 2022-12-20 DIAGNOSIS — E78.2 MIXED HYPERLIPIDEMIA: ICD-10-CM

## 2022-12-20 DIAGNOSIS — J30.89 NON-SEASONAL ALLERGIC RHINITIS, UNSPECIFIED TRIGGER: ICD-10-CM

## 2022-12-20 DIAGNOSIS — K63.5 POLYP OF COLON, UNSPECIFIED PART OF COLON, UNSPECIFIED TYPE: ICD-10-CM

## 2022-12-20 DIAGNOSIS — R73.02 GLUCOSE INTOLERANCE (IMPAIRED GLUCOSE TOLERANCE): ICD-10-CM

## 2022-12-20 DIAGNOSIS — I12.9 HYPERTENSION WITH RENAL DISEASE: Primary | ICD-10-CM

## 2022-12-20 DIAGNOSIS — M85.89 OSTEOPENIA OF MULTIPLE SITES: ICD-10-CM

## 2022-12-20 DIAGNOSIS — Z00.00 MEDICARE ANNUAL WELLNESS VISIT, SUBSEQUENT: ICD-10-CM

## 2022-12-20 DIAGNOSIS — G89.29 CHRONIC MIDLINE LOW BACK PAIN WITH LEFT-SIDED SCIATICA: ICD-10-CM

## 2022-12-20 DIAGNOSIS — R07.9 CHEST PAIN, UNSPECIFIED TYPE: ICD-10-CM

## 2022-12-20 LAB
APPEARANCE UR: CLEAR
BACTERIA URNS QL MICRO: ABNORMAL /HPF
BILIRUB UR QL: NEGATIVE
COLOR UR: ABNORMAL
EPITH CASTS URNS QL MICRO: ABNORMAL /LPF
GLUCOSE UR STRIP.AUTO-MCNC: NEGATIVE MG/DL
HGB UR QL STRIP: NEGATIVE
KETONES UR QL STRIP.AUTO: NEGATIVE MG/DL
LEUKOCYTE ESTERASE UR QL STRIP.AUTO: ABNORMAL
NITRITE UR QL STRIP.AUTO: NEGATIVE
PH UR STRIP: 6.5 [PH] (ref 5–8)
PROT UR STRIP-MCNC: NEGATIVE MG/DL
RBC #/AREA URNS HPF: ABNORMAL /HPF (ref 0–5)
SP GR UR REFRACTOMETRY: 1.01 (ref 1–1.03)
UA: UC IF INDICATED,UAUC: ABNORMAL
UROBILINOGEN UR QL STRIP.AUTO: 1 EU/DL (ref 0.2–1)
WBC URNS QL MICRO: ABNORMAL /HPF (ref 0–4)

## 2022-12-20 PROCEDURE — 1123F ACP DISCUSS/DSCN MKR DOCD: CPT | Performed by: INTERNAL MEDICINE

## 2022-12-20 PROCEDURE — G8510 SCR DEP NEG, NO PLAN REQD: HCPCS | Performed by: INTERNAL MEDICINE

## 2022-12-20 PROCEDURE — G8417 CALC BMI ABV UP PARAM F/U: HCPCS | Performed by: INTERNAL MEDICINE

## 2022-12-20 PROCEDURE — 3017F COLORECTAL CA SCREEN DOC REV: CPT | Performed by: INTERNAL MEDICINE

## 2022-12-20 PROCEDURE — G8399 PT W/DXA RESULTS DOCUMENT: HCPCS | Performed by: INTERNAL MEDICINE

## 2022-12-20 PROCEDURE — G8754 DIAS BP LESS 90: HCPCS | Performed by: INTERNAL MEDICINE

## 2022-12-20 PROCEDURE — 93000 ELECTROCARDIOGRAM COMPLETE: CPT | Performed by: INTERNAL MEDICINE

## 2022-12-20 PROCEDURE — 1101F PT FALLS ASSESS-DOCD LE1/YR: CPT | Performed by: INTERNAL MEDICINE

## 2022-12-20 PROCEDURE — 99215 OFFICE O/P EST HI 40 MIN: CPT | Performed by: INTERNAL MEDICINE

## 2022-12-20 PROCEDURE — G0439 PPPS, SUBSEQ VISIT: HCPCS | Performed by: INTERNAL MEDICINE

## 2022-12-20 PROCEDURE — G8427 DOCREV CUR MEDS BY ELIG CLIN: HCPCS | Performed by: INTERNAL MEDICINE

## 2022-12-20 PROCEDURE — G8536 NO DOC ELDER MAL SCRN: HCPCS | Performed by: INTERNAL MEDICINE

## 2022-12-20 PROCEDURE — G8752 SYS BP LESS 140: HCPCS | Performed by: INTERNAL MEDICINE

## 2022-12-20 PROCEDURE — G0442 ANNUAL ALCOHOL SCREEN 15 MIN: HCPCS | Performed by: INTERNAL MEDICINE

## 2022-12-20 PROCEDURE — 1090F PRES/ABSN URINE INCON ASSESS: CPT | Performed by: INTERNAL MEDICINE

## 2022-12-20 NOTE — PATIENT INSTRUCTIONS
Medicare Wellness Visit, Female     The best way to live healthy is to have a lifestyle where you eat a well-balanced diet, exercise regularly, limit alcohol use, and quit all forms of tobacco/nicotine, if applicable. Regular preventive services are another way to keep healthy. Preventive services (vaccines, screening tests, monitoring & exams) can help personalize your care plan, which helps you manage your own care. Screening tests can find health problems at the earliest stages, when they are easiest to treat. [facility] follows the current, evidence-based guidelines published by the [facility] ([facility]) when recommending preventive services for our patients. Because we follow these guidelines, sometimes recommendations change over time as research supports it. (For example, mammograms used to be recommended annually. Even though Medicare will still pay for an annual mammogram, the newer guidelines recommend a mammogram every two years for women of average risk). Of course, you and your doctor may decide to screen more often for some diseases, based on your risk and your co-morbidities (chronic disease you are already diagnosed with). Preventive services for you include:  - Medicare offers their members a free annual wellness visit, which is time for you and your primary care provider to discuss and plan for your preventive service needs.  Take advantage of this benefit every year!    -Over the age of 72 should receive the recommended pneumonia vaccines.    -All adults should have a flu vaccine yearly.  -All adults should have a tetanus vaccine every 10 years.   -Over the age 48 should receive the shingles vaccines.        -All adults should be screened once for Hepatitis C.  -All adults age 38-68 who are overweight should have a diabetes screening test once every three years.   -Other screening tests and preventive services for persons with diabetes include: an eye exam to screen for diabetic retinopathy, a kidney function test, a foot exam, and stricter control over your cholesterol.   -Cardiovascular screening for adults with routine risk involves an electrocardiogram (ECG) at intervals determined by your doctor.     -Colorectal cancer screenings should be done for adults age 39-70 with no increased risk factors for colorectal cancer. There are a number of acceptable methods of screening for this type of cancer. Each test has its own benefits and drawbacks. Discuss with your doctor what is most appropriate for you during your annual wellness visit. The different tests include: colonoscopy (considered the best screening method), a fecal occult blood test, a fecal DNA test, and sigmoidoscopy.    -Lung cancer screening is recommended annually with a low dose CT scan for adults between age 54 and 68, who have smoked at least 30 pack years (equivalent of 1 pack per day for 30 days), and who is a current smoker or quit less than 15 years ago.    -A bone mass density test is recommended when a woman turns 65 to screen for osteoporosis. This test is only recommended one time, as a screening. Some providers will use this same test as a disease monitoring tool if you already have osteoporosis. -Breast cancer screenings are recommended every other year for women of normal risk, age 54-69.    -Cervical cancer screenings for women over age 72 are only recommended with certain risk factors.      Here is a list of your current Health Maintenance items (your personalized list of preventive services) with a due date:  Health Maintenance Due   Topic Date Due    Pneumococcal Vaccine (3) 07/06/2016    Colorectal Screening  07/11/2019    Shingles Vaccine (2 of 2) 10/21/2019    COVID-19 Vaccine (3 - Booster for Thinkful series) 05/26/2021    Annual Well Visit  12/15/2022

## 2022-12-20 NOTE — PROGRESS NOTES
Chief Complaint   Patient presents with    Annual Wellness Visit     Visit Vitals  /78 (BP 1 Location: Left upper arm, BP Patient Position: Sitting, BP Cuff Size: Adult)   Pulse 76   Temp 97.7 °F (36.5 °C) (Oral)   Resp 16   Ht 5' 3\" (1.6 m)   Wt 177 lb 1.6 oz (80.3 kg)   SpO2 98%   BMI 31.37 kg/m²     1. Have you been to the ER, urgent care clinic since your last visit? Hospitalized since your last visit? No    2. Have you seen or consulted any other health care providers outside of the [address] [place] [address] since your last visit? Include any pap smears or colon screening. Dr. Brown-urology      Depression Risk Factor Screening:     3 most recent PHQ Screens 12/20/2022   Little interest or pleasure in doing things Not at all   Feeling down, depressed, irritable, or hopeless Not at all   Total Score PHQ 2 0       Functional Ability and Level of Safety:     Activities of Daily Living  ADL Assessment 12/20/2022   Feeding yourself No Help Needed   Getting from bed to chair No Help Needed   Getting dressed No Help Needed   Bathing or showering No Help Needed   Walk across the room (includes cane/walker) No Help Needed   Using the telphone No Help Needed   Taking your medications No Help Needed   Preparing meals No Help Needed   Managing money (expenses/bills) No Help Needed   Moderately strenuous housework (laundry) No Help Needed   Shopping for personal items (toiletries/medicines) No Help Needed   Shopping for groceries No Help Needed   Driving No Help Needed   Climbing a flight of stairs No Help Needed   Getting to places beyond walking distances No Help Needed       Fall Risk  Fall Risk Assessment, last 12 mths 12/20/2022   Able to walk? Yes   Fall in past 12 months? 0   Do you feel unsteady? 0   Are you worried about falling 0   Number of falls in past 12 months -   Fall with injury? -       Abuse Screen  Abuse Screening Questionnaire 12/20/2022   Do you ever feel afraid of your partner?  N   Are you in a relationship with someone who physically or mentally threatens you? N   Is it safe for you to go home?  Y         Patient Care Team   Patient Care Team:  Teena Kimball MD as PCP - General (Internal Medicine Physician)  [facility]

## 2022-12-21 LAB
25(OH)D3 SERPL-MCNC: 39.4 NG/ML (ref 30–100)
ALBUMIN SERPL-MCNC: 3.9 G/DL (ref 3.5–5)
ALBUMIN/GLOB SERPL: 1.3 {RATIO} (ref 1.1–2.2)
ALP SERPL-CCNC: 99 U/L (ref 45–117)
ALT SERPL-CCNC: 28 U/L (ref 12–78)
ANION GAP SERPL CALC-SCNC: 7 MMOL/L (ref 5–15)
AST SERPL-CCNC: 24 U/L (ref 15–37)
BASOPHILS # BLD: 0.1 K/UL (ref 0–0.1)
BASOPHILS NFR BLD: 1 % (ref 0–1)
BILIRUB SERPL-MCNC: 0.8 MG/DL (ref 0.2–1)
BUN SERPL-MCNC: 17 MG/DL (ref 6–20)
BUN/CREAT SERPL: 25 (ref 12–20)
CALCIUM SERPL-MCNC: 9.7 MG/DL (ref 8.5–10.1)
CHLORIDE SERPL-SCNC: 104 MMOL/L (ref 97–108)
CHOLEST SERPL-MCNC: 187 MG/DL
CK SERPL-CCNC: 82 U/L (ref 26–192)
CO2 SERPL-SCNC: 30 MMOL/L (ref 21–32)
CREAT SERPL-MCNC: 0.68 MG/DL (ref 0.55–1.02)
DIFFERENTIAL METHOD BLD: NORMAL
EOSINOPHIL # BLD: 0.2 K/UL (ref 0–0.4)
EOSINOPHIL NFR BLD: 3 % (ref 0–7)
ERYTHROCYTE [DISTWIDTH] IN BLOOD BY AUTOMATED COUNT: 14 % (ref 11.5–14.5)
EST. AVERAGE GLUCOSE BLD GHB EST-MCNC: 114 MG/DL
GLOBULIN SER CALC-MCNC: 3 G/DL (ref 2–4)
GLUCOSE SERPL-MCNC: 91 MG/DL (ref 65–100)
HBA1C MFR BLD: 5.6 % (ref 4–5.6)
HCT VFR BLD AUTO: 43.2 % (ref 35–47)
HDLC SERPL-MCNC: 69 MG/DL
HDLC SERPL: 2.7 {RATIO} (ref 0–5)
HGB BLD-MCNC: 13.7 G/DL (ref 11.5–16)
IMM GRANULOCYTES # BLD AUTO: 0 K/UL (ref 0–0.04)
IMM GRANULOCYTES NFR BLD AUTO: 0 % (ref 0–0.5)
LDLC SERPL CALC-MCNC: 100.2 MG/DL (ref 0–100)
LYMPHOCYTES # BLD: 3.1 K/UL (ref 0.8–3.5)
LYMPHOCYTES NFR BLD: 48 % (ref 12–49)
MCH RBC QN AUTO: 27.7 PG (ref 26–34)
MCHC RBC AUTO-ENTMCNC: 31.7 G/DL (ref 30–36.5)
MCV RBC AUTO: 87.3 FL (ref 80–99)
MONOCYTES # BLD: 0.4 K/UL (ref 0–1)
MONOCYTES NFR BLD: 6 % (ref 5–13)
NEUTS SEG # BLD: 2.7 K/UL (ref 1.8–8)
NEUTS SEG NFR BLD: 42 % (ref 32–75)
NRBC # BLD: 0 K/UL (ref 0–0.01)
NRBC BLD-RTO: 0 PER 100 WBC
PLATELET # BLD AUTO: 243 K/UL (ref 150–400)
PMV BLD AUTO: 11.9 FL (ref 8.9–12.9)
POTASSIUM SERPL-SCNC: 3.9 MMOL/L (ref 3.5–5.1)
PROT SERPL-MCNC: 6.9 G/DL (ref 6.4–8.2)
RBC # BLD AUTO: 4.95 M/UL (ref 3.8–5.2)
SODIUM SERPL-SCNC: 141 MMOL/L (ref 136–145)
T4 SERPL-MCNC: 8.9 UG/DL (ref 4.8–13.9)
TRIGL SERPL-MCNC: 89 MG/DL (ref ?–150)
TSH SERPL DL<=0.05 MIU/L-ACNC: 1.25 UIU/ML (ref 0.36–3.74)
VLDLC SERPL CALC-MCNC: 17.8 MG/DL
WBC # BLD AUTO: 6.4 K/UL (ref 3.6–11)

## 2022-12-22 RX ORDER — DOXYCYCLINE 100 MG/1
100 CAPSULE ORAL 2 TIMES DAILY
Qty: 14 CAPSULE | Refills: 0 | Status: SHIPPED | OUTPATIENT
Start: 2022-12-22

## 2022-12-22 NOTE — PROGRESS NOTES
Called and spoke to patient  Two pt identifiers confirmed  Informed patient per Dr. Katelyn Glasgow that labs are ok but has urine infection and to take doxycyxline 100 mg BID x 7 days  Rx was sent to pharmacy on file  Pt will get follow up urinalysis after finishing antibiotic  Patient verbalized understanding of information discussed  with no further questions at this time.

## 2022-12-22 NOTE — TELEPHONE ENCOUNTER
PCP: Linda Hennessy MD    Last appt: 12/20/2022  Future Appointments   Date Time Provider Preston Perez   3/29/2023 10:20 AM Linda Hennessy MD [facility] AM[facility]       Last refilled:-    Requested Prescriptions     Pending Prescriptions Disp Refills    doxycycline (VIBRAMYCIN) 100 mg capsule 14 Capsule 0     Sig: Take 1 Capsule by mouth two (2) times a day.

## 2022-12-28 DIAGNOSIS — K21.9 GASTROESOPHAGEAL REFLUX DISEASE WITHOUT ESOPHAGITIS: ICD-10-CM

## 2022-12-28 DIAGNOSIS — I10 ESSENTIAL HYPERTENSION: ICD-10-CM

## 2022-12-28 DIAGNOSIS — I12.9 HYPERTENSION WITH RENAL DISEASE: ICD-10-CM

## 2022-12-28 RX ORDER — PITAVASTATIN CALCIUM 2.09 MG/1
TABLET, FILM COATED ORAL
Qty: 90 TABLET | Refills: 3 | Status: SHIPPED | OUTPATIENT
Start: 2022-12-28

## 2022-12-28 RX ORDER — OMEPRAZOLE 20 MG/1
CAPSULE, DELAYED RELEASE ORAL
Qty: 90 CAPSULE | Refills: 3 | Status: SHIPPED | OUTPATIENT
Start: 2022-12-28

## 2022-12-28 RX ORDER — HYDROCHLOROTHIAZIDE 25 MG/1
TABLET ORAL
Qty: 90 TABLET | Refills: 3 | Status: SHIPPED | OUTPATIENT
Start: 2022-12-28

## 2022-12-28 RX ORDER — IRBESARTAN 300 MG/1
TABLET ORAL
Qty: 90 TABLET | Refills: 3 | Status: SHIPPED | OUTPATIENT
Start: 2022-12-28

## 2023-01-13 ENCOUNTER — OFFICE VISIT (OUTPATIENT)
Dept: INTERNAL MEDICINE CLINIC | Age: 78
End: 2023-01-13
Payer: MEDICARE

## 2023-01-13 VITALS
DIASTOLIC BLOOD PRESSURE: 70 MMHG | BODY MASS INDEX: 31.17 KG/M2 | OXYGEN SATURATION: 97 % | HEART RATE: 83 BPM | TEMPERATURE: 97.7 F | WEIGHT: 175.9 LBS | HEIGHT: 63 IN | RESPIRATION RATE: 16 BRPM | SYSTOLIC BLOOD PRESSURE: 120 MMHG

## 2023-01-13 DIAGNOSIS — J06.9 ACUTE URI: ICD-10-CM

## 2023-01-13 DIAGNOSIS — R25.2 LIMB CRAMPS: ICD-10-CM

## 2023-01-13 DIAGNOSIS — N30.00 ACUTE CYSTITIS WITHOUT HEMATURIA: Primary | ICD-10-CM

## 2023-01-13 RX ORDER — LEVOFLOXACIN 500 MG/1
500 TABLET, FILM COATED ORAL DAILY
Qty: 10 TABLET | Refills: 0 | Status: SHIPPED | OUTPATIENT
Start: 2023-01-13

## 2023-01-13 NOTE — PROGRESS NOTES
HIPAA verified by two patient identifiers. Inez Fowler is a 78 y.o. female    Chief Complaint   Patient presents with    Cough     Friday night  and Saturday night     Headache    UTI       Visit Vitals  /70 (BP 1 Location: Left upper arm, BP Patient Position: Sitting, BP Cuff Size: Adult long)   Pulse 83   Temp 97.7 °F (36.5 °C) (Oral)   Resp 16   Ht 5' 3\" (1.6 m)   Wt 175 lb 14.4 oz (79.8 kg)   SpO2 97%   BMI 31.16 kg/m²       Pain Scale: 0 - No pain/10  Pain Location:       Health Maintenance Due   Topic Date Due    Pneumococcal 65+ years (3) 07/06/2016    Colorectal Cancer Screening Combo  07/11/2019    Shingles Vaccine (2 of 2) 10/21/2019    COVID-19 Vaccine (3 - Booster for Pfizer series) 05/26/2021         Coordination of Care Questionnaire:  :   1) Have you been to an emergency room, urgent care, or hospitalized since your last visit? If yes, where when, and reason for visit? no       2. Have seen or consulted any other health care provider since your last visit? If yes, where when, and reason for visit? NO      Patient is accompanied by self I have received verbal consent from Inez Fowler to discuss any/all medical information while they are present in the room.

## 2023-01-13 NOTE — PROGRESS NOTES
Chief Complaint   Patient presents with    Cough     Friday night  and Saturday night     Headache    UTI       SUBJECTIVE:    Marissa Mathew is a 78 y.o. female who returns today feeling poorly known that she started about 6 days ago with a headache cough and upper respiratory symptoms which she has not had a a lot of coughing but no sputum production per se. She has done a COVID test twice which was negative. She also recently was treated for urinary tract infection with doxycycline which she does not feel like that cleared it up as she has noted some urinary frequency and dysuria going about every hour during the daytime. She notes no back pain abdominal pain. She has had some low-grade fevers without chills. She also has had some cramps in her fingers of her as well as her toes and is curious if that could be related at all. She does occasion note some numbness in the left leg but that is not a persistent problem. She denies any other complaints on complete review of systems. Current Outpatient Medications   Medication Sig Dispense Refill    levoFLOXacin (LEVAQUIN) 500 mg tablet Take 1 Tablet by mouth daily. 10 Tablet 0    omeprazole (PRILOSEC) 20 mg capsule TAKE 1 CAPSULE BY MOUTH  DAILY 90 Capsule 3    hydroCHLOROthiazide (HYDRODIURIL) 25 mg tablet TAKE 1 TABLET BY MOUTH  DAILY 90 Tablet 3    irbesartan (AVAPRO) 300 mg tablet TAKE 1 TABLET BY MOUTH  DAILY 90 Tablet 3    Livalo 2 mg tablet TAKE 1 TABLET BY MOUTH  EVERY NIGHT AT BEDTIME 90 Tablet 3    ALPRAZolam (XANAX) 0.5 mg tablet Take 1 Tablet by mouth three (3) times daily as needed for Anxiety. Max Daily Amount: 1.5 mg. 50 Tablet 0    vit C/E/Zn/coppr/lutein/zeaxan (PRESERVISION AREDS 2 PO) Take  by mouth. Indications: 2 pills dailly      biotin 10,000 mcg cap Take  by mouth daily (with dinner). Cholecalciferol, Vitamin D3, 3,000 unit tab Take 1,000 Units by mouth every morning.       cyanocobalamin (VITAMIN B12) 500 mcg tablet Take 500 mcg by mouth every morning. polyethylene glycol (MIRALAX) 17 gram/dose powder Take 17 g by mouth every morning.  Indications: CONSTIPATION       Past Medical History:   Diagnosis Date    Anxiety     Arthritis     Chronic constipation     CKD (chronic kidney disease) 7/17/2017    Colon polyps 7/17/2017    DJD (degenerative joint disease) 7/17/2017    Elevated LFTs 7/17/2017    Flu 02/19/2019    GERD (gastroesophageal reflux disease)     Glucose intolerance (impaired glucose tolerance) 7/17/2017    Hemorrhoids     internal & external- bleeds frequently    High cholesterol     Hyperlipidemia 7/17/2017    Hypertension     Hypertension with renal disease 7/17/2017    PT Education - High Blood Pressure (Essential Hypertension) *: blood pressure PT Education - How to access health information online: discussed with patient and provided information    Hypertension, renal 7/17/2017    Ill-defined condition     ringing in ears    Menopause     Mild obesity 7/17/2017    Nausea & vomiting     Neoplasm of uncertain behavior of skin 7/17/2017    On statin therapy 7/17/2017    Osteopenia 7/17/2017    Primary angle-closure glaucoma 7/17/2017    Comments: OU    Skin cancer of face     as of 6/15/16 pt states \"removed years ago\"    Spinal stenosis      Past Surgical History:   Procedure Laterality Date    HX BREAST BIOPSY Right 02/26/2021    HX CATARACT REMOVAL Bilateral 2020    HX CHOLECYSTECTOMY  11/06/2014    Dr Efrain Lam    HX HEENT      laser for glaucoma    HX HYSTERECTOMY      partial    HX OOPHORECTOMY Bilateral     HX PELVIC LAPAROSCOPY      Jono. oophorectomy    HX TUBAL LIGATION      HX UROLOGICAL  08/01/2022    bladder surgery Dr. Chery Shepard EPID LUMB ANES/STER SNGL  09/28/2021    [facility] FLX W/REMOVAL LESION BY HOT BX FORCEPS  11/12/2012         [facility] ER[facility] REMOVE CALCULI/DEBRIS BILIARY/PANCREAS DUCT  11/07/2014         [facility] W/SPHINCTEROTOMY/PAPILLOTOMY  11/07/2014          Allergies   Allergen Reactions    Iodinated Contrast Media Hives    Oxycodone-Aspirin Unknown (comments)     Patient states this in not a true allergy       REVIEW OF SYSTEMS:  General: negative for - chills, or weight loss or gain. Positive for low-grade fever  ENT: negative for - headaches, or tinnitus.   Possibly had nasal congestion with little bit of a cough and some sore throat  Eyes: no blurred or visual changes  Neck: No stiffness or swollen nodes  Respiratory: negative for -  hemoptysis, shortness of breath or wheezing positive for cough as noted no sputum  Cardiovascular : negative for - chest pain, edema, palpitations or shortness of breath  Gastrointestinal: negative for - abdominal pain, blood in stools, heartburn or nausea/vomiting  Genito-Urinary: Positive urinary frequency and some dysuria without trouble voiding, or hematuria  Musculoskeletal: negative for - gait disturbance, joint pain, joint stiffness or joint swelling  Neurological: no TIA or stroke symptoms  Hematologic: no bruises, no bleeding  Lymphatic: no swollen glands  Integument: no lumps, mole changes, nail changes or rash  Endocrine:no malaise/lethargy poly uria or polydipsia or unexpected weight changes        Social History     Socioeconomic History    Marital status:    Tobacco Use    Smoking status: Former     Packs/day: 1.50     Years: 35.00     Pack years: 52.50     Types: Cigarettes     Quit date: 2004     Years since quittin.2    Smokeless tobacco: Never    Tobacco comments:     quit smoking 6 yrs ago   Vaping Use    Vaping Use: Never used   Substance and Sexual Activity    Alcohol use: No    Drug use: No    Sexual activity: Never     Family History   Problem Relation Age of Onset    Heart Disease Mother     Hypertension Mother     Cancer Mother         skin    Stroke Father     Breast Cancer Sister         onset: 76s    Breast Cancer Niece        OBJECTIVE:     Visit Vitals  /70 (BP 1 Location: Left upper arm, BP Patient Position: Sitting, BP Cuff Size: Adult long)   Pulse 83   Temp 97.7 °F (36.5 °C) (Oral)   Resp 16   Ht 5' 3\" (1.6 m)   Wt 175 lb 14.4 oz (79.8 kg)   SpO2 97%   BMI 31.16 kg/m²     CONSTITUTIONAL:   well nourished, appears age appropriate  EYES: sclera anicteric, PERRL, EOMI  ENMT:nares inflamed and edematous, moist mucous membranes, pharynx clear  NECK: supple. Thyroid normal, No JVD or bruits  RESPIRATORY: Chest: clear to ascultation and percussion, normal inspiratory effort  CARDIOVASCULAR: Heart: regular rate and rhythm no murmurs, rubs or gallops, PMI not displaced, No thrills, no peripheral edema  GASTROINTESTINAL: Abdomen: non distended, soft, non tender, bowel sounds normal  HEMATOLOGIC: no purpura, petechiae or bruising  LYMPHATIC: No lymph node enlargemant  MUSCULOSKELETAL: Extremities: no active synovitis, pulse 1+   INTEGUMENT: No unusual rashes or suspicious skin lesions noted. Nails appear normal.  PERIPHERAL VASCULAR: normal pulses femoral, PT and DP  NEUROLOGIC: non-focal exam, A & O X 3  PSYCHIATRIC:, appropriate affect     ASSESSMENT:   1. Acute cystitis without hematuria    2. Limb cramps    3. Acute URI      Impression  1 acute cystitis she recently had doxycycline we will check a urine and culture and I will place her on Levaquin  2. Cramps toes and fingers I will check a potassium and magnesium  3. Acute upper respiratory infection Levaquin should cover that as well. She has been COVID tested x2 which is negative and I do not think this is consistent with flu  4. Numbness in the left leg I think unrelated and if that does not improve with the above treatment and I think that will need to be evaluated further  Follow-up per previous schedule and she will notify me if she does not improve with the above treatment.   I will notify records of the lab studies including a urine and urine culture    PLAN:  .  Orders Placed This Encounter    CULTURE, URINE    MAGNESIUM    METABOLIC PANEL, BASIC URINALYSIS W/MICROSCOPIC    levoFLOXacin (LEVAQUIN) 500 mg tablet         ATTENTION:   This medical record was transcribed using an electronic medical records system. Although proofread, it may and can contain electronic and spelling errors. Other human spelling and other errors may be present. Corrections may be executed at a later time. Please feel free to contact us for any clarifications as needed. Follow-up and Dispositions    Return At prior appt. No results found for any visits on 01/13/23. Elizabeth Gonzalez MD    The patient verbalized understanding of the problems and plans as explained.

## 2023-01-14 LAB
ANION GAP SERPL CALC-SCNC: 6 MMOL/L (ref 5–15)
APPEARANCE UR: CLEAR
BACTERIA URNS QL MICRO: NEGATIVE /HPF
BILIRUB UR QL: NEGATIVE
BUN SERPL-MCNC: 20 MG/DL (ref 6–20)
BUN/CREAT SERPL: 27 (ref 12–20)
CALCIUM SERPL-MCNC: 9.4 MG/DL (ref 8.5–10.1)
CHLORIDE SERPL-SCNC: 103 MMOL/L (ref 97–108)
CO2 SERPL-SCNC: 28 MMOL/L (ref 21–32)
COLOR UR: ABNORMAL
CREAT SERPL-MCNC: 0.74 MG/DL (ref 0.55–1.02)
EPITH CASTS URNS QL MICRO: ABNORMAL /LPF
GLUCOSE SERPL-MCNC: 85 MG/DL (ref 65–100)
GLUCOSE UR STRIP.AUTO-MCNC: NEGATIVE MG/DL
HGB UR QL STRIP: NEGATIVE
HYALINE CASTS URNS QL MICRO: ABNORMAL /LPF (ref 0–5)
KETONES UR QL STRIP.AUTO: NEGATIVE MG/DL
LEUKOCYTE ESTERASE UR QL STRIP.AUTO: ABNORMAL
MAGNESIUM SERPL-MCNC: 2.2 MG/DL (ref 1.6–2.4)
NITRITE UR QL STRIP.AUTO: NEGATIVE
PH UR STRIP: 6.5 (ref 5–8)
POTASSIUM SERPL-SCNC: 4.3 MMOL/L (ref 3.5–5.1)
PROT UR STRIP-MCNC: NEGATIVE MG/DL
RBC #/AREA URNS HPF: ABNORMAL /HPF (ref 0–5)
SODIUM SERPL-SCNC: 137 MMOL/L (ref 136–145)
SP GR UR REFRACTOMETRY: 1.02 (ref 1–1.03)
UROBILINOGEN UR QL STRIP.AUTO: 1 EU/DL (ref 0.2–1)
WBC URNS QL MICRO: ABNORMAL /HPF (ref 0–4)

## 2023-01-15 LAB
BACTERIA SPEC CULT: NORMAL
SERVICE CMNT-IMP: NORMAL

## 2023-01-18 PROBLEM — Z00.00 MEDICARE ANNUAL WELLNESS VISIT, SUBSEQUENT: Status: RESOLVED | Noted: 2017-10-23 | Resolved: 2023-01-18

## 2023-01-25 ENCOUNTER — TELEPHONE (OUTPATIENT)
Dept: INTERNAL MEDICINE CLINIC | Age: 78
End: 2023-01-25

## 2023-02-21 ENCOUNTER — TRANSCRIBE ORDER (OUTPATIENT)
Dept: SCHEDULING | Age: 78
End: 2023-02-21

## 2023-02-21 DIAGNOSIS — Z12.31 VISIT FOR SCREENING MAMMOGRAM: Primary | ICD-10-CM

## 2023-03-29 ENCOUNTER — OFFICE VISIT (OUTPATIENT)
Dept: INTERNAL MEDICINE CLINIC | Age: 78
End: 2023-03-29
Payer: MEDICARE

## 2023-03-29 VITALS
HEART RATE: 71 BPM | TEMPERATURE: 97.8 F | BODY MASS INDEX: 32.59 KG/M2 | WEIGHT: 184 LBS | DIASTOLIC BLOOD PRESSURE: 68 MMHG | OXYGEN SATURATION: 98 % | SYSTOLIC BLOOD PRESSURE: 118 MMHG

## 2023-03-29 DIAGNOSIS — I12.9 HYPERTENSION WITH RENAL DISEASE: Primary | ICD-10-CM

## 2023-03-29 DIAGNOSIS — M15.9 PRIMARY OSTEOARTHRITIS INVOLVING MULTIPLE JOINTS: ICD-10-CM

## 2023-03-29 DIAGNOSIS — R73.02 GLUCOSE INTOLERANCE (IMPAIRED GLUCOSE TOLERANCE): ICD-10-CM

## 2023-03-29 DIAGNOSIS — M85.89 OSTEOPENIA OF MULTIPLE SITES: ICD-10-CM

## 2023-03-29 DIAGNOSIS — E78.2 MIXED HYPERLIPIDEMIA: ICD-10-CM

## 2023-03-29 DIAGNOSIS — N18.2 STAGE 2 CHRONIC KIDNEY DISEASE: ICD-10-CM

## 2023-03-29 PROCEDURE — G8399 PT W/DXA RESULTS DOCUMENT: HCPCS | Performed by: INTERNAL MEDICINE

## 2023-03-29 PROCEDURE — 99214 OFFICE O/P EST MOD 30 MIN: CPT | Performed by: INTERNAL MEDICINE

## 2023-03-29 PROCEDURE — G8536 NO DOC ELDER MAL SCRN: HCPCS | Performed by: INTERNAL MEDICINE

## 2023-03-29 PROCEDURE — 1101F PT FALLS ASSESS-DOCD LE1/YR: CPT | Performed by: INTERNAL MEDICINE

## 2023-03-29 PROCEDURE — G8510 SCR DEP NEG, NO PLAN REQD: HCPCS | Performed by: INTERNAL MEDICINE

## 2023-03-29 PROCEDURE — G8417 CALC BMI ABV UP PARAM F/U: HCPCS | Performed by: INTERNAL MEDICINE

## 2023-03-29 PROCEDURE — G8427 DOCREV CUR MEDS BY ELIG CLIN: HCPCS | Performed by: INTERNAL MEDICINE

## 2023-03-29 PROCEDURE — 1090F PRES/ABSN URINE INCON ASSESS: CPT | Performed by: INTERNAL MEDICINE

## 2023-03-29 PROCEDURE — 3017F COLORECTAL CA SCREEN DOC REV: CPT | Performed by: INTERNAL MEDICINE

## 2023-03-29 PROCEDURE — 1123F ACP DISCUSS/DSCN MKR DOCD: CPT | Performed by: INTERNAL MEDICINE

## 2023-03-29 NOTE — PROGRESS NOTES
Chief Complaint   Patient presents with    Hypertension    Osteoarthritis    Chronic Kidney Disease    Cholesterol Problem    1. Have you been to the ER, urgent care clinic since your last visit? Hospitalized since your last visit?no    2. Have you seen or consulted any other health care providers outside of the [address] [place] [address] since your last visit? Include any pap smears or colon screening.  no

## 2023-03-29 NOTE — PROGRESS NOTES
Chief Complaint   Patient presents with    Hypertension    Osteoarthritis    Chronic Kidney Disease    Cholesterol Problem       SUBJECTIVE:    Kristofer Tillman is a 78 y.o. female who returns in follow-up for medical problems include hypertension, glucose intolerance, hyperlipidemia, DJD, chronic midline low back pain, osteopenia, DJD and other multimedical problems. She is taking her medications trying to follow her diet try and remain physically active. She lost a good amount of weight but is unfortunately gained significant amount of it back. She currently denies any chest pain, shortness of breath or cardiac or respiratory complaints. There are no GI or  complaints. She has no headaches, dizziness or neurologic complaints. She has no change of her chronic arthritic complaints and there are no other complaints on complete review systems. Current Outpatient Medications   Medication Sig Dispense Refill    omeprazole (PRILOSEC) 20 mg capsule TAKE 1 CAPSULE BY MOUTH  DAILY 90 Capsule 3    hydroCHLOROthiazide (HYDRODIURIL) 25 mg tablet TAKE 1 TABLET BY MOUTH  DAILY 90 Tablet 3    irbesartan (AVAPRO) 300 mg tablet TAKE 1 TABLET BY MOUTH  DAILY 90 Tablet 3    Livalo 2 mg tablet TAKE 1 TABLET BY MOUTH  EVERY NIGHT AT BEDTIME 90 Tablet 3    ALPRAZolam (XANAX) 0.5 mg tablet Take 1 Tablet by mouth three (3) times daily as needed for Anxiety. Max Daily Amount: 1.5 mg. 50 Tablet 0    vit C/E/Zn/coppr/lutein/zeaxan (PRESERVISION AREDS 2 PO) Take  by mouth. Indications: 2 pills dailly      biotin 10,000 mcg cap Take  by mouth daily (with dinner). Cholecalciferol, Vitamin D3, 3,000 unit tab Take 1,000 Units by mouth every morning. cyanocobalamin (VITAMIN B12) 500 mcg tablet Take 500 mcg by mouth every morning. polyethylene glycol (MIRALAX) 17 gram/dose powder Take 17 g by mouth every morning.  Indications: CONSTIPATION       Past Medical History:   Diagnosis Date    Anxiety     Arthritis Chronic constipation     CKD (chronic kidney disease) 7/17/2017    Colon polyps 7/17/2017    DJD (degenerative joint disease) 7/17/2017    Elevated LFTs 7/17/2017    Flu 02/19/2019    GERD (gastroesophageal reflux disease)     Glucose intolerance (impaired glucose tolerance) 7/17/2017    Hemorrhoids     internal & external- bleeds frequently    High cholesterol     Hyperlipidemia 7/17/2017    Hypertension     Hypertension with renal disease 7/17/2017    PT Education - High Blood Pressure (Essential Hypertension) *: blood pressure PT Education - How to access health information online: discussed with patient and provided information    Hypertension, renal 7/17/2017    Ill-defined condition     ringing in ears    Menopause     Mild obesity 7/17/2017    Nausea & vomiting     Neoplasm of uncertain behavior of skin 7/17/2017    On statin therapy 7/17/2017    Osteopenia 7/17/2017    Primary angle-closure glaucoma 7/17/2017    Comments: OU    Skin cancer of face     as of 6/15/16 pt states \"removed years ago\"    Spinal stenosis      Past Surgical History:   Procedure Laterality Date    HX BREAST BIOPSY Right 02/26/2021    HX CATARACT REMOVAL Bilateral 2020    HX CHOLECYSTECTOMY  11/06/2014    Dr Mikaela Ribeiro    HX HEENT      laser for glaucoma    HX HYSTERECTOMY      partial    HX OOPHORECTOMY Bilateral     HX PELVIC LAPAROSCOPY      Jono. oophorectomy    HX TUBAL LIGATION      HX UROLOGICAL  08/01/2022    bladder surgery Dr. Cathy Matos EPID LUMB ANES/STER SNGL  09/28/2021    [facility] COLSC FLX W/REMOVAL LESION BY HOT BX FORCEPS  11/12/2012         [facility] ERCP REMOVE CALCULI/DEBRIS BILIARY/PANCREAS DUCT  11/07/2014         [facility] ERCP W/SPHINCTEROTOMY/PAPILLOTOMY  11/07/2014          Allergies   Allergen Reactions    Iodinated Contrast Media Hives    Oxycodone-Aspirin Unknown (comments)     Patient states this in not a true allergy       REVIEW OF SYSTEMS:  General: negative for - chills or fever, or weight loss or gain  ENT: negative for - headaches, nasal congestion or tinnitus  Eyes: no blurred or visual changes  Neck: No stiffness or swollen nodes  Respiratory: negative for - cough, hemoptysis, shortness of breath or wheezing  Cardiovascular : negative for - chest pain, edema, palpitations or shortness of breath  Gastrointestinal: negative for - abdominal pain, blood in stools, heartburn or nausea/vomiting  Genito-Urinary: no dysuria, trouble voiding, or hematuria  Musculoskeletal: negative for - gait disturbance, joint pain, joint stiffness or joint swelling  Neurological: no TIA or stroke symptoms  Hematologic: no bruises, no bleeding  Lymphatic: no swollen glands  Integument: no lumps, mole changes, nail changes or rash  Endocrine:no malaise/lethargy poly uria or polydipsia or unexpected weight changes        Social History     Socioeconomic History    Marital status:    Tobacco Use    Smoking status: Former     Packs/day: 1.50     Years: 35.00     Pack years: 52.50     Types: Cigarettes     Quit date: 2004     Years since quittin.4    Smokeless tobacco: Never    Tobacco comments:     quit smoking 6 yrs ago   Vaping Use    Vaping Use: Never used   Substance and Sexual Activity    Alcohol use: No    Drug use: No    Sexual activity: Never     Family History   Problem Relation Age of Onset    Heart Disease Mother     Hypertension Mother     Cancer Mother         skin    Stroke Father     Breast Cancer Sister         onset: 76s    Breast Cancer Niece        OBJECTIVE:     Visit Vitals  /68 (BP 1 Location: Right arm, BP Patient Position: Sitting)   Pulse 71   Temp 97.8 °F (36.6 °C)   Wt 184 lb (83.5 kg)   SpO2 98%   BMI 32.59 kg/m²     CONSTITUTIONAL:   well nourished, appears age appropriate  EYES: sclera anicteric, PERRL, EOMI  ENMT:nares clear, moist mucous membranes, pharynx clear  NECK: supple.  Thyroid normal, No JVD or bruits  RESPIRATORY: Chest: clear to ascultation and percussion, normal inspiratory effort  CARDIOVASCULAR: Heart: regular rate and rhythm no murmurs, rubs or gallops, PMI not displaced, No thrills, no peripheral edema  GASTROINTESTINAL: Abdomen: non distended, soft, non tender, bowel sounds normal  HEMATOLOGIC: no purpura, petechiae or bruising  LYMPHATIC: No lymph node enlargemant  MUSCULOSKELETAL: Extremities: no active synovitis, pulse 1+   INTEGUMENT: No unusual rashes or suspicious skin lesions noted. Nails appear normal.  PERIPHERAL VASCULAR: normal pulses femoral, PT and DP  NEUROLOGIC: non-focal exam, A & O X 3  PSYCHIATRIC:, appropriate affect     ASSESSMENT:   1. Hypertension with renal disease    2. Glucose intolerance (impaired glucose tolerance)    3. Mixed hyperlipidemia    4. Primary osteoarthritis involving multiple joints    5. Stage 2 chronic kidney disease    6. Osteopenia of multiple sites      Impression  1. Hypertension that is controlled so continue current therapy reviewed with her. 2.  Glucose intolerance repeat status pending prior lab reviewed  3. Hyperlipidemia prior lab reviewed repeat status pending  4. DJD chronic but stable  5. CKD stage II repeat status pending  6. Osteopenia need to repeat bone density we will schedule  Follow-up again 3 months or sooner if there is a problem. I will call with lab results in interim. PLAN:  .  Orders Placed This Encounter    DEXA BONE DENSITY STUDY AXIAL    METABOLIC PANEL, COMPREHENSIVE    LIPID PANEL    CK    HEMOGLOBIN A1C WITH EAG         ATTENTION:   This medical record was transcribed using an electronic medical records system. Although proofread, it may and can contain electronic and spelling errors. Other human spelling and other errors may be present. Corrections may be executed at a later time. Please feel free to contact us for any clarifications as needed. Follow-up and Dispositions    Return in about 3 months (around 6/29/2023).          No results found for any visits on 03/29/23. Anum Pérez MD    The patient verbalized understanding of the problems and plans as explained.

## 2023-03-30 LAB
ALBUMIN SERPL-MCNC: 3.9 G/DL (ref 3.5–5)
ALBUMIN/GLOB SERPL: 1.1 (ref 1.1–2.2)
ALP SERPL-CCNC: 99 U/L (ref 45–117)
ALT SERPL-CCNC: 29 U/L (ref 12–78)
ANION GAP SERPL CALC-SCNC: 3 MMOL/L (ref 5–15)
AST SERPL-CCNC: 21 U/L (ref 15–37)
BILIRUB SERPL-MCNC: 1 MG/DL (ref 0.2–1)
BUN SERPL-MCNC: 23 MG/DL (ref 6–20)
BUN/CREAT SERPL: 32 (ref 12–20)
CALCIUM SERPL-MCNC: 9.9 MG/DL (ref 8.5–10.1)
CHLORIDE SERPL-SCNC: 104 MMOL/L (ref 97–108)
CHOLEST SERPL-MCNC: 212 MG/DL
CK SERPL-CCNC: 63 U/L (ref 26–192)
CO2 SERPL-SCNC: 30 MMOL/L (ref 21–32)
CREAT SERPL-MCNC: 0.73 MG/DL (ref 0.55–1.02)
EST. AVERAGE GLUCOSE BLD GHB EST-MCNC: 114 MG/DL
GLOBULIN SER CALC-MCNC: 3.5 G/DL (ref 2–4)
GLUCOSE SERPL-MCNC: 105 MG/DL (ref 65–100)
HBA1C MFR BLD: 5.6 % (ref 4–5.6)
HDLC SERPL-MCNC: 81 MG/DL
HDLC SERPL: 2.6 (ref 0–5)
LDLC SERPL CALC-MCNC: 113.8 MG/DL (ref 0–100)
POTASSIUM SERPL-SCNC: 4.1 MMOL/L (ref 3.5–5.1)
PROT SERPL-MCNC: 7.4 G/DL (ref 6.4–8.2)
SODIUM SERPL-SCNC: 137 MMOL/L (ref 136–145)
TRIGL SERPL-MCNC: 86 MG/DL (ref ?–150)
VLDLC SERPL CALC-MCNC: 17.2 MG/DL

## 2023-04-22 DIAGNOSIS — Z12.31 VISIT FOR SCREENING MAMMOGRAM: Primary | ICD-10-CM

## 2023-04-26 ENCOUNTER — HOSPITAL ENCOUNTER (OUTPATIENT)
Dept: MAMMOGRAPHY | Age: 78
Discharge: HOME | End: 2023-04-26
Attending: INTERNAL MEDICINE
Payer: MEDICARE

## 2023-04-26 DIAGNOSIS — Z12.31 VISIT FOR SCREENING MAMMOGRAM: ICD-10-CM

## 2023-04-26 PROCEDURE — 77063 BREAST TOMOSYNTHESIS BI: CPT

## 2023-06-20 ENCOUNTER — TELEPHONE (OUTPATIENT)
Facility: CLINIC | Age: 78
End: 2023-06-20

## 2023-06-20 ENCOUNTER — APPOINTMENT (OUTPATIENT)
Facility: HOSPITAL | Age: 78
End: 2023-06-20
Payer: MEDICARE

## 2023-06-20 ENCOUNTER — HOSPITAL ENCOUNTER (EMERGENCY)
Facility: HOSPITAL | Age: 78
Discharge: HOME | End: 2023-06-20
Attending: EMERGENCY MEDICINE
Payer: MEDICARE

## 2023-06-20 VITALS
OXYGEN SATURATION: 99 % | SYSTOLIC BLOOD PRESSURE: 138 MMHG | BODY MASS INDEX: 32.1 KG/M2 | RESPIRATION RATE: 16 BRPM | HEART RATE: 78 BPM | DIASTOLIC BLOOD PRESSURE: 66 MMHG | WEIGHT: 181.22 LBS | TEMPERATURE: 98.1 F

## 2023-06-20 DIAGNOSIS — F41.0 PANIC ATTACKS: ICD-10-CM

## 2023-06-20 DIAGNOSIS — R07.9 ACUTE CHEST PAIN: Primary | ICD-10-CM

## 2023-06-20 LAB
ALBUMIN SERPL-MCNC: 3.6 G/DL (ref 3.5–5)
ALBUMIN/GLOB SERPL: 1.1 (ref 1.1–2.2)
ALP SERPL-CCNC: 108 U/L (ref 45–117)
ALT SERPL-CCNC: 35 U/L (ref 12–78)
ANION GAP SERPL CALC-SCNC: 4 MMOL/L (ref 5–15)
AST SERPL-CCNC: 37 U/L (ref 15–37)
BASOPHILS # BLD: 0.1 K/UL (ref 0–0.1)
BASOPHILS NFR BLD: 1 % (ref 0–1)
BILIRUB SERPL-MCNC: 0.5 MG/DL (ref 0.2–1)
BUN SERPL-MCNC: 23 MG/DL (ref 6–20)
BUN/CREAT SERPL: 31 (ref 12–20)
CALCIUM SERPL-MCNC: 9.2 MG/DL (ref 8.5–10.1)
CHLORIDE SERPL-SCNC: 105 MMOL/L (ref 97–108)
CO2 SERPL-SCNC: 30 MMOL/L (ref 21–32)
CREAT SERPL-MCNC: 0.75 MG/DL (ref 0.55–1.02)
DIFFERENTIAL METHOD BLD: ABNORMAL
EOSINOPHIL # BLD: 0.2 K/UL (ref 0–0.4)
EOSINOPHIL NFR BLD: 2 % (ref 0–7)
ERYTHROCYTE [DISTWIDTH] IN BLOOD BY AUTOMATED COUNT: 13.6 % (ref 11.5–14.5)
GLOBULIN SER CALC-MCNC: 3.4 G/DL (ref 2–4)
GLUCOSE SERPL-MCNC: 106 MG/DL (ref 65–100)
HCT VFR BLD AUTO: 42.3 % (ref 35–47)
HGB BLD-MCNC: 13.8 G/DL (ref 11.5–16)
IMM GRANULOCYTES # BLD AUTO: 0 K/UL (ref 0–0.04)
IMM GRANULOCYTES NFR BLD AUTO: 0 % (ref 0–0.5)
LYMPHOCYTES # BLD: 3.6 K/UL (ref 0.8–3.5)
LYMPHOCYTES NFR BLD: 37 % (ref 12–49)
MCH RBC QN AUTO: 28.2 PG (ref 26–34)
MCHC RBC AUTO-ENTMCNC: 32.6 G/DL (ref 30–36.5)
MCV RBC AUTO: 86.5 FL (ref 80–99)
MONOCYTES # BLD: 0.6 K/UL (ref 0–1)
MONOCYTES NFR BLD: 7 % (ref 5–13)
NEUTS SEG # BLD: 5.2 K/UL (ref 1.8–8)
NEUTS SEG NFR BLD: 53 % (ref 32–75)
NRBC # BLD: 0 K/UL (ref 0–0.01)
NRBC BLD-RTO: 0 PER 100 WBC
PLATELET # BLD AUTO: 245 K/UL (ref 150–400)
PMV BLD AUTO: 11.3 FL (ref 8.9–12.9)
POTASSIUM SERPL-SCNC: 3.9 MMOL/L (ref 3.5–5.1)
PROT SERPL-MCNC: 7 G/DL (ref 6.4–8.2)
RBC # BLD AUTO: 4.89 M/UL (ref 3.8–5.2)
SODIUM SERPL-SCNC: 139 MMOL/L (ref 136–145)
TROPONIN I SERPL HS-MCNC: <4 NG/L (ref 0–51)
WBC # BLD AUTO: 9.7 K/UL (ref 3.6–11)

## 2023-06-20 PROCEDURE — 80053 COMPREHEN METABOLIC PANEL: CPT

## 2023-06-20 PROCEDURE — 85025 COMPLETE CBC W/AUTO DIFF WBC: CPT

## 2023-06-20 PROCEDURE — 84484 ASSAY OF TROPONIN QUANT: CPT

## 2023-06-20 PROCEDURE — 99285 EMERGENCY DEPT VISIT HI MDM: CPT

## 2023-06-20 PROCEDURE — 71046 X-RAY EXAM CHEST 2 VIEWS: CPT

## 2023-06-20 PROCEDURE — 36415 COLL VENOUS BLD VENIPUNCTURE: CPT

## 2023-06-20 PROCEDURE — 93005 ELECTROCARDIOGRAM TRACING: CPT | Performed by: EMERGENCY MEDICINE

## 2023-06-20 ASSESSMENT — HEART SCORE: ECG: 0

## 2023-06-20 ASSESSMENT — PAIN SCALES - GENERAL: PAINLEVEL_OUTOF10: 1

## 2023-06-20 ASSESSMENT — ENCOUNTER SYMPTOMS: SHORTNESS OF BREATH: 1

## 2023-06-20 NOTE — ED NOTES
Pt discharged by Nicole Lopes RN. Discharge instructions discussed and pt given opportunity to ask questions.  Pt ambulatory out of ED        Abraham Bain RN  06/20/23 [id number]

## 2023-06-20 NOTE — DISCHARGE INSTRUCTIONS
Your EKG, laboratories, and chest X-ray are all very reassuring today, and there are no clinical signs of a heart attack or other heart or lung problem today. Please follow up with Dr. Colton Rojas and continue to use Xanax as needed if you have sudden onset of similar symptoms. It was a pleasure taking care of you at [facility] today. We know that when you come to [facility], you are entrusting us with your health, comfort, and safety. Our physicians and nurses honor that trust, and we truly appreciate the opportunity to care for you and your loved ones. We also value your feedback. If you receive a survey about your Emergency Department experience today, please fill it out. We care about our patients' feedback, and we listen to what you have to say. Thank you!

## 2023-06-20 NOTE — TELEPHONE ENCOUNTER
Patient's sister called regarding the patient experiencing chest pain. Onset was about one hour ago lasting about 20 minutes. Experienced SOB and associated arm weakness. Called 911 and evaluated but patient refused to go to the ER. Discussed with Dr. Nicole De La Vega and referred patient to [id number] [facility] for further evaluation.

## 2023-06-21 NOTE — ED PROVIDER NOTES
EMERGENCY DEPARTMENT HISTORY AND PHYSICAL EXAM    Date: 2023  Patient Name: Dante Reyes  Patient Age and Sex: 78 y.o. female  MRN:  [id number]  CSN:  [id number]    History of Present Illness     Chief Complaint   Patient presents with    Chest Pain    Shortness of Breath     Today at 1pm began with chest pain and felt short of breath with the pain. Called rescue and her doctor, her doctor advise to come to ED. She has had this four other times. Pt took 0.25mg Xanax       History Provided By: Patient    Ability to gather history was limited by:     HPI: Dante Reyes, 78 y.o. female   Complains of chest pain for the last for 5 hours, which she has had multiple times in the past, accompanied by sudden onset difficulty breathing and chest tightness and panic sensation. She believes that she is having panic attacks. No significant history of coronary artery disease. She has had a normal stress test in the past.  Her symptoms are now relieved after taking Xanax prior to coming to the ED. Tobacco Use      Smoking status: Former        Packs/day: 1.50        Types: Cigarettes        Quit date: 2004        Years since quittin.6      Smokeless tobacco: Never     Past History   The patient's medical, surgical, and social history were reviewed by me today. Current Medications:  No current facility-administered medications on file prior to encounter. Current Outpatient Medications on File Prior to Encounter   Medication Sig Dispense Refill    ALPRAZolam (XANAX) 0.5 MG tablet Take 0.5 mg by mouth 3 times daily as needed.       Biotin 10 MG CAPS Take by mouth Daily with supper      Cholecalciferol 75 MCG (3000 UT) TABS Take 1,000 Units by mouth      cyanocobalamin 500 MCG tablet Take 500 mcg by mouth      hydroCHLOROthiazide (HYDRODIURIL) 25 MG tablet TAKE 1 TABLET BY MOUTH  DAILY      irbesartan (AVAPRO) 300 MG tablet TAKE 1 TABLET BY MOUTH  DAILY      levoFLOXacin (LEVAQUIN) 500 MG

## 2023-06-22 PROBLEM — E78.2 MIXED HYPERLIPIDEMIA: Status: ACTIVE | Noted: 2017-07-17

## 2023-06-22 PROBLEM — F41.0 PANIC ATTACK: Status: ACTIVE | Noted: 2020-02-07

## 2023-06-22 PROBLEM — R73.02 GLUCOSE INTOLERANCE (IMPAIRED GLUCOSE TOLERANCE): Status: ACTIVE | Noted: 2017-07-17

## 2023-06-22 PROBLEM — E55.9 VITAMIN D DEFICIENCY: Status: ACTIVE | Noted: 2017-07-17

## 2023-06-22 PROBLEM — R79.89 ELEVATED LFTS: Status: ACTIVE | Noted: 2017-07-17

## 2023-06-22 PROBLEM — J30.89 NON-SEASONAL ALLERGIC RHINITIS: Status: ACTIVE | Noted: 2020-06-04

## 2023-06-22 PROBLEM — M54.40 CHRONIC MIDLINE LOW BACK PAIN WITH SCIATICA: Status: ACTIVE | Noted: 2020-01-21

## 2023-06-22 PROBLEM — M15.0 PRIMARY OSTEOARTHRITIS INVOLVING MULTIPLE JOINTS: Status: ACTIVE | Noted: 2017-07-17

## 2023-06-22 PROBLEM — M85.89 OSTEOPENIA OF MULTIPLE SITES: Status: ACTIVE | Noted: 2017-07-17

## 2023-06-22 PROBLEM — K63.5 COLON POLYPS: Status: ACTIVE | Noted: 2017-07-17

## 2023-06-22 PROBLEM — I12.9 HYPERTENSION WITH RENAL DISEASE: Status: ACTIVE | Noted: 2017-07-17

## 2023-06-22 PROBLEM — G89.29 CHRONIC MIDLINE LOW BACK PAIN WITH SCIATICA: Status: ACTIVE | Noted: 2020-01-21

## 2023-06-22 PROBLEM — N18.2 STAGE 2 CHRONIC KIDNEY DISEASE: Status: ACTIVE | Noted: 2017-07-17

## 2023-06-22 PROBLEM — M15.9 PRIMARY OSTEOARTHRITIS INVOLVING MULTIPLE JOINTS: Status: ACTIVE | Noted: 2017-07-17

## 2023-06-22 LAB
EKG ATRIAL RATE: 68 BPM
EKG DIAGNOSIS: NORMAL
EKG P AXIS: 40 DEGREES
EKG P-R INTERVAL: 158 MS
EKG Q-T INTERVAL: 396 MS
EKG QRS DURATION: 78 MS
EKG QTC CALCULATION (BAZETT): 421 MS
EKG R AXIS: 6 DEGREES
EKG T AXIS: 27 DEGREES
EKG VENTRICULAR RATE: 68 BPM

## 2023-06-23 ENCOUNTER — OFFICE VISIT (OUTPATIENT)
Facility: CLINIC | Age: 78
End: 2023-06-23

## 2023-06-23 VITALS
HEIGHT: 63 IN | HEART RATE: 82 BPM | DIASTOLIC BLOOD PRESSURE: 74 MMHG | OXYGEN SATURATION: 98 % | SYSTOLIC BLOOD PRESSURE: 124 MMHG | WEIGHT: 180.4 LBS | RESPIRATION RATE: 16 BRPM | BODY MASS INDEX: 31.96 KG/M2 | TEMPERATURE: 98.2 F

## 2023-06-23 DIAGNOSIS — I12.9 HYPERTENSION WITH RENAL DISEASE: ICD-10-CM

## 2023-06-23 DIAGNOSIS — F41.0 PANIC ATTACK: ICD-10-CM

## 2023-06-23 DIAGNOSIS — R07.9 CHEST PAIN, UNSPECIFIED TYPE: Primary | ICD-10-CM

## 2023-06-23 RX ORDER — ALPRAZOLAM 0.5 MG/1
0.5 TABLET ORAL 3 TIMES DAILY PRN
Qty: 50 TABLET | Refills: 0 | Status: SHIPPED | OUTPATIENT
Start: 2023-06-23 | End: 2023-07-23

## 2023-06-23 RX ORDER — ESCITALOPRAM OXALATE 10 MG/1
10 TABLET ORAL DAILY
Qty: 30 TABLET | Refills: 3 | Status: SHIPPED | OUTPATIENT
Start: 2023-06-23

## 2023-06-23 NOTE — PROGRESS NOTES
Chief Complaint   Patient presents with    Follow-Up from Hospital       /74 (Site: Left Upper Arm, Position: Sitting, Cuff Size: Medium Adult)   Pulse 82   Temp 98.2 °F (36.8 °C) (Oral)   Resp 16   Ht 5' 3\" (1.6 m)   Wt 180 lb 6.4 oz (81.8 kg)   SpO2 98%   BMI 31.96 kg/m²     1. \"Have you been to the ER, urgent care clinic since your last visit? Hospitalized since your last visit? \" 6/20/23 for chest pain at [facility]    2. \"Have you seen or consulted any other health care providers outside of the [address] [place] [address] since your last visit? \" no     3. For patients aged 39-70: Has the patient had a colonoscopy / FIT/ Cologuard? No      If the patient is female:    4. For patients aged 41-77: Has the patient had a mammogram within the past 2 years? no      5. For patients aged 21-65: Has the patient had a pap smear?  NO

## 2023-06-23 NOTE — PROGRESS NOTES
Chief Complaint   Patient presents with    Follow-Up from Hospital       SUBJECTIVE:    Be Walton is a 78 y.o. female who returns in follow-up from hospital ER visit when she presented there on June 24 chest pain determined to be a panic attack. At the time of the ER visit she had work-up to include troponin that was negative as well as a CBC CMP which were normal and a EKG that was normal.  I reviewed all of those while she was here in office today with her daughter. This is at least the third episode she has had of that this week and she previously had an episode where she had work-up by me and include a normal stress test.  She does note there is a lot of stress going on and what happens that she steams to get some chest discomfort and then she seems to get numbness in her hands consistent with hyperventilation and she had a full-blown panic attack when she presented to the emergency room. She denies any exertional shortness of breath, chest pain or other cardiorespiratory complaints. There are no neurologic complaints. She has no GI or  complaints. Current Outpatient Medications   Medication Sig Dispense Refill    escitalopram (LEXAPRO) 10 MG tablet Take 1 tablet by mouth daily 30 tablet 3    ALPRAZolam (XANAX) 0.5 MG tablet Take 1 tablet by mouth 3 times daily as needed for Anxiety for up to 30 days.  Max Daily Amount: 1.5 mg 50 tablet 0    Biotin 10 MG CAPS Take by mouth Daily with supper      Cholecalciferol 75 MCG (3000 UT) TABS Take 1,000 Units by mouth      cyanocobalamin 500 MCG tablet Take 1 tablet by mouth      hydroCHLOROthiazide (HYDRODIURIL) 25 MG tablet TAKE 1 TABLET BY MOUTH  DAILY      irbesartan (AVAPRO) 300 MG tablet TAKE 1 TABLET BY MOUTH  DAILY      omeprazole (PRILOSEC) 20 MG delayed release capsule TAKE 1 CAPSULE BY MOUTH  DAILY      pitavastatin (LIVALO) 2 MG TABS tablet TAKE 1 TABLET BY MOUTH  EVERY NIGHT AT BEDTIME      polyethylene glycol (GLYCOLAX) 17 GM/SCOOP

## 2023-07-05 PROBLEM — E66.811 CLASS 1 OBESITY DUE TO EXCESS CALORIES WITHOUT SERIOUS COMORBIDITY WITH BODY MASS INDEX (BMI) OF 31.0 TO 31.9 IN ADULT: Status: ACTIVE | Noted: 2018-05-10

## 2023-07-05 PROBLEM — E66.09 CLASS 1 OBESITY DUE TO EXCESS CALORIES WITHOUT SERIOUS COMORBIDITY WITH BODY MASS INDEX (BMI) OF 31.0 TO 31.9 IN ADULT: Status: ACTIVE | Noted: 2018-05-10

## 2023-07-06 ENCOUNTER — OFFICE VISIT (OUTPATIENT)
Facility: CLINIC | Age: 78
End: 2023-07-06

## 2023-07-06 VITALS
WEIGHT: 181.2 LBS | RESPIRATION RATE: 18 BRPM | HEART RATE: 70 BPM | OXYGEN SATURATION: 97 % | BODY MASS INDEX: 32.11 KG/M2 | TEMPERATURE: 97.6 F | HEIGHT: 63 IN | DIASTOLIC BLOOD PRESSURE: 78 MMHG | SYSTOLIC BLOOD PRESSURE: 114 MMHG

## 2023-07-06 DIAGNOSIS — R73.02 GLUCOSE INTOLERANCE (IMPAIRED GLUCOSE TOLERANCE): ICD-10-CM

## 2023-07-06 DIAGNOSIS — M15.9 PRIMARY OSTEOARTHRITIS INVOLVING MULTIPLE JOINTS: ICD-10-CM

## 2023-07-06 DIAGNOSIS — I12.9 HYPERTENSION WITH RENAL DISEASE: Primary | ICD-10-CM

## 2023-07-06 DIAGNOSIS — F41.0 PANIC ATTACK: ICD-10-CM

## 2023-07-06 DIAGNOSIS — R79.89 ELEVATED LFTS: ICD-10-CM

## 2023-07-06 DIAGNOSIS — E78.2 MIXED HYPERLIPIDEMIA: ICD-10-CM

## 2023-07-06 DIAGNOSIS — R41.0 CONFUSION: ICD-10-CM

## 2023-07-06 DIAGNOSIS — R41.0 CONFUSION: Primary | ICD-10-CM

## 2023-07-06 DIAGNOSIS — E66.09 CLASS 1 OBESITY DUE TO EXCESS CALORIES WITHOUT SERIOUS COMORBIDITY WITH BODY MASS INDEX (BMI) OF 31.0 TO 31.9 IN ADULT: ICD-10-CM

## 2023-07-06 LAB
COMMENT:: NORMAL
SPECIMEN HOLD: NORMAL

## 2023-07-06 SDOH — ECONOMIC STABILITY: FOOD INSECURITY: WITHIN THE PAST 12 MONTHS, THE FOOD YOU BOUGHT JUST DIDN'T LAST AND YOU DIDN'T HAVE MONEY TO GET MORE.: NEVER TRUE

## 2023-07-06 SDOH — ECONOMIC STABILITY: HOUSING INSECURITY
IN THE LAST 12 MONTHS, WAS THERE A TIME WHEN YOU DID NOT HAVE A STEADY PLACE TO SLEEP OR SLEPT IN A SHELTER (INCLUDING NOW)?: NO

## 2023-07-06 SDOH — ECONOMIC STABILITY: FOOD INSECURITY: WITHIN THE PAST 12 MONTHS, YOU WORRIED THAT YOUR FOOD WOULD RUN OUT BEFORE YOU GOT MONEY TO BUY MORE.: NEVER TRUE

## 2023-07-06 SDOH — ECONOMIC STABILITY: INCOME INSECURITY: HOW HARD IS IT FOR YOU TO PAY FOR THE VERY BASICS LIKE FOOD, HOUSING, MEDICAL CARE, AND HEATING?: NOT HARD AT ALL

## 2023-07-07 LAB
ALBUMIN SERPL-MCNC: 3.8 G/DL (ref 3.5–5)
ALBUMIN/GLOB SERPL: 1.1 (ref 1.1–2.2)
ALP SERPL-CCNC: 129 U/L (ref 45–117)
ALT SERPL-CCNC: 36 U/L (ref 12–78)
ANION GAP SERPL CALC-SCNC: 4 MMOL/L (ref 5–15)
AST SERPL-CCNC: 18 U/L (ref 15–37)
BILIRUB SERPL-MCNC: 1 MG/DL (ref 0.2–1)
BUN SERPL-MCNC: 21 MG/DL (ref 6–20)
BUN/CREAT SERPL: 30 (ref 12–20)
CALCIUM SERPL-MCNC: 9.7 MG/DL (ref 8.5–10.1)
CHLORIDE SERPL-SCNC: 104 MMOL/L (ref 97–108)
CHOLEST SERPL-MCNC: 201 MG/DL
CK SERPL-CCNC: 43 U/L (ref 26–192)
CO2 SERPL-SCNC: 30 MMOL/L (ref 21–32)
CREAT SERPL-MCNC: 0.71 MG/DL (ref 0.55–1.02)
EST. AVERAGE GLUCOSE BLD GHB EST-MCNC: 108 MG/DL
GLOBULIN SER CALC-MCNC: 3.5 G/DL (ref 2–4)
GLUCOSE SERPL-MCNC: 101 MG/DL (ref 65–100)
HBA1C MFR BLD: 5.4 % (ref 4–5.6)
HDLC SERPL-MCNC: 59 MG/DL
HDLC SERPL: 3.4 (ref 0–5)
LDLC SERPL CALC-MCNC: 117.2 MG/DL (ref 0–100)
POTASSIUM SERPL-SCNC: 4.4 MMOL/L (ref 3.5–5.1)
PROT SERPL-MCNC: 7.3 G/DL (ref 6.4–8.2)
SODIUM SERPL-SCNC: 138 MMOL/L (ref 136–145)
T4 FREE SERPL-MCNC: 1.1 NG/DL (ref 0.8–1.5)
TRIGL SERPL-MCNC: 124 MG/DL
TSH SERPL DL<=0.05 MIU/L-ACNC: 1.92 UIU/ML (ref 0.36–3.74)
VIT B12 SERPL-MCNC: 1039 PG/ML (ref 193–986)
VLDLC SERPL CALC-MCNC: 24.8 MG/DL

## 2023-07-18 ENCOUNTER — HOSPITAL ENCOUNTER (OUTPATIENT)
Facility: HOSPITAL | Age: 78
Discharge: HOME | End: 2023-07-21
Attending: INTERNAL MEDICINE
Payer: MEDICARE

## 2023-07-18 DIAGNOSIS — R41.0 CONFUSION: ICD-10-CM

## 2023-07-18 PROCEDURE — 70551 MRI BRAIN STEM W/O DYE: CPT

## 2023-07-19 NOTE — PROGRESS NOTES
Chief Complaint   Patient presents with    Hypertension       SUBJECTIVE:    Vernon Verduzco is a 78 y.o. female who was recently seen here for evaluation of chest pain, hypertension and anxiety with panic attack. We did do a MRI of her head and that is return normal.  She is started Lexapro but the 10 mg seem to make her drowsy and she is cutting in half taking 5 mg and doing well with that. She currently denies cardiorespiratory GI/ complaints and no other complaints include no neurologic complaints. Current Outpatient Medications   Medication Sig Dispense Refill    escitalopram (LEXAPRO) 5 MG tablet Take 1 tablet by mouth daily 30 tablet PRN    ALPRAZolam (XANAX) 0.5 MG tablet Take 1 tablet by mouth 3 times daily as needed for Anxiety for up to 30 days. Max Daily Amount: 1.5 mg 50 tablet 0    Biotin 10 MG CAPS Take by mouth Daily with supper      Cholecalciferol 75 MCG (3000 UT) TABS Take 1,000 Units by mouth      cyanocobalamin 500 MCG tablet Take 1 tablet by mouth      hydroCHLOROthiazide (HYDRODIURIL) 25 MG tablet TAKE 1 TABLET BY MOUTH  DAILY      irbesartan (AVAPRO) 300 MG tablet TAKE 1 TABLET BY MOUTH  DAILY      omeprazole (PRILOSEC) 20 MG delayed release capsule TAKE 1 CAPSULE BY MOUTH  DAILY      pitavastatin (LIVALO) 2 MG TABS tablet TAKE 1 TABLET BY MOUTH  EVERY NIGHT AT BEDTIME      polyethylene glycol (GLYCOLAX) 17 GM/SCOOP powder Take 17 g by mouth       No current facility-administered medications for this visit.      Past Medical History:   Diagnosis Date    Anxiety     Arthritis     Chronic constipation     CKD (chronic kidney disease) 7/17/2017    Colon polyps 7/17/2017    DJD (degenerative joint disease) 7/17/2017    Elevated LFTs 7/17/2017    Flu 02/19/2019    GERD (gastroesophageal reflux disease)     Glucose intolerance (impaired glucose tolerance) 7/17/2017    Hemorrhoids     internal & external- bleeds frequently    High cholesterol     Hyperlipidemia 7/17/2017

## 2023-07-20 ENCOUNTER — OFFICE VISIT (OUTPATIENT)
Facility: CLINIC | Age: 78
End: 2023-07-20
Payer: MEDICARE

## 2023-07-20 VITALS
OXYGEN SATURATION: 96 % | RESPIRATION RATE: 18 BRPM | HEART RATE: 74 BPM | BODY MASS INDEX: 32.6 KG/M2 | HEIGHT: 63 IN | TEMPERATURE: 98 F | SYSTOLIC BLOOD PRESSURE: 135 MMHG | DIASTOLIC BLOOD PRESSURE: 84 MMHG | WEIGHT: 184 LBS

## 2023-07-20 DIAGNOSIS — I12.9 HYPERTENSION WITH RENAL DISEASE: ICD-10-CM

## 2023-07-20 DIAGNOSIS — R07.9 CHEST PAIN, UNSPECIFIED TYPE: ICD-10-CM

## 2023-07-20 DIAGNOSIS — F41.0 PANIC ATTACK: Primary | ICD-10-CM

## 2023-07-20 PROCEDURE — 1090F PRES/ABSN URINE INCON ASSESS: CPT | Performed by: INTERNAL MEDICINE

## 2023-07-20 PROCEDURE — G8427 DOCREV CUR MEDS BY ELIG CLIN: HCPCS | Performed by: INTERNAL MEDICINE

## 2023-07-20 PROCEDURE — G8417 CALC BMI ABV UP PARAM F/U: HCPCS | Performed by: INTERNAL MEDICINE

## 2023-07-20 PROCEDURE — G8400 PT W/DXA NO RESULTS DOC: HCPCS | Performed by: INTERNAL MEDICINE

## 2023-07-20 PROCEDURE — 3017F COLORECTAL CA SCREEN DOC REV: CPT | Performed by: INTERNAL MEDICINE

## 2023-07-20 PROCEDURE — 1036F TOBACCO NON-USER: CPT | Performed by: INTERNAL MEDICINE

## 2023-07-20 PROCEDURE — 99213 OFFICE O/P EST LOW 20 MIN: CPT | Performed by: INTERNAL MEDICINE

## 2023-07-20 PROCEDURE — 1123F ACP DISCUSS/DSCN MKR DOCD: CPT | Performed by: INTERNAL MEDICINE

## 2023-07-20 RX ORDER — ESCITALOPRAM OXALATE 5 MG/1
5 TABLET ORAL DAILY
Qty: 30 TABLET | Status: SHIPPED | OUTPATIENT
Start: 2023-07-20

## 2023-09-15 RX ORDER — ESCITALOPRAM OXALATE 5 MG/1
5 TABLET ORAL DAILY
Qty: 30 TABLET | Status: SHIPPED | OUTPATIENT
Start: 2023-09-15

## 2023-09-15 NOTE — TELEPHONE ENCOUNTER
Requested Prescriptions     Pending Prescriptions Disp Refills    escitalopram (LEXAPRO) 5 MG tablet 30 tablet PRN     Sig: Take 1 tablet by mouth daily     RX refill request from the patient/pharmacy. Patient last seen 7/20/23 with labs, and next appt. scheduled for 10/6/23.

## 2023-10-05 NOTE — PROGRESS NOTES
Chief Complaint   Patient presents with    Hypertension       SUBJECTIVE:    Santhosh Law is a 70 y.o. female who returns in follow-up for medical problems include hypertension, hyperlipidemia, glucose intolerance, DJD, obesity and other multiple medical problems. She is taking her medications trying to follow her diet and remains physically active. She currently denies any chest pain, shortness of breath, palpitations, PND, orthopnea or other cardiac respiratory complaints. There are no current GI or  complaints. There are no headaches, dizziness or neurologic complaints. There are no current active arthritic complaints and there are no other complaints on complete review of systems. Current Outpatient Medications   Medication Sig Dispense Refill    escitalopram (LEXAPRO) 5 MG tablet Take 1 tablet by mouth daily 30 tablet PRN    Biotin 10 MG CAPS Take by mouth Daily with supper      Cholecalciferol 75 MCG (3000 UT) TABS Take 1,000 Units by mouth      cyanocobalamin 500 MCG tablet Take 1 tablet by mouth      hydroCHLOROthiazide (HYDRODIURIL) 25 MG tablet TAKE 1 TABLET BY MOUTH  DAILY      irbesartan (AVAPRO) 300 MG tablet TAKE 1 TABLET BY MOUTH  DAILY      omeprazole (PRILOSEC) 20 MG delayed release capsule TAKE 1 CAPSULE BY MOUTH  DAILY      pitavastatin (LIVALO) 2 MG TABS tablet TAKE 1 TABLET BY MOUTH  EVERY NIGHT AT BEDTIME      polyethylene glycol (GLYCOLAX) 17 GM/SCOOP powder Take 17 g by mouth       No current facility-administered medications for this visit.      Past Medical History:   Diagnosis Date    Anxiety     Arthritis     Chronic constipation     CKD (chronic kidney disease) 7/17/2017    Colon polyps 7/17/2017    DJD (degenerative joint disease) 7/17/2017    Elevated LFTs 7/17/2017    Flu 02/19/2019    GERD (gastroesophageal reflux disease)     Glucose intolerance (impaired glucose tolerance) 7/17/2017    Hemorrhoids     internal & external- bleeds frequently    High cholesterol

## 2023-10-06 ENCOUNTER — OFFICE VISIT (OUTPATIENT)
Facility: CLINIC | Age: 78
End: 2023-10-06

## 2023-10-06 VITALS
OXYGEN SATURATION: 98 % | TEMPERATURE: 97.9 F | RESPIRATION RATE: 18 BRPM | SYSTOLIC BLOOD PRESSURE: 138 MMHG | BODY MASS INDEX: 33.03 KG/M2 | HEIGHT: 63 IN | WEIGHT: 186.4 LBS | HEART RATE: 64 BPM | DIASTOLIC BLOOD PRESSURE: 84 MMHG

## 2023-10-06 DIAGNOSIS — R73.02 GLUCOSE INTOLERANCE (IMPAIRED GLUCOSE TOLERANCE): ICD-10-CM

## 2023-10-06 DIAGNOSIS — E66.09 CLASS 1 OBESITY DUE TO EXCESS CALORIES WITHOUT SERIOUS COMORBIDITY WITH BODY MASS INDEX (BMI) OF 31.0 TO 31.9 IN ADULT: ICD-10-CM

## 2023-10-06 DIAGNOSIS — I12.9 HYPERTENSION WITH RENAL DISEASE: Primary | ICD-10-CM

## 2023-10-06 DIAGNOSIS — E78.2 MIXED HYPERLIPIDEMIA: ICD-10-CM

## 2023-10-06 DIAGNOSIS — M15.9 PRIMARY OSTEOARTHRITIS INVOLVING MULTIPLE JOINTS: ICD-10-CM

## 2023-10-06 LAB
ALBUMIN SERPL-MCNC: 4 G/DL (ref 3.5–5)
ALBUMIN/GLOB SERPL: 1.3 (ref 1.1–2.2)
ALP SERPL-CCNC: 93 U/L (ref 45–117)
ALT SERPL-CCNC: 26 U/L (ref 12–78)
ANION GAP SERPL CALC-SCNC: 6 MMOL/L (ref 5–15)
AST SERPL-CCNC: 19 U/L (ref 15–37)
BILIRUB SERPL-MCNC: 0.8 MG/DL (ref 0.2–1)
BUN SERPL-MCNC: 15 MG/DL (ref 6–20)
BUN/CREAT SERPL: 21 (ref 12–20)
CALCIUM SERPL-MCNC: 9.7 MG/DL (ref 8.5–10.1)
CHLORIDE SERPL-SCNC: 103 MMOL/L (ref 97–108)
CHOLEST SERPL-MCNC: 174 MG/DL
CK SERPL-CCNC: 79 U/L (ref 26–192)
CO2 SERPL-SCNC: 29 MMOL/L (ref 21–32)
CREAT SERPL-MCNC: 0.7 MG/DL (ref 0.55–1.02)
EST. AVERAGE GLUCOSE BLD GHB EST-MCNC: 120 MG/DL
GLOBULIN SER CALC-MCNC: 3.2 G/DL (ref 2–4)
GLUCOSE SERPL-MCNC: 94 MG/DL (ref 65–100)
HBA1C MFR BLD: 5.8 % (ref 4–5.6)
HDLC SERPL-MCNC: 63 MG/DL
HDLC SERPL: 2.8 (ref 0–5)
LDLC SERPL CALC-MCNC: 84.4 MG/DL (ref 0–100)
POTASSIUM SERPL-SCNC: 4.3 MMOL/L (ref 3.5–5.1)
PROT SERPL-MCNC: 7.2 G/DL (ref 6.4–8.2)
SODIUM SERPL-SCNC: 138 MMOL/L (ref 136–145)
TRIGL SERPL-MCNC: 133 MG/DL
VLDLC SERPL CALC-MCNC: 26.6 MG/DL

## 2023-10-06 NOTE — PROGRESS NOTES
After obtaining written consent and per orders of Dr. Marlene Willis, injection of Fluad given by Elsi Almodovar MA, CMA. Patient tolerated procedure well. VIS was given to them. No reactions noted.

## 2023-10-09 RX ORDER — HYDROCHLOROTHIAZIDE 25 MG/1
TABLET ORAL
Qty: 90 TABLET | Refills: 3 | Status: SHIPPED | OUTPATIENT
Start: 2023-10-09

## 2023-10-09 RX ORDER — OMEPRAZOLE 20 MG/1
CAPSULE, DELAYED RELEASE ORAL
Qty: 90 CAPSULE | Refills: 3 | Status: SHIPPED | OUTPATIENT
Start: 2023-10-09

## 2023-10-09 RX ORDER — IRBESARTAN 300 MG/1
TABLET ORAL
Qty: 90 TABLET | Refills: 3 | Status: SHIPPED | OUTPATIENT
Start: 2023-10-09

## 2023-10-09 NOTE — TELEPHONE ENCOUNTER
RX refill request from the patient/pharmacy. Patient last seen 10- with labs, and next appt. scheduled for 10-  Requested Prescriptions     Pending Prescriptions Disp Refills    hydroCHLOROthiazide (HYDRODIURIL) 25 MG tablet [Pharmacy Med Name: hydroCHLOROthiazide 25 MG Oral Tablet] 90 tablet 3     Sig: TAKE 1 TABLET BY MOUTH DAILY    omeprazole (PRILOSEC) 20 MG delayed release capsule [Pharmacy Med Name: Omeprazole 20 MG Oral Capsule Delayed Release] 90 capsule 3     Sig: TAKE 1 CAPSULE BY MOUTH DAILY    irbesartan (AVAPRO) 300 MG tablet [Pharmacy Med Name: Irbesartan 300 MG Oral Tablet] 90 tablet 3     Sig: TAKE 1 TABLET BY MOUTH DAILY    .

## 2023-10-11 ENCOUNTER — HOSPITAL ENCOUNTER (OUTPATIENT)
Facility: HOSPITAL | Age: 78
Setting detail: SPECIMEN
Discharge: HOME | End: 2023-10-14

## 2023-10-11 ENCOUNTER — OFFICE VISIT (OUTPATIENT)
Facility: CLINIC | Age: 78
End: 2023-10-11

## 2023-10-11 VITALS
BODY MASS INDEX: 33.26 KG/M2 | HEART RATE: 64 BPM | DIASTOLIC BLOOD PRESSURE: 84 MMHG | SYSTOLIC BLOOD PRESSURE: 126 MMHG | OXYGEN SATURATION: 98 % | RESPIRATION RATE: 18 BRPM | HEIGHT: 63 IN | WEIGHT: 187.7 LBS | TEMPERATURE: 97.9 F

## 2023-10-11 DIAGNOSIS — L98.9 SKIN LESION OF CHEST WALL: Primary | ICD-10-CM

## 2023-10-11 DIAGNOSIS — D48.5 NEOPLASM OF UNCERTAIN BEHAVIOR OF SKIN: ICD-10-CM

## 2023-10-11 NOTE — PROGRESS NOTES
Subjective:   Farzana Obregon is a 70 y.o. female      Chief Complaint   Patient presents with    Lesion(s)     Skin lesion removal        History of present illness: She presents for removal of enlarging skin lesion on the right upper anterior chest wall that has become problematic in nature. She currently denies any chest pain, shortness of breath or cardiorespiratory complaints. There are no GI or  complaints. She notes no other complaints on complete review of systems.     Patient Active Problem List   Diagnosis    Osteopenia of multiple sites    Primary angle-closure glaucoma    Vitamin D deficiency    Acute pancreatitis    Panic attack    Primary osteoarthritis involving multiple joints    Neoplasm of uncertain behavior of skin    Colon polyps    Chronic midline low back pain with sciatica    Stage 2 chronic kidney disease    Elevated LFTs    Plantar wart    Contact dermatitis, allergic    Hypertension with renal disease    Alcohol screening    Grade IV hemorrhoids    Influenza    Class 1 obesity due to excess calories without serious comorbidity with body mass index (BMI) of 31.0 to 31.9 in adult    Non-seasonal allergic rhinitis    Acute post-traumatic headache, not intractable    Glucose intolerance (impaired glucose tolerance)    Mixed hyperlipidemia    Choledocholithiasis    Chest pain    Acute cystitis without hematuria    Limb cramps    Acute URI      Past Medical History:   Diagnosis Date    Anxiety     Arthritis     Chronic constipation     CKD (chronic kidney disease) 7/17/2017    Colon polyps 7/17/2017    DJD (degenerative joint disease) 7/17/2017    Elevated LFTs 7/17/2017    Flu 02/19/2019    GERD (gastroesophageal reflux disease)     Glucose intolerance (impaired glucose tolerance) 7/17/2017    Hemorrhoids     internal & external- bleeds frequently    High cholesterol     Hyperlipidemia 7/17/2017    Hypertension     Hypertension with renal disease 7/17/2017    PT Education - High Blood

## 2023-10-20 ENCOUNTER — NURSE ONLY (OUTPATIENT)
Facility: CLINIC | Age: 78
End: 2023-10-20

## 2023-10-20 VITALS
HEART RATE: 87 BPM | OXYGEN SATURATION: 97 % | RESPIRATION RATE: 18 BRPM | WEIGHT: 188.7 LBS | SYSTOLIC BLOOD PRESSURE: 93 MMHG | BODY MASS INDEX: 33.43 KG/M2 | DIASTOLIC BLOOD PRESSURE: 62 MMHG | HEIGHT: 63 IN

## 2023-10-20 DIAGNOSIS — Z48.02 ENCOUNTER FOR REMOVAL OF SUTURES: Primary | ICD-10-CM

## 2023-10-20 NOTE — PROGRESS NOTES
Patient had come in for nurse visit to removal sutures. Sutures was located on the right side upper breast area, removal was success.

## 2023-12-01 NOTE — TELEPHONE ENCOUNTER
RX refill request from the patient/pharmacy. Patient last seen 10- with labs, and next appt. scheduled for 12-  Requested Prescriptions     Pending Prescriptions Disp Refills    Pitavastatin Magnesium (ZYPITAMAG) 2 MG TABS 30 tablet 5     Sig: Take one tablet at nighttime. Rios Novak

## 2023-12-02 RX ORDER — PITAVASTATIN MAGNESIUM 2 MG/1
TABLET, FILM COATED ORAL
Qty: 30 TABLET | Refills: 5 | Status: SHIPPED | OUTPATIENT
Start: 2023-12-02

## 2023-12-17 PROBLEM — Z00.00 MEDICARE ANNUAL WELLNESS VISIT, SUBSEQUENT: Status: ACTIVE | Noted: 2017-10-23

## 2023-12-28 RX ORDER — NITROFURANTOIN 25; 75 MG/1; MG/1
100 CAPSULE ORAL 2 TIMES DAILY
Qty: 14 CAPSULE | Refills: 0 | Status: SHIPPED | OUTPATIENT
Start: 2023-12-28 | End: 2024-01-04

## 2024-01-10 ENCOUNTER — NURSE ONLY (OUTPATIENT)
Facility: CLINIC | Age: 79
End: 2024-01-10

## 2024-01-10 DIAGNOSIS — N39.0 FREQUENT UTI: Primary | ICD-10-CM

## 2024-01-11 LAB
APPEARANCE UR: CLEAR
BACTERIA SPEC CULT: NORMAL
BACTERIA URNS QL MICRO: NEGATIVE /HPF
BILIRUB UR QL: NEGATIVE
CC UR VC: NORMAL
COLOR UR: ABNORMAL
EPITH CASTS URNS QL MICRO: ABNORMAL /LPF
GLUCOSE UR STRIP.AUTO-MCNC: NEGATIVE MG/DL
HGB UR QL STRIP: NEGATIVE
KETONES UR QL STRIP.AUTO: NEGATIVE MG/DL
LEUKOCYTE ESTERASE UR QL STRIP.AUTO: ABNORMAL
NITRITE UR QL STRIP.AUTO: NEGATIVE
PH UR STRIP: 7 (ref 5–8)
PROT UR STRIP-MCNC: NEGATIVE MG/DL
RBC #/AREA URNS HPF: ABNORMAL /HPF (ref 0–5)
SERVICE CMNT-IMP: NORMAL
SP GR UR REFRACTOMETRY: 1.02 (ref 1–1.03)
UROBILINOGEN UR QL STRIP.AUTO: 0.2 EU/DL (ref 0.2–1)
WBC URNS QL MICRO: ABNORMAL /HPF (ref 0–4)

## 2024-01-16 PROBLEM — Z00.00 MEDICARE ANNUAL WELLNESS VISIT, SUBSEQUENT: Status: RESOLVED | Noted: 2017-10-23 | Resolved: 2024-01-16

## 2024-01-25 RX ORDER — ESCITALOPRAM OXALATE 5 MG/1
5 TABLET ORAL DAILY
Qty: 30 TABLET | Status: SHIPPED | OUTPATIENT
Start: 2024-01-25

## 2024-01-25 NOTE — TELEPHONE ENCOUNTER
PCP: Cortes Meyer MD    Last appt: 12/18/2023    Future Appointments   Date Time Provider Department Center   3/18/2024 10:10 AM Cortes Meyer MD [facility] AM[facility]       Requested Prescriptions     Pending Prescriptions Disp Refills    escitalopram (LEXAPRO) 5 MG tablet 30 tablet PRN     Sig: Take 1 tablet by mouth daily

## 2024-02-09 RX ORDER — ESCITALOPRAM OXALATE 5 MG/1
5 TABLET ORAL DAILY
Qty: 90 TABLET | Status: SHIPPED | OUTPATIENT
Start: 2024-02-09

## 2024-02-09 NOTE — TELEPHONE ENCOUNTER
PCP: Cortes Meyer MD    Last appt: 12/18/2023    Future Appointments   Date Time Provider Department Center   3/18/2024 10:10 AM Cortes Meyer MD [facility]       Requested Prescriptions     Pending Prescriptions Disp Refills    escitalopram (LEXAPRO) 5 MG tablet 90 tablet PRN     Sig: Take 1 tablet by mouth daily

## 2024-02-27 RX ORDER — HYDROCHLOROTHIAZIDE 25 MG/1
25 TABLET ORAL DAILY
Qty: 90 TABLET | Refills: 3 | Status: SHIPPED | OUTPATIENT
Start: 2024-02-27

## 2024-02-27 RX ORDER — IRBESARTAN 300 MG/1
300 TABLET ORAL DAILY
Qty: 90 TABLET | Refills: 3 | Status: SHIPPED | OUTPATIENT
Start: 2024-02-27

## 2024-02-27 RX ORDER — OMEPRAZOLE 20 MG/1
20 CAPSULE, DELAYED RELEASE ORAL DAILY
Qty: 90 CAPSULE | Refills: 3 | Status: SHIPPED | OUTPATIENT
Start: 2024-02-27

## 2024-02-27 NOTE — TELEPHONE ENCOUNTER
PCP: Cortes Meyer MD    Last appt: 12/18/2023    Future Appointments   Date Time Provider Department Center   3/18/2024 10:10 AM Cortes Meyer MD [facility] AM[facility]       Requested Prescriptions     Pending Prescriptions Disp Refills    hydroCHLOROthiazide (HYDRODIURIL) 25 MG tablet 90 tablet 3     Sig: Take 1 tablet by mouth daily    omeprazole (PRILOSEC) 20 MG delayed release capsule 90 capsule 3     Sig: Take 1 capsule by mouth daily    irbesartan (AVAPRO) 300 MG tablet 90 tablet 3     Sig: Take 1 tablet by mouth daily

## 2024-03-01 RX ORDER — ESCITALOPRAM OXALATE 5 MG/1
5 TABLET ORAL DAILY
Qty: 90 TABLET | Status: SHIPPED | OUTPATIENT
Start: 2024-03-01

## 2024-03-01 RX ORDER — PITAVASTATIN MAGNESIUM 2 MG/1
TABLET, FILM COATED ORAL
Qty: 30 TABLET | Refills: 2 | Status: SHIPPED | OUTPATIENT
Start: 2024-03-01

## 2024-03-01 NOTE — TELEPHONE ENCOUNTER
PCP: Cortes Meyer MD    Last appt: 12/18/2023  Future Appointments   Date Time Provider Department Center   3/18/2024 10:10 AM Cortes Meeyr MD [facility]       Requested Prescriptions     Pending Prescriptions Disp Refills    escitalopram (LEXAPRO) 5 MG tablet 90 tablet PRN     Sig: Take 1 tablet by mouth daily    Pitavastatin Magnesium (ZYPITAMAG) 2 MG TABS 30 tablet 2     Sig: Take one tablet at nighttime.       Prior labs and Blood pressures:  BP Readings from Last 3 Encounters:   12/18/23 116/76   10/20/23 93/62   10/11/23 126/84     Lab Results   Component Value Date/Time     12/18/2023 11:20 AM    K 4.3 12/18/2023 11:20 AM     12/18/2023 11:20 AM    CO2 29 12/18/2023 11:20 AM    BUN 20 12/18/2023 11:20 AM    GFRAA >60 09/20/2022 11:09 AM     Lab Results   Component Value Date/Time    KRL7FKNX 5.7 06/10/2021 11:45 AM     Lab Results   Component Value Date/Time    CHOL 207 12/18/2023 11:20 AM    HDL 70 12/18/2023 11:20 AM    VLDL 27 03/10/2021 11:14 AM     No results found for: \"VITD3\", \"VD3RIA\"        Lab Results   Component Value Date/Time    TSH 1.25 12/20/2022 11:40 AM

## 2024-03-12 RX ORDER — PRAVASTATIN SODIUM 40 MG
40 TABLET ORAL DAILY
Qty: 90 TABLET | Refills: 1 | Status: SHIPPED | OUTPATIENT
Start: 2024-03-12

## 2024-03-12 NOTE — TELEPHONE ENCOUNTER
Called pt to inform pt Zypitamag 2mg tabs prior authorization was denied. Pt stated she already received medication for 30 days and she had to pay $224.    Advised pt  gave a alternative medication Pravachol 40 mg.    Faxed [facility] to inform them patient will not be receiving any refills of the Zypitamag 2mg.

## 2024-03-13 NOTE — TELEPHONE ENCOUNTER
RX refill request from the patient/pharmacy. Patient last seen 11- with labs, and next appt. scheduled for 02-  Requested Prescriptions     Pending Prescriptions Disp Refills    pitavastatin calcium (LIVALO) 2 mg tablet 30 Tab 11     Sig: Take 1 Tab by mouth nightly. Maria Del Rosario Phillips 8

## 2024-03-18 ENCOUNTER — OFFICE VISIT (OUTPATIENT)
Facility: CLINIC | Age: 79
End: 2024-03-18
Payer: MEDICARE

## 2024-03-18 VITALS
HEART RATE: 70 BPM | OXYGEN SATURATION: 98 % | HEIGHT: 63 IN | DIASTOLIC BLOOD PRESSURE: 82 MMHG | RESPIRATION RATE: 16 BRPM | TEMPERATURE: 97.8 F | BODY MASS INDEX: 34.55 KG/M2 | WEIGHT: 195 LBS | SYSTOLIC BLOOD PRESSURE: 139 MMHG

## 2024-03-18 DIAGNOSIS — R13.12 OROPHARYNGEAL DYSPHAGIA: ICD-10-CM

## 2024-03-18 DIAGNOSIS — E66.09 CLASS 1 OBESITY DUE TO EXCESS CALORIES WITHOUT SERIOUS COMORBIDITY WITH BODY MASS INDEX (BMI) OF 31.0 TO 31.9 IN ADULT: ICD-10-CM

## 2024-03-18 DIAGNOSIS — R05.9 COUGH, UNSPECIFIED TYPE: ICD-10-CM

## 2024-03-18 DIAGNOSIS — R73.02 GLUCOSE INTOLERANCE (IMPAIRED GLUCOSE TOLERANCE): ICD-10-CM

## 2024-03-18 DIAGNOSIS — I12.9 HYPERTENSION WITH RENAL DISEASE: Primary | ICD-10-CM

## 2024-03-18 DIAGNOSIS — E78.2 MIXED HYPERLIPIDEMIA: ICD-10-CM

## 2024-03-18 DIAGNOSIS — G89.29 CHRONIC RIGHT-SIDED LOW BACK PAIN WITH RIGHT-SIDED SCIATICA: ICD-10-CM

## 2024-03-18 DIAGNOSIS — M15.9 PRIMARY OSTEOARTHRITIS INVOLVING MULTIPLE JOINTS: ICD-10-CM

## 2024-03-18 DIAGNOSIS — M54.41 CHRONIC RIGHT-SIDED LOW BACK PAIN WITH RIGHT-SIDED SCIATICA: ICD-10-CM

## 2024-03-18 PROCEDURE — 99215 OFFICE O/P EST HI 40 MIN: CPT | Performed by: INTERNAL MEDICINE

## 2024-03-18 PROCEDURE — 1036F TOBACCO NON-USER: CPT | Performed by: INTERNAL MEDICINE

## 2024-03-18 PROCEDURE — G8417 CALC BMI ABV UP PARAM F/U: HCPCS | Performed by: INTERNAL MEDICINE

## 2024-03-18 PROCEDURE — G8399 PT W/DXA RESULTS DOCUMENT: HCPCS | Performed by: INTERNAL MEDICINE

## 2024-03-18 PROCEDURE — G8484 FLU IMMUNIZE NO ADMIN: HCPCS | Performed by: INTERNAL MEDICINE

## 2024-03-18 PROCEDURE — 1123F ACP DISCUSS/DSCN MKR DOCD: CPT | Performed by: INTERNAL MEDICINE

## 2024-03-18 PROCEDURE — 1090F PRES/ABSN URINE INCON ASSESS: CPT | Performed by: INTERNAL MEDICINE

## 2024-03-18 PROCEDURE — G8428 CUR MEDS NOT DOCUMENT: HCPCS | Performed by: INTERNAL MEDICINE

## 2024-03-18 NOTE — PROGRESS NOTES
Yulisa Daniel is a 78 y.o. female     Chief Complaint   Patient presents with    3 Month Follow-Up       /82 (Site: Left Upper Arm, Position: Sitting, Cuff Size: Medium Adult)   Pulse 70   Temp 97.8 °F (36.6 °C) (Temporal)   Resp 16   Ht 1.6 m (5' 3\")   Wt 88.5 kg (195 lb)   SpO2 98%   BMI 34.54 kg/m²     Health Maintenance Due   Topic Date Due    DTaP/Tdap/Td vaccine (1 - Tdap) Never done    Respiratory Syncytial Virus (RSV) Pregnant or age 60 yrs+ (1 - 1-dose 60+ series) Never done    Pneumococcal 65+ years Vaccine (2 of 2 - PPSV23 or PCV20) 07/06/2016    Shingles vaccine (2 of 2) 10/21/2019    COVID-19 Vaccine (3 - 2023-24 season) 09/01/2023         \"Have you been to the ER, urgent care clinic since your last visit?  Hospitalized since your last visit?\"    NO    “Have you seen or consulted any other health care providers outside of [facility] since your last visit?”    NO                     
    No current facility-administered medications for this visit.     Past Medical History:   Diagnosis Date    Anxiety     Arthritis     Chronic constipation     CKD (chronic kidney disease) 7/17/2017    Colon polyps 7/17/2017    DJD (degenerative joint disease) 7/17/2017    Elevated LFTs 7/17/2017    Flu 02/19/2019    GERD (gastroesophageal reflux disease)     Glucose intolerance (impaired glucose tolerance) 7/17/2017    Hemorrhoids     internal & external- bleeds frequently    High cholesterol     Hyperlipidemia 7/17/2017    Hypertension     Hypertension with renal disease 7/17/2017    PT Education - High Blood Pressure (Essential Hypertension) *: blood pressure PT Education - How to access health information online: discussed with patient and provided information    Hypertension, renal 7/17/2017    Ill-defined condition     ringing in ears    Menopause     Mild obesity 7/17/2017    Nausea & vomiting     Neoplasm of uncertain behavior of skin 7/17/2017    On statin therapy 7/17/2017    Osteopenia 7/17/2017    Primary angle-closure glaucoma 7/17/2017    Comments: OU    Skin cancer of face     as of 6/15/16 pt states \"removed years ago\"    Spinal stenosis      Past Surgical History:   Procedure Laterality Date    BREAST BIOPSY Right 02/26/2021    CATARACT REMOVAL Bilateral 2020    CHOLECYSTECTOMY  11/06/2014    Dr Thalia Delgado    COLSC FLX W/REMOVAL LESION BY HOT BX FORCEPS  11/12/2012         ERCP REMOVE CALCULI/DEBRIS BILIARY/PANCREAS DUCT  11/07/2014         ERCP W/SPHINCTEROTOMY/PAPILLOTOMY  11/07/2014         HEENT      laser for glaucoma    HYSTERECTOMY (CERVIX STATUS UNKNOWN)      partial    IR LUMBAR TRANSFORAMINAL EPIDURAL SINGLE  09/28/2021    OVARY REMOVAL Bilateral     PELVIC LAPAROSCOPY      Amarjit. oophorectomy    TUBAL LIGATION      UROLOGICAL SURGERY  08/01/2022    bladder surgery Dr. Godoy    US BREAST BIOPSY W LOC DEVICE 1ST LESION RIGHT Right 2/26/2021    US BREAST NEEDLE BIOPSY RIGHT 2/26/2021 [facility]

## 2024-03-19 LAB
ALBUMIN SERPL-MCNC: 3.8 G/DL (ref 3.5–5)
ALBUMIN/GLOB SERPL: 1.1 (ref 1.1–2.2)
ALP SERPL-CCNC: 100 U/L (ref 45–117)
ALT SERPL-CCNC: 32 U/L (ref 12–78)
ANION GAP SERPL CALC-SCNC: 6 MMOL/L (ref 5–15)
AST SERPL-CCNC: 20 U/L (ref 15–37)
BILIRUB SERPL-MCNC: 0.7 MG/DL (ref 0.2–1)
BUN SERPL-MCNC: 17 MG/DL (ref 6–20)
BUN/CREAT SERPL: 22 (ref 12–20)
CALCIUM SERPL-MCNC: 9.5 MG/DL (ref 8.5–10.1)
CHLORIDE SERPL-SCNC: 103 MMOL/L (ref 97–108)
CHOLEST SERPL-MCNC: 175 MG/DL
CK SERPL-CCNC: 66 U/L (ref 26–192)
CO2 SERPL-SCNC: 30 MMOL/L (ref 21–32)
CREAT SERPL-MCNC: 0.78 MG/DL (ref 0.55–1.02)
EST. AVERAGE GLUCOSE BLD GHB EST-MCNC: 114 MG/DL
GLOBULIN SER CALC-MCNC: 3.5 G/DL (ref 2–4)
GLUCOSE SERPL-MCNC: 98 MG/DL (ref 65–100)
HBA1C MFR BLD: 5.6 % (ref 4–5.6)
HDLC SERPL-MCNC: 59 MG/DL
HDLC SERPL: 3 (ref 0–5)
LDLC SERPL CALC-MCNC: 85.6 MG/DL (ref 0–100)
POTASSIUM SERPL-SCNC: 3.9 MMOL/L (ref 3.5–5.1)
PROT SERPL-MCNC: 7.3 G/DL (ref 6.4–8.2)
SODIUM SERPL-SCNC: 139 MMOL/L (ref 136–145)
TRIGL SERPL-MCNC: 152 MG/DL
VLDLC SERPL CALC-MCNC: 30.4 MG/DL

## 2024-04-05 ENCOUNTER — TRANSCRIBE ORDERS (OUTPATIENT)
Facility: HOSPITAL | Age: 79
End: 2024-04-05

## 2024-04-05 DIAGNOSIS — Z12.31 SCREENING MAMMOGRAM FOR HIGH-RISK PATIENT: Primary | ICD-10-CM

## 2024-04-09 ENCOUNTER — OFFICE VISIT (OUTPATIENT)
Facility: CLINIC | Age: 79
End: 2024-04-09
Payer: MEDICARE

## 2024-04-09 VITALS
SYSTOLIC BLOOD PRESSURE: 114 MMHG | OXYGEN SATURATION: 96 % | DIASTOLIC BLOOD PRESSURE: 74 MMHG | TEMPERATURE: 97.7 F | WEIGHT: 196.5 LBS | BODY MASS INDEX: 34.81 KG/M2 | HEART RATE: 77 BPM

## 2024-04-09 DIAGNOSIS — M25.552 BILATERAL HIP PAIN: Primary | ICD-10-CM

## 2024-04-09 DIAGNOSIS — M54.40 CHRONIC MIDLINE LOW BACK PAIN WITH SCIATICA, SCIATICA LATERALITY UNSPECIFIED: ICD-10-CM

## 2024-04-09 DIAGNOSIS — G89.29 CHRONIC MIDLINE LOW BACK PAIN WITH SCIATICA, SCIATICA LATERALITY UNSPECIFIED: ICD-10-CM

## 2024-04-09 DIAGNOSIS — M25.551 BILATERAL HIP PAIN: Primary | ICD-10-CM

## 2024-04-09 PROCEDURE — 1090F PRES/ABSN URINE INCON ASSESS: CPT | Performed by: INTERNAL MEDICINE

## 2024-04-09 PROCEDURE — G8427 DOCREV CUR MEDS BY ELIG CLIN: HCPCS | Performed by: INTERNAL MEDICINE

## 2024-04-09 PROCEDURE — 99213 OFFICE O/P EST LOW 20 MIN: CPT | Performed by: INTERNAL MEDICINE

## 2024-04-09 PROCEDURE — G8399 PT W/DXA RESULTS DOCUMENT: HCPCS | Performed by: INTERNAL MEDICINE

## 2024-04-09 PROCEDURE — 1036F TOBACCO NON-USER: CPT | Performed by: INTERNAL MEDICINE

## 2024-04-09 PROCEDURE — G8417 CALC BMI ABV UP PARAM F/U: HCPCS | Performed by: INTERNAL MEDICINE

## 2024-04-09 PROCEDURE — 1123F ACP DISCUSS/DSCN MKR DOCD: CPT | Performed by: INTERNAL MEDICINE

## 2024-04-09 RX ORDER — PREDNISONE 5 MG/1
TABLET ORAL
Qty: 1 EACH | Refills: 0 | Status: SHIPPED | OUTPATIENT
Start: 2024-04-09

## 2024-04-09 NOTE — PROGRESS NOTES
Chief Complaint   Patient presents with    Back Pain    Hip Pain    Hypertension    Cholesterol Problem    Chronic Kidney Disease    1. Have you been to the ER, urgent care clinic since your last visit?  Hospitalized since your last visit?no    2. Have you seen or consulted any other health care providers outside of the [facility] since your last visit?  Include any pap smears or colon screening. no  
Station and gait normal.   Hematologic:   No purpura or petechiae        Assessment/Plan:         1. Bilateral hip pain    2. Chronic midline low back pain with sciatica, sciatica laterality unspecified        Impressions/Plan:  Impression  Bilateral hip pain x-rays obtained today of the hips showed no significant arthritis to speak up.  2.  Low back pain x-rays obtained about 3 weeks ago reviewed which does show some arthritis and repeat today again no evidence of acute problem so at this point I will put on a prednisone 5 mg 12-day Dosepak and if the symptoms do not resolve with this then I think she needs an MRI lumbar spine.    Orders Placed This Encounter   Procedures    XR LUMBAR SPINE (2-3 VIEWS)     Standing Status:   Future     Number of Occurrences:   1     Standing Expiration Date:   4/9/2025    XR HIP BILATERAL W AP PELVIS (2 VIEWS)     Standing Status:   Future     Standing Expiration Date:   4/9/2025          No follow-ups on file.     No results found for any visits on 04/09/24.      Cortes Meyer MD    The patient was given after the visit summary the patient verbalized an understanding of the plans and problems as explained.

## 2024-04-26 ENCOUNTER — HOSPITAL ENCOUNTER (OUTPATIENT)
Facility: HOSPITAL | Age: 79
End: 2024-04-26
Attending: INTERNAL MEDICINE
Payer: MEDICARE

## 2024-04-26 VITALS — HEIGHT: 63 IN | WEIGHT: 196 LBS | BODY MASS INDEX: 34.73 KG/M2

## 2024-04-26 DIAGNOSIS — Z12.31 SCREENING MAMMOGRAM FOR HIGH-RISK PATIENT: ICD-10-CM

## 2024-04-26 PROCEDURE — 77063 BREAST TOMOSYNTHESIS BI: CPT

## 2024-05-29 RX ORDER — PRAVASTATIN SODIUM 40 MG
40 TABLET ORAL DAILY
Qty: 90 TABLET | Refills: 1 | Status: SHIPPED | OUTPATIENT
Start: 2024-05-29

## 2024-05-29 NOTE — TELEPHONE ENCOUNTER
PCP: Cortes Meyer MD    Last appt: 4/9/2024    Future Appointments   Date Time Provider Department Center   6/24/2024 10:10 AM Cortes Meyer MD [facility] AM[facility]       Requested Prescriptions     Pending Prescriptions Disp Refills    pravastatin (PRAVACHOL) 40 MG tablet 90 tablet 1     Sig: Take 1 tablet by mouth daily        How Severe Are Your Warts?: mild Is This A New Presentation, Or A Follow-Up?: Wart

## 2024-05-31 ENCOUNTER — NURSE ONLY (OUTPATIENT)
Facility: CLINIC | Age: 79
End: 2024-05-31

## 2024-05-31 DIAGNOSIS — N39.0 FREQUENT UTI: Primary | ICD-10-CM

## 2024-06-01 LAB
APPEARANCE UR: ABNORMAL
BACTERIA URNS QL MICRO: ABNORMAL /HPF
BILIRUB UR QL: NEGATIVE
COLOR UR: ABNORMAL
EPITH CASTS URNS QL MICRO: ABNORMAL /LPF
GLUCOSE UR STRIP.AUTO-MCNC: NEGATIVE MG/DL
HGB UR QL STRIP: NEGATIVE
KETONES UR QL STRIP.AUTO: NEGATIVE MG/DL
LEUKOCYTE ESTERASE UR QL STRIP.AUTO: ABNORMAL
NITRITE UR QL STRIP.AUTO: NEGATIVE
PH UR STRIP: 6.5 (ref 5–8)
PROT UR STRIP-MCNC: ABNORMAL MG/DL
RBC #/AREA URNS HPF: ABNORMAL /HPF (ref 0–5)
SP GR UR REFRACTOMETRY: 1.02 (ref 1–1.03)
UROBILINOGEN UR QL STRIP.AUTO: 0.2 EU/DL (ref 0.2–1)
WBC URNS QL MICRO: ABNORMAL /HPF (ref 0–4)

## 2024-06-02 LAB
BACTERIA SPEC CULT: ABNORMAL
BACTERIA SPEC CULT: ABNORMAL
CC UR VC: ABNORMAL
SERVICE CMNT-IMP: ABNORMAL

## 2024-06-05 ENCOUNTER — TELEPHONE (OUTPATIENT)
Facility: CLINIC | Age: 79
End: 2024-06-05

## 2024-06-05 RX ORDER — CEFUROXIME AXETIL 250 MG/1
250 TABLET ORAL 2 TIMES DAILY
Qty: 14 TABLET | Refills: 0 | Status: SHIPPED | OUTPATIENT
Start: 2024-06-05 | End: 2024-06-12

## 2024-06-05 NOTE — TELEPHONE ENCOUNTER
Patient states she left a urine sample 5- and has not gotten any results.  Very uncomfortable and is requesting results and treatment if indicated.  Can you help?

## 2024-06-06 DIAGNOSIS — N30.00 ACUTE CYSTITIS WITHOUT HEMATURIA: Primary | ICD-10-CM

## 2024-06-06 RX ORDER — NITROFURANTOIN 25; 75 MG/1; MG/1
100 CAPSULE ORAL 2 TIMES DAILY
Qty: 20 CAPSULE | Refills: 0 | Status: SHIPPED | OUTPATIENT
Start: 2024-06-06 | End: 2024-06-16

## 2024-06-06 NOTE — TELEPHONE ENCOUNTER
Truong Macdonald, APRN - NP  Shannan Thornton, LPN  Caller: Unspecified (Yesterday,  2:10 PM)  Please advise patient that I have sent an antibiotic to her pharmacy in [place].  Take 1 capsule twice a day for 10 days.      Patient notified of medication being sent to the pharmacy.

## 2024-06-21 NOTE — PROGRESS NOTES
Chief Complaint   Patient presents with    3 Month Follow-Up       SUBJECTIVE:    Yulisa Daniel is a 78 y.o. female who returns in follow-up for medical problems include hypertension, glucose intolerance, hyperlipidemia, allergic rhinitis, osteopenia, DJD, CKD stage II, obesity and other multiple medical problems.  In addition to her chronic problems she has some acute problems going on today as well.  She has noted her back pain for which I saw her before and gave her steroid Dosepak does not seem to be any better.  The pain seems to be predominantly in the upper lumbar region seems ago bilateral slightly more toward the right.  She notes no leg weakness but she says that she walks much outside she has to stop because of backs bothering her so much.  She does note also when she is walking outside she gets some shortness of breath but she does not get that problem when she is walking inside and she is wonder if that is related to her lungs and humidity now.  She was seen since she was last evaluated by me by ENT Dr. Parr and he told that she had significant reflux causing her problem with her vocal cord hoarseness and increased her omeprazole to twice daily.  She does note that that seems to be helping her breathing inside.  She notes no chest pain, palpitations, PND, orthopnea or other cardiac respiratory complaints.  She notes no current GI or  complaints.  She notes no headaches, dizziness or new neurologic complaints.  Other than the back she does not really note any arthritic complaints and she has no other complaints on complete review of systems.      Current Outpatient Medications   Medication Sig Dispense Refill    omeprazole (PRILOSEC) 20 MG delayed release capsule Take 1 capsule by mouth in the morning and at bedtime 180 capsule 3    pravastatin (PRAVACHOL) 40 MG tablet Take 1 tablet by mouth daily 90 tablet 1    escitalopram (LEXAPRO) 5 MG tablet Take 1 tablet by mouth daily 90 tablet

## 2024-06-24 ENCOUNTER — OFFICE VISIT (OUTPATIENT)
Facility: CLINIC | Age: 79
End: 2024-06-24
Payer: MEDICARE

## 2024-06-24 VITALS
TEMPERATURE: 97.7 F | HEART RATE: 80 BPM | HEIGHT: 63 IN | OXYGEN SATURATION: 96 % | BODY MASS INDEX: 35.4 KG/M2 | DIASTOLIC BLOOD PRESSURE: 80 MMHG | RESPIRATION RATE: 18 BRPM | WEIGHT: 199.8 LBS | SYSTOLIC BLOOD PRESSURE: 120 MMHG

## 2024-06-24 DIAGNOSIS — R06.02 SOB (SHORTNESS OF BREATH): ICD-10-CM

## 2024-06-24 DIAGNOSIS — E78.2 MIXED HYPERLIPIDEMIA: ICD-10-CM

## 2024-06-24 DIAGNOSIS — E66.01 SEVERE OBESITY (BMI 35.0-39.9) WITH COMORBIDITY (HCC): ICD-10-CM

## 2024-06-24 DIAGNOSIS — R73.02 GLUCOSE INTOLERANCE (IMPAIRED GLUCOSE TOLERANCE): ICD-10-CM

## 2024-06-24 DIAGNOSIS — G89.29 CHRONIC MIDLINE LOW BACK PAIN WITH SCIATICA, SCIATICA LATERALITY UNSPECIFIED: ICD-10-CM

## 2024-06-24 DIAGNOSIS — M15.9 PRIMARY OSTEOARTHRITIS INVOLVING MULTIPLE JOINTS: ICD-10-CM

## 2024-06-24 DIAGNOSIS — I12.9 HYPERTENSION WITH RENAL DISEASE: Primary | ICD-10-CM

## 2024-06-24 DIAGNOSIS — E66.09 CLASS 1 OBESITY DUE TO EXCESS CALORIES WITHOUT SERIOUS COMORBIDITY WITH BODY MASS INDEX (BMI) OF 31.0 TO 31.9 IN ADULT: ICD-10-CM

## 2024-06-24 DIAGNOSIS — K21.9 GASTROESOPHAGEAL REFLUX DISEASE WITHOUT ESOPHAGITIS: ICD-10-CM

## 2024-06-24 DIAGNOSIS — M54.40 CHRONIC MIDLINE LOW BACK PAIN WITH SCIATICA, SCIATICA LATERALITY UNSPECIFIED: ICD-10-CM

## 2024-06-24 LAB
ALBUMIN SERPL-MCNC: 3.9 G/DL (ref 3.5–5)
ALBUMIN/GLOB SERPL: 1.2 (ref 1.1–2.2)
ALP SERPL-CCNC: 98 U/L (ref 45–117)
ALT SERPL-CCNC: 33 U/L (ref 12–78)
ANION GAP SERPL CALC-SCNC: 8 MMOL/L (ref 5–15)
AST SERPL-CCNC: 26 U/L (ref 15–37)
BASOPHILS # BLD: 0.1 K/UL (ref 0–0.1)
BASOPHILS NFR BLD: 1 % (ref 0–1)
BILIRUB SERPL-MCNC: 0.9 MG/DL (ref 0.2–1)
BUN SERPL-MCNC: 17 MG/DL (ref 6–20)
BUN/CREAT SERPL: 23 (ref 12–20)
CALCIUM SERPL-MCNC: 10 MG/DL (ref 8.5–10.1)
CHLORIDE SERPL-SCNC: 102 MMOL/L (ref 97–108)
CHOLEST SERPL-MCNC: 225 MG/DL
CK SERPL-CCNC: 120 U/L (ref 26–192)
CO2 SERPL-SCNC: 30 MMOL/L (ref 21–32)
CREAT SERPL-MCNC: 0.73 MG/DL (ref 0.55–1.02)
DIFFERENTIAL METHOD BLD: NORMAL
EOSINOPHIL # BLD: 0.3 K/UL (ref 0–0.4)
EOSINOPHIL NFR BLD: 4 % (ref 0–7)
ERYTHROCYTE [DISTWIDTH] IN BLOOD BY AUTOMATED COUNT: 14.4 % (ref 11.5–14.5)
EST. AVERAGE GLUCOSE BLD GHB EST-MCNC: 117 MG/DL
GLOBULIN SER CALC-MCNC: 3.3 G/DL (ref 2–4)
GLUCOSE SERPL-MCNC: 105 MG/DL (ref 65–100)
HBA1C MFR BLD: 5.7 % (ref 4–5.6)
HCT VFR BLD AUTO: 40.9 % (ref 35–47)
HDLC SERPL-MCNC: 52 MG/DL
HDLC SERPL: 4.3 (ref 0–5)
HGB BLD-MCNC: 12.9 G/DL (ref 11.5–16)
IMM GRANULOCYTES # BLD AUTO: 0 K/UL (ref 0–0.04)
IMM GRANULOCYTES NFR BLD AUTO: 0 % (ref 0–0.5)
LDLC SERPL CALC-MCNC: 133.2 MG/DL (ref 0–100)
LYMPHOCYTES # BLD: 3.1 K/UL (ref 0.8–3.5)
LYMPHOCYTES NFR BLD: 43 % (ref 12–49)
MCH RBC QN AUTO: 26.8 PG (ref 26–34)
MCHC RBC AUTO-ENTMCNC: 31.5 G/DL (ref 30–36.5)
MCV RBC AUTO: 84.9 FL (ref 80–99)
MONOCYTES # BLD: 0.5 K/UL (ref 0–1)
MONOCYTES NFR BLD: 7 % (ref 5–13)
NEUTS SEG # BLD: 3.3 K/UL (ref 1.8–8)
NEUTS SEG NFR BLD: 45 % (ref 32–75)
NRBC # BLD: 0 K/UL (ref 0–0.01)
NRBC BLD-RTO: 0 PER 100 WBC
PLATELET # BLD AUTO: 260 K/UL (ref 150–400)
PMV BLD AUTO: 12 FL (ref 8.9–12.9)
POTASSIUM SERPL-SCNC: 3.9 MMOL/L (ref 3.5–5.1)
PROT SERPL-MCNC: 7.2 G/DL (ref 6.4–8.2)
RBC # BLD AUTO: 4.82 M/UL (ref 3.8–5.2)
SODIUM SERPL-SCNC: 140 MMOL/L (ref 136–145)
TRIGL SERPL-MCNC: 199 MG/DL
VLDLC SERPL CALC-MCNC: 39.8 MG/DL
WBC # BLD AUTO: 7.3 K/UL (ref 3.6–11)

## 2024-06-24 PROCEDURE — 1123F ACP DISCUSS/DSCN MKR DOCD: CPT | Performed by: INTERNAL MEDICINE

## 2024-06-24 PROCEDURE — G8417 CALC BMI ABV UP PARAM F/U: HCPCS | Performed by: INTERNAL MEDICINE

## 2024-06-24 PROCEDURE — 99215 OFFICE O/P EST HI 40 MIN: CPT | Performed by: INTERNAL MEDICINE

## 2024-06-24 PROCEDURE — 1036F TOBACCO NON-USER: CPT | Performed by: INTERNAL MEDICINE

## 2024-06-24 PROCEDURE — G8427 DOCREV CUR MEDS BY ELIG CLIN: HCPCS | Performed by: INTERNAL MEDICINE

## 2024-06-24 PROCEDURE — 1090F PRES/ABSN URINE INCON ASSESS: CPT | Performed by: INTERNAL MEDICINE

## 2024-06-24 PROCEDURE — G8399 PT W/DXA RESULTS DOCUMENT: HCPCS | Performed by: INTERNAL MEDICINE

## 2024-06-24 RX ORDER — OMEPRAZOLE 20 MG/1
20 CAPSULE, DELAYED RELEASE ORAL 2 TIMES DAILY
Qty: 180 CAPSULE | Refills: 3 | Status: SHIPPED | OUTPATIENT
Start: 2024-06-24

## 2024-06-24 ASSESSMENT — PATIENT HEALTH QUESTIONNAIRE - PHQ9
SUM OF ALL RESPONSES TO PHQ QUESTIONS 1-9: 0
2. FEELING DOWN, DEPRESSED OR HOPELESS: NOT AT ALL
SUM OF ALL RESPONSES TO PHQ9 QUESTIONS 1 & 2: 0
SUM OF ALL RESPONSES TO PHQ QUESTIONS 1-9: 0
1. LITTLE INTEREST OR PLEASURE IN DOING THINGS: NOT AT ALL

## 2024-06-24 NOTE — PROGRESS NOTES
Yulisa Daniel is a 78 y.o. female     Chief Complaint   Patient presents with    3 Month Follow-Up       /80 (Site: Left Upper Arm, Position: Sitting, Cuff Size: Large Adult)   Pulse 80   Temp 97.7 °F (36.5 °C) (Oral)   Resp 18   Ht 1.6 m (5' 2.99\")   Wt 90.6 kg (199 lb 12.8 oz)   SpO2 96%   BMI 35.40 kg/m²     Health Maintenance Due   Topic Date Due    DTaP/Tdap/Td vaccine (1 - Tdap) Never done    Respiratory Syncytial Virus (RSV) Pregnant or age 60 yrs+ (1 - 1-dose 60+ series) Never done    Pneumococcal 65+ years Vaccine (2 of 2 - PPSV23 or PCV20) 07/06/2016    Shingles vaccine (2 of 2) 10/21/2019    COVID-19 Vaccine (3 - 2023-24 season) 09/01/2023         \"Have you been to the ER, urgent care clinic since your last visit?  Hospitalized since your last visit?\"    NO    “Have you seen or consulted any other health care providers outside of [facility] since your last visit?”    NO

## 2024-06-29 ENCOUNTER — HOSPITAL ENCOUNTER (OUTPATIENT)
Facility: HOSPITAL | Age: 79
End: 2024-06-29
Attending: INTERNAL MEDICINE
Payer: MEDICARE

## 2024-06-29 DIAGNOSIS — E66.01 SEVERE OBESITY (BMI 35.0-39.9) WITH COMORBIDITY (HCC): ICD-10-CM

## 2024-06-29 PROCEDURE — 72148 MRI LUMBAR SPINE W/O DYE: CPT

## 2024-07-01 RX ORDER — MELOXICAM 15 MG/1
15 TABLET ORAL DAILY PRN
Qty: 30 TABLET | Refills: 5 | Status: SHIPPED | OUTPATIENT
Start: 2024-07-01

## 2024-07-01 NOTE — TELEPHONE ENCOUNTER
RX refill request from the patient/pharmacy. Patient last seen 06- with labs, and next appt. scheduled for 10-  Requested Prescriptions     Pending Prescriptions Disp Refills    meloxicam (MOBIC) 15 MG tablet 30 tablet 5     Sig: Take 1 tablet by mouth daily as needed for Pain    .

## 2024-07-08 RX ORDER — PRAVASTATIN SODIUM 80 MG/1
TABLET ORAL
Qty: 90 TABLET | Refills: 3 | Status: SHIPPED | OUTPATIENT
Start: 2024-07-08

## 2024-07-08 NOTE — TELEPHONE ENCOUNTER
RX refill request from the patient/pharmacy. Patient last seen 06- with labs, and next appt. scheduled for 10-  Requested Prescriptions     Pending Prescriptions Disp Refills    pravastatin (PRAVACHOL) 80 MG tablet 90 tablet 3     Sig: Take one tablet at bedtime.    .

## 2024-11-12 NOTE — PROGRESS NOTES
Chief Complaint   Patient presents with    Hypertension     3 month f/up    Chronic Kidney Disease    Blood Sugar Problem    Back Pain       SUBJECTIVE:    Yulisa Daniel is a 79 y.o. female who returns in follow-up for medical problems include hypertension, hyperlipidemia, DJD, GERD, obesity and other medical problems.  She is noting severe back pain and she noted she previously had severe pain in muscles and neck she had to stop her statin and it went away.  She is wondering if that could be the case again.  She notes no chest pain, shortness of breath or cardiac Rester complaints.  There are no GI or  complaints.  There are no headaches, dizziness or neurologic complaints.  No other complaints of complete review of systems regarding arthritis other than that related to the back.      Current Outpatient Medications   Medication Sig Dispense Refill    calcium carbonate (OSCAL) 500 MG TABS tablet Take 1 tablet by mouth 2 times daily      pravastatin (PRAVACHOL) 80 MG tablet Take one tablet at bedtime. 90 tablet 3    meloxicam (MOBIC) 15 MG tablet Take 1 tablet by mouth daily as needed for Pain 30 tablet 5    omeprazole (PRILOSEC) 20 MG delayed release capsule Take 1 capsule by mouth in the morning and at bedtime 180 capsule 3    escitalopram (LEXAPRO) 5 MG tablet Take 1 tablet by mouth daily 90 tablet PRN    hydroCHLOROthiazide (HYDRODIURIL) 25 MG tablet Take 1 tablet by mouth daily 90 tablet 3    irbesartan (AVAPRO) 300 MG tablet Take 1 tablet by mouth daily 90 tablet 3    Biotin 10 MG CAPS Take by mouth Daily with supper      cyanocobalamin 500 MCG tablet Take 1 tablet by mouth      polyethylene glycol (GLYCOLAX) 17 GM/SCOOP powder Take 17 g by mouth       No current facility-administered medications for this visit.     Past Medical History:   Diagnosis Date    Anxiety     Arthritis     Chronic constipation     CKD (chronic kidney disease) 7/17/2017    Colon polyps 7/17/2017    DJD (degenerative joint

## 2024-11-13 ENCOUNTER — OFFICE VISIT (OUTPATIENT)
Facility: CLINIC | Age: 79
End: 2024-11-13

## 2024-11-13 VITALS
WEIGHT: 197.7 LBS | DIASTOLIC BLOOD PRESSURE: 80 MMHG | HEIGHT: 62 IN | SYSTOLIC BLOOD PRESSURE: 132 MMHG | TEMPERATURE: 97.8 F | BODY MASS INDEX: 36.38 KG/M2 | OXYGEN SATURATION: 98 % | HEART RATE: 76 BPM

## 2024-11-13 DIAGNOSIS — M15.0 PRIMARY OSTEOARTHRITIS INVOLVING MULTIPLE JOINTS: ICD-10-CM

## 2024-11-13 DIAGNOSIS — R73.02 GLUCOSE INTOLERANCE (IMPAIRED GLUCOSE TOLERANCE): ICD-10-CM

## 2024-11-13 DIAGNOSIS — E78.2 MIXED HYPERLIPIDEMIA: ICD-10-CM

## 2024-11-13 DIAGNOSIS — I12.9 HYPERTENSION WITH RENAL DISEASE: Primary | ICD-10-CM

## 2024-11-13 DIAGNOSIS — K21.9 GASTROESOPHAGEAL REFLUX DISEASE WITHOUT ESOPHAGITIS: ICD-10-CM

## 2024-11-13 DIAGNOSIS — E66.811 CLASS 1 OBESITY DUE TO EXCESS CALORIES WITHOUT SERIOUS COMORBIDITY WITH BODY MASS INDEX (BMI) OF 31.0 TO 31.9 IN ADULT: ICD-10-CM

## 2024-11-13 DIAGNOSIS — Z23 NEEDS FLU SHOT: ICD-10-CM

## 2024-11-13 DIAGNOSIS — E66.09 CLASS 1 OBESITY DUE TO EXCESS CALORIES WITHOUT SERIOUS COMORBIDITY WITH BODY MASS INDEX (BMI) OF 31.0 TO 31.9 IN ADULT: ICD-10-CM

## 2024-11-13 RX ORDER — CALCIUM CARBONATE 500(1250)
500 TABLET ORAL 2 TIMES DAILY
COMMUNITY

## 2024-11-13 ASSESSMENT — PATIENT HEALTH QUESTIONNAIRE - PHQ9
SUM OF ALL RESPONSES TO PHQ9 QUESTIONS 1 & 2: 0
SUM OF ALL RESPONSES TO PHQ QUESTIONS 1-9: 0
2. FEELING DOWN, DEPRESSED OR HOPELESS: NOT AT ALL
SUM OF ALL RESPONSES TO PHQ QUESTIONS 1-9: 0
1. LITTLE INTEREST OR PLEASURE IN DOING THINGS: NOT AT ALL
SUM OF ALL RESPONSES TO PHQ QUESTIONS 1-9: 0
SUM OF ALL RESPONSES TO PHQ QUESTIONS 1-9: 0

## 2024-11-13 NOTE — PROGRESS NOTES
After obtaining written consent and per orders of Dr. Meyer, injection of flu vaccine given by Flor Reardon RN.  Patient tolerated procedure well. VIS was given to them. No reactions noted.

## 2024-11-13 NOTE — PROGRESS NOTES
Yulisa Daniel is a 79 y.o. female     Chief Complaint   Patient presents with    Hypertension     3 month f/up    Chronic Kidney Disease    Blood Sugar Problem    Back Pain       \"Have you been to the ER, urgent care clinic since your last visit?  Hospitalized since your last visit?\"    NO    “Have you seen or consulted any other health care providers outside of [facility] since your last visit?”    Went to ENT

## 2024-11-14 LAB
ALBUMIN SERPL-MCNC: 3.6 G/DL (ref 3.5–5)
ALBUMIN/GLOB SERPL: 1 (ref 1.1–2.2)
ALP SERPL-CCNC: 104 U/L (ref 45–117)
ALT SERPL-CCNC: 21 U/L (ref 12–78)
ANION GAP SERPL CALC-SCNC: 5 MMOL/L (ref 2–12)
AST SERPL-CCNC: 22 U/L (ref 15–37)
BILIRUB SERPL-MCNC: 0.8 MG/DL (ref 0.2–1)
BUN SERPL-MCNC: 19 MG/DL (ref 6–20)
BUN/CREAT SERPL: 27 (ref 12–20)
CALCIUM SERPL-MCNC: 9.6 MG/DL (ref 8.5–10.1)
CHLORIDE SERPL-SCNC: 104 MMOL/L (ref 97–108)
CHOLEST SERPL-MCNC: 215 MG/DL
CK SERPL-CCNC: 64 U/L (ref 26–192)
CO2 SERPL-SCNC: 29 MMOL/L (ref 21–32)
CREAT SERPL-MCNC: 0.71 MG/DL (ref 0.55–1.02)
EST. AVERAGE GLUCOSE BLD GHB EST-MCNC: 126 MG/DL
GLOBULIN SER CALC-MCNC: 3.7 G/DL (ref 2–4)
GLUCOSE SERPL-MCNC: 90 MG/DL (ref 65–100)
HBA1C MFR BLD: 6 % (ref 4–5.6)
HDLC SERPL-MCNC: 51 MG/DL
HDLC SERPL: 4.2 (ref 0–5)
LDLC SERPL CALC-MCNC: 132.4 MG/DL (ref 0–100)
POTASSIUM SERPL-SCNC: 3.8 MMOL/L (ref 3.5–5.1)
PROT SERPL-MCNC: 7.3 G/DL (ref 6.4–8.2)
SODIUM SERPL-SCNC: 138 MMOL/L (ref 136–145)
TRIGL SERPL-MCNC: 158 MG/DL
VLDLC SERPL CALC-MCNC: 31.6 MG/DL

## 2024-11-27 RX ORDER — ROSUVASTATIN CALCIUM 20 MG/1
20 TABLET, COATED ORAL DAILY
Qty: 90 TABLET | Refills: 1 | Status: SHIPPED | OUTPATIENT
Start: 2024-11-27

## 2024-11-27 NOTE — TELEPHONE ENCOUNTER
----- Message from Dr. Cortes Meyer MD sent at 11/15/2024  1:18 PM EST -----  Call patient with abnormal results.  Labs are stable but still has elevated cholesterol and LDL.  On Pravachol 80 mg daily so changed to Crestor 20 mg daily.  Other labs are okay.

## 2024-11-27 NOTE — TELEPHONE ENCOUNTER
PCP: Cortes Meyer MD    Last appt: 11/13/2024  Future Appointments   Date Time Provider Department Center   12/2/2024  2:00 PM Cortes Meyer MD [facility] DEP   2/13/2025  9:50 AM Cortes Meyer MD [facility] DEP       Requested Prescriptions     Pending Prescriptions Disp Refills    rosuvastatin (CRESTOR) 20 MG tablet 90 tablet 1     Sig: Take 1 tablet by mouth daily       Prior labs and Blood pressures:  BP Readings from Last 3 Encounters:   11/13/24 132/80   06/24/24 120/80   04/09/24 114/74     Lab Results   Component Value Date/Time     11/13/2024 01:24 PM    K 3.8 11/13/2024 01:24 PM     11/13/2024 01:24 PM    CO2 29 11/13/2024 01:24 PM    BUN 19 11/13/2024 01:24 PM    GFRAA >60 09/20/2022 11:09 AM     Lab Results   Component Value Date/Time    VUR0MKTF 5.7 06/10/2021 11:45 AM     Lab Results   Component Value Date/Time    CHOL 215 11/13/2024 01:24 PM    HDL 51 11/13/2024 01:24 PM    .4 11/13/2024 01:24 PM    LDL 85.6 03/18/2024 11:33 AM    VLDL 31.6 11/13/2024 01:24 PM    VLDL 27 03/10/2021 11:14 AM     No results found for: \"VITD3\"        Lab Results   Component Value Date/Time    TSH 2.09 12/18/2023 11:20 AM

## 2024-12-02 ENCOUNTER — OFFICE VISIT (OUTPATIENT)
Facility: CLINIC | Age: 79
End: 2024-12-02

## 2024-12-02 VITALS
WEIGHT: 200.2 LBS | DIASTOLIC BLOOD PRESSURE: 70 MMHG | BODY MASS INDEX: 36.84 KG/M2 | SYSTOLIC BLOOD PRESSURE: 110 MMHG | HEIGHT: 62 IN | HEART RATE: 83 BPM | RESPIRATION RATE: 18 BRPM | TEMPERATURE: 98.2 F | OXYGEN SATURATION: 95 %

## 2024-12-02 DIAGNOSIS — E78.2 MIXED HYPERLIPIDEMIA: ICD-10-CM

## 2024-12-02 DIAGNOSIS — Z00.00 MEDICARE ANNUAL WELLNESS VISIT, SUBSEQUENT: ICD-10-CM

## 2024-12-02 DIAGNOSIS — R79.89 ELEVATED LFTS: ICD-10-CM

## 2024-12-02 DIAGNOSIS — K21.9 GASTROESOPHAGEAL REFLUX DISEASE WITHOUT ESOPHAGITIS: ICD-10-CM

## 2024-12-02 DIAGNOSIS — E66.811 CLASS 1 OBESITY DUE TO EXCESS CALORIES WITHOUT SERIOUS COMORBIDITY WITH BODY MASS INDEX (BMI) OF 31.0 TO 31.9 IN ADULT: ICD-10-CM

## 2024-12-02 DIAGNOSIS — E55.9 VITAMIN D DEFICIENCY: ICD-10-CM

## 2024-12-02 DIAGNOSIS — M15.0 PRIMARY OSTEOARTHRITIS INVOLVING MULTIPLE JOINTS: ICD-10-CM

## 2024-12-02 DIAGNOSIS — R73.02 GLUCOSE INTOLERANCE (IMPAIRED GLUCOSE TOLERANCE): ICD-10-CM

## 2024-12-02 DIAGNOSIS — G89.29 CHRONIC MIDLINE LOW BACK PAIN WITH SCIATICA, SCIATICA LATERALITY UNSPECIFIED: ICD-10-CM

## 2024-12-02 DIAGNOSIS — J30.89 NON-SEASONAL ALLERGIC RHINITIS, UNSPECIFIED TRIGGER: ICD-10-CM

## 2024-12-02 DIAGNOSIS — M85.89 OSTEOPENIA OF MULTIPLE SITES: ICD-10-CM

## 2024-12-02 DIAGNOSIS — I12.9 HYPERTENSION WITH RENAL DISEASE: Primary | ICD-10-CM

## 2024-12-02 DIAGNOSIS — N18.2 STAGE 2 CHRONIC KIDNEY DISEASE: ICD-10-CM

## 2024-12-02 DIAGNOSIS — F41.0 PANIC ATTACK: ICD-10-CM

## 2024-12-02 DIAGNOSIS — M54.40 CHRONIC MIDLINE LOW BACK PAIN WITH SCIATICA, SCIATICA LATERALITY UNSPECIFIED: ICD-10-CM

## 2024-12-02 DIAGNOSIS — I12.9 HYPERTENSION WITH RENAL DISEASE: ICD-10-CM

## 2024-12-02 DIAGNOSIS — E66.09 CLASS 1 OBESITY DUE TO EXCESS CALORIES WITHOUT SERIOUS COMORBIDITY WITH BODY MASS INDEX (BMI) OF 31.0 TO 31.9 IN ADULT: ICD-10-CM

## 2024-12-02 DIAGNOSIS — K63.5 POLYP OF COLON, UNSPECIFIED PART OF COLON, UNSPECIFIED TYPE: ICD-10-CM

## 2024-12-02 DIAGNOSIS — Z13.39 ALCOHOL SCREENING: ICD-10-CM

## 2024-12-02 SDOH — ECONOMIC STABILITY: FOOD INSECURITY: WITHIN THE PAST 12 MONTHS, THE FOOD YOU BOUGHT JUST DIDN'T LAST AND YOU DIDN'T HAVE MONEY TO GET MORE.: NEVER TRUE

## 2024-12-02 SDOH — ECONOMIC STABILITY: INCOME INSECURITY: HOW HARD IS IT FOR YOU TO PAY FOR THE VERY BASICS LIKE FOOD, HOUSING, MEDICAL CARE, AND HEATING?: NOT VERY HARD

## 2024-12-02 SDOH — ECONOMIC STABILITY: FOOD INSECURITY: WITHIN THE PAST 12 MONTHS, YOU WORRIED THAT YOUR FOOD WOULD RUN OUT BEFORE YOU GOT MONEY TO BUY MORE.: NEVER TRUE

## 2024-12-02 SDOH — HEALTH STABILITY: PHYSICAL HEALTH: ON AVERAGE, HOW MANY MINUTES DO YOU ENGAGE IN EXERCISE AT THIS LEVEL?: PATIENT DECLINED

## 2024-12-02 SDOH — ECONOMIC STABILITY: TRANSPORTATION INSECURITY
IN THE PAST 12 MONTHS, HAS LACK OF TRANSPORTATION KEPT YOU FROM MEETINGS, WORK, OR FROM GETTING THINGS NEEDED FOR DAILY LIVING?: NO

## 2024-12-02 SDOH — HEALTH STABILITY: PHYSICAL HEALTH: ON AVERAGE, HOW MANY DAYS PER WEEK DO YOU ENGAGE IN MODERATE TO STRENUOUS EXERCISE (LIKE A BRISK WALK)?: 0 DAYS

## 2024-12-02 ASSESSMENT — LIFESTYLE VARIABLES
HOW OFTEN DO YOU HAVE A DRINK CONTAINING ALCOHOL: 2
HOW OFTEN DO YOU HAVE SIX OR MORE DRINKS ON ONE OCCASION: 1
HOW MANY STANDARD DRINKS CONTAINING ALCOHOL DO YOU HAVE ON A TYPICAL DAY: 1 OR 2
HOW OFTEN DO YOU HAVE A DRINK CONTAINING ALCOHOL: MONTHLY OR LESS
HOW MANY STANDARD DRINKS CONTAINING ALCOHOL DO YOU HAVE ON A TYPICAL DAY: 1

## 2024-12-02 ASSESSMENT — PATIENT HEALTH QUESTIONNAIRE - PHQ9
SUM OF ALL RESPONSES TO PHQ QUESTIONS 1-9: 0
SUM OF ALL RESPONSES TO PHQ QUESTIONS 1-9: 0
SUM OF ALL RESPONSES TO PHQ9 QUESTIONS 1 & 2: 0
SUM OF ALL RESPONSES TO PHQ QUESTIONS 1-9: 0
2. FEELING DOWN, DEPRESSED OR HOPELESS: NOT AT ALL
1. LITTLE INTEREST OR PLEASURE IN DOING THINGS: NOT AT ALL
SUM OF ALL RESPONSES TO PHQ QUESTIONS 1-9: 0

## 2024-12-02 NOTE — PROGRESS NOTES
This is a Subsequent Medicare Annual Wellness Visit providing Personalized Prevention Plan Services (PPPS) (Performed 12 months after initial AWV and PPPS )    I have reviewed the patient's medical history in detail and updated the computerized patient record.  She presents today for Medicare subsequent annual wellness examination and screening questionnaire.    She is also in follow-up of her multimedical problems include hypertension, glucose intolerance, hyperlipidemia, allergic rhinitis, GERD, chronic midline low back pain with previous MRI showing some mild spinal stenosis and multilevel degenerative changes, osteopenia, DJD, CKD stage II, prior elevated liver enzymes, obesity and other multimedical problems.  She notes today her back is still bothering her quite a bit to the point where she needs to have something done with it.  She was previously seen by Dr. Luna who recommended epidural cortisone shot and she thinks she had 1 of those but does not think it worked very well.  She denies any history of falls or trauma.  She denies any chest pain, shortness of breath, palpitations, PND, orthopnea or other cardiac or respiratory complaints.  She notes no current GI or  complaints.  She notes no headaches, dizziness or neurologic complaints.  She notes no current active arthritic complaints other than that related to the back and no other complaints Ogilvy review of systems.    History     Past Medical History:   Diagnosis Date    Anxiety     Arthritis     Chronic constipation     CKD (chronic kidney disease) 7/17/2017    Colon polyps 7/17/2017    DJD (degenerative joint disease) 7/17/2017    Elevated LFTs 7/17/2017    Flu 02/19/2019    GERD (gastroesophageal reflux disease)     Glucose intolerance (impaired glucose tolerance) 7/17/2017    Hemorrhoids     internal & external- bleeds frequently    High cholesterol     Hyperlipidemia 7/17/2017    Hypertension     Hypertension with renal disease 7/17/2017    PT

## 2024-12-02 NOTE — PROGRESS NOTES
Yulisa Daniel is a 79 y.o. female     Chief Complaint   Patient presents with    Medicare AWV       /70 (Site: Left Upper Arm, Position: Sitting, Cuff Size: Large Adult)   Pulse 83   Temp 98.2 °F (36.8 °C) (Oral)   Resp 18   Ht 1.575 m (5' 2\")   Wt 90.8 kg (200 lb 3.2 oz)   SpO2 95%   BMI 36.62 kg/m²     Health Maintenance Due   Topic Date Due    DTaP/Tdap/Td vaccine (1 - Tdap) Never done    Pneumococcal 65+ years Vaccine (2 of 2 - PPSV23 or PCV20) 07/06/2016    Shingles vaccine (2 of 2) 10/21/2019    Respiratory Syncytial Virus (RSV) Pregnant or age 60 yrs+ (1 - 1-dose 75+ series) Never done    COVID-19 Vaccine (3 - 2023-24 season) 09/01/2024         \"Have you been to the ER, urgent care clinic since your last visit?  Hospitalized since your last visit?\"    NO    “Have you seen or consulted any other health care providers outside of [facility] since your last visit?”    NO

## 2024-12-03 LAB
APPEARANCE UR: CLEAR
BACTERIA URNS QL MICRO: NEGATIVE /HPF
BILIRUB UR QL: NEGATIVE
COLOR UR: NORMAL
EPITH CASTS URNS QL MICRO: NORMAL /LPF
GLUCOSE UR STRIP.AUTO-MCNC: NEGATIVE MG/DL
HGB UR QL STRIP: NEGATIVE
KETONES UR QL STRIP.AUTO: NEGATIVE MG/DL
LEUKOCYTE ESTERASE UR QL STRIP.AUTO: NEGATIVE
NITRITE UR QL STRIP.AUTO: NEGATIVE
PH UR STRIP: 6.5 (ref 5–8)
PROT UR STRIP-MCNC: NEGATIVE MG/DL
RBC #/AREA URNS HPF: NORMAL /HPF (ref 0–5)
SP GR UR REFRACTOMETRY: 1.01 (ref 1–1.03)
UROBILINOGEN UR QL STRIP.AUTO: 0.2 EU/DL (ref 0.2–1)
WBC URNS QL MICRO: NORMAL /HPF (ref 0–4)

## 2024-12-04 LAB
25(OH)D3 SERPL-MCNC: 42.6 NG/ML (ref 30–100)
ALBUMIN SERPL-MCNC: 3.8 G/DL (ref 3.5–5)
ALBUMIN/GLOB SERPL: 1.2 (ref 1.1–2.2)
ALP SERPL-CCNC: 103 U/L (ref 45–117)
ALT SERPL-CCNC: 33 U/L (ref 12–78)
ANION GAP SERPL CALC-SCNC: 8 MMOL/L (ref 2–12)
AST SERPL-CCNC: 30 U/L (ref 15–37)
BASOPHILS # BLD: 0.1 K/UL (ref 0–0.1)
BASOPHILS NFR BLD: 1 % (ref 0–1)
BILIRUB SERPL-MCNC: 0.8 MG/DL (ref 0.2–1)
BUN SERPL-MCNC: 19 MG/DL (ref 6–20)
BUN/CREAT SERPL: 23 (ref 12–20)
CALCIUM SERPL-MCNC: 9.8 MG/DL (ref 8.5–10.1)
CHLORIDE SERPL-SCNC: 106 MMOL/L (ref 97–108)
CHOLEST SERPL-MCNC: 263 MG/DL
CK SERPL-CCNC: 77 U/L (ref 26–192)
CO2 SERPL-SCNC: 25 MMOL/L (ref 21–32)
CREAT SERPL-MCNC: 0.83 MG/DL (ref 0.55–1.02)
DIFFERENTIAL METHOD BLD: NORMAL
EOSINOPHIL # BLD: 0.2 K/UL (ref 0–0.4)
EOSINOPHIL NFR BLD: 3 % (ref 0–7)
ERYTHROCYTE [DISTWIDTH] IN BLOOD BY AUTOMATED COUNT: 14.3 % (ref 11.5–14.5)
EST. AVERAGE GLUCOSE BLD GHB EST-MCNC: 114 MG/DL
GLOBULIN SER CALC-MCNC: 3.3 G/DL (ref 2–4)
GLUCOSE SERPL-MCNC: 103 MG/DL (ref 65–100)
HBA1C MFR BLD: 5.6 % (ref 4–5.6)
HCT VFR BLD AUTO: 38.2 % (ref 35–47)
HDLC SERPL-MCNC: 54 MG/DL
HDLC SERPL: 4.9 (ref 0–5)
HGB BLD-MCNC: 12.1 G/DL (ref 11.5–16)
IMM GRANULOCYTES # BLD AUTO: 0 K/UL (ref 0–0.04)
IMM GRANULOCYTES NFR BLD AUTO: 0 % (ref 0–0.5)
LDLC SERPL CALC-MCNC: 172.6 MG/DL (ref 0–100)
LYMPHOCYTES # BLD: 3.1 K/UL (ref 0.8–3.5)
LYMPHOCYTES NFR BLD: 41 % (ref 12–49)
MCH RBC QN AUTO: 26.4 PG (ref 26–34)
MCHC RBC AUTO-ENTMCNC: 31.7 G/DL (ref 30–36.5)
MCV RBC AUTO: 83.2 FL (ref 80–99)
MONOCYTES # BLD: 0.6 K/UL (ref 0–1)
MONOCYTES NFR BLD: 8 % (ref 5–13)
NEUTS SEG # BLD: 3.7 K/UL (ref 1.8–8)
NEUTS SEG NFR BLD: 47 % (ref 32–75)
NRBC # BLD: 0 K/UL (ref 0–0.01)
NRBC BLD-RTO: 0 PER 100 WBC
PLATELET # BLD AUTO: 291 K/UL (ref 150–400)
PMV BLD AUTO: 12.1 FL (ref 8.9–12.9)
POTASSIUM SERPL-SCNC: 4.4 MMOL/L (ref 3.5–5.1)
PROT SERPL-MCNC: 7.1 G/DL (ref 6.4–8.2)
RBC # BLD AUTO: 4.59 M/UL (ref 3.8–5.2)
SODIUM SERPL-SCNC: 139 MMOL/L (ref 136–145)
T4 FREE SERPL-MCNC: 1.1 NG/DL (ref 0.8–1.5)
TRIGL SERPL-MCNC: 182 MG/DL
TSH SERPL DL<=0.05 MIU/L-ACNC: 2.43 UIU/ML (ref 0.36–3.74)
VLDLC SERPL CALC-MCNC: 36.4 MG/DL
WBC # BLD AUTO: 7.7 K/UL (ref 3.6–11)

## 2024-12-30 RX ORDER — IRBESARTAN 300 MG/1
300 TABLET ORAL DAILY
Qty: 90 TABLET | Refills: 3 | Status: SHIPPED | OUTPATIENT
Start: 2024-12-30

## 2024-12-30 RX ORDER — HYDROCHLOROTHIAZIDE 25 MG/1
25 TABLET ORAL DAILY
Qty: 90 TABLET | Refills: 3 | Status: SHIPPED | OUTPATIENT
Start: 2024-12-30

## 2024-12-30 NOTE — TELEPHONE ENCOUNTER
RX refill request from the patient/pharmacy. Patient last seen 12- with labs, and next appt. scheduled for 02-  Requested Prescriptions     Pending Prescriptions Disp Refills    hydroCHLOROthiazide (HYDRODIURIL) 25 MG tablet [Pharmacy Med Name: hydroCHLOROthiazide Oral Tablet 25 MG] 90 tablet 3     Sig: TAKE 1 TABLET EVERY DAY    irbesartan (AVAPRO) 300 MG tablet [Pharmacy Med Name: Irbesartan Oral Tablet 300 MG] 90 tablet 3     Sig: TAKE 1 TABLET EVERY DAY    .

## 2024-12-31 PROBLEM — Z00.00 MEDICARE ANNUAL WELLNESS VISIT, SUBSEQUENT: Status: RESOLVED | Noted: 2017-10-23 | Resolved: 2024-12-31

## 2025-01-15 ENCOUNTER — HOSPITAL ENCOUNTER (OUTPATIENT)
Facility: HOSPITAL | Age: 80
Discharge: HOME | End: 2025-01-18
Payer: MEDICARE

## 2025-01-15 ENCOUNTER — TRANSCRIBE ORDERS (OUTPATIENT)
Facility: HOSPITAL | Age: 80
End: 2025-01-15

## 2025-01-15 DIAGNOSIS — M54.16 LUMBAR RADICULOPATHY: Primary | ICD-10-CM

## 2025-01-15 DIAGNOSIS — M54.16 LUMBAR RADICULOPATHY: ICD-10-CM

## 2025-01-15 PROCEDURE — 72110 X-RAY EXAM L-2 SPINE 4/>VWS: CPT

## 2025-02-12 SDOH — ECONOMIC STABILITY: FOOD INSECURITY: WITHIN THE PAST 12 MONTHS, THE FOOD YOU BOUGHT JUST DIDN'T LAST AND YOU DIDN'T HAVE MONEY TO GET MORE.: NEVER TRUE

## 2025-02-12 SDOH — ECONOMIC STABILITY: INCOME INSECURITY: IN THE LAST 12 MONTHS, WAS THERE A TIME WHEN YOU WERE NOT ABLE TO PAY THE MORTGAGE OR RENT ON TIME?: NO

## 2025-02-12 SDOH — ECONOMIC STABILITY: FOOD INSECURITY: WITHIN THE PAST 12 MONTHS, YOU WORRIED THAT YOUR FOOD WOULD RUN OUT BEFORE YOU GOT MONEY TO BUY MORE.: NEVER TRUE

## 2025-02-12 SDOH — ECONOMIC STABILITY: TRANSPORTATION INSECURITY
IN THE PAST 12 MONTHS, HAS THE LACK OF TRANSPORTATION KEPT YOU FROM MEDICAL APPOINTMENTS OR FROM GETTING MEDICATIONS?: NO

## 2025-02-12 NOTE — PROGRESS NOTES
Chief Complaint   Patient presents with    Hypertension     3 month fup    Chronic Kidney Disease    Blood Sugar Problem       SUBJECTIVE:    Yulisa Daniel is a 79 y.o. female who returns in follow-up for medical problems include hypertension, hyperlipidemia, allergic rhinitis, GERD, chronic low back pain, glucose intolerance and other medical problems.  Since she last saw me she had ablation of her low back pain and that actually has worked.  She notes no chest pain, shortness of breath or cardiac respiratory complaints.  She notes no GI or  complaints.  She has no headaches, dizziness or neurologic complaints.  She has no current active arthritic complaints and there are no other complaints on complete review of systems.      Current Outpatient Medications   Medication Sig Dispense Refill    hydroCHLOROthiazide (HYDRODIURIL) 25 MG tablet TAKE 1 TABLET EVERY DAY 90 tablet 3    irbesartan (AVAPRO) 300 MG tablet TAKE 1 TABLET EVERY DAY 90 tablet 3    rosuvastatin (CRESTOR) 20 MG tablet Take 1 tablet by mouth daily 90 tablet 1    calcium carbonate (OSCAL) 500 MG TABS tablet Take 1 tablet by mouth 2 times daily      omeprazole (PRILOSEC) 20 MG delayed release capsule Take 1 capsule by mouth in the morning and at bedtime 180 capsule 3    escitalopram (LEXAPRO) 5 MG tablet Take 1 tablet by mouth daily 90 tablet PRN    Biotin 10 MG CAPS Take by mouth Daily with supper      cyanocobalamin 500 MCG tablet Take 1 tablet by mouth      polyethylene glycol (GLYCOLAX) 17 GM/SCOOP powder Take 17 g by mouth       No current facility-administered medications for this visit.     Past Medical History:   Diagnosis Date    Anxiety     Arthritis     Chronic constipation     CKD (chronic kidney disease) 7/17/2017    Colon polyps 7/17/2017    DJD (degenerative joint disease) 7/17/2017    Elevated LFTs 7/17/2017    Flu 02/19/2019    GERD (gastroesophageal reflux disease)     Glucose intolerance (impaired glucose tolerance)

## 2025-02-13 ENCOUNTER — OFFICE VISIT (OUTPATIENT)
Facility: CLINIC | Age: 80
End: 2025-02-13

## 2025-02-13 VITALS
WEIGHT: 202.7 LBS | DIASTOLIC BLOOD PRESSURE: 84 MMHG | SYSTOLIC BLOOD PRESSURE: 138 MMHG | HEIGHT: 62 IN | BODY MASS INDEX: 37.3 KG/M2 | TEMPERATURE: 98.2 F | HEART RATE: 80 BPM | OXYGEN SATURATION: 97 %

## 2025-02-13 DIAGNOSIS — M15.0 PRIMARY OSTEOARTHRITIS INVOLVING MULTIPLE JOINTS: ICD-10-CM

## 2025-02-13 DIAGNOSIS — E78.2 MIXED HYPERLIPIDEMIA: ICD-10-CM

## 2025-02-13 DIAGNOSIS — K21.9 GASTROESOPHAGEAL REFLUX DISEASE WITHOUT ESOPHAGITIS: ICD-10-CM

## 2025-02-13 DIAGNOSIS — R73.02 GLUCOSE INTOLERANCE (IMPAIRED GLUCOSE TOLERANCE): ICD-10-CM

## 2025-02-13 DIAGNOSIS — I12.9 HYPERTENSION WITH RENAL DISEASE: Primary | ICD-10-CM

## 2025-02-13 DIAGNOSIS — E66.09 CLASS 1 OBESITY DUE TO EXCESS CALORIES WITHOUT SERIOUS COMORBIDITY WITH BODY MASS INDEX (BMI) OF 31.0 TO 31.9 IN ADULT: ICD-10-CM

## 2025-02-13 DIAGNOSIS — E66.811 CLASS 1 OBESITY DUE TO EXCESS CALORIES WITHOUT SERIOUS COMORBIDITY WITH BODY MASS INDEX (BMI) OF 31.0 TO 31.9 IN ADULT: ICD-10-CM

## 2025-02-13 LAB
ALBUMIN SERPL-MCNC: 3.9 G/DL (ref 3.5–5)
ALBUMIN/GLOB SERPL: 1.2 (ref 1.1–2.2)
ALP SERPL-CCNC: 93 U/L (ref 45–117)
ALT SERPL-CCNC: 26 U/L (ref 12–78)
ANION GAP SERPL CALC-SCNC: 5 MMOL/L (ref 2–12)
AST SERPL-CCNC: 25 U/L (ref 15–37)
BILIRUB SERPL-MCNC: 0.6 MG/DL (ref 0.2–1)
BUN SERPL-MCNC: 19 MG/DL (ref 6–20)
BUN/CREAT SERPL: 25 (ref 12–20)
CALCIUM SERPL-MCNC: 10 MG/DL (ref 8.5–10.1)
CHLORIDE SERPL-SCNC: 104 MMOL/L (ref 97–108)
CHOLEST SERPL-MCNC: 182 MG/DL
CK SERPL-CCNC: 76 U/L (ref 26–192)
CO2 SERPL-SCNC: 30 MMOL/L (ref 21–32)
CREAT SERPL-MCNC: 0.76 MG/DL (ref 0.55–1.02)
EST. AVERAGE GLUCOSE BLD GHB EST-MCNC: 126 MG/DL
GLOBULIN SER CALC-MCNC: 3.3 G/DL (ref 2–4)
GLUCOSE SERPL-MCNC: 100 MG/DL (ref 65–100)
HBA1C MFR BLD: 6 % (ref 4–5.6)
HDLC SERPL-MCNC: 61 MG/DL
HDLC SERPL: 3 (ref 0–5)
LDLC SERPL CALC-MCNC: 89.4 MG/DL (ref 0–100)
POTASSIUM SERPL-SCNC: 4.3 MMOL/L (ref 3.5–5.1)
PROT SERPL-MCNC: 7.2 G/DL (ref 6.4–8.2)
SODIUM SERPL-SCNC: 139 MMOL/L (ref 136–145)
TRIGL SERPL-MCNC: 158 MG/DL
VLDLC SERPL CALC-MCNC: 31.6 MG/DL

## 2025-02-13 ASSESSMENT — PATIENT HEALTH QUESTIONNAIRE - PHQ9
2. FEELING DOWN, DEPRESSED OR HOPELESS: NOT AT ALL
1. LITTLE INTEREST OR PLEASURE IN DOING THINGS: NOT AT ALL
SUM OF ALL RESPONSES TO PHQ QUESTIONS 1-9: 0
SUM OF ALL RESPONSES TO PHQ9 QUESTIONS 1 & 2: 0

## 2025-02-13 NOTE — PROGRESS NOTES
Yulisa Daniel is a 79 y.o. female     Chief Complaint   Patient presents with    Hypertension     3 month fup    Chronic Kidney Disease    Blood Sugar Problem       \"Have you been to the ER, urgent care clinic since your last visit?  Hospitalized since your last visit?\"    NO    “Have you seen or consulted any other health care providers outside of [facility] since your last visit?”    Went to [facility] for back injections.

## 2025-03-14 ENCOUNTER — HOSPITAL ENCOUNTER (EMERGENCY)
Facility: HOSPITAL | Age: 80
Discharge: HOME | End: 2025-03-14
Attending: EMERGENCY MEDICINE
Payer: MEDICARE

## 2025-03-14 VITALS
BODY MASS INDEX: 34.25 KG/M2 | OXYGEN SATURATION: 100 % | WEIGHT: 200.62 LBS | SYSTOLIC BLOOD PRESSURE: 129 MMHG | TEMPERATURE: 97.7 F | RESPIRATION RATE: 20 BRPM | DIASTOLIC BLOOD PRESSURE: 68 MMHG | HEART RATE: 87 BPM | HEIGHT: 64 IN

## 2025-03-14 DIAGNOSIS — Z01.30 BLOOD PRESSURE CHECK: Primary | ICD-10-CM

## 2025-03-14 LAB
EKG ATRIAL RATE: 82 BPM
EKG DIAGNOSIS: NORMAL
EKG P AXIS: 66 DEGREES
EKG P-R INTERVAL: 158 MS
EKG Q-T INTERVAL: 380 MS
EKG QRS DURATION: 72 MS
EKG QTC CALCULATION (BAZETT): 443 MS
EKG R AXIS: 54 DEGREES
EKG T AXIS: 61 DEGREES
EKG VENTRICULAR RATE: 82 BPM

## 2025-03-14 PROCEDURE — 99283 EMERGENCY DEPT VISIT LOW MDM: CPT

## 2025-03-14 PROCEDURE — 93005 ELECTROCARDIOGRAM TRACING: CPT | Performed by: EMERGENCY MEDICINE

## 2025-03-14 RX ORDER — ESCITALOPRAM OXALATE 5 MG/1
5 TABLET ORAL DAILY
Qty: 90 TABLET | Refills: 3 | Status: SHIPPED | OUTPATIENT
Start: 2025-03-14

## 2025-03-14 RX ORDER — OMEPRAZOLE 20 MG/1
CAPSULE, DELAYED RELEASE ORAL
Qty: 180 CAPSULE | Refills: 3 | Status: SHIPPED | OUTPATIENT
Start: 2025-03-14

## 2025-03-14 NOTE — ED PROVIDER NOTES
[facility] EMERGENCY DEPARTMENT  EMERGENCY DEPARTMENT ENCOUNTER       Pt Name: Yulisa Daniel  MRN: [id number]  Birthdate 1945  Date of evaluation: 3/14/2025  Provider: Mattie Cantu PA-C   PCP: Cortes Meyer MD  Note Started: 12:22 PM EDT 3/14/25     CHIEF COMPLAINT       Chief Complaint   Patient presents with    Hypotension     Pt was scheduled for pain management ablation to L side of back w/ Dr Warren however durin gpre-procedure VS pt found to be hypotensive. Pt normotensive during triage, pt takes bp meds. Notes no changes to meds. Pt endorse mild lightheadedness, denies true dizziness         HISTORY OF PRESENT ILLNESS: 1 or more elements      History From: Patient  HPI Limitations: None     Yulisa Daniel is a 79 y.o. female who presents to the emergency department today referred by spinal specialist.  Patient was in preoperative setting and they obtained a hypotensive reading with a automatic wrist blood pressure cuff prior to her procedure.  At that time patient was experiencing mild lightheadedness but believes that this may have been her anxiety.  She does have underlying baseline anxiety which she describes despite quite bad.  Patient denies feeling anxious now, denies lightheadedness now, states that she feels well overall.  No shortness of breath, difficulty breathing, change in vision.     Nursing Notes were all reviewed and agreed with or any disagreements were addressed in the HPI.     REVIEW OF SYSTEMS      Review of Systems     Positives and Pertinent negatives as per HPI.    PAST HISTORY     Past Medical History:  Past Medical History:   Diagnosis Date    Anxiety     Arthritis     Chronic constipation     CKD (chronic kidney disease) 7/17/2017    Colon polyps 7/17/2017    DJD (degenerative joint disease) 7/17/2017    Elevated LFTs 7/17/2017    Flu 02/19/2019    GERD (gastroesophageal reflux disease)     Glucose intolerance (impaired glucose tolerance) 7/17/2017     available for consultation if needed.     I am the Primary Clinician of Record.   Mattie Cantu PA-C (electronically signed)    (Please note that parts of this dictation were completed with voice recognition software. Quite often unanticipated grammatical, syntax, homophones, and other interpretive errors are inadvertently transcribed by the computer software. Please disregards these errors. Please excuse any errors that have escaped final proofreading.)      Mattie Cantu PA-C  03/14/25 [id number]

## 2025-03-14 NOTE — TELEPHONE ENCOUNTER
RX refill request from the patient/pharmacy. Patient last seen 02- with labs, and next appt. scheduled for 05-  Requested Prescriptions     Pending Prescriptions Disp Refills    omeprazole (PRILOSEC) 20 MG delayed release capsule [Pharmacy Med Name: Omeprazole Oral Capsule Delayed Release 20 MG] 180 capsule 3     Sig: TAKE 1 CAPSULE IN THE MORNING AND AT BEDTIME    escitalopram (LEXAPRO) 5 MG tablet [Pharmacy Med Name: Escitalopram Oxalate Oral Tablet 5 MG] 90 tablet 3     Sig: TAKE 1 TABLET EVERY DAY    .

## 2025-03-17 ENCOUNTER — TELEPHONE (OUTPATIENT)
Facility: CLINIC | Age: 80
End: 2025-03-17

## 2025-03-17 NOTE — TELEPHONE ENCOUNTER
Patient states she has a hospital fu appt tomorrow but she states she is not feeling well today. She states she is feeling weak and jittery. Her bp at 10 am was 120/65 and at 12:40 is was 114/67 an pulse was 80. She has taken her bp medication today. Please advise.

## 2025-03-17 NOTE — PROGRESS NOTES
Chief Complaint   Patient presents with    Follow-Up from Hospital     Hypotension       SUBJECTIVE:    Yulisa Daniel is a 79 y.o. female who presents today for evaluation of hypotensive episode when she presented to the emergency room and thus follow-up from emergency room visit on 3/14.  She was due to have a ablation of her back for pain management on 3/14 and preprocedure blood pressure was 85/59 so she was sent to the emergency room where the blood pressure was normal in the emergency room.  In the ER they did not do any lab but they did do an EKG which was essentially unremarkable.  She has been monitoring her blood pressure since that time and blood pressure has been for the most part okay although when she gets up and around sometimes she will get short of breath, little lightheaded and developed some chest tightness at those times her blood pressure will be down.  Review of her chart reveals that I did do a nuclear medicine stress test on her January 2023 thus a little over 2 years ago that was normal.  She has not had any cardiac studies since that time.  She denies any palpitations but does note the episodes when she is up and around where the shortness of breath begins associated with chest tightness and sometimes she gets a headache and feels lightheaded and according to her friend with her she also at those times seems to be a little bit confused it seems.  She notes no other cardiorespiratory complaints.  There are no other new neurologic complaints and there are no other complaints on complete review of systems.      Current Outpatient Medications   Medication Sig Dispense Refill    omeprazole (PRILOSEC) 20 MG delayed release capsule TAKE 1 CAPSULE IN THE MORNING AND AT BEDTIME 180 capsule 3    escitalopram (LEXAPRO) 5 MG tablet TAKE 1 TABLET EVERY DAY 90 tablet 3    hydroCHLOROthiazide (HYDRODIURIL) 25 MG tablet TAKE 1 TABLET EVERY DAY 90 tablet 3    irbesartan (AVAPRO) 300 MG tablet TAKE 1

## 2025-03-18 ENCOUNTER — OFFICE VISIT (OUTPATIENT)
Facility: CLINIC | Age: 80
End: 2025-03-18
Payer: MEDICARE

## 2025-03-18 VITALS
RESPIRATION RATE: 20 BRPM | TEMPERATURE: 97.7 F | DIASTOLIC BLOOD PRESSURE: 76 MMHG | SYSTOLIC BLOOD PRESSURE: 124 MMHG | OXYGEN SATURATION: 98 % | WEIGHT: 202.3 LBS | BODY MASS INDEX: 34.54 KG/M2 | HEART RATE: 100 BPM | HEIGHT: 64 IN

## 2025-03-18 DIAGNOSIS — R07.9 CHEST PAIN, UNSPECIFIED TYPE: ICD-10-CM

## 2025-03-18 DIAGNOSIS — I12.9 HYPERTENSION WITH RENAL DISEASE: Primary | ICD-10-CM

## 2025-03-18 DIAGNOSIS — Z09 HOSPITAL DISCHARGE FOLLOW-UP: ICD-10-CM

## 2025-03-18 DIAGNOSIS — R06.09 DOE (DYSPNEA ON EXERTION): ICD-10-CM

## 2025-03-18 DIAGNOSIS — R09.89 LABILE BLOOD PRESSURE: ICD-10-CM

## 2025-03-18 PROCEDURE — 1090F PRES/ABSN URINE INCON ASSESS: CPT | Performed by: INTERNAL MEDICINE

## 2025-03-18 PROCEDURE — 1123F ACP DISCUSS/DSCN MKR DOCD: CPT | Performed by: INTERNAL MEDICINE

## 2025-03-18 PROCEDURE — 1125F AMNT PAIN NOTED PAIN PRSNT: CPT | Performed by: INTERNAL MEDICINE

## 2025-03-18 PROCEDURE — G8399 PT W/DXA RESULTS DOCUMENT: HCPCS | Performed by: INTERNAL MEDICINE

## 2025-03-18 PROCEDURE — 99215 OFFICE O/P EST HI 40 MIN: CPT | Performed by: INTERNAL MEDICINE

## 2025-03-18 PROCEDURE — 1160F RVW MEDS BY RX/DR IN RCRD: CPT | Performed by: INTERNAL MEDICINE

## 2025-03-18 PROCEDURE — G8427 DOCREV CUR MEDS BY ELIG CLIN: HCPCS | Performed by: INTERNAL MEDICINE

## 2025-03-18 PROCEDURE — 1036F TOBACCO NON-USER: CPT | Performed by: INTERNAL MEDICINE

## 2025-03-18 PROCEDURE — 93000 ELECTROCARDIOGRAM COMPLETE: CPT | Performed by: INTERNAL MEDICINE

## 2025-03-18 PROCEDURE — 1159F MED LIST DOCD IN RCRD: CPT | Performed by: INTERNAL MEDICINE

## 2025-03-18 PROCEDURE — G8417 CALC BMI ABV UP PARAM F/U: HCPCS | Performed by: INTERNAL MEDICINE

## 2025-03-18 PROCEDURE — 1111F DSCHRG MED/CURRENT MED MERGE: CPT | Performed by: INTERNAL MEDICINE

## 2025-03-18 NOTE — PROGRESS NOTES
Chief Complaint   Patient presents with    Follow-Up from Hospital     Hypotension     /76 (BP Site: Left Upper Arm, Patient Position: Sitting, BP Cuff Size: Large Adult)   Pulse 100   Temp 97.7 °F (36.5 °C) (Temporal)   Resp 20   Ht 1.626 m (5' 4\")   Wt 91.8 kg (202 lb 4.8 oz)   SpO2 98%   BMI 34.72 kg/m²   Yulisa Daniel is a 79 y.o. female here for   Chief Complaint   Patient presents with    Follow-Up from Hospital     Hypotension       1. Have you been to the ER, urgent care clinic since your last visit?  Hospitalized since your last visit? - yes    2. Have you seen or consulted any other health care providers outside of the [facility] since your last visit?  Include any pap smears or colon screening.-  Gyn

## 2025-04-07 ENCOUNTER — OFFICE VISIT (OUTPATIENT)
Facility: CLINIC | Age: 80
End: 2025-04-07
Payer: MEDICARE

## 2025-04-07 VITALS
OXYGEN SATURATION: 97 % | BODY MASS INDEX: 34.33 KG/M2 | WEIGHT: 200 LBS | SYSTOLIC BLOOD PRESSURE: 136 MMHG | DIASTOLIC BLOOD PRESSURE: 82 MMHG | HEART RATE: 88 BPM

## 2025-04-07 DIAGNOSIS — M54.40 CHRONIC MIDLINE LOW BACK PAIN WITH SCIATICA, SCIATICA LATERALITY UNSPECIFIED: ICD-10-CM

## 2025-04-07 DIAGNOSIS — F41.9 ANXIETY: ICD-10-CM

## 2025-04-07 DIAGNOSIS — F41.0 PANIC ATTACK: ICD-10-CM

## 2025-04-07 DIAGNOSIS — G89.29 CHRONIC MIDLINE LOW BACK PAIN WITH SCIATICA, SCIATICA LATERALITY UNSPECIFIED: ICD-10-CM

## 2025-04-07 DIAGNOSIS — R07.9 CHEST PAIN, UNSPECIFIED TYPE: ICD-10-CM

## 2025-04-07 DIAGNOSIS — I12.9 HYPERTENSION WITH RENAL DISEASE: Primary | ICD-10-CM

## 2025-04-07 PROCEDURE — 99214 OFFICE O/P EST MOD 30 MIN: CPT | Performed by: INTERNAL MEDICINE

## 2025-04-07 PROCEDURE — 1123F ACP DISCUSS/DSCN MKR DOCD: CPT | Performed by: INTERNAL MEDICINE

## 2025-04-07 PROCEDURE — 1036F TOBACCO NON-USER: CPT | Performed by: INTERNAL MEDICINE

## 2025-04-07 PROCEDURE — 1160F RVW MEDS BY RX/DR IN RCRD: CPT | Performed by: INTERNAL MEDICINE

## 2025-04-07 PROCEDURE — G8427 DOCREV CUR MEDS BY ELIG CLIN: HCPCS | Performed by: INTERNAL MEDICINE

## 2025-04-07 PROCEDURE — 1159F MED LIST DOCD IN RCRD: CPT | Performed by: INTERNAL MEDICINE

## 2025-04-07 PROCEDURE — G8399 PT W/DXA RESULTS DOCUMENT: HCPCS | Performed by: INTERNAL MEDICINE

## 2025-04-07 PROCEDURE — G8417 CALC BMI ABV UP PARAM F/U: HCPCS | Performed by: INTERNAL MEDICINE

## 2025-04-07 PROCEDURE — 1090F PRES/ABSN URINE INCON ASSESS: CPT | Performed by: INTERNAL MEDICINE

## 2025-04-07 RX ORDER — ESCITALOPRAM OXALATE 10 MG/1
10 TABLET ORAL DAILY
Qty: 90 TABLET | Refills: 3 | OUTPATIENT
Start: 2025-04-07

## 2025-04-07 RX ORDER — LORAZEPAM 0.5 MG/1
0.5 TABLET ORAL EVERY 8 HOURS PRN
Qty: 12 TABLET | Refills: 0 | Status: SHIPPED | OUTPATIENT
Start: 2025-04-07 | End: 2025-05-07

## 2025-04-07 NOTE — PROGRESS NOTES
Chief Complaint   Patient presents with    Discuss Medications         Health Maintenance Due   Topic Date Due    DTaP/Tdap/Td vaccine (1 - Tdap) Never done    Pneumococcal 50+ years Vaccine (2 of 2 - PPSV23) 07/06/2016    Shingles vaccine (2 of 2) 10/21/2019    Respiratory Syncytial Virus (RSV) Pregnant or age 60 yrs+ (1 - 1-dose 75+ series) Never done    COVID-19 Vaccine (3 - 2024-25 season) 09/01/2024         \"Have you been to the ER, urgent care clinic since your last visit?  Hospitalized since your last visit?\"    NO    “Have you seen or consulted any other health care providers outside of [facility] since your last visit?”    NO

## 2025-04-08 ENCOUNTER — RESULTS FOLLOW-UP (OUTPATIENT)
Facility: CLINIC | Age: 80
End: 2025-04-08

## 2025-04-08 LAB
ALBUMIN SERPL-MCNC: 3.5 G/DL (ref 3.5–5)
ALBUMIN/GLOB SERPL: 1 (ref 1.1–2.2)
ALP SERPL-CCNC: 100 U/L (ref 45–117)
ALT SERPL-CCNC: 22 U/L (ref 12–78)
ANION GAP SERPL CALC-SCNC: 6 MMOL/L (ref 2–12)
AST SERPL-CCNC: 23 U/L (ref 15–37)
BASOPHILS # BLD: 0.05 K/UL (ref 0–0.1)
BASOPHILS NFR BLD: 0.6 % (ref 0–1)
BILIRUB SERPL-MCNC: 0.5 MG/DL (ref 0.2–1)
BUN SERPL-MCNC: 22 MG/DL (ref 6–20)
BUN/CREAT SERPL: 24 (ref 12–20)
CALCIUM SERPL-MCNC: 9.5 MG/DL (ref 8.5–10.1)
CHLORIDE SERPL-SCNC: 104 MMOL/L (ref 97–108)
CO2 SERPL-SCNC: 29 MMOL/L (ref 21–32)
CREAT SERPL-MCNC: 0.93 MG/DL (ref 0.55–1.02)
DIFFERENTIAL METHOD BLD: ABNORMAL
EOSINOPHIL # BLD: 0.23 K/UL (ref 0–0.4)
EOSINOPHIL NFR BLD: 2.9 % (ref 0–7)
ERYTHROCYTE [DISTWIDTH] IN BLOOD BY AUTOMATED COUNT: 14.9 % (ref 11.5–14.5)
GLOBULIN SER CALC-MCNC: 3.4 G/DL (ref 2–4)
GLUCOSE SERPL-MCNC: 106 MG/DL (ref 65–100)
HCT VFR BLD AUTO: 35.2 % (ref 35–47)
HGB BLD-MCNC: 10.8 G/DL (ref 11.5–16)
IMM GRANULOCYTES # BLD AUTO: 0.02 K/UL (ref 0–0.04)
IMM GRANULOCYTES NFR BLD AUTO: 0.2 % (ref 0–0.5)
LYMPHOCYTES # BLD: 3.07 K/UL (ref 0.8–3.5)
LYMPHOCYTES NFR BLD: 38.1 % (ref 12–49)
MCH RBC QN AUTO: 24.7 PG (ref 26–34)
MCHC RBC AUTO-ENTMCNC: 30.7 G/DL (ref 30–36.5)
MCV RBC AUTO: 80.4 FL (ref 80–99)
MONOCYTES # BLD: 0.57 K/UL (ref 0–1)
MONOCYTES NFR BLD: 7.1 % (ref 5–13)
NEUTS SEG # BLD: 4.12 K/UL (ref 1.8–8)
NEUTS SEG NFR BLD: 51.1 % (ref 32–75)
NRBC # BLD: 0 K/UL (ref 0–0.01)
NRBC BLD-RTO: 0 PER 100 WBC
PLATELET # BLD AUTO: 290 K/UL (ref 150–400)
PMV BLD AUTO: 12.5 FL (ref 8.9–12.9)
POTASSIUM SERPL-SCNC: 4.2 MMOL/L (ref 3.5–5.1)
PROT SERPL-MCNC: 6.9 G/DL (ref 6.4–8.2)
RBC # BLD AUTO: 4.38 M/UL (ref 3.8–5.2)
SODIUM SERPL-SCNC: 139 MMOL/L (ref 136–145)
WBC # BLD AUTO: 8.1 K/UL (ref 3.6–11)

## 2025-04-14 ENCOUNTER — LAB (OUTPATIENT)
Facility: CLINIC | Age: 80
End: 2025-04-14

## 2025-04-14 DIAGNOSIS — D50.9 IRON DEFICIENCY ANEMIA, UNSPECIFIED IRON DEFICIENCY ANEMIA TYPE: Primary | ICD-10-CM

## 2025-04-14 LAB
IRON SATN MFR SERPL: 7 % (ref 20–50)
IRON SERPL-MCNC: 27 UG/DL (ref 35–150)
TIBC SERPL-MCNC: 374 UG/DL (ref 250–450)

## 2025-04-17 ENCOUNTER — RESULTS FOLLOW-UP (OUTPATIENT)
Facility: CLINIC | Age: 80
End: 2025-04-17

## 2025-04-21 ENCOUNTER — TELEPHONE (OUTPATIENT)
Facility: CLINIC | Age: 80
End: 2025-04-21

## 2025-04-21 DIAGNOSIS — D50.9 IRON DEFICIENCY ANEMIA, UNSPECIFIED IRON DEFICIENCY ANEMIA TYPE: Primary | ICD-10-CM

## 2025-04-21 DIAGNOSIS — R19.5 HEME POSITIVE STOOL: ICD-10-CM

## 2025-04-21 NOTE — TELEPHONE ENCOUNTER
Called pt back letting her know her stool result is positive. I didn't see in her chart that she's ever seen a GI specialist before and she stated she doesn't know of anyone specifically so she says she trusts you to make the right decision for her.   Once referral is placed, I will fax and call patient with information.

## 2025-04-24 ENCOUNTER — TELEPHONE (OUTPATIENT)
Facility: CLINIC | Age: 80
End: 2025-04-24

## 2025-04-24 NOTE — TELEPHONE ENCOUNTER
Called pt and left detailed message with referral information. Referral has been successfully faxed today @[phone]

## 2025-04-25 ENCOUNTER — TELEPHONE (OUTPATIENT)
Facility: CLINIC | Age: 80
End: 2025-04-25

## 2025-04-25 NOTE — TELEPHONE ENCOUNTER
Received a call yesterday morning but their messenger machine wasn't working. He gotten a referral to a gastric specialist and they didn't get the message and tried to call back and they never got through and she unaware of who she needs to talk to and her problem now is getting worse. She wants to know what the message was the she receives and she wanted to know where to go from here. She can be reached at [phone] and this phone doesn't have an answering machine to leave messages but she plans on being here.

## 2025-04-27 ENCOUNTER — HOSPITAL ENCOUNTER (INPATIENT)
Facility: HOSPITAL | Age: 80
LOS: 1 days | Discharge: HOME | DRG: 446 | End: 2025-04-29
Attending: INTERNAL MEDICINE | Admitting: FAMILY MEDICINE
Payer: MEDICARE

## 2025-04-27 ENCOUNTER — APPOINTMENT (OUTPATIENT)
Facility: HOSPITAL | Age: 80
End: 2025-04-27
Payer: MEDICARE

## 2025-04-27 ENCOUNTER — APPOINTMENT (OUTPATIENT)
Facility: HOSPITAL | Age: 80
DRG: 446 | End: 2025-04-27
Attending: INTERNAL MEDICINE
Payer: MEDICARE

## 2025-04-27 ENCOUNTER — HOSPITAL ENCOUNTER (EMERGENCY)
Facility: HOSPITAL | Age: 80
Discharge: ANOTHER HOSPITAL | End: 2025-04-27
Payer: MEDICARE

## 2025-04-27 VITALS
WEIGHT: 200 LBS | SYSTOLIC BLOOD PRESSURE: 115 MMHG | OXYGEN SATURATION: 94 % | DIASTOLIC BLOOD PRESSURE: 65 MMHG | BODY MASS INDEX: 34.15 KG/M2 | HEART RATE: 88 BPM | RESPIRATION RATE: 15 BRPM | HEIGHT: 64 IN | TEMPERATURE: 98.5 F

## 2025-04-27 DIAGNOSIS — K80.50 CHOLEDOCHOLITHIASIS: Primary | ICD-10-CM

## 2025-04-27 LAB
ALBUMIN SERPL-MCNC: 3.1 G/DL (ref 3.5–5)
ALBUMIN/GLOB SERPL: 0.7 (ref 1.1–2.2)
ALP SERPL-CCNC: 818 U/L (ref 45–117)
ALT SERPL-CCNC: 430 U/L (ref 12–78)
ANION GAP SERPL CALC-SCNC: 9 MMOL/L (ref 2–12)
AST SERPL-CCNC: 589 U/L (ref 15–37)
BASOPHILS # BLD: 0.04 K/UL (ref 0–0.1)
BASOPHILS NFR BLD: 0.3 % (ref 0–1)
BILIRUB SERPL-MCNC: 1.6 MG/DL (ref 0.2–1)
BUN SERPL-MCNC: 21 MG/DL (ref 6–20)
BUN/CREAT SERPL: 26 (ref 12–20)
CALCIUM SERPL-MCNC: 9.2 MG/DL (ref 8.5–10.1)
CHLORIDE SERPL-SCNC: 103 MMOL/L (ref 97–108)
CO2 SERPL-SCNC: 23 MMOL/L (ref 21–32)
CREAT SERPL-MCNC: 0.82 MG/DL (ref 0.55–1.02)
D DIMER PPP FEU-MCNC: 2.37 MG/L FEU (ref 0–0.65)
DIFFERENTIAL METHOD BLD: ABNORMAL
EKG ATRIAL RATE: 98 BPM
EKG DIAGNOSIS: NORMAL
EKG P AXIS: 51 DEGREES
EKG P-R INTERVAL: 142 MS
EKG Q-T INTERVAL: 348 MS
EKG QRS DURATION: 84 MS
EKG QTC CALCULATION (BAZETT): 444 MS
EKG R AXIS: 29 DEGREES
EKG T AXIS: 55 DEGREES
EKG VENTRICULAR RATE: 98 BPM
EOSINOPHIL # BLD: 0.04 K/UL (ref 0–0.4)
EOSINOPHIL NFR BLD: 0.3 % (ref 0–7)
ERYTHROCYTE [DISTWIDTH] IN BLOOD BY AUTOMATED COUNT: 16.3 % (ref 11.5–14.5)
GLOBULIN SER CALC-MCNC: 4.3 G/DL (ref 2–4)
GLUCOSE SERPL-MCNC: 123 MG/DL (ref 65–100)
HCT VFR BLD AUTO: 35.8 % (ref 35–47)
HEMOCCULT STL QL: POSITIVE
HGB BLD-MCNC: 11.1 G/DL (ref 11.5–16)
IMM GRANULOCYTES # BLD AUTO: 0.04 K/UL (ref 0–0.04)
IMM GRANULOCYTES NFR BLD AUTO: 0.3 % (ref 0–0.5)
LIPASE SERPL-CCNC: 36 U/L (ref 13–75)
LYMPHOCYTES # BLD: 1.28 K/UL (ref 0.8–3.5)
LYMPHOCYTES NFR BLD: 10.6 % (ref 12–49)
MCH RBC QN AUTO: 24.4 PG (ref 26–34)
MCHC RBC AUTO-ENTMCNC: 31 G/DL (ref 30–36.5)
MCV RBC AUTO: 78.7 FL (ref 80–99)
MONOCYTES # BLD: 0.68 K/UL (ref 0–1)
MONOCYTES NFR BLD: 5.6 % (ref 5–13)
NEUTS SEG # BLD: 9.96 K/UL (ref 1.8–8)
NEUTS SEG NFR BLD: 82.9 % (ref 32–75)
NRBC # BLD: 0 K/UL (ref 0–0.01)
NRBC BLD-RTO: 0 PER 100 WBC
PLATELET # BLD AUTO: 341 K/UL (ref 150–400)
PMV BLD AUTO: 10.6 FL (ref 8.9–12.9)
POTASSIUM SERPL-SCNC: 3.9 MMOL/L (ref 3.5–5.1)
PROT SERPL-MCNC: 7.4 G/DL (ref 6.4–8.2)
RBC # BLD AUTO: 4.55 M/UL (ref 3.8–5.2)
SODIUM SERPL-SCNC: 135 MMOL/L (ref 136–145)
TROPONIN I SERPL HS-MCNC: <4 NG/L (ref 0–51)
WBC # BLD AUTO: 12 K/UL (ref 3.6–11)

## 2025-04-27 PROCEDURE — 84484 ASSAY OF TROPONIN QUANT: CPT

## 2025-04-27 PROCEDURE — 96376 TX/PRO/DX INJ SAME DRUG ADON: CPT

## 2025-04-27 PROCEDURE — 71275 CT ANGIOGRAPHY CHEST: CPT

## 2025-04-27 PROCEDURE — 87040 BLOOD CULTURE FOR BACTERIA: CPT

## 2025-04-27 PROCEDURE — 6360000004 HC RX CONTRAST MEDICATION

## 2025-04-27 PROCEDURE — 2580000003 HC RX 258: Performed by: FAMILY MEDICINE

## 2025-04-27 PROCEDURE — 96375 TX/PRO/DX INJ NEW DRUG ADDON: CPT

## 2025-04-27 PROCEDURE — 96361 HYDRATE IV INFUSION ADD-ON: CPT

## 2025-04-27 PROCEDURE — 74183 MRI ABD W/O CNTR FLWD CNTR: CPT

## 2025-04-27 PROCEDURE — 96365 THER/PROPH/DIAG IV INF INIT: CPT

## 2025-04-27 PROCEDURE — 6370000000 HC RX 637 (ALT 250 FOR IP)

## 2025-04-27 PROCEDURE — 2580000003 HC RX 258

## 2025-04-27 PROCEDURE — 82272 OCCULT BLD FECES 1-3 TESTS: CPT

## 2025-04-27 PROCEDURE — 87154 CUL TYP ID BLD PTHGN 6+ TRGT: CPT

## 2025-04-27 PROCEDURE — A9579 GAD-BASE MR CONTRAST NOS,1ML: HCPCS

## 2025-04-27 PROCEDURE — 70450 CT HEAD/BRAIN W/O DYE: CPT

## 2025-04-27 PROCEDURE — 83690 ASSAY OF LIPASE: CPT

## 2025-04-27 PROCEDURE — 87186 SC STD MICRODIL/AGAR DIL: CPT

## 2025-04-27 PROCEDURE — 96366 THER/PROPH/DIAG IV INF ADDON: CPT

## 2025-04-27 PROCEDURE — G0378 HOSPITAL OBSERVATION PER HR: HCPCS

## 2025-04-27 PROCEDURE — 6360000002 HC RX W HCPCS: Performed by: FAMILY MEDICINE

## 2025-04-27 PROCEDURE — 6360000002 HC RX W HCPCS

## 2025-04-27 PROCEDURE — 87077 CULTURE AEROBIC IDENTIFY: CPT

## 2025-04-27 PROCEDURE — 99285 EMERGENCY DEPT VISIT HI MDM: CPT

## 2025-04-27 PROCEDURE — 74177 CT ABD & PELVIS W/CONTRAST: CPT

## 2025-04-27 PROCEDURE — 80053 COMPREHEN METABOLIC PANEL: CPT

## 2025-04-27 PROCEDURE — 76705 ECHO EXAM OF ABDOMEN: CPT

## 2025-04-27 PROCEDURE — 85379 FIBRIN DEGRADATION QUANT: CPT

## 2025-04-27 PROCEDURE — 93005 ELECTROCARDIOGRAM TRACING: CPT

## 2025-04-27 PROCEDURE — 85025 COMPLETE CBC W/AUTO DIFF WBC: CPT

## 2025-04-27 PROCEDURE — 36415 COLL VENOUS BLD VENIPUNCTURE: CPT

## 2025-04-27 PROCEDURE — 6370000000 HC RX 637 (ALT 250 FOR IP): Performed by: FAMILY MEDICINE

## 2025-04-27 RX ORDER — SODIUM CHLORIDE 9 MG/ML
INJECTION, SOLUTION INTRAVENOUS PRN
Status: DISCONTINUED | OUTPATIENT
Start: 2025-04-27 | End: 2025-04-29 | Stop reason: HOSPADM

## 2025-04-27 RX ORDER — DIPHENHYDRAMINE HYDROCHLORIDE 50 MG/ML
25 INJECTION, SOLUTION INTRAMUSCULAR; INTRAVENOUS
Status: COMPLETED | OUTPATIENT
Start: 2025-04-27 | End: 2025-04-27

## 2025-04-27 RX ORDER — ONDANSETRON 2 MG/ML
4 INJECTION INTRAMUSCULAR; INTRAVENOUS ONCE
Status: COMPLETED | OUTPATIENT
Start: 2025-04-27 | End: 2025-04-27

## 2025-04-27 RX ORDER — SODIUM CHLORIDE 9 MG/ML
INJECTION, SOLUTION INTRAVENOUS CONTINUOUS
Status: DISCONTINUED | OUTPATIENT
Start: 2025-04-27 | End: 2025-04-27 | Stop reason: HOSPADM

## 2025-04-27 RX ORDER — SODIUM CHLORIDE 0.9 % (FLUSH) 0.9 %
5-40 SYRINGE (ML) INJECTION PRN
Status: DISCONTINUED | OUTPATIENT
Start: 2025-04-27 | End: 2025-04-29 | Stop reason: HOSPADM

## 2025-04-27 RX ORDER — SODIUM CHLORIDE 0.9 % (FLUSH) 0.9 %
5-40 SYRINGE (ML) INJECTION EVERY 12 HOURS SCHEDULED
Status: DISCONTINUED | OUTPATIENT
Start: 2025-04-27 | End: 2025-04-29 | Stop reason: HOSPADM

## 2025-04-27 RX ORDER — IOPAMIDOL 755 MG/ML
100 INJECTION, SOLUTION INTRAVASCULAR
Status: COMPLETED | OUTPATIENT
Start: 2025-04-27 | End: 2025-04-27

## 2025-04-27 RX ORDER — PANTOPRAZOLE SODIUM 40 MG/1
40 TABLET, DELAYED RELEASE ORAL
Status: DISCONTINUED | OUTPATIENT
Start: 2025-04-28 | End: 2025-04-27

## 2025-04-27 RX ORDER — LORAZEPAM 0.5 MG/1
0.5 TABLET ORAL EVERY 8 HOURS PRN
Status: DISCONTINUED | OUTPATIENT
Start: 2025-04-27 | End: 2025-04-29 | Stop reason: HOSPADM

## 2025-04-27 RX ORDER — ONDANSETRON 2 MG/ML
4 INJECTION INTRAMUSCULAR; INTRAVENOUS EVERY 6 HOURS PRN
Status: DISCONTINUED | OUTPATIENT
Start: 2025-04-27 | End: 2025-04-29 | Stop reason: HOSPADM

## 2025-04-27 RX ORDER — ACETAMINOPHEN 650 MG/1
650 SUPPOSITORY RECTAL EVERY 6 HOURS PRN
Status: DISCONTINUED | OUTPATIENT
Start: 2025-04-27 | End: 2025-04-29 | Stop reason: HOSPADM

## 2025-04-27 RX ORDER — ESCITALOPRAM OXALATE 10 MG/1
5 TABLET ORAL DAILY
Status: DISCONTINUED | OUTPATIENT
Start: 2025-04-27 | End: 2025-04-29 | Stop reason: HOSPADM

## 2025-04-27 RX ORDER — LORAZEPAM 1 MG/1
1 TABLET ORAL
Status: COMPLETED | OUTPATIENT
Start: 2025-04-27 | End: 2025-04-27

## 2025-04-27 RX ORDER — LOSARTAN POTASSIUM 50 MG/1
100 TABLET ORAL DAILY
Status: DISCONTINUED | OUTPATIENT
Start: 2025-04-27 | End: 2025-04-29 | Stop reason: HOSPADM

## 2025-04-27 RX ORDER — ACETAMINOPHEN 325 MG/1
650 TABLET ORAL EVERY 6 HOURS PRN
Status: DISCONTINUED | OUTPATIENT
Start: 2025-04-27 | End: 2025-04-29 | Stop reason: HOSPADM

## 2025-04-27 RX ORDER — ONDANSETRON 4 MG/1
4 TABLET, ORALLY DISINTEGRATING ORAL EVERY 8 HOURS PRN
Status: DISCONTINUED | OUTPATIENT
Start: 2025-04-27 | End: 2025-04-29 | Stop reason: HOSPADM

## 2025-04-27 RX ORDER — 0.9 % SODIUM CHLORIDE 0.9 %
1000 INTRAVENOUS SOLUTION INTRAVENOUS ONCE
Status: COMPLETED | OUTPATIENT
Start: 2025-04-27 | End: 2025-04-27

## 2025-04-27 RX ORDER — POLYETHYLENE GLYCOL 3350 17 G/17G
17 POWDER, FOR SOLUTION ORAL DAILY PRN
Status: DISCONTINUED | OUTPATIENT
Start: 2025-04-27 | End: 2025-04-29 | Stop reason: HOSPADM

## 2025-04-27 RX ORDER — MORPHINE SULFATE 2 MG/ML
1 INJECTION, SOLUTION INTRAMUSCULAR; INTRAVENOUS EVERY 4 HOURS PRN
Status: DISCONTINUED | OUTPATIENT
Start: 2025-04-27 | End: 2025-04-29 | Stop reason: HOSPADM

## 2025-04-27 RX ORDER — NALOXONE HYDROCHLORIDE 0.4 MG/ML
0.4 INJECTION, SOLUTION INTRAMUSCULAR; INTRAVENOUS; SUBCUTANEOUS PRN
Status: DISCONTINUED | OUTPATIENT
Start: 2025-04-27 | End: 2025-04-29 | Stop reason: HOSPADM

## 2025-04-27 RX ADMIN — MORPHINE SULFATE 1 MG: 2 INJECTION, SOLUTION INTRAMUSCULAR; INTRAVENOUS at 22:09

## 2025-04-27 RX ADMIN — GADOTERIDOL 20 ML: 279.3 INJECTION, SOLUTION INTRAVENOUS at 12:13

## 2025-04-27 RX ADMIN — PANTOPRAZOLE SODIUM 40 MG: 40 INJECTION, POWDER, LYOPHILIZED, FOR SOLUTION INTRAVENOUS at 08:31

## 2025-04-27 RX ADMIN — SODIUM CHLORIDE: 0.9 INJECTION, SOLUTION INTRAVENOUS at 14:15

## 2025-04-27 RX ADMIN — SODIUM CHLORIDE 1000 ML: 0.9 INJECTION, SOLUTION INTRAVENOUS at 13:03

## 2025-04-27 RX ADMIN — DIPHENHYDRAMINE HYDROCHLORIDE 25 MG: 50 INJECTION INTRAMUSCULAR; INTRAVENOUS at 09:10

## 2025-04-27 RX ADMIN — ONDANSETRON 4 MG: 2 INJECTION, SOLUTION INTRAMUSCULAR; INTRAVENOUS at 09:11

## 2025-04-27 RX ADMIN — LOSARTAN POTASSIUM 100 MG: 50 TABLET, FILM COATED ORAL at 21:46

## 2025-04-27 RX ADMIN — PIPERACILLIN AND TAZOBACTAM 4500 MG: 4; .5 INJECTION, POWDER, LYOPHILIZED, FOR SOLUTION INTRAVENOUS at 14:14

## 2025-04-27 RX ADMIN — ONDANSETRON 4 MG: 2 INJECTION, SOLUTION INTRAMUSCULAR; INTRAVENOUS at 22:11

## 2025-04-27 RX ADMIN — IOPAMIDOL 100 ML: 755 INJECTION, SOLUTION INTRAVENOUS at 09:30

## 2025-04-27 RX ADMIN — PIPERACILLIN AND TAZOBACTAM 3375 MG: 3; .375 INJECTION, POWDER, LYOPHILIZED, FOR SOLUTION INTRAVENOUS at 21:48

## 2025-04-27 RX ADMIN — ESCITALOPRAM OXALATE 5 MG: 10 TABLET ORAL at 21:47

## 2025-04-27 RX ADMIN — LORAZEPAM 1 MG: 1 TABLET ORAL at 11:24

## 2025-04-27 RX ADMIN — ONDANSETRON 4 MG: 2 INJECTION, SOLUTION INTRAMUSCULAR; INTRAVENOUS at 08:28

## 2025-04-27 ASSESSMENT — PAIN SCALES - GENERAL
PAINLEVEL_OUTOF10: 4
PAINLEVEL_OUTOF10: 2
PAINLEVEL_OUTOF10: 4

## 2025-04-27 ASSESSMENT — PAIN DESCRIPTION - LOCATION
LOCATION: ABDOMEN
LOCATION: ABDOMEN

## 2025-04-27 ASSESSMENT — PAIN DESCRIPTION - ORIENTATION
ORIENTATION: ANTERIOR
ORIENTATION: ANTERIOR

## 2025-04-27 ASSESSMENT — PAIN DESCRIPTION - DESCRIPTORS
DESCRIPTORS: ACHING
DESCRIPTORS: ACHING

## 2025-04-27 ASSESSMENT — LIFESTYLE VARIABLES
HOW MANY STANDARD DRINKS CONTAINING ALCOHOL DO YOU HAVE ON A TYPICAL DAY: 1 OR 2
HOW OFTEN DO YOU HAVE A DRINK CONTAINING ALCOHOL: MONTHLY OR LESS

## 2025-04-27 NOTE — H&P
Stress: Not on file   Social Connections: Not on file   Intimate Partner Violence: Not on file   Depression: Not at risk (2/13/2025)    PHQ-2     PHQ-2 Score: 0   Housing Stability: Unknown (2/12/2025)    Housing Stability Vital Sign     Unable to Pay for Housing in the Last Year: Not on file     Number of Times Moved in the Last Year: 0     Homeless in the Last Year: No   Interpersonal Safety: Not At Risk (4/27/2025)    Interpersonal Safety Domain Source: IP Abuse Screening     Physical abuse: Denies     Verbal abuse: Denies     Emotional abuse: Denies     Financial abuse: Denies     Sexual abuse: Denies   Utilities: Not At Risk (2/12/2025)    [facility] Utilities     Threatened with loss of utilities: No        Medications were reconciled to the best of my ability given all available resources at the time of admission. Route is PO if not otherwise noted.     Family and social history were personally reviewed, all pertinent and relevant details are outlined as above.    Objective:   BP (!) 110/57   Pulse 84   Resp 17   SpO2 94%         PHYSICAL EXAM:   General: Alert x oriented x 3, awake, no acute distress,   HEENT: PEERL, EOMI, moist mucus membranes  Neck: Supple, no JVD, no meningeal signs  Chest: Clear to auscultation bilaterally   CVS: RRR, S1 S2 heard, no murmurs/rubs/gallops  Abd: Soft, non-tender, non-distended, +bowel sounds   Ext: No clubbing, no cyanosis, no edema  Neuro/Psych: Pleasant mood and affect, CN 2-12 grossly intact, sensory grossly within normal limit, Strength 5/5 in all extremities  Cap refill: Brisk, less than 3 seconds  Pulses: 2+, symmetric in all extremities  Skin: Warm, dry, without rashes or lesions    Data Review:   I have independently reviewed and interpreted patient's lab and all other diagnostic data    Abnormal Labs Reviewed - No data to display    Results       Procedure Component Value Units Date/Time    Blood Culture 1 [[id number]] Collected: 04/27/25 1411    Order Status: Sent

## 2025-04-27 NOTE — CONSULTS
[facility] [address] [address]       GASTROENTEROLOGY CONSULTATION NOTE        NAME:  Yulisa Daniel   :   1945   MRN:   [id number]           Consult Date: 2025 7:22 PM      History of Present Illness:  Patient is a 79 y.o. who is seen in consultation at the request of Dr. Xiao for elevated liver enzymes and abnormal MRCP. She has a PMH as below. She initially presented to [facility] ED today for mid- and left sided abdominal pain. WBC 12.0, tbili 1.6, , , . CTA chest/abd/pelvis ruled out P/E, but showed intrahepatic biliary dilation and concern for high density material in the distal common bile duct. MRI/MRCP with contrast shows dilated CBD due to a combination of ampullary soft tissue and distal choledocholithiasis.    In , she underwent ERCP by Dr. Oviedo for choledocholithiasis. At that time, biliary sphincterotomy was performed and stones were removed.    She is not on anticoagulation. No GLP-1 agonist history.        PMH:  Past Medical History:   Diagnosis Date    Anxiety     Arthritis     Chronic constipation     CKD (chronic kidney disease) 2017    Colon polyps 2017    DJD (degenerative joint disease) 2017    Elevated LFTs 2017    Flu 2019    GERD (gastroesophageal reflux disease)     Glucose intolerance (impaired glucose tolerance) 2017    Hemorrhoids     internal & external- bleeds frequently    High cholesterol     Hyperlipidemia 2017    Hypertension     Hypertension with renal disease 2017    PT Education - High Blood Pressure (Essential Hypertension) *: blood pressure PT Education - How to access health information online: discussed with patient and provided information    Hypertension, renal 2017    Ill-defined condition     ringing in ears    Menopause     Mild obesity 2017    Nausea & vomiting     Neoplasm of uncertain behavior of skin 2017    On statin therapy

## 2025-04-27 NOTE — ED NOTES
Rounded on patient. NAD. Physiological needs met. Patient resting in stretcher calmly. Call bell within reach. Patient remains on continuous monitoring x3.

## 2025-04-28 ENCOUNTER — ANESTHESIA EVENT (OUTPATIENT)
Facility: HOSPITAL | Age: 80
DRG: 446 | End: 2025-04-28
Payer: MEDICARE

## 2025-04-28 ENCOUNTER — ANESTHESIA (OUTPATIENT)
Facility: HOSPITAL | Age: 80
DRG: 446 | End: 2025-04-28
Payer: MEDICARE

## 2025-04-28 LAB
ABO + RH BLD: NORMAL
ACB COMPLEX DNA BLD POS QL NAA+NON-PROBE: NOT DETECTED
ACCESSION NUMBER, LLC1M: ABNORMAL
ALBUMIN SERPL-MCNC: 2.8 G/DL (ref 3.5–5)
ALBUMIN/GLOB SERPL: 0.8 (ref 1.1–2.2)
ALP SERPL-CCNC: 790 U/L (ref 45–117)
ALT SERPL-CCNC: 516 U/L (ref 12–78)
ANION GAP SERPL CALC-SCNC: 5 MMOL/L (ref 2–12)
AST SERPL-CCNC: 550 U/L (ref 15–37)
B FRAGILIS DNA BLD POS QL NAA+NON-PROBE: NOT DETECTED
BASOPHILS # BLD: 0.01 K/UL (ref 0–0.1)
BASOPHILS NFR BLD: 0.1 % (ref 0–1)
BILIRUB SERPL-MCNC: 3.7 MG/DL (ref 0.2–1)
BIOFIRE TEST COMMENT: ABNORMAL
BLOOD GROUP ANTIBODIES SERPL: NORMAL
BUN SERPL-MCNC: 17 MG/DL (ref 6–20)
BUN/CREAT SERPL: 20 (ref 12–20)
C ALBICANS DNA BLD POS QL NAA+NON-PROBE: NOT DETECTED
C AURIS DNA BLD POS QL NAA+NON-PROBE: NOT DETECTED
C GATTII+NEOFOR DNA BLD POS QL NAA+N-PRB: NOT DETECTED
C GLABRATA DNA BLD POS QL NAA+NON-PROBE: NOT DETECTED
C KRUSEI DNA BLD POS QL NAA+NON-PROBE: NOT DETECTED
C PARAP DNA BLD POS QL NAA+NON-PROBE: NOT DETECTED
C TROPICLS DNA BLD POS QL NAA+NON-PROBE: NOT DETECTED
CALCIUM SERPL-MCNC: 8.9 MG/DL (ref 8.5–10.1)
CHLORIDE SERPL-SCNC: 105 MMOL/L (ref 97–108)
CO2 SERPL-SCNC: 26 MMOL/L (ref 21–32)
CREAT SERPL-MCNC: 0.83 MG/DL (ref 0.55–1.02)
DIFFERENTIAL METHOD BLD: ABNORMAL
E CLOAC COMP DNA BLD POS NAA+NON-PROBE: NOT DETECTED
E COLI DNA BLD POS QL NAA+NON-PROBE: NOT DETECTED
E FAECALIS DNA BLD POS QL NAA+NON-PROBE: NOT DETECTED
E FAECIUM DNA BLD POS QL NAA+NON-PROBE: NOT DETECTED
ENTEROBACTERALES DNA BLD POS NAA+N-PRB: NOT DETECTED
EOSINOPHIL # BLD: 0.04 K/UL (ref 0–0.4)
EOSINOPHIL NFR BLD: 0.4 % (ref 0–7)
ERYTHROCYTE [DISTWIDTH] IN BLOOD BY AUTOMATED COUNT: 15.7 % (ref 11.5–14.5)
FERRITIN SERPL-MCNC: 31 NG/ML (ref 8–252)
FOLATE SERPL-MCNC: 17.1 NG/ML (ref 5–21)
GLOBULIN SER CALC-MCNC: 3.4 G/DL (ref 2–4)
GLUCOSE SERPL-MCNC: 127 MG/DL (ref 65–100)
GP B STREP DNA BLD POS QL NAA+NON-PROBE: NOT DETECTED
HAEM INFLU DNA BLD POS QL NAA+NON-PROBE: NOT DETECTED
HCT VFR BLD AUTO: 31.5 % (ref 35–47)
HGB BLD-MCNC: 9.6 G/DL (ref 11.5–16)
IMM GRANULOCYTES # BLD AUTO: 0.04 K/UL (ref 0–0.04)
IMM GRANULOCYTES NFR BLD AUTO: 0.4 % (ref 0–0.5)
IRON SATN MFR SERPL: 9 % (ref 20–50)
IRON SERPL-MCNC: 32 UG/DL (ref 35–150)
K OXYTOCA DNA BLD POS QL NAA+NON-PROBE: NOT DETECTED
KLEBSIELLA SP DNA BLD POS QL NAA+NON-PRB: NOT DETECTED
KLEBSIELLA SP DNA BLD POS QL NAA+NON-PRB: NOT DETECTED
L MONOCYTOG DNA BLD POS QL NAA+NON-PROBE: NOT DETECTED
LYMPHOCYTES # BLD: 1.27 K/UL (ref 0.8–3.5)
LYMPHOCYTES NFR BLD: 12.8 % (ref 12–49)
MCH RBC QN AUTO: 24.1 PG (ref 26–34)
MCHC RBC AUTO-ENTMCNC: 30.5 G/DL (ref 30–36.5)
MCV RBC AUTO: 79.1 FL (ref 80–99)
MONOCYTES # BLD: 0.57 K/UL (ref 0–1)
MONOCYTES NFR BLD: 5.7 % (ref 5–13)
N MEN DNA BLD POS QL NAA+NON-PROBE: NOT DETECTED
NEUTS SEG # BLD: 8 K/UL (ref 1.8–8)
NEUTS SEG NFR BLD: 80.6 % (ref 32–75)
NRBC # BLD: 0 K/UL (ref 0–0.01)
NRBC BLD-RTO: 0 PER 100 WBC
P AERUGINOSA DNA BLD POS NAA+NON-PROBE: NOT DETECTED
PLATELET # BLD AUTO: 271 K/UL (ref 150–400)
PMV BLD AUTO: 10.9 FL (ref 8.9–12.9)
POTASSIUM SERPL-SCNC: 4 MMOL/L (ref 3.5–5.1)
PROT SERPL-MCNC: 6.2 G/DL (ref 6.4–8.2)
PROTEUS SP DNA BLD POS QL NAA+NON-PROBE: NOT DETECTED
RBC # BLD AUTO: 3.98 M/UL (ref 3.8–5.2)
RESISTANT GENE TARGETS: ABNORMAL
RETICS # AUTO: 0.06 M/UL (ref 0.02–0.08)
RETICS/RBC NFR AUTO: 1.4 % (ref 0.7–2.1)
S AUREUS DNA BLD POS QL NAA+NON-PROBE: NOT DETECTED
S AUREUS+CONS DNA BLD POS NAA+NON-PROBE: DETECTED
S EPIDERMIDIS DNA BLD POS QL NAA+NON-PRB: NOT DETECTED
S LUGDUNENSIS DNA BLD POS QL NAA+NON-PRB: NOT DETECTED
S MALTOPHILIA DNA BLD POS QL NAA+NON-PRB: NOT DETECTED
S MARCESCENS DNA BLD POS NAA+NON-PROBE: NOT DETECTED
S PNEUM DNA BLD POS QL NAA+NON-PROBE: NOT DETECTED
S PYO DNA BLD POS QL NAA+NON-PROBE: NOT DETECTED
SALMONELLA DNA BLD POS QL NAA+NON-PROBE: NOT DETECTED
SODIUM SERPL-SCNC: 136 MMOL/L (ref 136–145)
SPECIMEN EXP DATE BLD: NORMAL
STREPTOCOCCUS DNA BLD POS NAA+NON-PROBE: NOT DETECTED
TIBC SERPL-MCNC: 355 UG/DL (ref 250–450)
TSH SERPL DL<=0.05 MIU/L-ACNC: 0.69 UIU/ML (ref 0.36–3.74)
VIT B12 SERPL-MCNC: >2000 PG/ML (ref 193–986)
WBC # BLD AUTO: 9.9 K/UL (ref 3.6–11)

## 2025-04-28 PROCEDURE — 6360000002 HC RX W HCPCS

## 2025-04-28 PROCEDURE — 6360000002 HC RX W HCPCS: Performed by: FAMILY MEDICINE

## 2025-04-28 PROCEDURE — 84443 ASSAY THYROID STIM HORMONE: CPT

## 2025-04-28 PROCEDURE — C1769 GUIDE WIRE: HCPCS | Performed by: INTERNAL MEDICINE

## 2025-04-28 PROCEDURE — 96376 TX/PRO/DX INJ SAME DRUG ADON: CPT

## 2025-04-28 PROCEDURE — 85025 COMPLETE CBC W/AUTO DIFF WBC: CPT

## 2025-04-28 PROCEDURE — 6370000000 HC RX 637 (ALT 250 FOR IP): Performed by: FAMILY MEDICINE

## 2025-04-28 PROCEDURE — 80053 COMPREHEN METABOLIC PANEL: CPT

## 2025-04-28 PROCEDURE — 96366 THER/PROPH/DIAG IV INF ADDON: CPT

## 2025-04-28 PROCEDURE — 86901 BLOOD TYPING SEROLOGIC RH(D): CPT

## 2025-04-28 PROCEDURE — C2625 STENT, NON-COR, TEM W/DEL SY: HCPCS | Performed by: INTERNAL MEDICINE

## 2025-04-28 PROCEDURE — 7100000011 HC PHASE II RECOVERY - ADDTL 15 MIN: Performed by: INTERNAL MEDICINE

## 2025-04-28 PROCEDURE — 1200000000 HC SEMI PRIVATE

## 2025-04-28 PROCEDURE — 96375 TX/PRO/DX INJ NEW DRUG ADDON: CPT

## 2025-04-28 PROCEDURE — 83550 IRON BINDING TEST: CPT

## 2025-04-28 PROCEDURE — 2580000003 HC RX 258: Performed by: FAMILY MEDICINE

## 2025-04-28 PROCEDURE — 82728 ASSAY OF FERRITIN: CPT

## 2025-04-28 PROCEDURE — 3600007503: Performed by: INTERNAL MEDICINE

## 2025-04-28 PROCEDURE — 86900 BLOOD TYPING SEROLOGIC ABO: CPT

## 2025-04-28 PROCEDURE — 0F998ZZ DRAINAGE OF COMMON BILE DUCT, VIA NATURAL OR ARTIFICIAL OPENING ENDOSCOPIC: ICD-10-PCS | Performed by: INTERNAL MEDICINE

## 2025-04-28 PROCEDURE — 0FC98ZZ EXTIRPATION OF MATTER FROM COMMON BILE DUCT, VIA NATURAL OR ARTIFICIAL OPENING ENDOSCOPIC: ICD-10-PCS | Performed by: INTERNAL MEDICINE

## 2025-04-28 PROCEDURE — 85045 AUTOMATED RETICULOCYTE COUNT: CPT

## 2025-04-28 PROCEDURE — 0F798DZ DILATION OF COMMON BILE DUCT WITH INTRALUMINAL DEVICE, VIA NATURAL OR ARTIFICIAL OPENING ENDOSCOPIC: ICD-10-PCS | Performed by: INTERNAL MEDICINE

## 2025-04-28 PROCEDURE — BF101ZZ FLUOROSCOPY OF BILE DUCTS USING LOW OSMOLAR CONTRAST: ICD-10-PCS | Performed by: INTERNAL MEDICINE

## 2025-04-28 PROCEDURE — 3700000001 HC ADD 15 MINUTES (ANESTHESIA): Performed by: INTERNAL MEDICINE

## 2025-04-28 PROCEDURE — 3700000000 HC ANESTHESIA ATTENDED CARE: Performed by: INTERNAL MEDICINE

## 2025-04-28 PROCEDURE — 2720000010 HC SURG SUPPLY STERILE: Performed by: INTERNAL MEDICINE

## 2025-04-28 PROCEDURE — 3600007513: Performed by: INTERNAL MEDICINE

## 2025-04-28 PROCEDURE — 83540 ASSAY OF IRON: CPT

## 2025-04-28 PROCEDURE — 7100000010 HC PHASE II RECOVERY - FIRST 15 MIN: Performed by: INTERNAL MEDICINE

## 2025-04-28 PROCEDURE — 86850 RBC ANTIBODY SCREEN: CPT

## 2025-04-28 PROCEDURE — 82746 ASSAY OF FOLIC ACID SERUM: CPT

## 2025-04-28 PROCEDURE — 2500000003 HC RX 250 WO HCPCS

## 2025-04-28 PROCEDURE — 6360000004 HC RX CONTRAST MEDICATION: Performed by: INTERNAL MEDICINE

## 2025-04-28 PROCEDURE — 2709999900 HC NON-CHARGEABLE SUPPLY: Performed by: INTERNAL MEDICINE

## 2025-04-28 PROCEDURE — 82607 VITAMIN B-12: CPT

## 2025-04-28 DEVICE — BILIARY STENT WITH NAVIFLEXTM RX DELIVERY SYSTEM
Type: IMPLANTABLE DEVICE | Site: COMMON BILE DUCT | Status: FUNCTIONAL
Brand: ADVANIX™ BILIARY

## 2025-04-28 RX ORDER — SODIUM CHLORIDE 0.9 % (FLUSH) 0.9 %
5-40 SYRINGE (ML) INJECTION EVERY 12 HOURS SCHEDULED
Status: DISCONTINUED | OUTPATIENT
Start: 2025-04-28 | End: 2025-04-29 | Stop reason: HOSPADM

## 2025-04-28 RX ORDER — DIPHENHYDRAMINE HYDROCHLORIDE 50 MG/ML
INJECTION, SOLUTION INTRAMUSCULAR; INTRAVENOUS
Status: DISCONTINUED | OUTPATIENT
Start: 2025-04-28 | End: 2025-04-28 | Stop reason: SDUPTHER

## 2025-04-28 RX ORDER — FENTANYL CITRATE 50 UG/ML
INJECTION, SOLUTION INTRAMUSCULAR; INTRAVENOUS
Status: DISCONTINUED | OUTPATIENT
Start: 2025-04-28 | End: 2025-04-28 | Stop reason: SDUPTHER

## 2025-04-28 RX ORDER — SODIUM CHLORIDE, SODIUM LACTATE, POTASSIUM CHLORIDE, CALCIUM CHLORIDE 600; 310; 30; 20 MG/100ML; MG/100ML; MG/100ML; MG/100ML
INJECTION, SOLUTION INTRAVENOUS CONTINUOUS
Status: DISCONTINUED | OUTPATIENT
Start: 2025-04-28 | End: 2025-04-29

## 2025-04-28 RX ORDER — DEXAMETHASONE SODIUM PHOSPHATE 4 MG/ML
INJECTION, SOLUTION INTRA-ARTICULAR; INTRALESIONAL; INTRAMUSCULAR; INTRAVENOUS; SOFT TISSUE
Status: DISCONTINUED | OUTPATIENT
Start: 2025-04-28 | End: 2025-04-28 | Stop reason: SDUPTHER

## 2025-04-28 RX ORDER — IOPAMIDOL 612 MG/ML
INJECTION, SOLUTION INTRAVASCULAR PRN
Status: DISCONTINUED | OUTPATIENT
Start: 2025-04-28 | End: 2025-04-28 | Stop reason: ALTCHOICE

## 2025-04-28 RX ORDER — SODIUM CHLORIDE 0.9 % (FLUSH) 0.9 %
5-40 SYRINGE (ML) INJECTION PRN
Status: DISCONTINUED | OUTPATIENT
Start: 2025-04-28 | End: 2025-04-29 | Stop reason: HOSPADM

## 2025-04-28 RX ORDER — SUCCINYLCHOLINE/SOD CL,ISO/PF 200MG/10ML
SYRINGE (ML) INTRAVENOUS
Status: DISCONTINUED | OUTPATIENT
Start: 2025-04-28 | End: 2025-04-28 | Stop reason: SDUPTHER

## 2025-04-28 RX ORDER — LIDOCAINE HYDROCHLORIDE 20 MG/ML
INJECTION, SOLUTION EPIDURAL; INFILTRATION; INTRACAUDAL; PERINEURAL
Status: DISCONTINUED | OUTPATIENT
Start: 2025-04-28 | End: 2025-04-28 | Stop reason: SDUPTHER

## 2025-04-28 RX ORDER — ONDANSETRON 2 MG/ML
INJECTION INTRAMUSCULAR; INTRAVENOUS
Status: DISCONTINUED | OUTPATIENT
Start: 2025-04-28 | End: 2025-04-28 | Stop reason: SDUPTHER

## 2025-04-28 RX ORDER — PROPOFOL 10 MG/ML
INJECTION, EMULSION INTRAVENOUS
Status: DISCONTINUED | OUTPATIENT
Start: 2025-04-28 | End: 2025-04-28 | Stop reason: SDUPTHER

## 2025-04-28 RX ORDER — SODIUM CHLORIDE 9 MG/ML
INJECTION, SOLUTION INTRAVENOUS PRN
Status: DISCONTINUED | OUTPATIENT
Start: 2025-04-28 | End: 2025-04-29 | Stop reason: HOSPADM

## 2025-04-28 RX ADMIN — PIPERACILLIN AND TAZOBACTAM 3375 MG: 3; .375 INJECTION, POWDER, LYOPHILIZED, FOR SOLUTION INTRAVENOUS at 06:08

## 2025-04-28 RX ADMIN — Medication 140 MG: at 11:01

## 2025-04-28 RX ADMIN — PIPERACILLIN AND TAZOBACTAM 3375 MG: 3; .375 INJECTION, POWDER, LYOPHILIZED, FOR SOLUTION INTRAVENOUS at 14:07

## 2025-04-28 RX ADMIN — PROPOFOL 50 MG: 10 INJECTION, EMULSION INTRAVENOUS at 11:09

## 2025-04-28 RX ADMIN — PROPOFOL 150 MG: 10 INJECTION, EMULSION INTRAVENOUS at 11:00

## 2025-04-28 RX ADMIN — ONDANSETRON 4 MG: 2 INJECTION, SOLUTION INTRAMUSCULAR; INTRAVENOUS at 11:26

## 2025-04-28 RX ADMIN — LIDOCAINE HYDROCHLORIDE 80 MG: 20 INJECTION, SOLUTION EPIDURAL; INFILTRATION; INTRACAUDAL; PERINEURAL at 11:00

## 2025-04-28 RX ADMIN — SODIUM CHLORIDE: 9 INJECTION, SOLUTION INTRAVENOUS at 10:54

## 2025-04-28 RX ADMIN — DIPHENHYDRAMINE HYDROCHLORIDE 50 MG: 50 INJECTION INTRAMUSCULAR; INTRAVENOUS at 11:00

## 2025-04-28 RX ADMIN — SODIUM CHLORIDE, PRESERVATIVE FREE 40 MG: 5 INJECTION INTRAVENOUS at 17:27

## 2025-04-28 RX ADMIN — ESCITALOPRAM OXALATE 5 MG: 10 TABLET ORAL at 09:32

## 2025-04-28 RX ADMIN — MORPHINE SULFATE 1 MG: 2 INJECTION, SOLUTION INTRAMUSCULAR; INTRAVENOUS at 02:39

## 2025-04-28 RX ADMIN — SODIUM CHLORIDE, PRESERVATIVE FREE 40 MG: 5 INJECTION INTRAVENOUS at 06:09

## 2025-04-28 RX ADMIN — PROPOFOL 20 MG: 10 INJECTION, EMULSION INTRAVENOUS at 11:29

## 2025-04-28 RX ADMIN — PIPERACILLIN AND TAZOBACTAM 3375 MG: 3; .375 INJECTION, POWDER, LYOPHILIZED, FOR SOLUTION INTRAVENOUS at 22:28

## 2025-04-28 RX ADMIN — FENTANYL CITRATE 50 MCG: 50 INJECTION INTRAMUSCULAR; INTRAVENOUS at 11:00

## 2025-04-28 RX ADMIN — DEXAMETHASONE SODIUM PHOSPHATE 8 MG: 4 INJECTION INTRA-ARTICULAR; INTRALESIONAL; INTRAMUSCULAR; INTRAVENOUS; SOFT TISSUE at 11:05

## 2025-04-28 RX ADMIN — FENTANYL CITRATE 25 MCG: 50 INJECTION INTRAMUSCULAR; INTRAVENOUS at 11:09

## 2025-04-28 RX ADMIN — ACETAMINOPHEN 650 MG: 325 TABLET ORAL at 02:40

## 2025-04-28 RX ADMIN — LIDOCAINE HYDROCHLORIDE 20 MG: 20 INJECTION, SOLUTION EPIDURAL; INFILTRATION; INTRACAUDAL; PERINEURAL at 11:26

## 2025-04-28 RX ADMIN — FENTANYL CITRATE 25 MCG: 50 INJECTION INTRAMUSCULAR; INTRAVENOUS at 11:24

## 2025-04-28 ASSESSMENT — PAIN DESCRIPTION - ORIENTATION: ORIENTATION: ANTERIOR

## 2025-04-28 ASSESSMENT — PAIN DESCRIPTION - LOCATION: LOCATION: HEAD

## 2025-04-28 ASSESSMENT — PAIN SCALES - GENERAL: PAINLEVEL_OUTOF10: 6

## 2025-04-28 ASSESSMENT — PAIN DESCRIPTION - DESCRIPTORS: DESCRIPTORS: ACHING

## 2025-04-28 NOTE — ANESTHESIA PRE PROCEDURE
Department of Anesthesiology  Preprocedure Note       Name:  Yulisa Daniel   Age:  79 y.o.  :  1945                                          MRN:  [id number]         Date:  2025      Surgeon: Surgeon(s):  Siddharth Perdomo MD    Procedure: Procedure(s):  ENDOSCOPIC RETROGRADE CHOLANGIOPANCREATOGRAPHY    Medications prior to admission:   Prior to Admission medications    Medication Sig Start Date End Date Taking? Authorizing Provider   escitalopram (LEXAPRO) 10 MG tablet Take 1 tablet by mouth daily 25   Cortes Meyer MD   LORazepam (ATIVAN) 0.5 MG tablet Take 1 tablet by mouth every 8 hours as needed for Anxiety for up to 30 days. Max Daily Amount: 1.5 mg 25  Cortes Meyer MD   omeprazole (PRILOSEC) 20 MG delayed release capsule TAKE 1 CAPSULE IN THE MORNING AND AT BEDTIME 3/14/25   Cortes Meyer MD   hydroCHLOROthiazide (HYDRODIURIL) 25 MG tablet TAKE 1 TABLET EVERY DAY 24   Cortes Meyer MD   irbesartan (AVAPRO) 300 MG tablet TAKE 1 TABLET EVERY DAY 24   Cortes Meyer MD   rosuvastatin (CRESTOR) 20 MG tablet Take 1 tablet by mouth daily 24   Cortes Meyer MD   calcium carbonate (OSCAL) 500 MG TABS tablet Take 1 tablet by mouth 2 times daily    Provider, MD Girish   Biotin 10 MG CAPS Take by mouth Daily with supper    Automatic Reconciliation, Ar   cyanocobalamin 500 MCG tablet Take 1 tablet by mouth    Automatic Reconciliation, Ar   polyethylene glycol (GLYCOLAX) 17 GM/SCOOP powder Take 17 g by mouth    Automatic Reconciliation, Ar       Current medications:    Current Facility-Administered Medications   Medication Dose Route Frequency Provider Last Rate Last Admin    lactated ringers infusion   IntraVENous Continuous Siddharth Perdomo MD        sodium chloride flush 0.9 % injection 5-40 mL  5-40 mL IntraVENous 2 times per day Siddharth Perdomo MD        sodium chloride flush 0.9 % injection 5-40 mL  5-40 mL IntraVENous PRN

## 2025-04-28 NOTE — OP NOTE
swept from the bile duct. Also, purulent fluid was extracted. Next, an occlusion cholangiogram was performed and there were no upstream filling defects. Next, a 10 Fr by 7 cm plastic biliary stent was placed in the setting of cholangitis based on finding of purulent fluid drainage.    There was adequate drainage of contrast and bile. The pancreatic duct was neither entered or injected during this procedure. I performed all immediate radiologic interpretation during this procedure.     Specimen Removed: none    Complications: None.     EBL:  None.    Interventions:    Pancreatic: see above  Biliary: see above    Impression:   Ascending cholangitis   Choledocholithiasis - removed with biliary sphincterotomy extension and balloon extraction  Placement of a 10 Fr by 7 cm plastic biliary stent    Recommendations:      1. Watch for complications, including cholangitis, pancreatitis, bleeding, and perforation.   2. IV LR   3. PRN pain medication and anti-emetics  4. NPO for now. Clear liquid diet if patient is pain free.  5. If patient has progressive abdominal pain and/or change in abdominal exam, please check amylase, lipase, CBC, CMP, and upright KUB.  6. Continue antibiotics for 7 days  7. Outpatient follow up in 4 weeks at which time we can plan for repeat ERCP thereafter  8. Inpatient GI service to follow. Please call with any questions.        Signed By: Siddharth Perdomo MD     4/28/2025  11:33 AM

## 2025-04-28 NOTE — ANESTHESIA POSTPROCEDURE EVALUATION
Department of Anesthesiology  Postprocedure Note    Patient: Yulisa Daniel  MRN: [id number]  YOB: 1945  Date of evaluation: 4/28/2025    Procedure Summary       Date: 04/28/25 Room / Location: [facility] ENDO  / [facility] ENDOSCOPY    Anesthesia Start: 1054 Anesthesia Stop: 1141    Procedure: ENDOSCOPIC RETROGRADE CHOLANGIOPANCREATOGRAPHY (Upper GI Region) Diagnosis:       Dilation of common bile duct      (Dilation of common bile duct [K83.8])    Surgeons: Siddharth Perdomo MD Responsible Provider: Kendell Vidal MD    Anesthesia Type: General ASA Status: 3            Anesthesia Type: General    Loreto Phase I: Loreto Score: 10    Loreto Phase II: Loreto Score: 10    Anesthesia Post Evaluation    Patient location during evaluation: PACU  Patient participation: complete - patient participated  Level of consciousness: awake  Airway patency: patent  Nausea & Vomiting: no nausea  Cardiovascular status: hemodynamically stable  Respiratory status: acceptable  Hydration status: stable  Pain management: adequate    No notable events documented.

## 2025-04-29 ENCOUNTER — RESULTS FOLLOW-UP (OUTPATIENT)
Facility: HOSPITAL | Age: 80
End: 2025-04-29

## 2025-04-29 VITALS
SYSTOLIC BLOOD PRESSURE: 123 MMHG | TEMPERATURE: 98.1 F | DIASTOLIC BLOOD PRESSURE: 76 MMHG | RESPIRATION RATE: 18 BRPM | OXYGEN SATURATION: 97 % | HEART RATE: 82 BPM

## 2025-04-29 LAB
ALBUMIN SERPL-MCNC: 2.7 G/DL (ref 3.5–5)
ALBUMIN/GLOB SERPL: 0.6 (ref 1.1–2.2)
ALP SERPL-CCNC: 690 U/L (ref 45–117)
ALT SERPL-CCNC: 402 U/L (ref 12–78)
ANION GAP SERPL CALC-SCNC: 7 MMOL/L (ref 2–12)
AST SERPL-CCNC: 303 U/L (ref 15–37)
BASOPHILS # BLD: 0.01 K/UL (ref 0–0.1)
BASOPHILS NFR BLD: 0.1 % (ref 0–1)
BILIRUB SERPL-MCNC: 1.3 MG/DL (ref 0.2–1)
BUN SERPL-MCNC: 12 MG/DL (ref 6–20)
BUN/CREAT SERPL: 13 (ref 12–20)
CALCIUM SERPL-MCNC: 8.9 MG/DL (ref 8.5–10.1)
CHLORIDE SERPL-SCNC: 107 MMOL/L (ref 97–108)
CO2 SERPL-SCNC: 25 MMOL/L (ref 21–32)
CREAT SERPL-MCNC: 0.95 MG/DL (ref 0.55–1.02)
DIFFERENTIAL METHOD BLD: ABNORMAL
EOSINOPHIL # BLD: 0 K/UL (ref 0–0.4)
EOSINOPHIL NFR BLD: 0 % (ref 0–7)
ERYTHROCYTE [DISTWIDTH] IN BLOOD BY AUTOMATED COUNT: 15.8 % (ref 11.5–14.5)
GLOBULIN SER CALC-MCNC: 4.4 G/DL (ref 2–4)
GLUCOSE SERPL-MCNC: 145 MG/DL (ref 65–100)
HCT VFR BLD AUTO: 31.3 % (ref 35–47)
HGB BLD-MCNC: 9.6 G/DL (ref 11.5–16)
IMM GRANULOCYTES # BLD AUTO: 0.05 K/UL (ref 0–0.04)
IMM GRANULOCYTES NFR BLD AUTO: 0.6 % (ref 0–0.5)
LYMPHOCYTES # BLD: 1.47 K/UL (ref 0.8–3.5)
LYMPHOCYTES NFR BLD: 17.1 % (ref 12–49)
MCH RBC QN AUTO: 23.8 PG (ref 26–34)
MCHC RBC AUTO-ENTMCNC: 30.7 G/DL (ref 30–36.5)
MCV RBC AUTO: 77.7 FL (ref 80–99)
MONOCYTES # BLD: 0.27 K/UL (ref 0–1)
MONOCYTES NFR BLD: 3.1 % (ref 5–13)
NEUTS SEG # BLD: 6.8 K/UL (ref 1.8–8)
NEUTS SEG NFR BLD: 79.1 % (ref 32–75)
NRBC # BLD: 0 K/UL (ref 0–0.01)
NRBC BLD-RTO: 0 PER 100 WBC
PLATELET # BLD AUTO: 272 K/UL (ref 150–400)
PMV BLD AUTO: 11.2 FL (ref 8.9–12.9)
POTASSIUM SERPL-SCNC: 4.2 MMOL/L (ref 3.5–5.1)
PROT SERPL-MCNC: 7.1 G/DL (ref 6.4–8.2)
RBC # BLD AUTO: 4.03 M/UL (ref 3.8–5.2)
SODIUM SERPL-SCNC: 139 MMOL/L (ref 136–145)
WBC # BLD AUTO: 8.6 K/UL (ref 3.6–11)

## 2025-04-29 PROCEDURE — 97165 OT EVAL LOW COMPLEX 30 MIN: CPT

## 2025-04-29 PROCEDURE — 6370000000 HC RX 637 (ALT 250 FOR IP): Performed by: FAMILY MEDICINE

## 2025-04-29 PROCEDURE — 6360000002 HC RX W HCPCS: Performed by: FAMILY MEDICINE

## 2025-04-29 PROCEDURE — 97535 SELF CARE MNGMENT TRAINING: CPT

## 2025-04-29 PROCEDURE — 2500000003 HC RX 250 WO HCPCS: Performed by: INTERNAL MEDICINE

## 2025-04-29 PROCEDURE — 97116 GAIT TRAINING THERAPY: CPT

## 2025-04-29 PROCEDURE — 2580000003 HC RX 258: Performed by: FAMILY MEDICINE

## 2025-04-29 PROCEDURE — 80053 COMPREHEN METABOLIC PANEL: CPT

## 2025-04-29 PROCEDURE — 85025 COMPLETE CBC W/AUTO DIFF WBC: CPT

## 2025-04-29 PROCEDURE — 97161 PT EVAL LOW COMPLEX 20 MIN: CPT

## 2025-04-29 RX ORDER — CIPROFLOXACIN 500 MG/1
500 TABLET, FILM COATED ORAL 2 TIMES DAILY
Qty: 10 TABLET | Refills: 0 | Status: SHIPPED | OUTPATIENT
Start: 2025-04-29 | End: 2025-05-04

## 2025-04-29 RX ADMIN — SODIUM CHLORIDE, PRESERVATIVE FREE 10 ML: 5 INJECTION INTRAVENOUS at 09:20

## 2025-04-29 RX ADMIN — PIPERACILLIN AND TAZOBACTAM 3375 MG: 3; .375 INJECTION, POWDER, LYOPHILIZED, FOR SOLUTION INTRAVENOUS at 05:32

## 2025-04-29 RX ADMIN — PIPERACILLIN AND TAZOBACTAM 3375 MG: 3; .375 INJECTION, POWDER, LYOPHILIZED, FOR SOLUTION INTRAVENOUS at 14:12

## 2025-04-29 RX ADMIN — SODIUM CHLORIDE, PRESERVATIVE FREE 40 MG: 5 INJECTION INTRAVENOUS at 05:31

## 2025-04-29 RX ADMIN — LOSARTAN POTASSIUM 100 MG: 50 TABLET, FILM COATED ORAL at 09:19

## 2025-04-29 RX ADMIN — ESCITALOPRAM OXALATE 5 MG: 10 TABLET ORAL at 09:19

## 2025-04-29 RX ADMIN — ACETAMINOPHEN 650 MG: 325 TABLET ORAL at 03:08

## 2025-04-29 ASSESSMENT — PAIN SCALES - GENERAL
PAINLEVEL_OUTOF10: 0
PAINLEVEL_OUTOF10: 0

## 2025-04-29 NOTE — DISCHARGE INSTRUCTIONS
Discharge Instructions       PATIENT ID: Yulisa Daniel  MRN: [id number]   YOB: 1945    DATE OF ADMISSION: 4/27/2025   DATE OF DISCHARGE: 4/29/2025    PRIMARY CARE PROVIDER: Cortes Meyer     ATTENDING PHYSICIAN: Ap Tirado MD   DISCHARGING PROVIDER: RAJ Rubin NP    To contact this individual call [phone] and ask the  to page.   If unavailable ask to be transferred the Adult Hospitalist Department.    DISCHARGE DIAGNOSES: Choledocholithiasis    CONSULTATIONS: Gastroenterology    PROCEDURES/SURGERIES: Procedure(s):  ENDOSCOPIC RETROGRADE CHOLANGIOPANCREATOGRAPHY    PENDING TEST RESULTS:   At the time of discharge the following test results are still pending: none    FOLLOW UP APPOINTMENTS:      Name Relationship Specialty Phone Fax Address Order                Siddharth Perdomo MD  Gastroenterology [phone] [address] [id number]     Next Steps: Schedule an appointment as soon as possible for a visit           ADDITIONAL CARE RECOMMENDATIONS:   You were diagnosed with Choledocholithiasis (gallstones in the bile duct). You had an ERCP done by Dr. Perdomo who placed a biliary stent to relieve the obstruction and allow bile to flow. During your procedure cholangitis (evidence of infection) was visualized    You will need to follow up in 4 weeks with Dr. Perdomo to plan for a repeat ERCP.    You had 48 hours of IV Zosyn, continue Cipro every 12 hours for 5 more days.    Monitor yourself at home for fever, worsening pain, vomiting, jaundice (turning yellow), could mean infection or stent blockage.     DIET: Low fiber diet    ACTIVITY: activity as tolerated    WOUND CARE: none     EQUIPMENT needed: none      DISCHARGE MEDICATIONS:   See Medication Reconciliation Form    It is important that you take the medication exactly as they are prescribed.   Keep your medication in the bottles provided by the pharmacist and keep a list of the

## 2025-04-29 NOTE — PROGRESS NOTES
Hospitalist Progress Note  RAJ Rodriguez NP  Answering service: [phone] OR [phone] from in house phone        Date of Service:  2025  NAME:  Yulisa Daniel  :  1945  MRN:  [id number]      Admission Summary:   Per H&P   Yulisa Daniel is a 79 y.o. female with a pmhx CKD, GERD, HTN, and dyslipidemia who presents with intermittent epigastric pain for the past three months, and is being admitted for suspected choledocholithiasis.  Associated sx in include, fatigue, and headache.  She is in the process of being evaluated for      In the ED, VSS.  Labs showed WBC 12, microcytic anemia with MCV 78.7,  T. Bili 1.6, , , alk phos 818, and d-dimer 2.37.  CTA thorax/abdomen/pelvis showed intrahepatic biliary dilatation has increased with concern for stone in the distal CBD. US abdomen showed intrahepatic biliary duct dilatation.     In the ED, she received benadryl, zofran, protonix, and zosyn.    Interval history / Subjective:   I saw the patient today on rounds.  Symptoms currently controlled     Assessment & Plan:        choledocholithiasis/dilated CBD duct  MRCP with dilated CBD, choledocholithiasis  Transaminitis, likely due to this obstruction  Gastroenterology following,   Underwent ERCP today with stone removal and stent placement  Will monitor overnight         microcytic anemia  fatigue  Iron mildly low  Hemoglobin 9.6 today, not too far from baseline  Oral iron repletion         new headaches  Head CT unremarkable  Fioricet      Depression/anxiety  Escitalopram and lorazepam as needed         HTN  Blood pressure currently controlled  Continuing losartan           GERD  PPI     dyslipidemia  Will restart statin as appropriate       Code status: Full  Prophylaxis: SCD  Care Plan discussed with: Patient, nurse, , gastroenterology  Anticipated Disposition: Pending progression 
                                                                                                Hospitalist Progress Note  RAJ Rubin - NP  Answering service: [phone] OR [phone] from in house phone        Date of Service:  2025  NAME:  Yulisa Daniel  :  1945  MRN:  [id number]      Admission Summary:   Per H&P   Yulisa Daniel is a 79 y.o. female with a pmhx CKD, GERD, HTN, and dyslipidemia who presents with intermittent epigastric pain for the past three months, and is being admitted for suspected choledocholithiasis.  Associated sx in include, fatigue, and headache.  She is in the process of being evaluated for      In the ED, VSS.  Labs showed WBC 12, microcytic anemia with MCV 78.7,  T. Bili 1.6, , , alk phos 818, and d-dimer 2.37.  CTA thorax/abdomen/pelvis showed intrahepatic biliary dilatation has increased with concern for stone in the distal CBD. US abdomen showed intrahepatic biliary duct dilatation.     In the ED, she received benadryl, zofran, protonix, and zosyn.    Interval history / Subjective:      S/p ERCP with biliary sphincterotomy, biliary stone removal, biliary stent placement yesterday    Notified by nursing patient tolerated clear liquids and is requesting to advance to full liquids.Tolerated full liquids requesting to further advance diet. Advance to low fat diet     LFT's trending down, T bili 1.3    + flatus, no BM, no abd pain, some soreness, afebrile, denies RUQ pain, appears stable    Pt would like to go home today, would also like a PT/OT eval, she is suppose to participate with op therapy but hasn't been able to go yet 2/2 back pain and this current admission. Walking tolerance has decreased.     Assessment & Plan:     Choledocholithiasis/dilated CBD duct  - MRCP with dilated CBD, choledocholithiasis  - Transaminitis, likely due to this obstruction  - Gastroenterology following  - ERCP with biliary sphincterotomy, biliary stone removal, biliary 
A Spiritual Care Partner Volunteer visited Yulisa Daniel at [facility] in [facility] 5E2 SURGICAL UNIT on 4/29/2025     Documented by: Chaplain Belinda Kulkarni  
Attempted to deliver and verbally explain the MOON with patient. Patient was BECKY (Off The Floor). Ute Youssef, Care Management Assistant   
PAULETTE GRIGSBY   [address] [address]       GI PROGRESS NOTE  Mary Sánchez PA-C  [phone] office  NP/PA in-hospital M-F until 4:30PM  After 5PM or on weekends, please call  for physician on call      NAME: Yulisa Daniel   :  1945   MRN:  [id number]       Subjective:     Patient resting comfortably bed. Reports significant improvement in abdominal pain. No nausea or vomiting. Tolerated soft food for breakfast this morning.     Objective:     VITALS:   Last 24hrs VS reviewed since prior progress note. Most recent are:  Vitals:    25 0832   BP: 123/76   Pulse: 72   Resp: 18   Temp: 98.1 °F (36.7 °C)   SpO2: 97%       PHYSICAL EXAM:  General: Cooperative, no acute distress    Neurologic:  Alert and oriented X 3.  HEENT: EOMI, no scleral icterus   Lungs:  CTA bilaterally. No wheezing  Heart:  S1 S2, regular rhythm  Abdomen: Soft, non-distended, no tenderness. +Bowel sounds  Extremities: No edema  Psych:   Good insight. Not anxious or agitated.    Lab Data Reviewed:     Recent Results (from the past 24 hours)   Comprehensive Metabolic Panel    Collection Time: 25  2:46 AM   Result Value Ref Range    Sodium 139 136 - 145 mmol/L    Potassium 4.2 3.5 - 5.1 mmol/L    Chloride 107 97 - 108 mmol/L    CO2 25 21 - 32 mmol/L    Anion Gap 7 2 - 12 mmol/L    Glucose 145 (H) 65 - 100 mg/dL    BUN 12 6 - 20 MG/DL    Creatinine 0.95 0.55 - 1.02 MG/DL    BUN/Creatinine Ratio 13 12 - 20      Est, Glom Filt Rate 62 >60 ml/min/1.73m2    Calcium 8.9 8.5 - 10.1 MG/DL    Total Bilirubin 1.3 (H) 0.2 - 1.0 MG/DL     (H) 12 - 78 U/L     (H) 15 - 37 U/L    Alk Phosphatase 690 (H) 45 - 117 U/L    Total Protein 7.1 6.4 - 8.2 g/dL    Albumin 2.7 (L) 3.5 - 5.0 g/dL    Globulin 4.4 (H) 2.0 - 4.0 g/dL    Albumin/Globulin Ratio 0.6 (L) 1.1 - 2.2     CBC with Auto Differential    Collection Time: 25  2:46 AM   Result Value Ref Range    WBC 8.6 3.6 - 11.0 K/uL    RBC 
PHYSICAL THERAPY EVALUATION/DISCHARGE    Patient: Yulisa Daniel (79 y.o. female)  Date: 4/29/2025  Primary Diagnosis: Cholelithiasis [K80.20]  Elevated LFTs [R79.89]  Choledocholithiasis [K80.50]  Procedure(s) (LRB):  ENDOSCOPIC RETROGRADE CHOLANGIOPANCREATOGRAPHY (N/A) 1 Day Post-Op   Precautions:              ASSESSMENT AND RECOMMENDATIONS:  Based on the objective data below, the patient presents at/near her baseline functional mobility level following admission for c/o abdominal pain, now s/p ERCP with stone removal, POD 1. At baseline she lives at home with her spouse and is generally indep without AD.    Received pt supine in bed, motivated to participate in session. She was able to complete all mobility at an overall INDEP/SUP level, including ambulating 300ft in hallway and asc/desc 4 steps without difficulty. Demos good standing balance while completing self care and multi level reaching tasks. Pt left up in chair at end of session, VSS, NAD. No further acute care PT needs anticipated; recommend continued mobility with RN saff assist/SUP.     Functional Outcome Measure:  The patient scored 24/24 on the [facility] outcome measure which is indicative of low likelihood of post acute rehab needs.      Further skilled acute physical therapy is not indicated at this time.       PLAN :  Recommendation for discharge: (in order for the patient to meet his/her long term goals):   No skilled physical therapy    Other factors to consider for discharge: no additional factors    IF patient discharges home will need the following DME: none       SUBJECTIVE:   Patient stated “I just get tired easily.”    OBJECTIVE DATA SUMMARY:     Past Medical History:   Diagnosis Date    Anxiety     Arthritis     Chronic constipation     CKD (chronic kidney disease) 7/17/2017    Colon polyps 7/17/2017    DJD (degenerative joint disease) 7/17/2017    Elevated LFTs 7/17/2017    Flu 02/19/2019    GERD (gastroesophageal reflux disease)     
TRANSFER - IN REPORT:    Verbal report received from DONYA Nova on Yulisa Daniel  being received from [id number] for ordered procedure      Report consisted of patient's Situation, Background, Assessment and   Recommendations(SBAR).     Information from the following report(s) Nurse Handoff Report was reviewed with the receiving nurse.    Opportunity for questions and clarification was provided.      Assessment completed upon patient's arrival to unit and care assumed.     
TRANSFER - OUT REPORT:    Verbal report given to Rohini NAQVI on Yulisa Daniel  being transferred to [facility] for routine post-op       Report consisted of patient's Situation, Background, Assessment and   Recommendations(SBAR).     Information from the following report(s) Nurse Handoff Report was reviewed with the receiving nurse.           Lines:   Peripheral IV 04/27/25 Left Antecubital (Active)   Site Assessment Clean, dry & intact 04/28/25 1145   Line Status Infusing 04/28/25 1145   Line Care Connections checked and tightened;Cap changed 04/28/25 0932   Phlebitis Assessment No symptoms 04/28/25 1145   Infiltration Assessment 0 04/28/25 1145   Alcohol Cap Used Yes 04/28/25 0932   Dressing Status Clean, dry & intact 04/28/25 1145   Dressing Type Transparent 04/28/25 1145        Opportunity for questions and clarification was provided.      Patient transported with:  Tech        
Verified patient name and date of birth, scheduled procedure, and informed consent. Reviewed general discharge instructions and  information.  Assessed patient. Awake, alert, and oriented per baseline. Vital signs stable (see vital sign flowsheet). Respiratory status within defined limits, abdomen soft and non tender. Skin with in defined limits.     Initial RN admission and assessment performed and documented in Endoscopy navigator.     Patient evaluated by anesthesia in pre-procedure holding.     All procedural vital signs, airway assessment, and level of consciousness information monitored and recorded by anesthesia staff on the anesthesia record.     Report received from CRNA post procedure.  Patient transported to recovery area by RN.    Endoscopy post procedure time out was performed and specimens were verified by physician.    Endoscope was pre-cleaned at bedside immediately following procedure by Suleman.      
patient history:   Lives With: Spouse  Type of Home: House  Home Layout: Two level, Able to Live on Main level with bedroom/bathroom  Home Access: Stairs to enter with rails     Entrance Stairs - Rails: Right  Bathroom Shower/Tub: Walk-in shower           Home Equipment: Walker - Rolling (wasn't using)  Has the patient had two or more falls in the past year or any fall with injury in the past year?: No      Hand Dominance: unknown     EXAMINATION OF PERFORMANCE DEFICITS:    Cognitive/Behavioral Status:    Orientation  Overall Orientation Status: Within Normal Limits  Orientation Level: Oriented X4  Cognition  Overall Cognitive Status: WFL    Skin: intact where visible    Edema: none noted    Hearing:   Hearing  Hearing: Within functional limits    Vision/Perceptual:             Perception  Overall Perceptual Status: WFL    Range of Motion:   AROM: Within functional limits  PROM: Within functional limits      Strength:  Strength: Within functional limits      Coordination:  Coordination: Within functional limits     Coordination: Within functional limits      Tone & Sensation:   Tone: Normal  Sensation: Intact    Balance:     Balance  Sitting: Intact  Standing: Intact      Functional Mobility and Transfers for ADLs:  Bed Mobility:     Bed Mobility Training  Bed Mobility Training: Yes  Overall Level of Assistance: Independent  Supine to Sit: Independent  Scooting: Independent    Transfers:     Transfer Training  Transfer Training: Yes  Overall Level of Assistance: Independent  Sit to Stand: Independent  Stand to Sit: Independent      ADL Assessment:      Feeding: Independent       Grooming: Independent     UE Bathing: Independent;Based on clinical judgement     LE Bathing: Independent;Based on clinical judgement     UE Dressing: Independent     LE Dressing: Independent;Based on clinical judgement     Toileting: Independent            ADL Intervention and task modifications:      Grooming: .  - Brushing/Combing Hair :

## 2025-04-29 NOTE — DISCHARGE SUMMARY
decreased.    Addendum: 1630 medically stable for discharge, tolerated 2 meals, wants to go home. Medically clear fro GI standpoint as well      DISCHARGE DIAGNOSES / PLAN:      Choledocholithiasis/dilated CBD duct  - MRCP with dilated CBD, choledocholithiasis  - Transaminitis, likely due to this obstruction  - Gastroenterology following  - ERCP with biliary sphincterotomy, biliary stone removal, biliary stent placement on 4/28  - c/w abx x 7 days  - will need follow up with Dr. Welsh in 4 weeks for repeat ERCP     microcytic anemia  fatigue  - Iron mildly low  - Hemoglobin 9.6  - Oral iron repletion     new headaches  - Head CT unremarkable  - Fioricet      Depression/anxiety  - c/w Escitalopram and lorazepam as needed     HTN  - Blood pressure currently controlled  - Continuing losartan     GERD  - PPI     dyslipidemia  - Will restart statin as appropriate       PENDING TEST RESULTS:   At the time of discharge the following test results are still pending: none    FOLLOW UP APPOINTMENTS:      Name Relationship Specialty Phone Fax Address Order                Siddharth Perdomo MD  Gastroenterology [phone] [address] [address]     Next Steps: Schedule an appointment as soon as possible for a visit           ADDITIONAL CARE RECOMMENDATIONS:   You were diagnosed with Choledocholithiasis (gallstones in the bile duct). You had an ERCP done by Dr. Perdomo who placed a biliary stent to relieve the obstruction and allow bile to flow. During your procedure cholangitis (evidence of infection) was visualized    You will need to follow up in 4 weeks with Dr. Perdomo to plan for a repeat ERCP.    You had 48 hours of IV Zosyn, continue Cipro every 12 hours for 5 more days.    Monitor yourself at home for fever, worsening pain, vomiting, jaundice (turning yellow), could mean infection or stent blockage.     DIET: Low fiber diet    ACTIVITY: activity as tolerated    WOUND CARE: none

## 2025-04-29 NOTE — CARE COORDINATION
Care Management Initial Assessment       RUR:13%  Readmission? No  1st IM letter given? Yes  4/29/25  1st  letter given: N/A    Transition of Care Plan:    RUR: 13%  Prior Level of Functioning: independent  Disposition: Home with family assistance  PT and OT are following  TRINO: 5/1/25  If SNF or IPR: Date FOC offered: N/A  Date FOC received:   Accepting facility:   Date authorization started with reference number:   Date authorization received and expires:   Follow up appointments:   DME needed: None  Transportation at discharge: family  IM/IMM Medicare/ letter given: 1st 4/29/25  Is patient a  and connected with [facility]?    If yes, was [place] transfer form completed and [facility] notified?   Caregiver Contact: spouse Tawanda Daniel  Discharge Caregiver contacted prior to discharge?   Care Conference needed?   Barriers to discharge:     Reason for Admission:   Choledocholithiasis/ CBT duct         S/P 4/28/25 ERCP      Consults:         GI           Plan for utilizing home health:   None       PCP: First and Last name:  Cortes Meyer MD   Name of Practice:    Are you a current patient: Yes    Approximate date of last visit: 2 weks ago    Current Advanced Directive/Advance Care Plan: Full Code     Healthcare Decision Maker:   Click here to complete HealthCare Decision Makers including selection of the Healthcare Decision Maker Relationship (ie \"Primary\")             Secondary Decision Maker: Tawanda Daniel - Spouse - [phone]    Secondary Decision Maker: Afshan Hong - Child - [phone]    Secondary Decision Maker: Gissell Cuellar - Child - [phone]    Secondary Decision Maker: Vincent Cuellar - Child - [phone]                   met with patient. Patient lives  with her  age 87. Patient lives in a 2 story house with the first floor occupied. Local family support includes 2 daughters, son, brother, sister. Patient was ambulatory prior to admission. Patient confirmed PCP,

## 2025-04-30 LAB
BACTERIA SPEC CULT: ABNORMAL
BACTERIA SPEC CULT: ABNORMAL
SERVICE CMNT-IMP: ABNORMAL

## 2025-05-03 LAB
BACTERIA SPEC CULT: ABNORMAL
BACTERIA SPEC CULT: ABNORMAL
SERVICE CMNT-IMP: ABNORMAL

## 2025-05-05 NOTE — ED PROVIDER NOTES
[facility] EMERGENCY DEPARTMENT  EMERGENCY DEPARTMENT ENCOUNTER    Patient Name: Yulisa Daniel  MRN: [id number]  YOB: 1945  Provider: Kyler Bynum MD  PCP: Cortes Meyer MD  Time/Date of evaluation:  4/27/25    History of Presenting Illness     Chief Complaint   Patient presents with    Abdominal Pain     Patient ambulatory into triage c/o upper middle burning abdominal pain, described as acid reflux since last night. Pt reports pain radiates to LUQ with nausea and dry heaving.        HISTORY (Narrative):   Yulisa Daniel is a 79 y.o. female presents to the Emergency Department with abdominal pain that has been ongoing for several months. The pain is primarily located in the middle, starting under the breastbone and working downward. The patient reports experiencing this pain almost daily or weekly, with fluctuating intensity.    The onset of the pain occurred several months ago, but the patient decided to seek medical attention today due to a particularly severe episode last night. The patient describes the pain as being \"awful\" and states that they \"could hardly stand it.\"     Associated symptoms include changes in bowel movements. The patient reports having had darker stools which have stopped which then became yellow and loose before returning to normal. The patient also mentions a history of constipation.    The patient's medical history is significant for iron deficiency anemia, as diagnosed by Dr. Meyer. Additionally, the patient reports having undergone a stress test and echocardiogram last week, both of which were normal.    The patient also mentions experiencing rashes when upset and reports currently feeling itchy all over. They have a history of coughing episodes with difficulty breathing, which have been attributed to vocal cord spasms by another healthcare provider.    Medical History  - Normal stress test and echocardiogram within the past week  - Iron

## 2025-05-08 ENCOUNTER — TELEPHONE (OUTPATIENT)
Facility: CLINIC | Age: 80
End: 2025-05-08

## 2025-05-08 NOTE — TELEPHONE ENCOUNTER
Patient has an upcoming appointment 5/13/25, she would like to complete her labs tomorrow so the provider will already have the results for her appointment. Lad order needed. Patient also wants to know if she has to fast.. Please advise  [phone]

## 2025-05-09 ENCOUNTER — OFFICE VISIT (OUTPATIENT)
Facility: CLINIC | Age: 80
End: 2025-05-09

## 2025-05-09 VITALS
RESPIRATION RATE: 19 BRPM | HEIGHT: 64 IN | OXYGEN SATURATION: 98 % | TEMPERATURE: 98 F | SYSTOLIC BLOOD PRESSURE: 130 MMHG | WEIGHT: 194 LBS | BODY MASS INDEX: 33.12 KG/M2 | HEART RATE: 77 BPM | DIASTOLIC BLOOD PRESSURE: 82 MMHG

## 2025-05-09 DIAGNOSIS — E78.2 MIXED HYPERLIPIDEMIA: ICD-10-CM

## 2025-05-09 DIAGNOSIS — I12.9 HYPERTENSION WITH RENAL DISEASE: ICD-10-CM

## 2025-05-09 DIAGNOSIS — Z09 HOSPITAL DISCHARGE FOLLOW-UP: Primary | ICD-10-CM

## 2025-05-09 DIAGNOSIS — K21.9 GASTROESOPHAGEAL REFLUX DISEASE WITHOUT ESOPHAGITIS: ICD-10-CM

## 2025-05-09 DIAGNOSIS — K80.50 CHOLEDOCHOLITHIASIS: ICD-10-CM

## 2025-05-09 DIAGNOSIS — D50.9 MICROCYTIC ANEMIA: ICD-10-CM

## 2025-05-09 DIAGNOSIS — R79.89 ELEVATED LFTS: ICD-10-CM

## 2025-05-09 RX ORDER — FERROUS SULFATE 325(65) MG
325 TABLET ORAL 2 TIMES DAILY
Qty: 180 TABLET | Refills: 1 | Status: SHIPPED | OUTPATIENT
Start: 2025-05-09

## 2025-05-09 NOTE — PROGRESS NOTES
The patient identity was confirmed with  and First/Last Name. Medications and Allergies reviewed with patient, as well as any new diagnosis/procedures.     Chief Complaint   Patient presents with    Follow-Up from Hospital        Vitals:    25 1102   BP: 130/82   Pulse: 77   Resp: 19   Temp: 98 °F (36.7 °C)   SpO2: 98%       Health Maintenance Due   Topic Date Due    DTaP/Tdap/Td vaccine (1 - Tdap) Never done    Pneumococcal 50+ years Vaccine (2 of 2 - PPSV23) 2016    Shingles vaccine (2 of 2) 10/21/2019    Respiratory Syncytial Virus (RSV) Pregnant or age 60 yrs+ (1 - 1-dose 75+ series) Never done    COVID-19 Vaccine (3 - 2024-25 season) 2024          \"Have you been to the ER, urgent care clinic since your last visit?  Hospitalized since your last visit?\"    NO- Reason for visit.    “Have you seen or consulted any other health care providers outside our system since your last visit?”    NO

## 2025-05-09 NOTE — PROGRESS NOTES
Follow-Up from Hospital       HPI:  Yulisa Daniel is a 79 y.o. year old female who is here for a follow up visit for hospitalization transition of care.   She was last seen by me on 4/7/2025.     Discharged on: 4/29/2025 after being admitted on 4/27/2025.  Transition of care follow-up call made on 1/30/2025.  Medication reconciliation done today 5/9/2025    Diagnosis in hospital:  1.  Choledocholithiasis  2.  Elevated liver enzymes secondary to #1  3.  Microcytic anemia  4.  Fatigue  5.  Anxiety depression  6 hypertension eczema 7 GERD  8 dyslipidemia    Complications in hospital: No complications    Medication changes: Given Cipro 500 twice daily to complete a 10-day course which she has now completed    Discharge Summary reviewed.  Today 5/9/2025      She reports the following: Since discharge from the hospital she has had a little difficulty following the diet but is doing okay with it.  She is avoiding things that seem to irritate her stomach.  She has no significant abdominal pain.  She has completed her antibiotics.  She notes no chest pain, shortness of breath or cardiac Rister complaints.  There are no GI or  complaints.  She has no headaches, dizziness or new neurologic complaints.  She has no new arthritic complaints and there are no other complaints on complete review of systems.          /82 (BP Site: Left Upper Arm, Patient Position: Sitting, BP Cuff Size: Medium Adult)   Pulse 77   Temp 98 °F (36.7 °C) (Temporal)   Resp 19   Ht 1.626 m (5' 4\")   Wt 88 kg (194 lb)   SpO2 98%   BMI 33.30 kg/m²     Historical Data    Past Medical History:   Diagnosis Date    Anxiety     Arthritis     Chronic constipation     CKD (chronic kidney disease) 7/17/2017    Colon polyps 7/17/2017    DJD (degenerative joint disease) 7/17/2017    Elevated LFTs 7/17/2017    Flu 02/19/2019    GERD (gastroesophageal reflux disease)     Glucose intolerance (impaired glucose tolerance) 7/17/2017    Hemorrhoids

## 2025-05-10 LAB
ALBUMIN SERPL-MCNC: 3.5 G/DL (ref 3.5–5)
ALBUMIN/GLOB SERPL: 0.9 (ref 1.1–2.2)
ALP SERPL-CCNC: 248 U/L (ref 45–117)
ALT SERPL-CCNC: 46 U/L (ref 12–78)
ANION GAP SERPL CALC-SCNC: 7 MMOL/L (ref 2–12)
AST SERPL-CCNC: 24 U/L (ref 15–37)
BASOPHILS # BLD: 0.07 K/UL (ref 0–0.1)
BASOPHILS NFR BLD: 0.9 % (ref 0–1)
BILIRUB SERPL-MCNC: 0.6 MG/DL (ref 0.2–1)
BUN SERPL-MCNC: 16 MG/DL (ref 6–20)
BUN/CREAT SERPL: 21 (ref 12–20)
CALCIUM SERPL-MCNC: 10.2 MG/DL (ref 8.5–10.1)
CHLORIDE SERPL-SCNC: 104 MMOL/L (ref 97–108)
CO2 SERPL-SCNC: 28 MMOL/L (ref 21–32)
CREAT SERPL-MCNC: 0.75 MG/DL (ref 0.55–1.02)
DIFFERENTIAL METHOD BLD: ABNORMAL
EOSINOPHIL # BLD: 0.27 K/UL (ref 0–0.4)
EOSINOPHIL NFR BLD: 3.6 % (ref 0–7)
ERYTHROCYTE [DISTWIDTH] IN BLOOD BY AUTOMATED COUNT: 15.9 % (ref 11.5–14.5)
GLOBULIN SER CALC-MCNC: 4 G/DL (ref 2–4)
GLUCOSE SERPL-MCNC: 96 MG/DL (ref 65–100)
HCT VFR BLD AUTO: 36.1 % (ref 35–47)
HGB BLD-MCNC: 11.2 G/DL (ref 11.5–16)
IMM GRANULOCYTES # BLD AUTO: 0.01 K/UL (ref 0–0.04)
IMM GRANULOCYTES NFR BLD AUTO: 0.1 % (ref 0–0.5)
LYMPHOCYTES # BLD: 3.47 K/UL (ref 0.8–3.5)
LYMPHOCYTES NFR BLD: 46.3 % (ref 12–49)
MCH RBC QN AUTO: 24.1 PG (ref 26–34)
MCHC RBC AUTO-ENTMCNC: 31 G/DL (ref 30–36.5)
MCV RBC AUTO: 77.8 FL (ref 80–99)
MONOCYTES # BLD: 0.65 K/UL (ref 0–1)
MONOCYTES NFR BLD: 8.7 % (ref 5–13)
NEUTS SEG # BLD: 3.02 K/UL (ref 1.8–8)
NEUTS SEG NFR BLD: 40.4 % (ref 32–75)
NRBC # BLD: 0 K/UL (ref 0–0.01)
NRBC BLD-RTO: 0 PER 100 WBC
PLATELET # BLD AUTO: 365 K/UL (ref 150–400)
PMV BLD AUTO: 11.7 FL (ref 8.9–12.9)
POTASSIUM SERPL-SCNC: 4.4 MMOL/L (ref 3.5–5.1)
PROT SERPL-MCNC: 7.5 G/DL (ref 6.4–8.2)
RBC # BLD AUTO: 4.64 M/UL (ref 3.8–5.2)
SODIUM SERPL-SCNC: 139 MMOL/L (ref 136–145)
WBC # BLD AUTO: 7.5 K/UL (ref 3.6–11)

## 2025-05-12 NOTE — PROGRESS NOTES
Chief Complaint   Patient presents with    Hypertension       SUBJECTIVE:    Yulisa Daniel is a 79 y.o. female who returns in follow-up for medical problems include hypertension, hyperlipidemia, DJD, GERD, glucose intolerance and recent hospitalization for choledocholithiasis treated with ERCP.  On her hospital follow-up visit her liver enzymes were markedly improved but alk phos still slightly elevated.  She also at that time was improved as far as anemia.  She notes no chest pain, shortness of breath or cardiac respiratory complaints.  She notes no current GI or  complaints.  She has no headaches, dizziness or new neurologic complaints.  She notes no current active arthritic complaints and there are no other complaints on complete review of systems.      Current Outpatient Medications   Medication Sig Dispense Refill    escitalopram (LEXAPRO) 10 MG tablet Take 1 tablet by mouth daily 90 tablet 3    omeprazole (PRILOSEC) 20 MG delayed release capsule TAKE 1 CAPSULE IN THE MORNING AND AT BEDTIME 180 capsule 3    hydroCHLOROthiazide (HYDRODIURIL) 25 MG tablet TAKE 1 TABLET EVERY DAY 90 tablet 3    irbesartan (AVAPRO) 300 MG tablet TAKE 1 TABLET EVERY DAY 90 tablet 3    rosuvastatin (CRESTOR) 20 MG tablet Take 1 tablet by mouth daily 90 tablet 1    calcium carbonate (OSCAL) 500 MG TABS tablet Take 1 tablet by mouth 2 times daily      Biotin 10 MG CAPS Take by mouth Daily with supper      cyanocobalamin 500 MCG tablet Take 1 tablet by mouth      polyethylene glycol (GLYCOLAX) 17 GM/SCOOP powder Take 17 g by mouth       No current facility-administered medications for this visit.     Past Medical History:   Diagnosis Date    Anxiety     Arthritis     Chronic constipation     CKD (chronic kidney disease) 7/17/2017    Colon polyps 7/17/2017    DJD (degenerative joint disease) 7/17/2017    Elevated LFTs 7/17/2017    Flu 02/19/2019    GERD (gastroesophageal reflux disease)     Glucose intolerance (impaired glucose

## 2025-05-13 ENCOUNTER — OFFICE VISIT (OUTPATIENT)
Facility: CLINIC | Age: 80
End: 2025-05-13
Payer: MEDICARE

## 2025-05-13 ENCOUNTER — RESULTS FOLLOW-UP (OUTPATIENT)
Facility: CLINIC | Age: 80
End: 2025-05-13

## 2025-05-13 VITALS
OXYGEN SATURATION: 98 % | RESPIRATION RATE: 16 BRPM | BODY MASS INDEX: 32.88 KG/M2 | WEIGHT: 192.6 LBS | TEMPERATURE: 97.8 F | HEART RATE: 84 BPM | HEIGHT: 64 IN | DIASTOLIC BLOOD PRESSURE: 72 MMHG | SYSTOLIC BLOOD PRESSURE: 124 MMHG

## 2025-05-13 DIAGNOSIS — R73.02 GLUCOSE INTOLERANCE (IMPAIRED GLUCOSE TOLERANCE): ICD-10-CM

## 2025-05-13 DIAGNOSIS — D64.9 ANEMIA, UNSPECIFIED TYPE: ICD-10-CM

## 2025-05-13 DIAGNOSIS — I12.9 HYPERTENSION WITH RENAL DISEASE: Primary | ICD-10-CM

## 2025-05-13 DIAGNOSIS — K21.9 GASTROESOPHAGEAL REFLUX DISEASE WITHOUT ESOPHAGITIS: ICD-10-CM

## 2025-05-13 DIAGNOSIS — E66.811 CLASS 1 OBESITY DUE TO EXCESS CALORIES WITHOUT SERIOUS COMORBIDITY WITH BODY MASS INDEX (BMI) OF 31.0 TO 31.9 IN ADULT: ICD-10-CM

## 2025-05-13 DIAGNOSIS — E78.2 MIXED HYPERLIPIDEMIA: ICD-10-CM

## 2025-05-13 DIAGNOSIS — M15.0 PRIMARY OSTEOARTHRITIS INVOLVING MULTIPLE JOINTS: ICD-10-CM

## 2025-05-13 DIAGNOSIS — E66.09 CLASS 1 OBESITY DUE TO EXCESS CALORIES WITHOUT SERIOUS COMORBIDITY WITH BODY MASS INDEX (BMI) OF 31.0 TO 31.9 IN ADULT: ICD-10-CM

## 2025-05-13 PROCEDURE — 1160F RVW MEDS BY RX/DR IN RCRD: CPT | Performed by: INTERNAL MEDICINE

## 2025-05-13 PROCEDURE — 1126F AMNT PAIN NOTED NONE PRSNT: CPT | Performed by: INTERNAL MEDICINE

## 2025-05-13 PROCEDURE — G8417 CALC BMI ABV UP PARAM F/U: HCPCS | Performed by: INTERNAL MEDICINE

## 2025-05-13 PROCEDURE — 1090F PRES/ABSN URINE INCON ASSESS: CPT | Performed by: INTERNAL MEDICINE

## 2025-05-13 PROCEDURE — 1123F ACP DISCUSS/DSCN MKR DOCD: CPT | Performed by: INTERNAL MEDICINE

## 2025-05-13 PROCEDURE — 1159F MED LIST DOCD IN RCRD: CPT | Performed by: INTERNAL MEDICINE

## 2025-05-13 PROCEDURE — G8399 PT W/DXA RESULTS DOCUMENT: HCPCS | Performed by: INTERNAL MEDICINE

## 2025-05-13 PROCEDURE — 99214 OFFICE O/P EST MOD 30 MIN: CPT | Performed by: INTERNAL MEDICINE

## 2025-05-13 PROCEDURE — G8427 DOCREV CUR MEDS BY ELIG CLIN: HCPCS | Performed by: INTERNAL MEDICINE

## 2025-05-13 PROCEDURE — 1036F TOBACCO NON-USER: CPT | Performed by: INTERNAL MEDICINE

## 2025-05-13 PROCEDURE — 1111F DSCHRG MED/CURRENT MED MERGE: CPT | Performed by: INTERNAL MEDICINE

## 2025-05-13 NOTE — PROGRESS NOTES
Yulisa Daniel is a 79 y.o. female     Chief Complaint   Patient presents with    Hypertension       /72 (BP Site: Left Upper Arm, Patient Position: Sitting, BP Cuff Size: Large Adult)   Pulse 84   Temp 97.8 °F (36.6 °C) (Oral)   Resp 16   Ht 1.626 m (5' 4\")   Wt 87.4 kg (192 lb 9.6 oz)   SpO2 98%   BMI 33.06 kg/m²     Health Maintenance Due   Topic Date Due    DTaP/Tdap/Td vaccine (1 - Tdap) Never done    Pneumococcal 50+ years Vaccine (2 of 2 - PPSV23) 07/06/2016    Shingles vaccine (2 of 2) 10/21/2019    Respiratory Syncytial Virus (RSV) Pregnant or age 60 yrs+ (1 - 1-dose 75+ series) Never done    COVID-19 Vaccine (3 - 2024-25 season) 09/01/2024         \"Have you been to the ER, urgent care clinic since your last visit?  Hospitalized since your last visit?\"    NO    “Have you seen or consulted any other health care providers outside of [facility] since your last visit?”    NO

## 2025-05-13 NOTE — PROGRESS NOTES
No chief complaint on file.    \"Have you been to the ER, urgent care clinic since your last visit?  Hospitalized since your last visit?\"    {YES/NO:[id number]}    “Have you seen or consulted any other health care providers outside of [facility] since your last visit?”    {YES/NO:[id number]}

## 2025-05-14 LAB
ALBUMIN SERPL-MCNC: 3.4 G/DL (ref 3.5–5)
ALBUMIN/GLOB SERPL: 0.9 (ref 1.1–2.2)
ALP SERPL-CCNC: 187 U/L (ref 45–117)
ALT SERPL-CCNC: 31 U/L (ref 12–78)
ANION GAP SERPL CALC-SCNC: 5 MMOL/L (ref 2–12)
AST SERPL-CCNC: 28 U/L (ref 15–37)
BASOPHILS # BLD: 0.06 K/UL (ref 0–0.1)
BASOPHILS NFR BLD: 1 % (ref 0–1)
BILIRUB SERPL-MCNC: 0.8 MG/DL (ref 0.2–1)
BUN SERPL-MCNC: 17 MG/DL (ref 6–20)
BUN/CREAT SERPL: 20 (ref 12–20)
CALCIUM SERPL-MCNC: 9.9 MG/DL (ref 8.5–10.1)
CHLORIDE SERPL-SCNC: 104 MMOL/L (ref 97–108)
CHOLEST SERPL-MCNC: 159 MG/DL
CK SERPL-CCNC: 46 U/L (ref 26–192)
CO2 SERPL-SCNC: 30 MMOL/L (ref 21–32)
CREAT SERPL-MCNC: 0.85 MG/DL (ref 0.55–1.02)
DIFFERENTIAL METHOD BLD: ABNORMAL
EOSINOPHIL # BLD: 0.24 K/UL (ref 0–0.4)
EOSINOPHIL NFR BLD: 4.1 % (ref 0–7)
ERYTHROCYTE [DISTWIDTH] IN BLOOD BY AUTOMATED COUNT: 15.7 % (ref 11.5–14.5)
EST. AVERAGE GLUCOSE BLD GHB EST-MCNC: 126 MG/DL
GLOBULIN SER CALC-MCNC: 3.8 G/DL (ref 2–4)
GLUCOSE SERPL-MCNC: 99 MG/DL (ref 65–100)
HBA1C MFR BLD: 6 % (ref 4–5.6)
HCT VFR BLD AUTO: 35.9 % (ref 35–47)
HDLC SERPL-MCNC: 51 MG/DL
HDLC SERPL: 3.1 (ref 0–5)
HGB BLD-MCNC: 11.1 G/DL (ref 11.5–16)
IMM GRANULOCYTES # BLD AUTO: 0.01 K/UL (ref 0–0.04)
IMM GRANULOCYTES NFR BLD AUTO: 0.2 % (ref 0–0.5)
LDLC SERPL CALC-MCNC: 81.2 MG/DL (ref 0–100)
LYMPHOCYTES # BLD: 2.86 K/UL (ref 0.8–3.5)
LYMPHOCYTES NFR BLD: 48.4 % (ref 12–49)
MCH RBC QN AUTO: 24 PG (ref 26–34)
MCHC RBC AUTO-ENTMCNC: 30.9 G/DL (ref 30–36.5)
MCV RBC AUTO: 77.7 FL (ref 80–99)
MONOCYTES # BLD: 0.46 K/UL (ref 0–1)
MONOCYTES NFR BLD: 7.8 % (ref 5–13)
NEUTS SEG # BLD: 2.28 K/UL (ref 1.8–8)
NEUTS SEG NFR BLD: 38.5 % (ref 32–75)
NRBC # BLD: 0.02 K/UL (ref 0–0.01)
NRBC BLD-RTO: 0.3 PER 100 WBC
PLATELET # BLD AUTO: 313 K/UL (ref 150–400)
PMV BLD AUTO: 12.2 FL (ref 8.9–12.9)
POTASSIUM SERPL-SCNC: 4.1 MMOL/L (ref 3.5–5.1)
PROT SERPL-MCNC: 7.2 G/DL (ref 6.4–8.2)
RBC # BLD AUTO: 4.62 M/UL (ref 3.8–5.2)
SODIUM SERPL-SCNC: 139 MMOL/L (ref 136–145)
TRIGL SERPL-MCNC: 134 MG/DL
VLDLC SERPL CALC-MCNC: 26.8 MG/DL
WBC # BLD AUTO: 5.9 K/UL (ref 3.6–11)

## 2025-05-20 ENCOUNTER — RESULTS FOLLOW-UP (OUTPATIENT)
Facility: CLINIC | Age: 80
End: 2025-05-20

## 2025-06-01 PROBLEM — R10.10 UPPER ABDOMINAL PAIN: Status: ACTIVE | Noted: 2018-01-16

## 2025-06-01 PROBLEM — J11.1 INFLUENZA: Status: RESOLVED | Noted: 2020-01-17 | Resolved: 2025-06-01

## 2025-06-01 PROBLEM — G44.319 ACUTE POST-TRAUMATIC HEADACHE, NOT INTRACTABLE: Status: RESOLVED | Noted: 2019-03-05 | Resolved: 2025-06-01

## 2025-06-01 PROBLEM — R07.9 CHEST PAIN: Status: RESOLVED | Noted: 2022-12-20 | Resolved: 2025-06-01

## 2025-06-02 ENCOUNTER — OFFICE VISIT (OUTPATIENT)
Facility: CLINIC | Age: 80
End: 2025-06-02
Payer: MEDICARE

## 2025-06-02 DIAGNOSIS — D50.9 IRON DEFICIENCY ANEMIA, UNSPECIFIED IRON DEFICIENCY ANEMIA TYPE: ICD-10-CM

## 2025-06-02 DIAGNOSIS — R10.10 UPPER ABDOMINAL PAIN: Primary | ICD-10-CM

## 2025-06-02 PROCEDURE — 1036F TOBACCO NON-USER: CPT | Performed by: INTERNAL MEDICINE

## 2025-06-02 PROCEDURE — 1123F ACP DISCUSS/DSCN MKR DOCD: CPT | Performed by: INTERNAL MEDICINE

## 2025-06-02 PROCEDURE — 1090F PRES/ABSN URINE INCON ASSESS: CPT | Performed by: INTERNAL MEDICINE

## 2025-06-02 PROCEDURE — 1160F RVW MEDS BY RX/DR IN RCRD: CPT | Performed by: INTERNAL MEDICINE

## 2025-06-02 PROCEDURE — 99214 OFFICE O/P EST MOD 30 MIN: CPT | Performed by: INTERNAL MEDICINE

## 2025-06-02 PROCEDURE — 1159F MED LIST DOCD IN RCRD: CPT | Performed by: INTERNAL MEDICINE

## 2025-06-02 PROCEDURE — G8427 DOCREV CUR MEDS BY ELIG CLIN: HCPCS | Performed by: INTERNAL MEDICINE

## 2025-06-02 PROCEDURE — G8399 PT W/DXA RESULTS DOCUMENT: HCPCS | Performed by: INTERNAL MEDICINE

## 2025-06-02 PROCEDURE — G8417 CALC BMI ABV UP PARAM F/U: HCPCS | Performed by: INTERNAL MEDICINE

## 2025-06-02 RX ORDER — SUCRALFATE 1 G/1
1 TABLET ORAL 4 TIMES DAILY
Qty: 120 TABLET | Refills: 3 | Status: SHIPPED | OUTPATIENT
Start: 2025-06-02

## 2025-06-02 NOTE — PROGRESS NOTES
Chief Complaint   Patient presents with    Abdominal Pain     Pt comes in today complaining of abdominal pain and bloating since Friday. Pt denies any nausea or vomiting but does report constipation. Pt states that she recently had a gallstone removed.       SUBJECTIVE:    Yulisa Daniel is a 79 y.o. female who presents today with persistent upper abdominal pain in the midepigastric and right upper quadrant area.  She is status post recent choledocholithiasis when she had gallstone removal and stent placement.  She was told to follow-up with GI to get the stent removed in 4 weeks but at this point she is not have an appointment to see GI for another 4 weeks which would now be total of 8 weeks and even at that point first appointment is to just check her and then the office set up appointment to remove the gallstones which should be probably another 4 weeks.  She notes no nausea vomiting.  She is taking the omeprazole 20 mg twice a day.  She feels like she has a constant upper abdominal pain/bloating and indigestion.  She notes no nausea vomiting but does have some constipation.  There been no associated fevers or chills.  No change in the color urine no change in color BMs.  She notes no current cardiorespiratory complaints.      Current Outpatient Medications   Medication Sig Dispense Refill    sucralfate (CARAFATE) 1 GM tablet Take 1 tablet by mouth 4 times daily 120 tablet 3    escitalopram (LEXAPRO) 10 MG tablet Take 1 tablet by mouth daily 90 tablet 3    omeprazole (PRILOSEC) 20 MG delayed release capsule TAKE 1 CAPSULE IN THE MORNING AND AT BEDTIME 180 capsule 3    hydroCHLOROthiazide (HYDRODIURIL) 25 MG tablet TAKE 1 TABLET EVERY DAY 90 tablet 3    irbesartan (AVAPRO) 300 MG tablet TAKE 1 TABLET EVERY DAY 90 tablet 3    rosuvastatin (CRESTOR) 20 MG tablet Take 1 tablet by mouth daily 90 tablet 1    calcium carbonate (OSCAL) 500 MG TABS tablet Take 1 tablet by mouth 2 times daily      Biotin 10 MG CAPS

## 2025-06-03 ENCOUNTER — RESULTS FOLLOW-UP (OUTPATIENT)
Facility: CLINIC | Age: 80
End: 2025-06-03

## 2025-06-03 LAB
ALBUMIN SERPL-MCNC: 3.4 G/DL (ref 3.5–5)
ALBUMIN/GLOB SERPL: 0.8 (ref 1.1–2.2)
ALP SERPL-CCNC: 103 U/L (ref 45–117)
ALT SERPL-CCNC: 21 U/L (ref 12–78)
ANION GAP SERPL CALC-SCNC: 5 MMOL/L (ref 2–12)
AST SERPL-CCNC: 23 U/L (ref 15–37)
BASOPHILS # BLD: 0.05 K/UL (ref 0–0.1)
BASOPHILS NFR BLD: 0.7 % (ref 0–1)
BILIRUB SERPL-MCNC: 0.4 MG/DL (ref 0.2–1)
BUN SERPL-MCNC: 22 MG/DL (ref 6–20)
BUN/CREAT SERPL: 29 (ref 12–20)
CALCIUM SERPL-MCNC: 9.5 MG/DL (ref 8.5–10.1)
CHLORIDE SERPL-SCNC: 105 MMOL/L (ref 97–108)
CO2 SERPL-SCNC: 29 MMOL/L (ref 21–32)
CREAT SERPL-MCNC: 0.77 MG/DL (ref 0.55–1.02)
DIFFERENTIAL METHOD BLD: ABNORMAL
EOSINOPHIL # BLD: 0.34 K/UL (ref 0–0.4)
EOSINOPHIL NFR BLD: 4.4 % (ref 0–7)
ERYTHROCYTE [DISTWIDTH] IN BLOOD BY AUTOMATED COUNT: 15.5 % (ref 11.5–14.5)
GLOBULIN SER CALC-MCNC: 4.2 G/DL (ref 2–4)
GLUCOSE SERPL-MCNC: 124 MG/DL (ref 65–100)
HCT VFR BLD AUTO: 35.7 % (ref 35–47)
HGB BLD-MCNC: 10.2 G/DL (ref 11.5–16)
IMM GRANULOCYTES # BLD AUTO: 0.02 K/UL (ref 0–0.04)
IMM GRANULOCYTES NFR BLD AUTO: 0.3 % (ref 0–0.5)
LYMPHOCYTES # BLD: 3.67 K/UL (ref 0.8–3.5)
LYMPHOCYTES NFR BLD: 47.5 % (ref 12–49)
MCH RBC QN AUTO: 23.2 PG (ref 26–34)
MCHC RBC AUTO-ENTMCNC: 28.6 G/DL (ref 30–36.5)
MCV RBC AUTO: 81.1 FL (ref 80–99)
MONOCYTES # BLD: 0.46 K/UL (ref 0–1)
MONOCYTES NFR BLD: 6 % (ref 5–13)
NEUTS SEG # BLD: 3.16 K/UL (ref 1.8–8)
NEUTS SEG NFR BLD: 41.1 % (ref 32–75)
NRBC # BLD: 0 K/UL (ref 0–0.01)
NRBC BLD-RTO: 0 PER 100 WBC
PLATELET # BLD AUTO: 278 K/UL (ref 150–400)
PMV BLD AUTO: 12.6 FL (ref 8.9–12.9)
POTASSIUM SERPL-SCNC: 3.7 MMOL/L (ref 3.5–5.1)
PROT SERPL-MCNC: 7.6 G/DL (ref 6.4–8.2)
RBC # BLD AUTO: 4.4 M/UL (ref 3.8–5.2)
RBC MORPH BLD: ABNORMAL
SODIUM SERPL-SCNC: 139 MMOL/L (ref 136–145)
WBC # BLD AUTO: 7.7 K/UL (ref 3.6–11)

## 2025-06-09 ENCOUNTER — OFFICE VISIT (OUTPATIENT)
Facility: CLINIC | Age: 80
End: 2025-06-09
Payer: MEDICARE

## 2025-06-09 DIAGNOSIS — D50.9 IRON DEFICIENCY ANEMIA, UNSPECIFIED IRON DEFICIENCY ANEMIA TYPE: ICD-10-CM

## 2025-06-09 DIAGNOSIS — R10.10 UPPER ABDOMINAL PAIN: Primary | ICD-10-CM

## 2025-06-09 PROCEDURE — 1159F MED LIST DOCD IN RCRD: CPT | Performed by: INTERNAL MEDICINE

## 2025-06-09 PROCEDURE — 99214 OFFICE O/P EST MOD 30 MIN: CPT | Performed by: INTERNAL MEDICINE

## 2025-06-09 PROCEDURE — G8399 PT W/DXA RESULTS DOCUMENT: HCPCS | Performed by: INTERNAL MEDICINE

## 2025-06-09 PROCEDURE — 1123F ACP DISCUSS/DSCN MKR DOCD: CPT | Performed by: INTERNAL MEDICINE

## 2025-06-09 PROCEDURE — 1036F TOBACCO NON-USER: CPT | Performed by: INTERNAL MEDICINE

## 2025-06-09 PROCEDURE — G8417 CALC BMI ABV UP PARAM F/U: HCPCS | Performed by: INTERNAL MEDICINE

## 2025-06-09 PROCEDURE — 1090F PRES/ABSN URINE INCON ASSESS: CPT | Performed by: INTERNAL MEDICINE

## 2025-06-09 PROCEDURE — 1160F RVW MEDS BY RX/DR IN RCRD: CPT | Performed by: INTERNAL MEDICINE

## 2025-06-09 PROCEDURE — G8427 DOCREV CUR MEDS BY ELIG CLIN: HCPCS | Performed by: INTERNAL MEDICINE

## 2025-06-09 RX ORDER — ALPRAZOLAM 0.5 MG
0.5 TABLET ORAL NIGHTLY PRN
COMMUNITY

## 2025-06-09 NOTE — PROGRESS NOTES
Have you been to the ER, urgent care clinic since your last visit?  Hospitalized since your last visit?   NO    Have you seen or consulted any other health care providers outside our system since your last visit?   NO            
unspecified iron deficiency anemia type      Impression  1.  Upper abdominal pain improved with Carafate continue Carafate along with Prilosec.  I am rechecking her CBC and CMP and I will check a lipase  2 iron deficiency anemia note that her hemoglobin on most recent check here was 10.2 down from 11.1 and if it is any further down then I think we need to proceed further with the workup and I think she does need to have a EGD and a colonoscopy unless they get a really good look at the stomach when they did the ERCP which probably is not the case.  30 minutes spent in direct care of this patient today with greater than 50% in counseling and coordination of care all of her questions answered.  Provided all is stable I will recheck in a month but I will see her sooner should the need to based upon her symptoms or the blood count or other lab results    PLAN:  1. Upper abdominal pain  -     CBC with Auto Differential; Future  -     Comprehensive Metabolic Panel; Future  -     Lipase; Future  2. Iron deficiency anemia, unspecified iron deficiency anemia type  -     CBC with Auto Differential; Future          ATTENTION:   This medical record was transcribed using an electronic medical records system.  Although proofread, it may and can contain electronic and spelling errors.  Other human spelling and other errors may be present.  Corrections may be executed at a later time.  Please feel free to contact us for any clarifications as needed.      No follow-up provider specified.    No results found for any visits on 06/09/25.    Cortes Meyer MD    The patient verbalized understanding of the problems and plans as explained.

## 2025-06-10 ENCOUNTER — RESULTS FOLLOW-UP (OUTPATIENT)
Facility: CLINIC | Age: 80
End: 2025-06-10

## 2025-06-10 LAB
ALBUMIN SERPL-MCNC: 3.5 G/DL (ref 3.5–5)
ALBUMIN/GLOB SERPL: 1 (ref 1.1–2.2)
ALP SERPL-CCNC: 100 U/L (ref 45–117)
ALT SERPL-CCNC: 23 U/L (ref 12–78)
ANION GAP SERPL CALC-SCNC: 5 MMOL/L (ref 2–12)
AST SERPL-CCNC: 18 U/L (ref 15–37)
BASOPHILS # BLD: 0.05 K/UL (ref 0–0.1)
BASOPHILS NFR BLD: 0.7 % (ref 0–1)
BILIRUB SERPL-MCNC: 0.5 MG/DL (ref 0.2–1)
BUN SERPL-MCNC: 20 MG/DL (ref 6–20)
BUN/CREAT SERPL: 26 (ref 12–20)
CALCIUM SERPL-MCNC: 10.1 MG/DL (ref 8.5–10.1)
CHLORIDE SERPL-SCNC: 107 MMOL/L (ref 97–108)
CO2 SERPL-SCNC: 28 MMOL/L (ref 21–32)
CREAT SERPL-MCNC: 0.76 MG/DL (ref 0.55–1.02)
DIFFERENTIAL METHOD BLD: ABNORMAL
EOSINOPHIL # BLD: 0.28 K/UL (ref 0–0.4)
EOSINOPHIL NFR BLD: 4.1 % (ref 0–7)
ERYTHROCYTE [DISTWIDTH] IN BLOOD BY AUTOMATED COUNT: 15.9 % (ref 11.5–14.5)
GLOBULIN SER CALC-MCNC: 3.6 G/DL (ref 2–4)
GLUCOSE SERPL-MCNC: 108 MG/DL (ref 65–100)
HCT VFR BLD AUTO: 33.7 % (ref 35–47)
HGB BLD-MCNC: 10.1 G/DL (ref 11.5–16)
IMM GRANULOCYTES # BLD AUTO: 0.01 K/UL (ref 0–0.04)
IMM GRANULOCYTES NFR BLD AUTO: 0.1 % (ref 0–0.5)
LIPASE SERPL-CCNC: 37 U/L (ref 13–75)
LYMPHOCYTES # BLD: 3.09 K/UL (ref 0.8–3.5)
LYMPHOCYTES NFR BLD: 44.8 % (ref 12–49)
MCH RBC QN AUTO: 23 PG (ref 26–34)
MCHC RBC AUTO-ENTMCNC: 30 G/DL (ref 30–36.5)
MCV RBC AUTO: 76.6 FL (ref 80–99)
MONOCYTES # BLD: 0.53 K/UL (ref 0–1)
MONOCYTES NFR BLD: 7.7 % (ref 5–13)
NEUTS SEG # BLD: 2.93 K/UL (ref 1.8–8)
NEUTS SEG NFR BLD: 42.6 % (ref 32–75)
NRBC # BLD: 0 K/UL (ref 0–0.01)
NRBC BLD-RTO: 0 PER 100 WBC
PLATELET # BLD AUTO: 274 K/UL (ref 150–400)
PMV BLD AUTO: 11.7 FL (ref 8.9–12.9)
POTASSIUM SERPL-SCNC: 4.1 MMOL/L (ref 3.5–5.1)
PROT SERPL-MCNC: 7.1 G/DL (ref 6.4–8.2)
RBC # BLD AUTO: 4.4 M/UL (ref 3.8–5.2)
SODIUM SERPL-SCNC: 140 MMOL/L (ref 136–145)
WBC # BLD AUTO: 6.9 K/UL (ref 3.6–11)

## 2025-06-12 ENCOUNTER — TELEPHONE (OUTPATIENT)
Facility: CLINIC | Age: 80
End: 2025-06-12

## 2025-06-12 RX ORDER — PREDNISONE 5 MG/1
TABLET ORAL
Qty: 1 EACH | Refills: 0 | Status: SHIPPED | OUTPATIENT
Start: 2025-06-12

## 2025-06-12 NOTE — TELEPHONE ENCOUNTER
Patient states she was seen on 6/9/25 for poison ivy. She states she has taken topical medications and it is not working. She states it has spread. Patient is requesting a prescription. Please advise.    Pharmacy: [facility] (on file)

## 2025-06-24 RX ORDER — PREDNISONE 5 MG/1
TABLET ORAL
Qty: 21 EACH | Refills: 0 | Status: SHIPPED | OUTPATIENT
Start: 2025-06-24

## 2025-06-24 NOTE — TELEPHONE ENCOUNTER
RX refill request from the patient/pharmacy. Patient last seen 06/09/2025 with labs, and next appt. scheduled for 07/07/2025.   Requested Prescriptions     Pending Prescriptions Disp Refills    predniSONE 5 MG (21) TBPK [Pharmacy Med Name: predniSONE Oral Tablet Therapy Pack 5 MG (21)]       Sig: TAKE AS DIRECTED

## 2025-06-27 RX ORDER — ROSUVASTATIN CALCIUM 20 MG/1
20 TABLET, COATED ORAL DAILY
Qty: 90 TABLET | Refills: 1 | Status: SHIPPED | OUTPATIENT
Start: 2025-06-27

## 2025-06-27 NOTE — TELEPHONE ENCOUNTER
Pharmacy: [facility]      rosuvastatin (CRESTOR) 20 MG tablet [[id number]]    Order Details  Dose: 20 mg Route: Oral Frequency: DAILY   Dispense Quantity: 90 tablet Refills: 1    Note to Pharmacy: Medication change per labs 11/13/24         Sig: Take 1 tablet by mouth daily

## 2025-06-30 ENCOUNTER — TRANSCRIBE ORDERS (OUTPATIENT)
Facility: HOSPITAL | Age: 80
End: 2025-06-30

## 2025-06-30 DIAGNOSIS — Z12.31 VISIT FOR SCREENING MAMMOGRAM: Primary | ICD-10-CM

## 2025-07-08 ENCOUNTER — HOSPITAL ENCOUNTER (OUTPATIENT)
Facility: HOSPITAL | Age: 80
Setting detail: OUTPATIENT SURGERY
Discharge: HOME | End: 2025-07-08
Attending: INTERNAL MEDICINE | Admitting: INTERNAL MEDICINE
Payer: MEDICARE

## 2025-07-08 ENCOUNTER — ANESTHESIA (OUTPATIENT)
Facility: HOSPITAL | Age: 80
End: 2025-07-08
Payer: MEDICARE

## 2025-07-08 ENCOUNTER — APPOINTMENT (OUTPATIENT)
Facility: HOSPITAL | Age: 80
End: 2025-07-08
Attending: INTERNAL MEDICINE
Payer: MEDICARE

## 2025-07-08 ENCOUNTER — ANESTHESIA EVENT (OUTPATIENT)
Facility: HOSPITAL | Age: 80
End: 2025-07-08
Payer: MEDICARE

## 2025-07-08 VITALS
HEART RATE: 87 BPM | DIASTOLIC BLOOD PRESSURE: 64 MMHG | BODY MASS INDEX: 33.12 KG/M2 | WEIGHT: 194 LBS | OXYGEN SATURATION: 98 % | TEMPERATURE: 97.6 F | HEIGHT: 64 IN | RESPIRATION RATE: 16 BRPM | SYSTOLIC BLOOD PRESSURE: 127 MMHG

## 2025-07-08 PROCEDURE — 7100000011 HC PHASE II RECOVERY - ADDTL 15 MIN: Performed by: INTERNAL MEDICINE

## 2025-07-08 PROCEDURE — 2500000003 HC RX 250 WO HCPCS: Performed by: NURSE ANESTHETIST, CERTIFIED REGISTERED

## 2025-07-08 PROCEDURE — 2580000003 HC RX 258: Performed by: INTERNAL MEDICINE

## 2025-07-08 PROCEDURE — 6360000002 HC RX W HCPCS: Performed by: NURSE ANESTHETIST, CERTIFIED REGISTERED

## 2025-07-08 PROCEDURE — 3700000000 HC ANESTHESIA ATTENDED CARE: Performed by: INTERNAL MEDICINE

## 2025-07-08 PROCEDURE — 2709999900 HC NON-CHARGEABLE SUPPLY: Performed by: INTERNAL MEDICINE

## 2025-07-08 PROCEDURE — 3600007503: Performed by: INTERNAL MEDICINE

## 2025-07-08 PROCEDURE — 3700000001 HC ADD 15 MINUTES (ANESTHESIA): Performed by: INTERNAL MEDICINE

## 2025-07-08 PROCEDURE — C1769 GUIDE WIRE: HCPCS | Performed by: INTERNAL MEDICINE

## 2025-07-08 PROCEDURE — 2720000010 HC SURG SUPPLY STERILE: Performed by: INTERNAL MEDICINE

## 2025-07-08 PROCEDURE — 3600007513: Performed by: INTERNAL MEDICINE

## 2025-07-08 PROCEDURE — 6360000004 HC RX CONTRAST MEDICATION: Performed by: INTERNAL MEDICINE

## 2025-07-08 PROCEDURE — 7100000010 HC PHASE II RECOVERY - FIRST 15 MIN: Performed by: INTERNAL MEDICINE

## 2025-07-08 RX ORDER — SUCCINYLCHOLINE/SOD CL,ISO/PF 200MG/10ML
SYRINGE (ML) INTRAVENOUS
Status: DISCONTINUED | OUTPATIENT
Start: 2025-07-08 | End: 2025-07-08 | Stop reason: SDUPTHER

## 2025-07-08 RX ORDER — LIDOCAINE HYDROCHLORIDE 20 MG/ML
INJECTION, SOLUTION EPIDURAL; INFILTRATION; INTRACAUDAL; PERINEURAL
Status: DISCONTINUED | OUTPATIENT
Start: 2025-07-08 | End: 2025-07-08 | Stop reason: SDUPTHER

## 2025-07-08 RX ORDER — FENTANYL CITRATE 50 UG/ML
INJECTION, SOLUTION INTRAMUSCULAR; INTRAVENOUS
Status: DISCONTINUED | OUTPATIENT
Start: 2025-07-08 | End: 2025-07-08 | Stop reason: SDUPTHER

## 2025-07-08 RX ORDER — PROPOFOL 10 MG/ML
INJECTION, EMULSION INTRAVENOUS
Status: DISCONTINUED | OUTPATIENT
Start: 2025-07-08 | End: 2025-07-08 | Stop reason: SDUPTHER

## 2025-07-08 RX ORDER — SODIUM CHLORIDE 9 MG/ML
INJECTION, SOLUTION INTRAVENOUS PRN
Status: DISCONTINUED | OUTPATIENT
Start: 2025-07-08 | End: 2025-07-08 | Stop reason: HOSPADM

## 2025-07-08 RX ORDER — SODIUM CHLORIDE 0.9 % (FLUSH) 0.9 %
5-40 SYRINGE (ML) INJECTION EVERY 12 HOURS SCHEDULED
Status: DISCONTINUED | OUTPATIENT
Start: 2025-07-08 | End: 2025-07-08 | Stop reason: HOSPADM

## 2025-07-08 RX ORDER — ONDANSETRON 2 MG/ML
INJECTION INTRAMUSCULAR; INTRAVENOUS
Status: DISCONTINUED | OUTPATIENT
Start: 2025-07-08 | End: 2025-07-08 | Stop reason: SDUPTHER

## 2025-07-08 RX ORDER — IOPAMIDOL 612 MG/ML
INJECTION, SOLUTION INTRAVASCULAR PRN
Status: DISCONTINUED | OUTPATIENT
Start: 2025-07-08 | End: 2025-07-08 | Stop reason: ALTCHOICE

## 2025-07-08 RX ORDER — DEXAMETHASONE SODIUM PHOSPHATE 4 MG/ML
INJECTION, SOLUTION INTRA-ARTICULAR; INTRALESIONAL; INTRAMUSCULAR; INTRAVENOUS; SOFT TISSUE
Status: DISCONTINUED | OUTPATIENT
Start: 2025-07-08 | End: 2025-07-08 | Stop reason: SDUPTHER

## 2025-07-08 RX ORDER — ROCURONIUM BROMIDE 10 MG/ML
INJECTION, SOLUTION INTRAVENOUS
Status: DISCONTINUED | OUTPATIENT
Start: 2025-07-08 | End: 2025-07-08 | Stop reason: SDUPTHER

## 2025-07-08 RX ORDER — VIT C/E/ZN/COPPR/LUTEIN/ZEAXAN 250MG-90MG
CAPSULE ORAL
COMMUNITY

## 2025-07-08 RX ORDER — SODIUM CHLORIDE, SODIUM LACTATE, POTASSIUM CHLORIDE, CALCIUM CHLORIDE 600; 310; 30; 20 MG/100ML; MG/100ML; MG/100ML; MG/100ML
INJECTION, SOLUTION INTRAVENOUS CONTINUOUS
Status: DISCONTINUED | OUTPATIENT
Start: 2025-07-08 | End: 2025-07-08 | Stop reason: HOSPADM

## 2025-07-08 RX ORDER — SODIUM CHLORIDE 0.9 % (FLUSH) 0.9 %
5-40 SYRINGE (ML) INJECTION PRN
Status: DISCONTINUED | OUTPATIENT
Start: 2025-07-08 | End: 2025-07-08 | Stop reason: HOSPADM

## 2025-07-08 RX ORDER — PHENYLEPHRINE HCL IN 0.9% NACL 0.4MG/10ML
SYRINGE (ML) INTRAVENOUS
Status: DISCONTINUED | OUTPATIENT
Start: 2025-07-08 | End: 2025-07-08 | Stop reason: SDUPTHER

## 2025-07-08 RX ADMIN — FENTANYL CITRATE 25 MCG: 50 INJECTION INTRAMUSCULAR; INTRAVENOUS at 14:12

## 2025-07-08 RX ADMIN — Medication 80 MCG: at 14:46

## 2025-07-08 RX ADMIN — ONDANSETRON 4 MG: 2 INJECTION, SOLUTION INTRAMUSCULAR; INTRAVENOUS at 14:45

## 2025-07-08 RX ADMIN — PROPOFOL 120 MG: 10 INJECTION, EMULSION INTRAVENOUS at 14:12

## 2025-07-08 RX ADMIN — FENTANYL CITRATE 25 MCG: 50 INJECTION INTRAMUSCULAR; INTRAVENOUS at 14:31

## 2025-07-08 RX ADMIN — Medication 140 MG: at 14:13

## 2025-07-08 RX ADMIN — DEXAMETHASONE SODIUM PHOSPHATE 4 MG: 4 INJECTION, SOLUTION INTRAMUSCULAR; INTRAVENOUS at 14:30

## 2025-07-08 RX ADMIN — ROCURONIUM BROMIDE 5 MG: 10 INJECTION, SOLUTION INTRAVENOUS at 14:12

## 2025-07-08 RX ADMIN — SODIUM CHLORIDE, POTASSIUM CHLORIDE, SODIUM LACTATE AND CALCIUM CHLORIDE: 600; 310; 30; 20 INJECTION, SOLUTION INTRAVENOUS at 13:57

## 2025-07-08 RX ADMIN — LIDOCAINE HYDROCHLORIDE 50 MG: 20 INJECTION, SOLUTION EPIDURAL; INFILTRATION; INTRACAUDAL; PERINEURAL at 14:12

## 2025-07-08 NOTE — OP NOTE
injected during this procedure. I performed all immediate radiologic interpretation during this procedure.     Specimen Removed: none    Complications: None.     EBL:  None.    Interventions:    Pancreatic: see above  Biliary: see above    Impression:   Removal of previously placed plastic biliary stent  No residual choledocholithiasis    Recommendations:      1. Watch for complications, including cholangitis, pancreatitis, bleeding, and perforation.   2. IV LR   3. PRN pain medication and anti-emetics  4. Clear liquid diet if patient is pain free.  5. If patient has progressive abdominal pain and/or change in abdominal exam, please check amylase, lipase, CBC, CMP, and upright KUB.   6. Follow up with PCP. Please call with any questions.        Signed By: Siddharth Perdomo MD     7/8/2025  2:51 PM

## 2025-07-08 NOTE — H&P
[facility] [address] [address]        History and Physical       NAME:  Yulisa Daniel   :   1945   MRN:   [id number]             History of Present Illness:  Patient is a 79 y.o. who is seen for choledocholithiasis.     PMH:  Past Medical History:   Diagnosis Date    Anxiety     Arthritis     Chronic constipation     CKD (chronic kidney disease) 2017    Colon polyps 2017    DJD (degenerative joint disease) 2017    Elevated LFTs 2017    Flu 2019    GERD (gastroesophageal reflux disease)     Glucose intolerance (impaired glucose tolerance) 2017    Hemorrhoids     internal & external- bleeds frequently    High cholesterol     Hyperlipidemia 2017    Hypertension     Hypertension with renal disease 2017    PT Education - High Blood Pressure (Essential Hypertension) *: blood pressure PT Education - How to access health information online: discussed with patient and provided information    Hypertension, renal 2017    Ill-defined condition     ringing in ears    Menopause     Mild obesity 2017    Nausea & vomiting     Neoplasm of uncertain behavior of skin 2017    On statin therapy 2017    Osteopenia 2017    Primary angle-closure glaucoma 2017    Comments: OU    Skin cancer of face     as of 6/15/16 pt states \"removed years ago\"    Spinal stenosis        PSH:  Past Surgical History:   Procedure Laterality Date    BREAST BIOPSY Right 2021    CATARACT REMOVAL Bilateral 2020    CHOLECYSTECTOMY  2014    Dr Thalia Delgado    COLSC FLX W/REMOVAL LESION BY HOT BX FORCEPS  2012         ERCP N/A 2025    ENDOSCOPIC RETROGRADE CHOLANGIOPANCREATOGRAPHY performed by Siddharth Perdomo MD at [facility] ENDOSCOPY    ERCP REMOVE CALCULI/DEBRIS BILIARY/PANCREAS DUCT  2014         ERCP W/SPHINCTEROTOMY/PAPILLOTOMY  2014         HEENT      laser for glaucoma    HYSTERECTOMY (CERVIX STATUS

## 2025-07-08 NOTE — ANESTHESIA POSTPROCEDURE EVALUATION
Department of Anesthesiology  Postprocedure Note    Patient: Yulisa Daniel  MRN: [id number]  YOB: 1945  Date of evaluation: 7/8/2025    Procedure Summary       Date: 07/08/25 Room / Location: [facility] ENDO 08 / [facility] ENDOSCOPY    Anesthesia Start: 1357 Anesthesia Stop: 1500    Procedure: ENDOSCOPIC RETROGRADE CHOLANGIOPANCREATOGRAPHY WITH STENT REMOVAL Diagnosis:       Choledocholithiasis      (Choledocholithiasis [K80.50])    Surgeons: Siddharth Perdomo MD Responsible Provider: Elian Dumont MD    Anesthesia Type: General ASA Status: 2            Anesthesia Type: General    Loreto Phase I: Loreto Score: 10    Loreto Phase II: Loreto Score: 9    Anesthesia Post Evaluation    Patient location during evaluation: bedside  Nausea & Vomiting: no nausea  Cardiovascular status: blood pressure returned to baseline  Respiratory status: acceptable  Hydration status: euvolemic    No notable events documented.

## 2025-07-08 NOTE — ANESTHESIA PRE PROCEDURE
Current packs/day: 0.00     Average packs/day: 1.5 packs/day for 30.0 years (45.0 ttl pk-yrs)     Types: Cigarettes     Start date: 1974     Quit date: 2004     Years since quittin.6     Passive exposure: Past   • Smokeless tobacco: Never   Substance Use Topics   • Alcohol use: No                                Counseling given: Not Answered      Vital Signs (Current): There were no vitals filed for this visit.                                           BP Readings from Last 3 Encounters:   25 124/72   25 130/82   25 123/76       NPO Status:                                                                                 BMI:   Wt Readings from Last 3 Encounters:   25 87.4 kg (192 lb 9.6 oz)   25 88 kg (194 lb)   25 90.7 kg (200 lb)     There is no height or weight on file to calculate BMI.    CBC:   Lab Results   Component Value Date/Time    WBC 6.9 2025 03:58 PM    RBC 4.40 2025 03:58 PM    HGB 10.1 2025 03:58 PM    HCT 33.7 2025 03:58 PM    MCV 76.6 2025 03:58 PM    RDW 15.9 2025 03:58 PM     2025 03:58 PM       CMP:   Lab Results   Component Value Date/Time     2025 03:58 PM    K 4.1 2025 03:58 PM     2025 03:58 PM    CO2 28 2025 03:58 PM    BUN 20 2025 03:58 PM    CREATININE 0.76 2025 03:58 PM    GFRAA >60 2022 11:09 AM    AGRATIO 1.1 2023 11:15 AM    LABGLOM 81 2025 03:58 PM    LABGLOM >60 2024 11:33 AM    LABGLOM >60 2023 11:15 AM    GLUCOSE 108 2025 03:58 PM    CALCIUM 10.1 2025 03:58 PM    BILITOT 0.5 2025 03:58 PM    ALKPHOS 100 2025 03:58 PM    ALKPHOS 99 2023 11:15 AM    AST 18 2025 03:58 PM    ALT 23 2025 03:58 PM       POC Tests: No results for input(s): \"POCGLU\", \"POCNA\", \"POCK\", \"POCCL\", \"POCBUN\", \"POCHEMO\", \"POCHCT\" in the last 72 hours.    Coags: No results found for: \"PROTIME\",

## 2025-07-08 NOTE — DISCHARGE INSTRUCTIONS
[facility]  [address] [place], [place] [address]    ERCP DISCHARGE INSTRUCTIONS    Yulisa Daniel  [id number]  1945    Discomfort:  Sore throat- throat lozenges or warm salt water gargle  redness at IV site- apply warm compress to area; if redness or soreness persist- contact your physician  Gaseous discomfort- walking, belching will help relieve any discomfort  You may not operate a vehicle for 12 hours  You may not engage in an occupation involving machinery or appliances for rest of today  You may not drink alcoholic beverages for at least 12 hours  Avoid making any critical decisions for at least 24 hour  DIET  You may resume a clear liquid diet today. If this is tolerated and you remain pain free, you may advance your diet to soft solids tomorrow.    ACTIVITY  You may resume your normal daily activities   Spend the remainder of the day resting -  avoid any strenuous activity.    CALL M.D.  ANY SIGN OF   Increasing pain, nausea, vomiting  Abdominal distension (swelling)  New increased bleeding (oral or rectal)  Fever (chills)  Pain in chest area  Bloody discharge from nose or mouth  Shortness of breath    Follow-up Instructions:   Call Dr. Siddharth Perdomo for any questions or problems.  Telephone # [phone]    ENDOSCOPY FINDINGS:   Your previously placed plastic biliary stent was removed. There were no residual stones seen today. A stent did not need to be replaced. Please call us with any questions.    Signed By: Siddharth Perdomo MD     7/8/2025  3:27 PM

## 2025-07-08 NOTE — PROGRESS NOTES
Verified patient name and date of birth, scheduled procedure, and informed consent. Reviewed general discharge instructions and  information.  Assessed patient. Awake, alert, and oriented per baseline. Vital signs stable (see vital sign flowsheet). Respiratory status within defined limits, abdomen soft and non tender. Skin with in defined limits.     Initial RN admission and assessment performed and documented in Endoscopy navigator.     Patient evaluated by anesthesia in pre-procedure holding.     All procedural vital signs, airway assessment, and level of consciousness information monitored and recorded by anesthesia staff on the anesthesia record.     Report received from CRNA post procedure.  Patient transported to recovery area by RN.    Endoscopy post procedure time out was performed and specimens were verified with physician.    Endoscope was pre-cleaned at bedside immediately following procedure by [facility].

## 2025-07-08 NOTE — PROGRESS NOTES
Pt complaint of cough and feeling like she can breathe. Pt adv that it may be nervous or anxiety. She has been coughing and clearing her throat a bit. VS wnl and  made aware. As patient wakes up more she advised that he is feeling better less anxious. Has a bit of a tickle in there throat. Pt encouraged to use cough drops and warm salt water gargles at home.  Pt verbalis ed understanding.

## 2025-08-14 ENCOUNTER — OFFICE VISIT (OUTPATIENT)
Facility: CLINIC | Age: 80
End: 2025-08-14

## 2025-08-14 VITALS
WEIGHT: 196.9 LBS | HEART RATE: 77 BPM | BODY MASS INDEX: 34.33 KG/M2 | OXYGEN SATURATION: 98 % | DIASTOLIC BLOOD PRESSURE: 77 MMHG | TEMPERATURE: 97.7 F | SYSTOLIC BLOOD PRESSURE: 135 MMHG

## 2025-08-14 DIAGNOSIS — M15.0 PRIMARY OSTEOARTHRITIS INVOLVING MULTIPLE JOINTS: ICD-10-CM

## 2025-08-14 DIAGNOSIS — E66.811 CLASS 1 OBESITY DUE TO EXCESS CALORIES WITHOUT SERIOUS COMORBIDITY WITH BODY MASS INDEX (BMI) OF 31.0 TO 31.9 IN ADULT: ICD-10-CM

## 2025-08-14 DIAGNOSIS — I12.9 HYPERTENSION WITH RENAL DISEASE: Primary | ICD-10-CM

## 2025-08-14 DIAGNOSIS — E66.09 CLASS 1 OBESITY DUE TO EXCESS CALORIES WITHOUT SERIOUS COMORBIDITY WITH BODY MASS INDEX (BMI) OF 31.0 TO 31.9 IN ADULT: ICD-10-CM

## 2025-08-14 DIAGNOSIS — K21.9 GASTROESOPHAGEAL REFLUX DISEASE WITHOUT ESOPHAGITIS: ICD-10-CM

## 2025-08-14 DIAGNOSIS — E78.2 MIXED HYPERLIPIDEMIA: ICD-10-CM

## 2025-08-14 DIAGNOSIS — R73.02 GLUCOSE INTOLERANCE (IMPAIRED GLUCOSE TOLERANCE): ICD-10-CM

## 2025-08-14 LAB
ALBUMIN SERPL-MCNC: 3.6 G/DL (ref 3.5–5.2)
ALBUMIN/GLOB SERPL: 1.1 (ref 1.1–2.2)
ALP SERPL-CCNC: 101 U/L (ref 35–104)
ALT SERPL-CCNC: 17 U/L (ref 10–35)
ANION GAP SERPL CALC-SCNC: 11 MMOL/L (ref 2–14)
AST SERPL-CCNC: 26 U/L (ref 10–35)
BILIRUB SERPL-MCNC: 0.5 MG/DL (ref 0–1.2)
BUN SERPL-MCNC: 17 MG/DL (ref 8–23)
BUN/CREAT SERPL: 25 (ref 12–20)
CALCIUM SERPL-MCNC: 9.6 MG/DL (ref 8.8–10.2)
CHLORIDE SERPL-SCNC: 102 MMOL/L (ref 98–107)
CHOLEST SERPL-MCNC: 176 MG/DL (ref 0–200)
CK SERPL-CCNC: 62 U/L (ref 26–192)
CO2 SERPL-SCNC: 26 MMOL/L (ref 20–29)
CREAT SERPL-MCNC: 0.68 MG/DL (ref 0.6–1)
EST. AVERAGE GLUCOSE BLD GHB EST-MCNC: 130 MG/DL
GLOBULIN SER CALC-MCNC: 3.2 G/DL (ref 2–4)
GLUCOSE SERPL-MCNC: 97 MG/DL (ref 65–100)
HBA1C MFR BLD: 6.2 % (ref 4–5.6)
HDLC SERPL-MCNC: 55 MG/DL (ref 40–60)
HDLC SERPL: 3.2 (ref 0–5)
LDLC SERPL CALC-MCNC: 88 MG/DL (ref 0–100)
POTASSIUM SERPL-SCNC: 4.5 MMOL/L (ref 3.5–5.1)
PROT SERPL-MCNC: 6.8 G/DL (ref 6.4–8.3)
SODIUM SERPL-SCNC: 139 MMOL/L (ref 136–145)
TRIGL SERPL-MCNC: 165 MG/DL (ref 0–150)
VLDLC SERPL CALC-MCNC: 33 MG/DL

## 2025-08-18 ENCOUNTER — RESULTS FOLLOW-UP (OUTPATIENT)
Facility: CLINIC | Age: 80
End: 2025-08-18

## 2025-08-20 ENCOUNTER — HOSPITAL ENCOUNTER (OUTPATIENT)
Facility: HOSPITAL | Age: 80
Discharge: HOME | End: 2025-08-23
Attending: INTERNAL MEDICINE
Payer: MEDICARE

## 2025-08-20 DIAGNOSIS — Z12.31 VISIT FOR SCREENING MAMMOGRAM: ICD-10-CM

## 2025-08-20 PROCEDURE — 77063 BREAST TOMOSYNTHESIS BI: CPT

## (undated) DEVICE — DEVICE GRSP L230CM SHTH DIA2.4MM HYBRID JAW FLX DST WIRE

## (undated) DEVICE — GARMENT,MEDLINE,DVT,INT,CALF,LG, GEN2: Brand: MEDLINE

## (undated) DEVICE — SPHINCTEROTOME: Brand: DREAMTOME™ RX 44

## (undated) DEVICE — RETRIEVAL BALLOON CATHETER: Brand: EXTRACTOR™ PRO RX